# Patient Record
Sex: FEMALE | Race: WHITE | Employment: OTHER | URBAN - METROPOLITAN AREA
[De-identification: names, ages, dates, MRNs, and addresses within clinical notes are randomized per-mention and may not be internally consistent; named-entity substitution may affect disease eponyms.]

---

## 2017-03-21 ENCOUNTER — OFFICE VISIT (OUTPATIENT)
Dept: PULMONOLOGY | Age: 72
End: 2017-03-21

## 2017-03-21 VITALS
BODY MASS INDEX: 45.54 KG/M2 | HEART RATE: 106 BPM | DIASTOLIC BLOOD PRESSURE: 66 MMHG | OXYGEN SATURATION: 94 % | SYSTOLIC BLOOD PRESSURE: 113 MMHG | WEIGHT: 257 LBS | HEIGHT: 63 IN | TEMPERATURE: 98 F | RESPIRATION RATE: 18 BRPM

## 2017-03-21 DIAGNOSIS — J44.9 COPD, SEVERE (HCC): Primary | ICD-10-CM

## 2017-03-21 PROCEDURE — 99214 OFFICE O/P EST MOD 30 MIN: CPT | Performed by: INTERNAL MEDICINE

## 2017-03-21 RX ORDER — CHOLECALCIFEROL (VITAMIN D3) 25 MCG
CAPSULE ORAL
COMMUNITY
End: 2020-09-24 | Stop reason: CLARIF

## 2017-03-21 RX ORDER — ALBUTEROL SULFATE 90 UG/1
2 AEROSOL, METERED RESPIRATORY (INHALATION) EVERY 6 HOURS PRN
Qty: 1 INHALER | Refills: 5 | Status: SHIPPED | OUTPATIENT
Start: 2017-03-21 | End: 2017-08-01 | Stop reason: ALTCHOICE

## 2017-03-21 ASSESSMENT — SLEEP AND FATIGUE QUESTIONNAIRES
HOW LIKELY ARE YOU TO NOD OFF OR FALL ASLEEP WHILE SITTING AND READING: 0
HOW LIKELY ARE YOU TO NOD OFF OR FALL ASLEEP WHILE SITTING QUIETLY AFTER LUNCH WITHOUT ALCOHOL: 0
NECK CIRCUMFERENCE (INCHES): 16.25
HOW LIKELY ARE YOU TO NOD OFF OR FALL ASLEEP WHILE SITTING INACTIVE IN A PUBLIC PLACE: 0
HOW LIKELY ARE YOU TO NOD OFF OR FALL ASLEEP WHILE SITTING AND TALKING TO SOMEONE: 0
ESS TOTAL SCORE: 4
HOW LIKELY ARE YOU TO NOD OFF OR FALL ASLEEP WHILE LYING DOWN TO REST IN THE AFTERNOON WHEN CIRCUMSTANCES PERMIT: 3
HOW LIKELY ARE YOU TO NOD OFF OR FALL ASLEEP WHILE WATCHING TV: 1
HOW LIKELY ARE YOU TO NOD OFF OR FALL ASLEEP IN A CAR, WHILE STOPPED FOR A FEW MINUTES IN TRAFFIC: 0
HOW LIKELY ARE YOU TO NOD OFF OR FALL ASLEEP WHEN YOU ARE A PASSENGER IN A CAR FOR AN HOUR WITHOUT A BREAK: 0

## 2017-08-01 ENCOUNTER — OFFICE VISIT (OUTPATIENT)
Dept: PULMONOLOGY | Age: 72
End: 2017-08-01

## 2017-08-01 VITALS
DIASTOLIC BLOOD PRESSURE: 62 MMHG | OXYGEN SATURATION: 96 % | WEIGHT: 253.4 LBS | SYSTOLIC BLOOD PRESSURE: 118 MMHG | TEMPERATURE: 98 F | RESPIRATION RATE: 18 BRPM | HEIGHT: 63 IN | BODY MASS INDEX: 44.9 KG/M2 | HEART RATE: 90 BPM

## 2017-08-01 DIAGNOSIS — J44.9 COPD, SEVERE (HCC): Primary | ICD-10-CM

## 2017-08-01 DIAGNOSIS — E66.2 OBESITY HYPOVENTILATION SYNDROME (HCC): ICD-10-CM

## 2017-08-01 DIAGNOSIS — J96.11 HYPOXEMIC RESPIRATORY FAILURE, CHRONIC (HCC): ICD-10-CM

## 2017-08-01 PROCEDURE — 99214 OFFICE O/P EST MOD 30 MIN: CPT | Performed by: INTERNAL MEDICINE

## 2017-08-01 RX ORDER — FLUTICASONE PROPIONATE AND SALMETEROL 232; 14 UG/1; UG/1
1 POWDER, METERED RESPIRATORY (INHALATION) 2 TIMES DAILY
Qty: 1 EACH | Refills: 5 | Status: SHIPPED | OUTPATIENT
Start: 2017-08-01 | End: 2018-01-31

## 2017-09-19 RX ORDER — TIOTROPIUM BROMIDE 18 UG/1
CAPSULE ORAL; RESPIRATORY (INHALATION)
Qty: 30 CAPSULE | Refills: 5 | Status: SHIPPED | OUTPATIENT
Start: 2017-09-19 | End: 2018-01-31 | Stop reason: SDUPTHER

## 2018-01-31 ENCOUNTER — OFFICE VISIT (OUTPATIENT)
Dept: PULMONOLOGY | Age: 73
End: 2018-01-31

## 2018-01-31 VITALS
DIASTOLIC BLOOD PRESSURE: 68 MMHG | WEIGHT: 249 LBS | HEIGHT: 63 IN | RESPIRATION RATE: 18 BRPM | SYSTOLIC BLOOD PRESSURE: 130 MMHG | HEART RATE: 82 BPM | TEMPERATURE: 97.8 F | OXYGEN SATURATION: 96 % | BODY MASS INDEX: 44.12 KG/M2

## 2018-01-31 DIAGNOSIS — J96.11 CHRONIC HYPOXEMIC RESPIRATORY FAILURE (HCC): Primary | ICD-10-CM

## 2018-01-31 DIAGNOSIS — J44.9 CHRONIC OBSTRUCTIVE PULMONARY DISEASE, UNSPECIFIED COPD TYPE (HCC): ICD-10-CM

## 2018-01-31 PROCEDURE — 1036F TOBACCO NON-USER: CPT | Performed by: INTERNAL MEDICINE

## 2018-01-31 PROCEDURE — G8427 DOCREV CUR MEDS BY ELIG CLIN: HCPCS | Performed by: INTERNAL MEDICINE

## 2018-01-31 PROCEDURE — 3023F SPIROM DOC REV: CPT | Performed by: INTERNAL MEDICINE

## 2018-01-31 PROCEDURE — G8400 PT W/DXA NO RESULTS DOC: HCPCS | Performed by: INTERNAL MEDICINE

## 2018-01-31 PROCEDURE — G8926 SPIRO NO PERF OR DOC: HCPCS | Performed by: INTERNAL MEDICINE

## 2018-01-31 PROCEDURE — G8484 FLU IMMUNIZE NO ADMIN: HCPCS | Performed by: INTERNAL MEDICINE

## 2018-01-31 PROCEDURE — 1123F ACP DISCUSS/DSCN MKR DOCD: CPT | Performed by: INTERNAL MEDICINE

## 2018-01-31 PROCEDURE — 99214 OFFICE O/P EST MOD 30 MIN: CPT | Performed by: INTERNAL MEDICINE

## 2018-01-31 PROCEDURE — 3014F SCREEN MAMMO DOC REV: CPT | Performed by: INTERNAL MEDICINE

## 2018-01-31 PROCEDURE — 4040F PNEUMOC VAC/ADMIN/RCVD: CPT | Performed by: INTERNAL MEDICINE

## 2018-01-31 PROCEDURE — 3017F COLORECTAL CA SCREEN DOC REV: CPT | Performed by: INTERNAL MEDICINE

## 2018-01-31 PROCEDURE — G8417 CALC BMI ABV UP PARAM F/U: HCPCS | Performed by: INTERNAL MEDICINE

## 2018-01-31 PROCEDURE — 1090F PRES/ABSN URINE INCON ASSESS: CPT | Performed by: INTERNAL MEDICINE

## 2018-01-31 RX ORDER — ALBUTEROL SULFATE 2.5 MG/3ML
2.5 SOLUTION RESPIRATORY (INHALATION) EVERY 4 HOURS PRN
Qty: 360 ML | Refills: 3 | Status: SHIPPED | OUTPATIENT
Start: 2018-01-31 | End: 2018-10-30 | Stop reason: SDUPTHER

## 2018-01-31 ASSESSMENT — SLEEP AND FATIGUE QUESTIONNAIRES
HOW LIKELY ARE YOU TO NOD OFF OR FALL ASLEEP WHEN YOU ARE A PASSENGER IN A CAR FOR AN HOUR WITHOUT A BREAK: 0
NECK CIRCUMFERENCE (INCHES): 16
HOW LIKELY ARE YOU TO NOD OFF OR FALL ASLEEP WHILE SITTING AND READING: 0
HOW LIKELY ARE YOU TO NOD OFF OR FALL ASLEEP IN A CAR, WHILE STOPPED FOR A FEW MINUTES IN TRAFFIC: 0
HOW LIKELY ARE YOU TO NOD OFF OR FALL ASLEEP WHILE SITTING AND TALKING TO SOMEONE: 0
HOW LIKELY ARE YOU TO NOD OFF OR FALL ASLEEP WHILE SITTING INACTIVE IN A PUBLIC PLACE: 0
HOW LIKELY ARE YOU TO NOD OFF OR FALL ASLEEP WHILE LYING DOWN TO REST IN THE AFTERNOON WHEN CIRCUMSTANCES PERMIT: 3
HOW LIKELY ARE YOU TO NOD OFF OR FALL ASLEEP WHILE WATCHING TV: 1
ESS TOTAL SCORE: 4
HOW LIKELY ARE YOU TO NOD OFF OR FALL ASLEEP WHILE SITTING QUIETLY AFTER LUNCH WITHOUT ALCOHOL: 0

## 2018-07-31 ENCOUNTER — OFFICE VISIT (OUTPATIENT)
Dept: PULMONOLOGY | Age: 73
End: 2018-07-31

## 2018-07-31 VITALS
TEMPERATURE: 97.9 F | HEIGHT: 63 IN | BODY MASS INDEX: 44.16 KG/M2 | RESPIRATION RATE: 16 BRPM | SYSTOLIC BLOOD PRESSURE: 138 MMHG | HEART RATE: 97 BPM | DIASTOLIC BLOOD PRESSURE: 62 MMHG | WEIGHT: 249.2 LBS | OXYGEN SATURATION: 96 %

## 2018-07-31 DIAGNOSIS — E66.2 OBESITY HYPOVENTILATION SYNDROME (HCC): ICD-10-CM

## 2018-07-31 DIAGNOSIS — J44.9 CHRONIC OBSTRUCTIVE PULMONARY DISEASE, UNSPECIFIED COPD TYPE (HCC): Primary | ICD-10-CM

## 2018-07-31 DIAGNOSIS — J96.11 CHRONIC HYPOXEMIC RESPIRATORY FAILURE (HCC): ICD-10-CM

## 2018-07-31 PROCEDURE — 3023F SPIROM DOC REV: CPT | Performed by: INTERNAL MEDICINE

## 2018-07-31 PROCEDURE — G8400 PT W/DXA NO RESULTS DOC: HCPCS | Performed by: INTERNAL MEDICINE

## 2018-07-31 PROCEDURE — G8417 CALC BMI ABV UP PARAM F/U: HCPCS | Performed by: INTERNAL MEDICINE

## 2018-07-31 PROCEDURE — 4040F PNEUMOC VAC/ADMIN/RCVD: CPT | Performed by: INTERNAL MEDICINE

## 2018-07-31 PROCEDURE — G8926 SPIRO NO PERF OR DOC: HCPCS | Performed by: INTERNAL MEDICINE

## 2018-07-31 PROCEDURE — 1090F PRES/ABSN URINE INCON ASSESS: CPT | Performed by: INTERNAL MEDICINE

## 2018-07-31 PROCEDURE — G8427 DOCREV CUR MEDS BY ELIG CLIN: HCPCS | Performed by: INTERNAL MEDICINE

## 2018-07-31 PROCEDURE — 99214 OFFICE O/P EST MOD 30 MIN: CPT | Performed by: INTERNAL MEDICINE

## 2018-07-31 PROCEDURE — 3017F COLORECTAL CA SCREEN DOC REV: CPT | Performed by: INTERNAL MEDICINE

## 2018-07-31 PROCEDURE — 1123F ACP DISCUSS/DSCN MKR DOCD: CPT | Performed by: INTERNAL MEDICINE

## 2018-07-31 PROCEDURE — 1101F PT FALLS ASSESS-DOCD LE1/YR: CPT | Performed by: INTERNAL MEDICINE

## 2018-07-31 PROCEDURE — 1036F TOBACCO NON-USER: CPT | Performed by: INTERNAL MEDICINE

## 2018-07-31 RX ORDER — AZITHROMYCIN 250 MG/1
TABLET, FILM COATED ORAL
Qty: 1 PACKET | Refills: 0 | Status: SHIPPED | OUTPATIENT
Start: 2018-07-31 | End: 2018-08-16 | Stop reason: ALTCHOICE

## 2018-07-31 RX ORDER — PREDNISONE 10 MG/1
TABLET ORAL
Qty: 30 TABLET | Refills: 0 | Status: SHIPPED | OUTPATIENT
Start: 2018-07-31 | End: 2018-08-16 | Stop reason: ALTCHOICE

## 2018-07-31 NOTE — PROGRESS NOTES
2005     Plan:   1. Prednisone taper, Zpac and robitussin DM   Spiriva q day; Advair 500/50 (has failed symbicort - is not effective)   2. Continue Albuterol prn  3. Weight reduction is recommended. 4.  3 L oxygen continuous   5. Patient has declined low dose chest CT for lung cancer screening     6.   See me in 6 months

## 2018-08-14 ENCOUNTER — TELEPHONE (OUTPATIENT)
Dept: PULMONOLOGY | Age: 73
End: 2018-08-14

## 2018-08-14 RX ORDER — CEFDINIR 300 MG/1
300 CAPSULE ORAL 2 TIMES DAILY
Qty: 20 CAPSULE | Refills: 0 | Status: SHIPPED | OUTPATIENT
Start: 2018-08-14 | End: 2018-08-24

## 2018-08-14 RX ORDER — PREDNISONE 10 MG/1
TABLET ORAL
Qty: 25 TABLET | Refills: 0 | Status: SHIPPED | OUTPATIENT
Start: 2018-08-14 | End: 2018-08-24

## 2018-08-16 ENCOUNTER — HOSPITAL ENCOUNTER (OUTPATIENT)
Age: 73
Discharge: HOME | End: 2018-08-16
Payer: MEDICARE

## 2018-08-16 ENCOUNTER — OFFICE VISIT (OUTPATIENT)
Dept: PULMONOLOGY | Age: 73
End: 2018-08-16

## 2018-08-16 ENCOUNTER — HOSPITAL ENCOUNTER (OUTPATIENT)
Dept: GENERAL RADIOLOGY | Age: 73
Discharge: HOME | End: 2018-08-16
Payer: MEDICARE

## 2018-08-16 VITALS
HEART RATE: 94 BPM | WEIGHT: 246 LBS | HEIGHT: 64 IN | BODY MASS INDEX: 42 KG/M2 | RESPIRATION RATE: 20 BRPM | SYSTOLIC BLOOD PRESSURE: 132 MMHG | TEMPERATURE: 98 F | OXYGEN SATURATION: 97 % | DIASTOLIC BLOOD PRESSURE: 74 MMHG

## 2018-08-16 DIAGNOSIS — J18.9 COMMUNITY ACQUIRED PNEUMONIA, UNSPECIFIED LATERALITY: ICD-10-CM

## 2018-08-16 DIAGNOSIS — Z51.81 THERAPEUTIC DRUG MONITORING: ICD-10-CM

## 2018-08-16 DIAGNOSIS — J44.1 COPD EXACERBATION (HCC): ICD-10-CM

## 2018-08-16 DIAGNOSIS — J18.9 COMMUNITY ACQUIRED PNEUMONIA, UNSPECIFIED LATERALITY: Primary | ICD-10-CM

## 2018-08-16 LAB
ALBUMIN SERPL-MCNC: 3.9 G/DL (ref 3.4–5)
ANION GAP SERPL CALCULATED.3IONS-SCNC: 11 MMOL/L (ref 3–16)
BASOPHILS ABSOLUTE: 0.1 K/UL (ref 0–0.2)
BASOPHILS RELATIVE PERCENT: 0.7 %
BUN BLDV-MCNC: 31 MG/DL (ref 7–20)
CALCIUM SERPL-MCNC: 10.5 MG/DL (ref 8.3–10.6)
CHLORIDE BLD-SCNC: 102 MMOL/L (ref 99–110)
CO2: 30 MMOL/L (ref 21–32)
CREAT SERPL-MCNC: 1.4 MG/DL (ref 0.6–1.2)
EOSINOPHILS ABSOLUTE: 0 K/UL (ref 0–0.6)
EOSINOPHILS RELATIVE PERCENT: 0.2 %
GFR AFRICAN AMERICAN: 44
GFR NON-AFRICAN AMERICAN: 37
GLUCOSE BLD-MCNC: 101 MG/DL (ref 70–99)
HCT VFR BLD CALC: 36 % (ref 36–48)
HEMOGLOBIN: 11.7 G/DL (ref 12–16)
LYMPHOCYTES ABSOLUTE: 1.1 K/UL (ref 1–5.1)
LYMPHOCYTES RELATIVE PERCENT: 8.5 %
MCH RBC QN AUTO: 31.6 PG (ref 26–34)
MCHC RBC AUTO-ENTMCNC: 32.6 G/DL (ref 31–36)
MCV RBC AUTO: 96.8 FL (ref 80–100)
MONOCYTES ABSOLUTE: 0.5 K/UL (ref 0–1.3)
MONOCYTES RELATIVE PERCENT: 4.2 %
NEUTROPHILS ABSOLUTE: 11.1 K/UL (ref 1.7–7.7)
NEUTROPHILS RELATIVE PERCENT: 86.4 %
PDW BLD-RTO: 14.5 % (ref 12.4–15.4)
PHOSPHORUS: 2.8 MG/DL (ref 2.5–4.9)
PLATELET # BLD: 249 K/UL (ref 135–450)
PMV BLD AUTO: 9.2 FL (ref 5–10.5)
POTASSIUM SERPL-SCNC: 5.3 MMOL/L (ref 3.5–5.1)
RBC # BLD: 3.72 M/UL (ref 4–5.2)
SODIUM BLD-SCNC: 143 MMOL/L (ref 136–145)
WBC # BLD: 12.8 K/UL (ref 4–11)

## 2018-08-16 PROCEDURE — 1036F TOBACCO NON-USER: CPT | Performed by: INTERNAL MEDICINE

## 2018-08-16 PROCEDURE — G8417 CALC BMI ABV UP PARAM F/U: HCPCS | Performed by: INTERNAL MEDICINE

## 2018-08-16 PROCEDURE — 71046 X-RAY EXAM CHEST 2 VIEWS: CPT

## 2018-08-16 PROCEDURE — 4040F PNEUMOC VAC/ADMIN/RCVD: CPT | Performed by: INTERNAL MEDICINE

## 2018-08-16 PROCEDURE — 1090F PRES/ABSN URINE INCON ASSESS: CPT | Performed by: INTERNAL MEDICINE

## 2018-08-16 PROCEDURE — G8926 SPIRO NO PERF OR DOC: HCPCS | Performed by: INTERNAL MEDICINE

## 2018-08-16 PROCEDURE — G8427 DOCREV CUR MEDS BY ELIG CLIN: HCPCS | Performed by: INTERNAL MEDICINE

## 2018-08-16 PROCEDURE — 99214 OFFICE O/P EST MOD 30 MIN: CPT | Performed by: INTERNAL MEDICINE

## 2018-08-16 PROCEDURE — 3017F COLORECTAL CA SCREEN DOC REV: CPT | Performed by: INTERNAL MEDICINE

## 2018-08-16 PROCEDURE — 85025 COMPLETE CBC W/AUTO DIFF WBC: CPT

## 2018-08-16 PROCEDURE — 1101F PT FALLS ASSESS-DOCD LE1/YR: CPT | Performed by: INTERNAL MEDICINE

## 2018-08-16 PROCEDURE — 36415 COLL VENOUS BLD VENIPUNCTURE: CPT

## 2018-08-16 PROCEDURE — 3023F SPIROM DOC REV: CPT | Performed by: INTERNAL MEDICINE

## 2018-08-16 PROCEDURE — G8400 PT W/DXA NO RESULTS DOC: HCPCS | Performed by: INTERNAL MEDICINE

## 2018-08-16 PROCEDURE — 80069 RENAL FUNCTION PANEL: CPT

## 2018-08-16 PROCEDURE — 1123F ACP DISCUSS/DSCN MKR DOCD: CPT | Performed by: INTERNAL MEDICINE

## 2018-08-16 RX ORDER — PREDNISONE 10 MG/1
10 TABLET ORAL DAILY
Qty: 30 TABLET | Refills: 0 | Status: SHIPPED | OUTPATIENT
Start: 2018-08-16 | End: 2018-09-05

## 2018-08-16 RX ORDER — HYDROCHLOROTHIAZIDE 25 MG/1
25 TABLET ORAL DAILY
Qty: 30 TABLET | Refills: 3 | Status: SHIPPED | OUTPATIENT
Start: 2018-08-16 | End: 2019-02-12

## 2018-08-17 ENCOUNTER — TELEPHONE (OUTPATIENT)
Dept: PULMONOLOGY | Age: 73
End: 2018-08-17

## 2018-08-17 NOTE — PROGRESS NOTES
Tell pt I reviewed her blood work. Her potassium is slightly elevated but the blood pressure medication change I made will help with that. Repeat blood work next week as recommended.

## 2018-08-21 ENCOUNTER — HOSPITAL ENCOUNTER (OUTPATIENT)
Age: 73
Discharge: HOME | End: 2018-08-21
Payer: MEDICARE

## 2018-08-21 DIAGNOSIS — Z51.81 THERAPEUTIC DRUG MONITORING: ICD-10-CM

## 2018-08-21 DIAGNOSIS — J18.9 COMMUNITY ACQUIRED PNEUMONIA, UNSPECIFIED LATERALITY: ICD-10-CM

## 2018-08-21 LAB
ALBUMIN SERPL-MCNC: 3.7 G/DL (ref 3.4–5)
ANION GAP SERPL CALCULATED.3IONS-SCNC: 12 MMOL/L (ref 3–16)
BUN BLDV-MCNC: 33 MG/DL (ref 7–20)
CALCIUM SERPL-MCNC: 10.3 MG/DL (ref 8.3–10.6)
CHLORIDE BLD-SCNC: 98 MMOL/L (ref 99–110)
CO2: 33 MMOL/L (ref 21–32)
CREAT SERPL-MCNC: 1.5 MG/DL (ref 0.6–1.2)
GFR AFRICAN AMERICAN: 41
GFR NON-AFRICAN AMERICAN: 34
GLUCOSE BLD-MCNC: 100 MG/DL (ref 70–99)
PHOSPHORUS: 2.9 MG/DL (ref 2.5–4.9)
POTASSIUM SERPL-SCNC: 4.6 MMOL/L (ref 3.5–5.1)
SODIUM BLD-SCNC: 143 MMOL/L (ref 136–145)

## 2018-08-21 PROCEDURE — 36415 COLL VENOUS BLD VENIPUNCTURE: CPT

## 2018-08-21 PROCEDURE — 80069 RENAL FUNCTION PANEL: CPT

## 2018-09-05 ENCOUNTER — OFFICE VISIT (OUTPATIENT)
Dept: PULMONOLOGY | Age: 73
End: 2018-09-05

## 2018-09-05 VITALS
HEART RATE: 82 BPM | WEIGHT: 246 LBS | BODY MASS INDEX: 42 KG/M2 | SYSTOLIC BLOOD PRESSURE: 136 MMHG | RESPIRATION RATE: 18 BRPM | OXYGEN SATURATION: 95 % | DIASTOLIC BLOOD PRESSURE: 72 MMHG | HEIGHT: 64 IN | TEMPERATURE: 98 F

## 2018-09-05 DIAGNOSIS — J44.9 CHRONIC OBSTRUCTIVE PULMONARY DISEASE, UNSPECIFIED COPD TYPE (HCC): Primary | ICD-10-CM

## 2018-09-05 DIAGNOSIS — J96.11 CHRONIC HYPOXEMIC RESPIRATORY FAILURE (HCC): ICD-10-CM

## 2018-09-05 PROCEDURE — 1101F PT FALLS ASSESS-DOCD LE1/YR: CPT | Performed by: INTERNAL MEDICINE

## 2018-09-05 PROCEDURE — 1036F TOBACCO NON-USER: CPT | Performed by: INTERNAL MEDICINE

## 2018-09-05 PROCEDURE — 1123F ACP DISCUSS/DSCN MKR DOCD: CPT | Performed by: INTERNAL MEDICINE

## 2018-09-05 PROCEDURE — G8926 SPIRO NO PERF OR DOC: HCPCS | Performed by: INTERNAL MEDICINE

## 2018-09-05 PROCEDURE — G8400 PT W/DXA NO RESULTS DOC: HCPCS | Performed by: INTERNAL MEDICINE

## 2018-09-05 PROCEDURE — 99214 OFFICE O/P EST MOD 30 MIN: CPT | Performed by: INTERNAL MEDICINE

## 2018-09-05 PROCEDURE — G8417 CALC BMI ABV UP PARAM F/U: HCPCS | Performed by: INTERNAL MEDICINE

## 2018-09-05 PROCEDURE — 3017F COLORECTAL CA SCREEN DOC REV: CPT | Performed by: INTERNAL MEDICINE

## 2018-09-05 PROCEDURE — 4040F PNEUMOC VAC/ADMIN/RCVD: CPT | Performed by: INTERNAL MEDICINE

## 2018-09-05 PROCEDURE — G8428 CUR MEDS NOT DOCUMENT: HCPCS | Performed by: INTERNAL MEDICINE

## 2018-09-05 PROCEDURE — 3023F SPIROM DOC REV: CPT | Performed by: INTERNAL MEDICINE

## 2018-09-05 PROCEDURE — 1090F PRES/ABSN URINE INCON ASSESS: CPT | Performed by: INTERNAL MEDICINE

## 2018-09-05 RX ORDER — PREDNISONE 1 MG/1
TABLET ORAL
Qty: 60 TABLET | Refills: 2 | Status: SHIPPED | OUTPATIENT
Start: 2018-09-05 | End: 2018-12-07

## 2018-09-05 NOTE — LETTER
[facility] Pulmonary, Critical Care, and Sleep  [address], [address] [place] [address]  Phone: [phone]  Fax: [phone]    September 5, 2018     Patient: Ruthy Vuong   MR Number: [id number]   YOB: 1945   Date of Visit: 9/5/2018     Dear Dr. Beverley Fischer: Yadira Savage saw me today in follow up. She has had a very difficult couple of months, having been treated at least three times with prednisone for recurrent flares of her COPD. I recently treated her and kept her on 10 mg of prednisone until I could see her back. She looks tremendously better and I am going to plan to taper prednisone slowly to off. I did change her lisinopril to HCTZ temporarily due to great difficulty with her cough. I don't think the ACE is the sole culprit, but it may be lowering her cough threshold. My plan was to have her return to lisinopril once she is stable off prednisone. Assessment:   1. Severe COPD FEV1 1  L    2. Chronic Respiratory Failure with hypoxemia on continuous home oxygen (3 L pulse)   3. Morbid Obesity with hypoventilation syndrome - had been better S/P Gastric banding. Her PSG in 2014 did not show PARK  4.  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 Spiriva q day; Advair 500/50 (has failed symbicort - is not effective)    Prednisone very slow taper to 5 mg then see me and we'll try coming off over several more weeks  2. Continue Albuterol prn  3. Remain off lisinopril for now, persistent coughing. I don't think lisinopril is the cause, but it may lower cough threshold. 4.  3 L oxygen continuous     If you have questions, please do not hesitate to call me. I look forward to following Yadira Hussein along with you.     Sincerely,        Sierra Pate MD

## 2018-09-05 NOTE — PROGRESS NOTES
CC: COPD  HPI   Treated in April, June, and August for COPD exacerbations. Last flare was severe, I offered hospitalization but she has done well on slow prednisone taper. Still with increased cough. Prednisone dose now down to 10 mg. She has actually lost weight on the prednisone. Previous history:  Non restorative sleep with prolonged period of sleep: had PSG without PARK June 2014; had had CPAP 6 in the past but d/c'd (had Bariatric surgery). Weight not much different than on that study. Has PLMS with PLMI of 5 on that PSG. Review of Systems:        Objective:   Physical Exam  Blood pressure 136/72, pulse 82, temperature 98 °F (36.7 °C), temperature source Oral, resp. rate 18, height 5' 4\" (1.626 m), weight 246 lb (111.6 kg), SpO2 95 %. 'on 2L   previous weight 237, 241 on 5-17-13; 258 on 5-20-14; 260 on 2-6-15, 246 on 9-15-15; 239 on 11-17-15, 255 on 3-15-16, 243 on 9-19-16, 257 on 3/21/17, 249# on 1/31/18, 249# on 7/31/18, 246# on 9/5/18  Constitutional:  No acute distress. HENT:  Oropharynx is clear and moist.   Eyes: Pupils equal, round, and reactive to light. No scleral icterus. Neck: No tracheal deviation present. Cardiovascular: Normal heart sounds. + lower extremity edema. Pulmonary/Chest: No wheezes. No rhonchi. No rales. + decreased breath sounds. no accessory muscle usage or stridor. Musculoskeletal: No cyanosis. No clubbing. Skin: Skin is warm and dry. Psychiatric: Normal mood and affect. Neurologic: speech fluent, alert and oriented, strength symmetric        PFT Nov 2009 FEV1 1.07 L  54%  DLCO 7.16  PFT Feb 2013 FEV1 1.0 L  46% DLCO 10.44  PFT 4-21-15 FEV1 1.1 L 51% DLCO 9.72 45% %    PSG at [facility] with re-titration 2010 post surgery  PSG @ [facility] 6-11-14 AHI 0.6    CXR 8/16/18 no airspace disease     Assessment:   1. Severe COPD FEV1 1  L    2. Chronic Respiratory Failure with hypoxemia on continuous home oxygen (3 L pulse)   3.   Morbid Obesity with hypoventilation syndrome - had been better S/P Gastric banding. Her PSG in 2014 did not show PARK  4.  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 Spiriva q day; Advair 500/50 (has failed symbicort - is not effective)    Prednisone very slow taper to 5 mg then see me and we'll try coming off over several more weeks  2. Continue Albuterol prn  3. Remain off lisinopril for now, persistent coughing. I don't think lisinopril is the cause, but it may lower cough threshold. 4.  3 L oxygen continuous   5. Patient has previously declined low dose chest CT for lung cancer screening     6.   See me in 2 months to re-evaluate prednisone taper

## 2018-10-30 ENCOUNTER — OFFICE VISIT (OUTPATIENT)
Dept: PULMONOLOGY | Age: 73
End: 2018-10-30
Payer: MEDICARE

## 2018-10-30 VITALS
BODY MASS INDEX: 43.87 KG/M2 | SYSTOLIC BLOOD PRESSURE: 146 MMHG | RESPIRATION RATE: 22 BRPM | TEMPERATURE: 98.3 F | HEART RATE: 86 BPM | HEIGHT: 64 IN | OXYGEN SATURATION: 95 % | DIASTOLIC BLOOD PRESSURE: 74 MMHG | WEIGHT: 257 LBS

## 2018-10-30 DIAGNOSIS — J44.9 CHRONIC OBSTRUCTIVE PULMONARY DISEASE, UNSPECIFIED COPD TYPE (HCC): Primary | ICD-10-CM

## 2018-10-30 DIAGNOSIS — R05.3 CHRONIC COUGH: ICD-10-CM

## 2018-10-30 DIAGNOSIS — J96.11 CHRONIC HYPOXEMIC RESPIRATORY FAILURE (HCC): ICD-10-CM

## 2018-10-30 PROCEDURE — 3023F SPIROM DOC REV: CPT | Performed by: INTERNAL MEDICINE

## 2018-10-30 PROCEDURE — 99214 OFFICE O/P EST MOD 30 MIN: CPT | Performed by: INTERNAL MEDICINE

## 2018-10-30 PROCEDURE — G8400 PT W/DXA NO RESULTS DOC: HCPCS | Performed by: INTERNAL MEDICINE

## 2018-10-30 PROCEDURE — 4040F PNEUMOC VAC/ADMIN/RCVD: CPT | Performed by: INTERNAL MEDICINE

## 2018-10-30 PROCEDURE — G8417 CALC BMI ABV UP PARAM F/U: HCPCS | Performed by: INTERNAL MEDICINE

## 2018-10-30 PROCEDURE — G8427 DOCREV CUR MEDS BY ELIG CLIN: HCPCS | Performed by: INTERNAL MEDICINE

## 2018-10-30 PROCEDURE — 1036F TOBACCO NON-USER: CPT | Performed by: INTERNAL MEDICINE

## 2018-10-30 PROCEDURE — G8926 SPIRO NO PERF OR DOC: HCPCS | Performed by: INTERNAL MEDICINE

## 2018-10-30 PROCEDURE — 1101F PT FALLS ASSESS-DOCD LE1/YR: CPT | Performed by: INTERNAL MEDICINE

## 2018-10-30 PROCEDURE — G8482 FLU IMMUNIZE ORDER/ADMIN: HCPCS | Performed by: INTERNAL MEDICINE

## 2018-10-30 PROCEDURE — 3017F COLORECTAL CA SCREEN DOC REV: CPT | Performed by: INTERNAL MEDICINE

## 2018-10-30 PROCEDURE — 1123F ACP DISCUSS/DSCN MKR DOCD: CPT | Performed by: INTERNAL MEDICINE

## 2018-10-30 PROCEDURE — 1090F PRES/ABSN URINE INCON ASSESS: CPT | Performed by: INTERNAL MEDICINE

## 2018-10-30 RX ORDER — ALBUTEROL SULFATE 2.5 MG/3ML
2.5 SOLUTION RESPIRATORY (INHALATION) EVERY 4 HOURS PRN
Qty: 360 ML | Refills: 3 | Status: SHIPPED | OUTPATIENT
Start: 2018-10-30 | End: 2020-09-24 | Stop reason: SDUPTHER

## 2018-10-30 RX ORDER — LOSARTAN POTASSIUM 100 MG/1
100 TABLET ORAL
Status: ON HOLD | COMMUNITY
Start: 2018-09-06 | End: 2021-12-18 | Stop reason: HOSPADM

## 2018-10-30 NOTE — PROGRESS NOTES
CC: COPD  HPI   Several month h/o increase cough and green sputum production. Has cough enuresis. She feels miserable. Is not open to suggestions regarding weight loss and exercise. Previous history:  Non restorative sleep with prolonged period of sleep: had PSG without PARK June 2014; had had CPAP 6 in the past but d/c'd (had Bariatric surgery). Weight not much different than on that study. Has PLMS with PLMI of 5 on that PSG. Review of Systems:        Objective:   Physical Exam  Blood pressure (!) 146/74, pulse 86, temperature 98.3 °F (36.8 °C), temperature source Oral, resp. rate 22, height 5' 4\" (1.626 m), weight 257 lb (116.6 kg), SpO2 95 %. 'on 2L   previous weight 237, 241 on 5-17-13; 258 on 5-20-14; 260 on 2-6-15, 246 on 9-15-15; 239 on 11-17-15, 255 on 3-15-16, 243 on 9-19-16, 257 on 3/21/17, 249# on 1/31/18, 249# on 7/31/18, 246# on 9/5/18  Constitutional:  No acute distress. HENT:  Oropharynx is clear and moist.   Eyes: Pupils equal, round, and reactive to light. No scleral icterus. Neck: No tracheal deviation present. Cardiovascular: Normal heart sounds. + lower extremity edema. Pulmonary/Chest: No wheezes. No rhonchi. No rales. + decreased breath sounds. no accessory muscle usage or stridor. Musculoskeletal: No cyanosis. No clubbing. Skin: Skin is warm and dry. Psychiatric: Normal mood and affect. Neurologic: speech fluent, alert and oriented, strength symmetric        PFT Nov 2009 FEV1 1.07 L  54%  DLCO 7.16  PFT Feb 2013 FEV1 1.0 L  46% DLCO 10.44  PFT 4-21-15 FEV1 1.1 L 51% DLCO 9.72 45% %    PSG at [facility] with re-titration 2010 post surgery  PSG @ [facility] 6-11-14 AHI 0.6    CXR 8/16/18 no airspace disease     Assessment:   · Chronic cough   · Severe COPD FEV1 1  L    · Chronic Respiratory Failure with hypoxemia on continuous home oxygen (3 L pulse)   · Morbid Obesity with hypoventilation syndrome - had been better S/P Gastric banding.   Her PSG in 2014 did not show PARK  · 73

## 2018-10-31 ENCOUNTER — HOSPITAL ENCOUNTER (OUTPATIENT)
Dept: CT IMAGING | Age: 73
Discharge: HOME | End: 2018-10-31
Payer: MEDICARE

## 2018-10-31 DIAGNOSIS — J44.9 CHRONIC OBSTRUCTIVE PULMONARY DISEASE, UNSPECIFIED COPD TYPE (HCC): ICD-10-CM

## 2018-10-31 DIAGNOSIS — R05.3 CHRONIC COUGH: ICD-10-CM

## 2018-10-31 PROCEDURE — 71250 CT THORAX DX C-: CPT

## 2018-11-07 ENCOUNTER — TELEPHONE (OUTPATIENT)
Dept: PULMONOLOGY | Age: 73
End: 2018-11-07

## 2018-11-07 NOTE — TELEPHONE ENCOUNTER
Per overdue results, pt is due for sputum culture. I s/w patient. She said she has not been able to spit anything up. Once she is able, she will submit culture. Last OV 10/30/18  Assessment:   · Chronic cough   · Severe COPD FEV1 1  L    · Chronic Respiratory Failure with hypoxemia on continuous home oxygen (3 L pulse)   · Morbid Obesity with hypoventilation syndrome - had been better S/P Gastric banding. 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 Spiriva q day; Advair 500/50 (has failed symbicort - is not effective)               Prednisone currently at 10 mg alternating with 5 mg, feels like she does a little better on 10 mg than 5 mg. Will eventually need to try dropping dose to 5 mg   2. Continue Albuterol prn  3. Remain off lisinopril for now, persistent coughing. I don't think lisinopril is the cause, but it may lower cough threshold. 4.  3 L oxygen continuous   5. CT CHEST no IV dye for chronic cough  6. Sputum gram stain and culture to look for resistant organisms  7.   See me in 1-2 months to re-evaluate prednisone taper

## 2018-12-07 ENCOUNTER — OFFICE VISIT (OUTPATIENT)
Dept: PULMONOLOGY | Age: 73
End: 2018-12-07
Payer: MEDICARE

## 2018-12-07 VITALS
DIASTOLIC BLOOD PRESSURE: 100 MMHG | TEMPERATURE: 98.2 F | BODY MASS INDEX: 43.71 KG/M2 | WEIGHT: 256 LBS | OXYGEN SATURATION: 95 % | HEIGHT: 64 IN | RESPIRATION RATE: 18 BRPM | SYSTOLIC BLOOD PRESSURE: 152 MMHG | HEART RATE: 95 BPM

## 2018-12-07 DIAGNOSIS — J44.9 CHRONIC OBSTRUCTIVE PULMONARY DISEASE, UNSPECIFIED COPD TYPE (HCC): ICD-10-CM

## 2018-12-07 DIAGNOSIS — R05.3 CHRONIC COUGH: ICD-10-CM

## 2018-12-07 DIAGNOSIS — R91.1 PULMONARY NODULE: Primary | ICD-10-CM

## 2018-12-07 DIAGNOSIS — J96.11 CHRONIC HYPOXEMIC RESPIRATORY FAILURE (HCC): ICD-10-CM

## 2018-12-07 PROCEDURE — G8427 DOCREV CUR MEDS BY ELIG CLIN: HCPCS | Performed by: INTERNAL MEDICINE

## 2018-12-07 PROCEDURE — G8417 CALC BMI ABV UP PARAM F/U: HCPCS | Performed by: INTERNAL MEDICINE

## 2018-12-07 PROCEDURE — 4040F PNEUMOC VAC/ADMIN/RCVD: CPT | Performed by: INTERNAL MEDICINE

## 2018-12-07 PROCEDURE — 1123F ACP DISCUSS/DSCN MKR DOCD: CPT | Performed by: INTERNAL MEDICINE

## 2018-12-07 PROCEDURE — 3017F COLORECTAL CA SCREEN DOC REV: CPT | Performed by: INTERNAL MEDICINE

## 2018-12-07 PROCEDURE — 1090F PRES/ABSN URINE INCON ASSESS: CPT | Performed by: INTERNAL MEDICINE

## 2018-12-07 PROCEDURE — G8482 FLU IMMUNIZE ORDER/ADMIN: HCPCS | Performed by: INTERNAL MEDICINE

## 2018-12-07 PROCEDURE — 99214 OFFICE O/P EST MOD 30 MIN: CPT | Performed by: INTERNAL MEDICINE

## 2018-12-07 PROCEDURE — G8400 PT W/DXA NO RESULTS DOC: HCPCS | Performed by: INTERNAL MEDICINE

## 2018-12-07 PROCEDURE — 3023F SPIROM DOC REV: CPT | Performed by: INTERNAL MEDICINE

## 2018-12-07 PROCEDURE — 1036F TOBACCO NON-USER: CPT | Performed by: INTERNAL MEDICINE

## 2018-12-07 PROCEDURE — 1101F PT FALLS ASSESS-DOCD LE1/YR: CPT | Performed by: INTERNAL MEDICINE

## 2018-12-07 PROCEDURE — G8926 SPIRO NO PERF OR DOC: HCPCS | Performed by: INTERNAL MEDICINE

## 2018-12-07 RX ORDER — PREDNISONE 1 MG/1
TABLET ORAL
Qty: 55 TABLET | Refills: 0 | Status: SHIPPED | OUTPATIENT
Start: 2018-12-07 | End: 2019-02-04

## 2018-12-07 NOTE — PROGRESS NOTES
18-24 months.  In a   high-risk patient, CT at 6-12 months, then CT at 18-24 months. 3. Coronary artery disease. Assessment:   · Chronic cough   · Severe COPD FEV1 1  L  - Has done relatively poorly  · Chronic Respiratory Failure with hypoxemia on continuous home oxygen (3 L pulse)   · Morbid Obesity with hypoventilation syndrome - had been better S/P Gastric banding. 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 Spiriva q day; Advair 500/50 (has failed symbicort - is not effective)    Prednisone drop to 5 mg daily then 3, 2, 1  2. Continue Albuterol prn  3. Remain off lisinopril for now, persistent coughing. I don't think lisinopril is the cause, but it may lower cough threshold. 4.  3 L oxygen continuous   5. CT CHEST no IV dye for PN in 6 months  7.   See me in 1-2 months to re-evaluate prednisone taper

## 2019-01-16 RX ORDER — TIOTROPIUM BROMIDE 18 UG/1
CAPSULE ORAL; RESPIRATORY (INHALATION)
Qty: 30 CAPSULE | Refills: 11 | OUTPATIENT
Start: 2019-01-16

## 2019-01-29 RX ORDER — TIOTROPIUM BROMIDE 18 UG/1
CAPSULE ORAL; RESPIRATORY (INHALATION)
Qty: 30 CAPSULE | Refills: 5 | Status: SHIPPED | OUTPATIENT
Start: 2019-01-29 | End: 2019-08-15 | Stop reason: SDUPTHER

## 2019-02-04 ENCOUNTER — APPOINTMENT (OUTPATIENT)
Dept: GENERAL RADIOLOGY | Age: 74
End: 2019-02-04
Payer: MEDICARE

## 2019-02-04 ENCOUNTER — HOSPITAL ENCOUNTER (EMERGENCY)
Age: 74
Discharge: HOME | End: 2019-02-04
Payer: MEDICARE

## 2019-02-04 VITALS
SYSTOLIC BLOOD PRESSURE: 115 MMHG | TEMPERATURE: 98.2 F | RESPIRATION RATE: 16 BRPM | DIASTOLIC BLOOD PRESSURE: 87 MMHG | OXYGEN SATURATION: 99 % | HEART RATE: 80 BPM

## 2019-02-04 DIAGNOSIS — S41.112A ARM LACERATION WITH COMPLICATION, LEFT, INITIAL ENCOUNTER: Primary | ICD-10-CM

## 2019-02-04 PROCEDURE — 90715 TDAP VACCINE 7 YRS/> IM: CPT | Performed by: PHYSICIAN ASSISTANT

## 2019-02-04 PROCEDURE — 99283 EMERGENCY DEPT VISIT LOW MDM: CPT

## 2019-02-04 PROCEDURE — 6360000002 HC RX W HCPCS: Performed by: PHYSICIAN ASSISTANT

## 2019-02-04 PROCEDURE — 90471 IMMUNIZATION ADMIN: CPT | Performed by: PHYSICIAN ASSISTANT

## 2019-02-04 PROCEDURE — 73060 X-RAY EXAM OF HUMERUS: CPT

## 2019-02-04 PROCEDURE — 4500000023 HC ED LEVEL 3 PROCEDURE

## 2019-02-04 PROCEDURE — 73562 X-RAY EXAM OF KNEE 3: CPT

## 2019-02-04 RX ORDER — NAPROXEN 500 MG/1
500 TABLET ORAL 2 TIMES DAILY
Qty: 20 TABLET | Refills: 0 | Status: SHIPPED | OUTPATIENT
Start: 2019-02-04 | End: 2019-09-26

## 2019-02-04 RX ORDER — DULOXETIN HYDROCHLORIDE 60 MG/1
60 CAPSULE, DELAYED RELEASE ORAL DAILY
COMMUNITY

## 2019-02-04 RX ADMIN — TETANUS TOXOID, REDUCED DIPHTHERIA TOXOID AND ACELLULAR PERTUSSIS VACCINE, ADSORBED 0.5 ML: 5; 2.5; 8; 8; 2.5 SUSPENSION INTRAMUSCULAR at 18:00

## 2019-02-04 ASSESSMENT — PAIN DESCRIPTION - ORIENTATION: ORIENTATION: LEFT

## 2019-02-04 ASSESSMENT — PAIN DESCRIPTION - PAIN TYPE: TYPE: ACUTE PAIN

## 2019-02-04 ASSESSMENT — ENCOUNTER SYMPTOMS: RESPIRATORY NEGATIVE: 1

## 2019-02-04 ASSESSMENT — PAIN DESCRIPTION - LOCATION: LOCATION: ARM

## 2019-02-12 ENCOUNTER — OFFICE VISIT (OUTPATIENT)
Dept: PULMONOLOGY | Age: 74
End: 2019-02-12
Payer: MEDICARE

## 2019-02-12 VITALS
HEIGHT: 61 IN | SYSTOLIC BLOOD PRESSURE: 134 MMHG | WEIGHT: 256 LBS | BODY MASS INDEX: 48.33 KG/M2 | DIASTOLIC BLOOD PRESSURE: 64 MMHG | RESPIRATION RATE: 20 BRPM | TEMPERATURE: 98.1 F | HEART RATE: 84 BPM | OXYGEN SATURATION: 94 %

## 2019-02-12 DIAGNOSIS — J44.9 CHRONIC OBSTRUCTIVE PULMONARY DISEASE, UNSPECIFIED COPD TYPE (HCC): Primary | ICD-10-CM

## 2019-02-12 PROCEDURE — 3023F SPIROM DOC REV: CPT | Performed by: INTERNAL MEDICINE

## 2019-02-12 PROCEDURE — G8926 SPIRO NO PERF OR DOC: HCPCS | Performed by: INTERNAL MEDICINE

## 2019-02-12 PROCEDURE — 1090F PRES/ABSN URINE INCON ASSESS: CPT | Performed by: INTERNAL MEDICINE

## 2019-02-12 PROCEDURE — 3017F COLORECTAL CA SCREEN DOC REV: CPT | Performed by: INTERNAL MEDICINE

## 2019-02-12 PROCEDURE — 4040F PNEUMOC VAC/ADMIN/RCVD: CPT | Performed by: INTERNAL MEDICINE

## 2019-02-12 PROCEDURE — G8427 DOCREV CUR MEDS BY ELIG CLIN: HCPCS | Performed by: INTERNAL MEDICINE

## 2019-02-12 PROCEDURE — 1036F TOBACCO NON-USER: CPT | Performed by: INTERNAL MEDICINE

## 2019-02-12 PROCEDURE — G8400 PT W/DXA NO RESULTS DOC: HCPCS | Performed by: INTERNAL MEDICINE

## 2019-02-12 PROCEDURE — G8417 CALC BMI ABV UP PARAM F/U: HCPCS | Performed by: INTERNAL MEDICINE

## 2019-02-12 PROCEDURE — 1123F ACP DISCUSS/DSCN MKR DOCD: CPT | Performed by: INTERNAL MEDICINE

## 2019-02-12 PROCEDURE — 1101F PT FALLS ASSESS-DOCD LE1/YR: CPT | Performed by: INTERNAL MEDICINE

## 2019-02-12 PROCEDURE — G8482 FLU IMMUNIZE ORDER/ADMIN: HCPCS | Performed by: INTERNAL MEDICINE

## 2019-02-12 PROCEDURE — 99213 OFFICE O/P EST LOW 20 MIN: CPT | Performed by: INTERNAL MEDICINE

## 2019-06-12 ENCOUNTER — HOSPITAL ENCOUNTER (OUTPATIENT)
Dept: CT IMAGING | Age: 74
Discharge: HOME | End: 2019-06-12
Payer: MEDICARE

## 2019-06-12 ENCOUNTER — OFFICE VISIT (OUTPATIENT)
Dept: PULMONOLOGY | Age: 74
End: 2019-06-12
Payer: MEDICARE

## 2019-06-12 VITALS
OXYGEN SATURATION: 95 % | HEIGHT: 61 IN | SYSTOLIC BLOOD PRESSURE: 126 MMHG | RESPIRATION RATE: 16 BRPM | BODY MASS INDEX: 47.2 KG/M2 | DIASTOLIC BLOOD PRESSURE: 68 MMHG | WEIGHT: 250 LBS | HEART RATE: 101 BPM | TEMPERATURE: 98.2 F

## 2019-06-12 DIAGNOSIS — J44.9 CHRONIC OBSTRUCTIVE PULMONARY DISEASE, UNSPECIFIED COPD TYPE (HCC): ICD-10-CM

## 2019-06-12 DIAGNOSIS — R91.1 PULMONARY NODULE: Primary | ICD-10-CM

## 2019-06-12 DIAGNOSIS — J96.11 CHRONIC HYPOXEMIC RESPIRATORY FAILURE (HCC): ICD-10-CM

## 2019-06-12 DIAGNOSIS — R91.1 PULMONARY NODULE: ICD-10-CM

## 2019-06-12 PROCEDURE — G8926 SPIRO NO PERF OR DOC: HCPCS | Performed by: INTERNAL MEDICINE

## 2019-06-12 PROCEDURE — 1123F ACP DISCUSS/DSCN MKR DOCD: CPT | Performed by: INTERNAL MEDICINE

## 2019-06-12 PROCEDURE — 1036F TOBACCO NON-USER: CPT | Performed by: INTERNAL MEDICINE

## 2019-06-12 PROCEDURE — 99214 OFFICE O/P EST MOD 30 MIN: CPT | Performed by: INTERNAL MEDICINE

## 2019-06-12 PROCEDURE — 3023F SPIROM DOC REV: CPT | Performed by: INTERNAL MEDICINE

## 2019-06-12 PROCEDURE — 1090F PRES/ABSN URINE INCON ASSESS: CPT | Performed by: INTERNAL MEDICINE

## 2019-06-12 PROCEDURE — G8400 PT W/DXA NO RESULTS DOC: HCPCS | Performed by: INTERNAL MEDICINE

## 2019-06-12 PROCEDURE — G8417 CALC BMI ABV UP PARAM F/U: HCPCS | Performed by: INTERNAL MEDICINE

## 2019-06-12 PROCEDURE — 4040F PNEUMOC VAC/ADMIN/RCVD: CPT | Performed by: INTERNAL MEDICINE

## 2019-06-12 PROCEDURE — 71250 CT THORAX DX C-: CPT

## 2019-06-12 PROCEDURE — G8427 DOCREV CUR MEDS BY ELIG CLIN: HCPCS | Performed by: INTERNAL MEDICINE

## 2019-06-12 NOTE — PROGRESS NOTES
CC: COPD  HPI   Had prednisone 3 weeks ago from her PCP but otherwise has successfully come off prednisone     Previous history:  Non restorative sleep with prolonged period of sleep: had PSG without PARK June 2014; had had CPAP 6 in the past but d/c'd (had Bariatric surgery). Weight not much different than on that study. Has PLMS with PLMI of 5 on that PSG. Review of Systems:        Objective:   Physical Exam  Blood pressure 126/68, pulse 101, temperature 98.2 °F (36.8 °C), temperature source Oral, resp. rate 16, height 5' 1\" (1.549 m), weight 250 lb (113.4 kg), SpO2 95 %. 'on 2L   previous weight 237, 241 on 5-17-13; 258 on 5-20-14; 260 on 2-6-15, 246 on 9-15-15; 239 on 11-17-15, 255 on 3-15-16, 243 on 9-19-16, 257 on 3/21/17, 249# on 1/31/18, 249# on 7/31/18, 246# on 9/5/18, 256 # on 12/7/18  Constitutional:  No acute distress. HENT:  Oropharynx is clear and moist.   Eyes: Pupils equal, round, and reactive to light. No scleral icterus. Neck: No tracheal deviation present. Cardiovascular: Normal heart sounds. + lower extremity edema. Pulmonary/Chest: No wheezes. No rhonchi. No rales. + decreased breath sounds. No increased work of breathing   Musculoskeletal: No cyanosis. No clubbing. Skin: Skin is warm and dry. Psychiatric: Normal mood and affect. Neurologic: speech fluent, alert and oriented, strength symmetric        PFT Nov 2009 FEV1 1.07 L  54%  DLCO 7.16  PFT Feb 2013 FEV1 1.0 L  46% DLCO 10.44  PFT 4-21-15 FEV1 1.1 L 51% DLCO 9.72 45% %    PSG at [facility] with re-titration 2010 post surgery  PSG @ [facility] 6-11-14 AHI 0.6    CT CHEST 10/31/18  Impression   1. Emphysema with no acute airspace disease. 2. New 6 mm left upper lobe pulmonary nodule.  Recommend follow-up: In a   low-risk patient, CT at 6-12 months, then consider CT at 18-24 months.  In a   high-risk patient, CT at 6-12 months, then CT at 18-24 months. 3. Coronary artery disease.      CT CHEST 6/12/19  FINDINGS:   Mediastinum: Thyroid gland appears normal.  Small mediastinal nodes are   noted.  Atherosclerotic change seen in aorta.  Coronary artery calcification   is seen.  Trace pericardial fluid is seen. Aury Dine is evidence of prior lap   band procedure.  There is nonspecific thickening at the GE junction.       Lungs/pleura: Moderate to severe apical predominant emphysema is seen.  No   pulmonary edema.  Bandlike opacity is seen medially in the right upper lobe,   likely scarring       Subtle ground-glass opacity posteriorly in the right upper lobe is unchanged. There is a new area of ground-glass opacity anteriorly in the right upper   lobe measuring 9 mm.       Smoothly marginated noncalcified pulmonary nodule left upper lobe is   unchanged measuring 6-7 mm.  Calcified granuloma seen in the left lower lobe. Bandlike opacity is seen anteriorly in the left lower lobe.     Upper Abdomen: Right adrenal gland is normal.  Left adrenal gland is normal.   Scattered colonic diverticula are seen.       Soft Tissues/Bones: Spurring is seen in the spine           Impression   No obvious change in smoothly marginated noncalcified pulmonary nodule left   upper lobe since most recent study.  This finding however is new compared to   2008 as previously discussed.       New subtle area of ground-glass opacity right upper lobe anteriorly, likely   postinflammatory-infectious       Assessment:   · Severe COPD FEV1 1  L  - Has done better recently  · Pulmonary Nodules - solid 7 mm stable Oct 2018 to June 2019, one ground glass area is stable, one new ground glass opacity, favor inflammatory change  · Chronic cough   · Chronic Respiratory Failure with hypoxemia on continuous home oxygen (3 L pulse)   · Morbid Obesity with hypoventilation syndrome - had been better S/P Gastric banding. 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 Spiriva q day;  Advair 500/50 - substitute Wixela 500 based upon cost (has failed symbicort - is not effective)   2. Continue Albuterol prn  3. Remain off lisinopril for now, persistent coughing. I don't think lisinopril is the cause, but it may lower cough threshold. 4.  3 L oxygen continuous   5.   CT CHEST no IV dye for PN in 6 months

## 2019-06-12 NOTE — PATIENT INSTRUCTIONS
Remember to bring all pulmonary medications to your next appointment with the office. Please keep all of your future appointments scheduled by [facility] Pulmonary office. Out of respect for other patients and providers, you may be asked to reschedule your appointment if you arrive later than your scheduled appointment time. Appointments cancelled less than 24hrs in advance will be considered a no show. Patients with three missed appointments within 1 year or four missed appointments within 2 years can be dismissed from the practice.

## 2019-07-16 ENCOUNTER — TELEPHONE (OUTPATIENT)
Dept: PULMONOLOGY | Age: 74
End: 2019-07-16

## 2019-08-02 NOTE — TELEPHONE ENCOUNTER
Called billing and could not find reason for claim not to be paid by insurance company, as patient was recently seen 2/19 and 12/18 and insurance paid both visits.  said that the claim will be sent back to be re-processed. Patient should not receive a bill. I called patient and explained what happened with the claim. I told her it will be re-processed and she should not receive a bill. If she does please call the office and speak to either myself or [facility].

## 2019-08-15 RX ORDER — TIOTROPIUM BROMIDE 18 UG/1
CAPSULE ORAL; RESPIRATORY (INHALATION)
Qty: 30 CAPSULE | Refills: 5 | Status: SHIPPED | OUTPATIENT
Start: 2019-08-15 | End: 2020-02-17

## 2019-09-26 ENCOUNTER — OFFICE VISIT (OUTPATIENT)
Dept: PULMONOLOGY | Age: 74
End: 2019-09-26
Payer: MEDICARE

## 2019-09-26 VITALS
OXYGEN SATURATION: 98 % | DIASTOLIC BLOOD PRESSURE: 70 MMHG | RESPIRATION RATE: 26 BRPM | BODY MASS INDEX: 47.61 KG/M2 | WEIGHT: 252 LBS | HEART RATE: 80 BPM | SYSTOLIC BLOOD PRESSURE: 140 MMHG

## 2019-09-26 DIAGNOSIS — J44.9 CHRONIC OBSTRUCTIVE PULMONARY DISEASE, UNSPECIFIED COPD TYPE (HCC): Primary | ICD-10-CM

## 2019-09-26 DIAGNOSIS — R60.0 LOWER EXTREMITY EDEMA: ICD-10-CM

## 2019-09-26 PROCEDURE — 4040F PNEUMOC VAC/ADMIN/RCVD: CPT | Performed by: INTERNAL MEDICINE

## 2019-09-26 PROCEDURE — G8417 CALC BMI ABV UP PARAM F/U: HCPCS | Performed by: INTERNAL MEDICINE

## 2019-09-26 PROCEDURE — 1123F ACP DISCUSS/DSCN MKR DOCD: CPT | Performed by: INTERNAL MEDICINE

## 2019-09-26 PROCEDURE — 99214 OFFICE O/P EST MOD 30 MIN: CPT | Performed by: INTERNAL MEDICINE

## 2019-09-26 PROCEDURE — G8427 DOCREV CUR MEDS BY ELIG CLIN: HCPCS | Performed by: INTERNAL MEDICINE

## 2019-09-26 PROCEDURE — 1036F TOBACCO NON-USER: CPT | Performed by: INTERNAL MEDICINE

## 2019-09-26 PROCEDURE — 3023F SPIROM DOC REV: CPT | Performed by: INTERNAL MEDICINE

## 2019-09-26 PROCEDURE — 1090F PRES/ABSN URINE INCON ASSESS: CPT | Performed by: INTERNAL MEDICINE

## 2019-09-26 PROCEDURE — G8400 PT W/DXA NO RESULTS DOC: HCPCS | Performed by: INTERNAL MEDICINE

## 2019-09-26 PROCEDURE — G8926 SPIRO NO PERF OR DOC: HCPCS | Performed by: INTERNAL MEDICINE

## 2019-09-26 RX ORDER — PREDNISONE 10 MG/1
TABLET ORAL
Qty: 15 TABLET | Refills: 0 | Status: SHIPPED | OUTPATIENT
Start: 2019-09-26 | End: 2019-12-17 | Stop reason: CLARIF

## 2019-09-26 RX ORDER — ARIPIPRAZOLE 5 MG/1
TABLET ORAL
Refills: 0 | COMMUNITY
Start: 2019-09-19 | End: 2019-09-26

## 2019-09-26 NOTE — PROGRESS NOTES
CC: COPD  HPI   shortness of breath and leg swelling - leg swelling better with increased diuretic from Dr. Ondina German but shortness of breath not better. Previous history:  Non restorative sleep with prolonged period of sleep: had PSG without PARK June 2014; had had CPAP 6 in the past but d/c'd (had Bariatric surgery). Weight not much different than on that study. Has PLMS with PLMI of 5 on that PSG. Review of Systems:        Objective:   Physical Exam  Blood pressure (!) 140/70, pulse 80, resp. rate 26, weight 252 lb (114.3 kg), SpO2 98 %. 'on 2L   241 # on 5-17-13; 258 on 5-20-14; 260 on 2-6-15, 246 on 9-15-15; 239 on 11-17-15, 255 on 3-15-16, 243 on 9-19-16, 257 on 3/21/17, 249# on 1/31/18, 249# on 7/31/18, 246# on 9/5/18, 256 # on 12/7/18; 252# on 9/25/19  Constitutional:  No acute distress. HENT:  Oropharynx is clear and moist.   Eyes: Pupils equal, round, and reactive to light. No scleral icterus. Neck: No tracheal deviation present. Cardiovascular: Normal heart sounds. + lower extremity edema. Pulmonary/Chest: No wheezes. No rhonchi. No rales. + decreased breath sounds. No increased work of breathing   Musculoskeletal: No cyanosis. No clubbing. Skin: Skin is warm and dry. Psychiatric: Normal mood and affect. Neurologic: speech fluent, alert and oriented, strength symmetric        PFT Nov 2009 FEV1 1.07 L  54%  DLCO 7.16  PFT Feb 2013 FEV1 1.0 L  46% DLCO 10.44  PFT 4-21-15 FEV1 1.1 L 51% DLCO 9.72 45% %    PSG at [facility] with re-titration 2010 post surgery  PSG @ [facility] 6-11-14 AHI 0.6    CT CHEST 10/31/18  Impression   1. Emphysema with no acute airspace disease. 2. New 6 mm left upper lobe pulmonary nodule.  Recommend follow-up: In a   low-risk patient, CT at 6-12 months, then consider CT at 18-24 months.  In a   high-risk patient, CT at 6-12 months, then CT at 18-24 months. 3. Coronary artery disease.      CT CHEST 6/12/19  FINDINGS:   Mediastinum: Thyroid gland appears normal.  Small mediastinal nodes are   noted.  Atherosclerotic change seen in aorta.  Coronary artery calcification   is seen.  Trace pericardial fluid is seen. Liberty Lie is evidence of prior lap   band procedure.  There is nonspecific thickening at the GE junction.       Lungs/pleura: Moderate to severe apical predominant emphysema is seen.  No   pulmonary edema.  Bandlike opacity is seen medially in the right upper lobe,   likely scarring       Subtle ground-glass opacity posteriorly in the right upper lobe is unchanged. There is a new area of ground-glass opacity anteriorly in the right upper   lobe measuring 9 mm.       Smoothly marginated noncalcified pulmonary nodule left upper lobe is   unchanged measuring 6-7 mm.  Calcified granuloma seen in the left lower lobe. Bandlike opacity is seen anteriorly in the left lower lobe.     Upper Abdomen: Right adrenal gland is normal.  Left adrenal gland is normal.   Scattered colonic diverticula are seen.       Soft Tissues/Bones: Spurring is seen in the spine           Impression   No obvious change in smoothly marginated noncalcified pulmonary nodule left   upper lobe since most recent study.  This finding however is new compared to   2008 as previously discussed.       New subtle area of ground-glass opacity right upper lobe anteriorly, likely   postinflammatory-infectious       Assessment:   · Severe COPD FEV1 1  L  -  Had been doing better - now worsened  · Lower extremity edema - shortness of breath not improved despite treatment  · Chronic Respiratory Failure with hypoxemia on continuous home oxygen (3 L pulse)   · Pulmonary Nodules - solid 7 mm stable Oct 2018 to June 2019, one ground glass area is stable, one new ground glass opacity, favor inflammatory change  · Chronic cough   · Morbid Obesity with hypoventilation syndrome - had been better S/P Gastric banding. 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   Spiriva q day; Advair 500/50 (has failed symbicort - is not effective); add short course of prednisone for possible COPD exacerbation  If not improving, may require admission for close monitoring with further diuresis  2. Continue Albuterol prn  3. Remain off lisinopril for now, persistent coughing. I don't think lisinopril is the cause, but it may lower cough threshold. 4.  3 L oxygen continuous   5. CT CHEST no IV dye for PN in 6 months (Dec 2019)  6.   Resume pulmonary rehab recommended today

## 2019-10-08 ENCOUNTER — HOSPITAL ENCOUNTER (OUTPATIENT)
Dept: NON INVASIVE DIAGNOSTICS | Age: 74
Discharge: HOME | End: 2019-10-08
Payer: MEDICARE

## 2019-10-08 LAB
LV EF: 58 %
LVEF MODALITY: NORMAL

## 2019-10-08 PROCEDURE — 93306 TTE W/DOPPLER COMPLETE: CPT

## 2019-12-17 ENCOUNTER — OFFICE VISIT (OUTPATIENT)
Dept: PULMONOLOGY | Age: 74
End: 2019-12-17
Payer: MEDICARE

## 2019-12-17 ENCOUNTER — HOSPITAL ENCOUNTER (OUTPATIENT)
Dept: CT IMAGING | Age: 74
Discharge: HOME | End: 2019-12-17
Payer: MEDICARE

## 2019-12-17 ENCOUNTER — TELEPHONE (OUTPATIENT)
Dept: PULMONOLOGY | Age: 74
End: 2019-12-17

## 2019-12-17 VITALS
OXYGEN SATURATION: 92 % | BODY MASS INDEX: 49.09 KG/M2 | WEIGHT: 260 LBS | HEIGHT: 61 IN | SYSTOLIC BLOOD PRESSURE: 136 MMHG | DIASTOLIC BLOOD PRESSURE: 66 MMHG | HEART RATE: 103 BPM | TEMPERATURE: 98 F | RESPIRATION RATE: 18 BRPM

## 2019-12-17 DIAGNOSIS — J44.9 CHRONIC OBSTRUCTIVE PULMONARY DISEASE, UNSPECIFIED COPD TYPE (HCC): ICD-10-CM

## 2019-12-17 DIAGNOSIS — R91.1 PULMONARY NODULE: ICD-10-CM

## 2019-12-17 DIAGNOSIS — R91.1 LEFT UPPER LOBE PULMONARY NODULE: Primary | ICD-10-CM

## 2019-12-17 PROCEDURE — 99214 OFFICE O/P EST MOD 30 MIN: CPT | Performed by: INTERNAL MEDICINE

## 2019-12-17 PROCEDURE — 4040F PNEUMOC VAC/ADMIN/RCVD: CPT | Performed by: INTERNAL MEDICINE

## 2019-12-17 PROCEDURE — 3023F SPIROM DOC REV: CPT | Performed by: INTERNAL MEDICINE

## 2019-12-17 PROCEDURE — G8926 SPIRO NO PERF OR DOC: HCPCS | Performed by: INTERNAL MEDICINE

## 2019-12-17 PROCEDURE — 1123F ACP DISCUSS/DSCN MKR DOCD: CPT | Performed by: INTERNAL MEDICINE

## 2019-12-17 PROCEDURE — 71250 CT THORAX DX C-: CPT

## 2019-12-17 PROCEDURE — G8400 PT W/DXA NO RESULTS DOC: HCPCS | Performed by: INTERNAL MEDICINE

## 2019-12-17 PROCEDURE — 1036F TOBACCO NON-USER: CPT | Performed by: INTERNAL MEDICINE

## 2019-12-17 PROCEDURE — 1090F PRES/ABSN URINE INCON ASSESS: CPT | Performed by: INTERNAL MEDICINE

## 2019-12-17 PROCEDURE — G8427 DOCREV CUR MEDS BY ELIG CLIN: HCPCS | Performed by: INTERNAL MEDICINE

## 2019-12-17 PROCEDURE — G8417 CALC BMI ABV UP PARAM F/U: HCPCS | Performed by: INTERNAL MEDICINE

## 2019-12-17 PROCEDURE — G8484 FLU IMMUNIZE NO ADMIN: HCPCS | Performed by: INTERNAL MEDICINE

## 2019-12-17 RX ORDER — ALBUTEROL SULFATE 2.5 MG/3ML
2.5 SOLUTION RESPIRATORY (INHALATION) EVERY 4 HOURS PRN
Qty: 120 VIAL | Refills: 6 | Status: SHIPPED | OUTPATIENT
Start: 2019-12-17 | End: 2022-03-07

## 2020-01-03 ENCOUNTER — HOSPITAL ENCOUNTER (OUTPATIENT)
Dept: PET IMAGING | Age: 75
Discharge: HOME | End: 2020-01-03
Payer: MEDICARE

## 2020-01-03 VITALS — WEIGHT: 260 LBS | BODY MASS INDEX: 46.07 KG/M2 | HEIGHT: 63 IN

## 2020-01-03 PROCEDURE — A9552 F18 FDG: HCPCS | Performed by: INTERNAL MEDICINE

## 2020-01-03 PROCEDURE — 78815 PET IMAGE W/CT SKULL-THIGH: CPT

## 2020-01-03 PROCEDURE — 3430000000 HC RX DIAGNOSTIC RADIOPHARMACEUTICAL: Performed by: INTERNAL MEDICINE

## 2020-01-03 RX ORDER — FLUDEOXYGLUCOSE F 18 200 MCI/ML
17.6 INJECTION, SOLUTION INTRAVENOUS
Status: COMPLETED | OUTPATIENT
Start: 2020-01-03 | End: 2020-01-03

## 2020-01-03 RX ADMIN — FLUDEOXYGLUCOSE F 18 17.6 MILLICURIE: 200 INJECTION, SOLUTION INTRAVENOUS at 09:30

## 2020-01-08 ENCOUNTER — OFFICE VISIT (OUTPATIENT)
Dept: PULMONOLOGY | Age: 75
End: 2020-01-08
Payer: MEDICARE

## 2020-01-08 VITALS
RESPIRATION RATE: 16 BRPM | DIASTOLIC BLOOD PRESSURE: 70 MMHG | WEIGHT: 256 LBS | SYSTOLIC BLOOD PRESSURE: 136 MMHG | TEMPERATURE: 98.3 F | OXYGEN SATURATION: 91 % | BODY MASS INDEX: 45.36 KG/M2 | HEIGHT: 63 IN | HEART RATE: 91 BPM

## 2020-01-08 PROCEDURE — 1123F ACP DISCUSS/DSCN MKR DOCD: CPT | Performed by: INTERNAL MEDICINE

## 2020-01-08 PROCEDURE — 1090F PRES/ABSN URINE INCON ASSESS: CPT | Performed by: INTERNAL MEDICINE

## 2020-01-08 PROCEDURE — G8484 FLU IMMUNIZE NO ADMIN: HCPCS | Performed by: INTERNAL MEDICINE

## 2020-01-08 PROCEDURE — G8400 PT W/DXA NO RESULTS DOC: HCPCS | Performed by: INTERNAL MEDICINE

## 2020-01-08 PROCEDURE — G8427 DOCREV CUR MEDS BY ELIG CLIN: HCPCS | Performed by: INTERNAL MEDICINE

## 2020-01-08 PROCEDURE — 1036F TOBACCO NON-USER: CPT | Performed by: INTERNAL MEDICINE

## 2020-01-08 PROCEDURE — 4040F PNEUMOC VAC/ADMIN/RCVD: CPT | Performed by: INTERNAL MEDICINE

## 2020-01-08 PROCEDURE — G8417 CALC BMI ABV UP PARAM F/U: HCPCS | Performed by: INTERNAL MEDICINE

## 2020-01-08 PROCEDURE — 99214 OFFICE O/P EST MOD 30 MIN: CPT | Performed by: INTERNAL MEDICINE

## 2020-01-08 RX ORDER — ARIPIPRAZOLE 5 MG/1
5 TABLET ORAL
COMMUNITY
Start: 2019-12-19

## 2020-01-08 NOTE — LETTER
2.  Referral to Gastroenterology for abnormal CT PET imaging of gastroesophageal junction. Patient has seen [facility] in the past and she is referred to that group.       Sincerely,        Idalmis Farias MD

## 2020-01-08 NOTE — PROGRESS NOTES
re-titration 2010 post surgery  PSG @ [facility] 6-11-14 AHI 0.6    CT CHEST 12/17/19  FINDINGS:   Mediastinum:  Thyroid is grossly unremarkable within the limits of CT. Coronary and aortic atherosclerotic calcifications are noted. Heart and   pericardium are unremarkable. No evidence of mediastinal, hilar or axillary   lymphadenopathy. The central airways are clear.       Lungs/pleura: There is a smoothly marginated left upper lobe pulmonary nodule   on image 26 measuring approximately 8 mm in size.  When compared to prior   10/31/2018 and 6/12/2019 CTs, this has slowly increased over time.  Moderate   centrilobular emphysema is noted throughout the lungs.  No evidence of new   pulmonary nodules.  Bibasilar subsegmental atelectasis is noted.  No focal   consolidation or pulmonary edema.  No evidence of pleural effusion or   pneumothorax.  Resolution of the previously seen right upper lobe patchy   subpleural ground-glass opacity seen on prior examination.  Subsegmental   atelectasis is noted in both lung bases.       Upper Abdomen: No focal adrenal nodule. Arva Blunt is a laparoscopic gastric band   present in grossly satisfactory position. Limited images of the upper abdomen   demonstrate no acute abnormality.       Soft Tissues/Bones:  No acute abnormality of the visualized osseous   structures.           Impression   Solid, noncalcified, smoothly-marginated, left upper lobe pulmonary nodule   measuring 8 mm in size.  When compared to prior exams, this is likely slowly   growing over time, and measured between 6-7 mm on 2018 exam.  Recommend   further evaluation with PET-CT and/or biopsy.       Moderate centrilobular emphysema with bibasilar subsegmental atelectasis and   scarring. CT PET 1/3/20  FINDINGS:   Thyroid gland appears normal.  Atherosclerotic change seen in aorta.    Coronary artery calcification is seen.  Small hiatal hernia seen. Arva Blunt is   nonspecific thickening at the GE junction.       No continuous home oxygen (3 L pulse)   · Abnormal CT PET imaging of esophagus   · Morbid Obesity with hypoventilation syndrome - had been better S/P Gastric banding. Her PSG in 2014 did not show PARK; unfortunately, she has had progressive weight gain. 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 Referral to radiation oncology for consideration for stereotactic radiation to left upper lobe 8 mm metabolic Pulmonary Nodule that is growing. Biopsy in this patient with severe emphysema and a relatively deep pulmonary nodule would be very high risk and the risk is felt to outweigh the benefit. Watchful waiting was given as an alternative approach, however my clinical suspicion for cancer is high and I think that treatment is appropriate at this point. 2.  Referral to Gastroenterology for abnormal CT PET imaging of gastroesophageal junction  3. Spiriva q day; Advair 500/50 (has failed symbicort - is not effective)   4. Continue Albuterol prn  5.   Remain off lisinopril   6.  3 L supplemental oxygen

## 2020-01-31 NOTE — TELEPHONE ENCOUNTER
Spoke to 's office states patient was seen on 1/29/20, to send OV and patient is scheduled for EGD 2/23/20.

## 2020-02-17 RX ORDER — TIOTROPIUM BROMIDE 18 UG/1
CAPSULE ORAL; RESPIRATORY (INHALATION)
Qty: 30 CAPSULE | Refills: 5 | Status: SHIPPED | OUTPATIENT
Start: 2020-02-17 | End: 2020-09-17

## 2020-02-21 RX ORDER — ALBUTEROL SULFATE 90 UG/1
2 AEROSOL, METERED RESPIRATORY (INHALATION) EVERY 6 HOURS PRN
Qty: 8.5 G | Refills: 5 | Status: SHIPPED | OUTPATIENT
Start: 2020-02-21 | Stop reason: SDUPTHER

## 2020-03-31 ENCOUNTER — TELEPHONE (OUTPATIENT)
Dept: PULMONOLOGY | Age: 75
End: 2020-03-31

## 2020-03-31 NOTE — TELEPHONE ENCOUNTER
abnormal CT PET imaging of gastroesophageal junction  3. Spiriva q day; Advair 500/50 (has failed symbicort - is not effective)   4. Continue Albuterol prn  5.   Remain off lisinopril   6.  3 L supplemental oxygen

## 2020-04-22 ENCOUNTER — HOSPITAL ENCOUNTER (OUTPATIENT)
Dept: CT IMAGING | Age: 75
Discharge: HOME | End: 2020-04-22
Payer: MEDICARE

## 2020-04-22 PROCEDURE — 71250 CT THORAX DX C-: CPT

## 2020-05-01 ENCOUNTER — APPOINTMENT (OUTPATIENT)
Dept: GENERAL RADIOLOGY | Age: 75
DRG: 200 | End: 2020-05-01
Payer: MEDICARE

## 2020-05-01 ENCOUNTER — HOSPITAL ENCOUNTER (INPATIENT)
Age: 75
LOS: 3 days | Discharge: HOME HEALTH | DRG: 200 | End: 2020-05-04
Attending: EMERGENCY MEDICINE | Admitting: INTERNAL MEDICINE
Payer: MEDICARE

## 2020-05-01 PROBLEM — J93.0 TENSION PNEUMOTHORAX: Status: ACTIVE | Noted: 2020-05-01

## 2020-05-01 LAB
A/G RATIO: 1.2 (ref 1.1–2.2)
ALBUMIN SERPL-MCNC: 3.7 G/DL (ref 3.4–5)
ALP BLD-CCNC: 138 U/L (ref 40–129)
ALT SERPL-CCNC: 10 U/L (ref 10–40)
ANION GAP SERPL CALCULATED.3IONS-SCNC: 10 MMOL/L (ref 3–16)
AST SERPL-CCNC: 23 U/L (ref 15–37)
BASOPHILS ABSOLUTE: 0.1 K/UL (ref 0–0.2)
BASOPHILS RELATIVE PERCENT: 1.4 %
BILIRUB SERPL-MCNC: <0.2 MG/DL (ref 0–1)
BUN BLDV-MCNC: 22 MG/DL (ref 7–20)
CALCIUM SERPL-MCNC: 9.8 MG/DL (ref 8.3–10.6)
CHLORIDE BLD-SCNC: 95 MMOL/L (ref 99–110)
CO2: 32 MMOL/L (ref 21–32)
CREAT SERPL-MCNC: 1.5 MG/DL (ref 0.6–1.2)
EKG ATRIAL RATE: 102 BPM
EKG DIAGNOSIS: NORMAL
EKG P AXIS: 96 DEGREES
EKG P-R INTERVAL: 184 MS
EKG Q-T INTERVAL: 330 MS
EKG QRS DURATION: 86 MS
EKG QTC CALCULATION (BAZETT): 430 MS
EKG R AXIS: -60 DEGREES
EKG T AXIS: 90 DEGREES
EKG VENTRICULAR RATE: 102 BPM
EOSINOPHILS ABSOLUTE: 0.3 K/UL (ref 0–0.6)
EOSINOPHILS RELATIVE PERCENT: 3.5 %
GFR AFRICAN AMERICAN: 41
GFR NON-AFRICAN AMERICAN: 34
GLOBULIN: 3.1 G/DL
GLUCOSE BLD-MCNC: 113 MG/DL (ref 70–99)
GLUCOSE BLD-MCNC: 130 MG/DL (ref 70–99)
GLUCOSE BLD-MCNC: 132 MG/DL (ref 70–99)
HCT VFR BLD CALC: 35.1 % (ref 36–48)
HEMOGLOBIN: 11.5 G/DL (ref 12–16)
LYMPHOCYTES ABSOLUTE: 1.6 K/UL (ref 1–5.1)
LYMPHOCYTES RELATIVE PERCENT: 20.5 %
MCH RBC QN AUTO: 33.2 PG (ref 26–34)
MCHC RBC AUTO-ENTMCNC: 32.8 G/DL (ref 31–36)
MCV RBC AUTO: 101.4 FL (ref 80–100)
MONOCYTES ABSOLUTE: 0.5 K/UL (ref 0–1.3)
MONOCYTES RELATIVE PERCENT: 6.1 %
NEUTROPHILS ABSOLUTE: 5.3 K/UL (ref 1.7–7.7)
NEUTROPHILS RELATIVE PERCENT: 68.5 %
PDW BLD-RTO: 15.5 % (ref 12.4–15.4)
PERFORMED ON: ABNORMAL
PERFORMED ON: ABNORMAL
PLATELET # BLD: 247 K/UL (ref 135–450)
PMV BLD AUTO: 8.4 FL (ref 5–10.5)
POTASSIUM REFLEX MAGNESIUM: 4.7 MMOL/L (ref 3.5–5.1)
PRO-BNP: 366 PG/ML (ref 0–449)
RBC # BLD: 3.47 M/UL (ref 4–5.2)
SODIUM BLD-SCNC: 137 MMOL/L (ref 136–145)
TOTAL PROTEIN: 6.8 G/DL (ref 6.4–8.2)
TROPONIN: <0.01 NG/ML
WBC # BLD: 7.7 K/UL (ref 4–11)

## 2020-05-01 PROCEDURE — 2580000003 HC RX 258: Performed by: PHYSICIAN ASSISTANT

## 2020-05-01 PROCEDURE — 71045 X-RAY EXAM CHEST 1 VIEW: CPT

## 2020-05-01 PROCEDURE — 80053 COMPREHEN METABOLIC PANEL: CPT

## 2020-05-01 PROCEDURE — 0W9930Z DRAINAGE OF RIGHT PLEURAL CAVITY WITH DRAINAGE DEVICE, PERCUTANEOUS APPROACH: ICD-10-PCS | Performed by: EMERGENCY MEDICINE

## 2020-05-01 PROCEDURE — 6370000000 HC RX 637 (ALT 250 FOR IP): Performed by: PHYSICIAN ASSISTANT

## 2020-05-01 PROCEDURE — 32551 INSERTION OF CHEST TUBE: CPT

## 2020-05-01 PROCEDURE — 85025 COMPLETE CBC W/AUTO DIFF WBC: CPT

## 2020-05-01 PROCEDURE — 93010 ELECTROCARDIOGRAM REPORT: CPT | Performed by: INTERNAL MEDICINE

## 2020-05-01 PROCEDURE — 84484 ASSAY OF TROPONIN QUANT: CPT

## 2020-05-01 PROCEDURE — 94761 N-INVAS EAR/PLS OXIMETRY MLT: CPT

## 2020-05-01 PROCEDURE — 2500000003 HC RX 250 WO HCPCS: Performed by: EMERGENCY MEDICINE

## 2020-05-01 PROCEDURE — 6370000000 HC RX 637 (ALT 250 FOR IP): Performed by: NURSE PRACTITIONER

## 2020-05-01 PROCEDURE — 99222 1ST HOSP IP/OBS MODERATE 55: CPT | Performed by: NURSE PRACTITIONER

## 2020-05-01 PROCEDURE — 6360000002 HC RX W HCPCS

## 2020-05-01 PROCEDURE — 83880 ASSAY OF NATRIURETIC PEPTIDE: CPT

## 2020-05-01 PROCEDURE — 96374 THER/PROPH/DIAG INJ IV PUSH: CPT

## 2020-05-01 PROCEDURE — 2060000000 HC ICU INTERMEDIATE R&B

## 2020-05-01 PROCEDURE — 94640 AIRWAY INHALATION TREATMENT: CPT

## 2020-05-01 PROCEDURE — 93005 ELECTROCARDIOGRAM TRACING: CPT | Performed by: EMERGENCY MEDICINE

## 2020-05-01 PROCEDURE — 2700000000 HC OXYGEN THERAPY PER DAY

## 2020-05-01 PROCEDURE — 99291 CRITICAL CARE FIRST HOUR: CPT

## 2020-05-01 PROCEDURE — 6360000002 HC RX W HCPCS: Performed by: NURSE PRACTITIONER

## 2020-05-01 RX ORDER — ACETAMINOPHEN 325 MG/1
650 TABLET ORAL EVERY 6 HOURS PRN
Status: DISCONTINUED | OUTPATIENT
Start: 2020-05-01 | End: 2020-05-04 | Stop reason: HOSPADM

## 2020-05-01 RX ORDER — SODIUM CHLORIDE 0.9 % (FLUSH) 0.9 %
10 SYRINGE (ML) INJECTION EVERY 12 HOURS SCHEDULED
Status: DISCONTINUED | OUTPATIENT
Start: 2020-05-01 | End: 2020-05-04 | Stop reason: HOSPADM

## 2020-05-01 RX ORDER — SODIUM CHLORIDE 0.9 % (FLUSH) 0.9 %
10 SYRINGE (ML) INJECTION PRN
Status: DISCONTINUED | OUTPATIENT
Start: 2020-05-01 | End: 2020-05-04 | Stop reason: HOSPADM

## 2020-05-01 RX ORDER — FENTANYL CITRATE 50 UG/ML
50 INJECTION, SOLUTION INTRAMUSCULAR; INTRAVENOUS ONCE
Status: COMPLETED | OUTPATIENT
Start: 2020-05-01 | End: 2020-05-01

## 2020-05-01 RX ORDER — FENTANYL CITRATE 50 UG/ML
INJECTION, SOLUTION INTRAMUSCULAR; INTRAVENOUS
Status: COMPLETED
Start: 2020-05-01 | End: 2020-05-01

## 2020-05-01 RX ORDER — ALBUTEROL SULFATE 90 UG/1
2 AEROSOL, METERED RESPIRATORY (INHALATION) EVERY 6 HOURS PRN
Status: DISCONTINUED | OUTPATIENT
Start: 2020-05-01 | End: 2020-05-02

## 2020-05-01 RX ORDER — MAGNESIUM SULFATE IN WATER 40 MG/ML
2 INJECTION, SOLUTION INTRAVENOUS PRN
Status: DISCONTINUED | OUTPATIENT
Start: 2020-05-01 | End: 2020-05-04 | Stop reason: HOSPADM

## 2020-05-01 RX ORDER — HYDROCODONE BITARTRATE AND ACETAMINOPHEN 7.5; 325 MG/1; MG/1
1 TABLET ORAL EVERY 6 HOURS PRN
Status: DISCONTINUED | OUTPATIENT
Start: 2020-05-01 | End: 2020-05-04 | Stop reason: HOSPADM

## 2020-05-01 RX ORDER — BUMETANIDE 1 MG/1
1 TABLET ORAL DAILY
Status: DISCONTINUED | OUTPATIENT
Start: 2020-05-02 | End: 2020-05-04 | Stop reason: HOSPADM

## 2020-05-01 RX ORDER — ONDANSETRON 2 MG/ML
4 INJECTION INTRAMUSCULAR; INTRAVENOUS EVERY 6 HOURS PRN
Status: DISCONTINUED | OUTPATIENT
Start: 2020-05-01 | End: 2020-05-04 | Stop reason: HOSPADM

## 2020-05-01 RX ORDER — POLYETHYLENE GLYCOL 3350 17 G/17G
17 POWDER, FOR SOLUTION ORAL DAILY PRN
Status: DISCONTINUED | OUTPATIENT
Start: 2020-05-01 | End: 2020-05-04 | Stop reason: HOSPADM

## 2020-05-01 RX ORDER — DEXTROSE MONOHYDRATE 50 MG/ML
100 INJECTION, SOLUTION INTRAVENOUS PRN
Status: DISCONTINUED | OUTPATIENT
Start: 2020-05-01 | End: 2020-05-04 | Stop reason: HOSPADM

## 2020-05-01 RX ORDER — KETAMINE HYDROCHLORIDE 100 MG/ML
30 INJECTION, SOLUTION INTRAMUSCULAR; INTRAVENOUS ONCE
Status: COMPLETED | OUTPATIENT
Start: 2020-05-01 | End: 2020-05-01

## 2020-05-01 RX ORDER — PANTOPRAZOLE SODIUM 40 MG/1
40 TABLET, DELAYED RELEASE ORAL DAILY
Status: ON HOLD | COMMUNITY
End: 2021-12-18 | Stop reason: HOSPADM

## 2020-05-01 RX ORDER — BUDESONIDE AND FORMOTEROL FUMARATE DIHYDRATE 160; 4.5 UG/1; UG/1
2 AEROSOL RESPIRATORY (INHALATION) 2 TIMES DAILY
Status: DISCONTINUED | OUTPATIENT
Start: 2020-05-01 | End: 2020-05-04 | Stop reason: HOSPADM

## 2020-05-01 RX ORDER — DULOXETIN HYDROCHLORIDE 60 MG/1
60 CAPSULE, DELAYED RELEASE ORAL DAILY
Status: DISCONTINUED | OUTPATIENT
Start: 2020-05-01 | End: 2020-05-04 | Stop reason: HOSPADM

## 2020-05-01 RX ORDER — POTASSIUM CHLORIDE 20 MEQ/1
40 TABLET, EXTENDED RELEASE ORAL PRN
Status: DISCONTINUED | OUTPATIENT
Start: 2020-05-01 | End: 2020-05-04 | Stop reason: HOSPADM

## 2020-05-01 RX ORDER — DEXTROSE MONOHYDRATE 25 G/50ML
12.5 INJECTION, SOLUTION INTRAVENOUS PRN
Status: DISCONTINUED | OUTPATIENT
Start: 2020-05-01 | End: 2020-05-04 | Stop reason: HOSPADM

## 2020-05-01 RX ORDER — NICOTINE POLACRILEX 4 MG
15 LOZENGE BUCCAL PRN
Status: DISCONTINUED | OUTPATIENT
Start: 2020-05-01 | End: 2020-05-04 | Stop reason: HOSPADM

## 2020-05-01 RX ORDER — POTASSIUM CHLORIDE 7.45 MG/ML
10 INJECTION INTRAVENOUS PRN
Status: DISCONTINUED | OUTPATIENT
Start: 2020-05-01 | End: 2020-05-04 | Stop reason: HOSPADM

## 2020-05-01 RX ORDER — PROMETHAZINE HYDROCHLORIDE 25 MG/1
12.5 TABLET ORAL EVERY 6 HOURS PRN
Status: DISCONTINUED | OUTPATIENT
Start: 2020-05-01 | End: 2020-05-04 | Stop reason: HOSPADM

## 2020-05-01 RX ORDER — CALCIUM CARBONATE 500(1250)
500 TABLET ORAL DAILY
Status: DISCONTINUED | OUTPATIENT
Start: 2020-05-01 | End: 2020-05-04 | Stop reason: HOSPADM

## 2020-05-01 RX ORDER — ARIPIPRAZOLE 10 MG/1
5 TABLET ORAL DAILY
Status: DISCONTINUED | OUTPATIENT
Start: 2020-05-01 | End: 2020-05-04 | Stop reason: HOSPADM

## 2020-05-01 RX ORDER — MORPHINE SULFATE 4 MG/ML
4 INJECTION, SOLUTION INTRAMUSCULAR; INTRAVENOUS
Status: DISCONTINUED | OUTPATIENT
Start: 2020-05-01 | End: 2020-05-04 | Stop reason: HOSPADM

## 2020-05-01 RX ORDER — GUAIFENESIN AND CODEINE PHOSPHATE 100; 10 MG/5ML; MG/5ML
5 SOLUTION ORAL 3 TIMES DAILY PRN
COMMUNITY
End: 2020-09-24 | Stop reason: CLARIF

## 2020-05-01 RX ORDER — OXYBUTYNIN CHLORIDE 5 MG/1
5 TABLET ORAL 3 TIMES DAILY
COMMUNITY

## 2020-05-01 RX ORDER — ACETAMINOPHEN 650 MG/1
650 SUPPOSITORY RECTAL EVERY 6 HOURS PRN
Status: DISCONTINUED | OUTPATIENT
Start: 2020-05-01 | End: 2020-05-04 | Stop reason: HOSPADM

## 2020-05-01 RX ADMIN — Medication 2 PUFF: at 20:03

## 2020-05-01 RX ADMIN — FENTANYL CITRATE 50 MCG: 50 INJECTION INTRAMUSCULAR; INTRAVENOUS at 14:12

## 2020-05-01 RX ADMIN — FENTANYL CITRATE 50 MCG: 50 INJECTION, SOLUTION INTRAMUSCULAR; INTRAVENOUS at 14:12

## 2020-05-01 RX ADMIN — MORPHINE SULFATE 4 MG: 4 INJECTION, SOLUTION INTRAMUSCULAR; INTRAVENOUS at 17:31

## 2020-05-01 RX ADMIN — Medication 500 MG: at 18:17

## 2020-05-01 RX ADMIN — DULOXETINE HYDROCHLORIDE 60 MG: 60 CAPSULE, DELAYED RELEASE ORAL at 18:17

## 2020-05-01 RX ADMIN — Medication 10 ML: at 20:10

## 2020-05-01 RX ADMIN — ARIPIPRAZOLE 5 MG: 10 TABLET ORAL at 18:17

## 2020-05-01 RX ADMIN — HYDROCODONE BITARTRATE AND ACETAMINOPHEN 1 TABLET: 7.5; 325 TABLET ORAL at 20:09

## 2020-05-01 RX ADMIN — KETAMINE HYDROCHLORIDE 30 MG: 100 INJECTION INTRAMUSCULAR; INTRAVENOUS at 13:48

## 2020-05-01 ASSESSMENT — ENCOUNTER SYMPTOMS
COUGH: 1
STRIDOR: 0
ABDOMINAL PAIN: 0
SORE THROAT: 0
CHEST TIGHTNESS: 0
VOMITING: 0
NAUSEA: 0
DIARRHEA: 0
RHINORRHEA: 0
SHORTNESS OF BREATH: 1
WHEEZING: 0
TROUBLE SWALLOWING: 0

## 2020-05-01 ASSESSMENT — PAIN DESCRIPTION - ORIENTATION
ORIENTATION: RIGHT
ORIENTATION: RIGHT

## 2020-05-01 ASSESSMENT — PAIN DESCRIPTION - DESCRIPTORS
DESCRIPTORS: ACHING;CONSTANT
DESCRIPTORS: ACHING;CONSTANT;SORE

## 2020-05-01 ASSESSMENT — PAIN SCALES - GENERAL
PAINLEVEL_OUTOF10: 9
PAINLEVEL_OUTOF10: 9
PAINLEVEL_OUTOF10: 0
PAINLEVEL_OUTOF10: 9

## 2020-05-01 ASSESSMENT — PAIN DESCRIPTION - PROGRESSION: CLINICAL_PROGRESSION: GRADUALLY WORSENING

## 2020-05-01 ASSESSMENT — PAIN DESCRIPTION - LOCATION
LOCATION: RIB CAGE
LOCATION: RIB CAGE

## 2020-05-01 ASSESSMENT — PAIN DESCRIPTION - ONSET: ONSET: ON-GOING

## 2020-05-01 ASSESSMENT — PAIN DESCRIPTION - PAIN TYPE
TYPE: SURGICAL PAIN
TYPE: SURGICAL PAIN

## 2020-05-01 ASSESSMENT — PAIN DESCRIPTION - FREQUENCY
FREQUENCY: CONTINUOUS
FREQUENCY: CONTINUOUS

## 2020-05-01 ASSESSMENT — PAIN - FUNCTIONAL ASSESSMENT: PAIN_FUNCTIONAL_ASSESSMENT: PREVENTS OR INTERFERES SOME ACTIVE ACTIVITIES AND ADLS

## 2020-05-01 NOTE — ED NOTES
Report given to Sylvia Cuevas RN. Pt transported in stable condition.       Karishma Perez RN  05/01/20 [id number]

## 2020-05-01 NOTE — ED NOTES
Bed: 03  Expected date:   Expected time:   Means of arrival:   Comments:  [address]  05/01/20 1206

## 2020-05-01 NOTE — ED PROVIDER NOTES
Patient's oxygen saturation remained in the low 90s on her 4 L nasal cannula though she does desaturate when she falls asleep. Repeat cxr shows complete expansion of the right lung status post chest tube placement. Small pneumothorax remains.  -Labs and imaging reviewed and results discussed with patient. Plan for admission for further workup and treatment for spontaneous penumothorax discussed with patient and family. Patient  in agreement with plan and have nofurther questions/concerns      REASSESSMENT      Well appearing, non toxic, alert, oriented speaking in full sentences and hemodynamically stable upon discharge      CRITICAL CARE TIME   Total Critical Care time was 40 minutes, excluding separately reportableprocedures. There was a high probability of clinicallysignificant/life threatening deterioration in the patient's condition which required my urgent intervention. CONSULTS:  IP CONSULT TO HOSPITALIST  IP CONSULT TO PULMONOLOGY    PROCEDURES:  Unless otherwise noted below, none     Chest Tube  Date/Time: 5/1/2020 3:52 PM  Performed by: Carmen Marshall MD  Authorized by: Carmen Marshall MD     Consent:     Consent obtained:  Verbal    Consent given by:  Patient    Risks discussed:  Bleeding, incomplete drainage, infection and damage to surrounding structures  Pre-procedure details:     Skin preparation:  Betadine  Sedation:     Sedation type: ketamine. Anesthesia (see MAR for exact dosages):      Anesthesia method:  Local infiltration    Local anesthetic:  Lidocaine 2% WITH epi  Procedure details:     Placement location:  R lateral    Scalpel size:  11    Tube size (Fr):  28    Dissection instrument:  Nanette clamp    Tension pneumothorax: yes      Tube connected to:  Suction    Drainage characteristics:  Air only    Suture material:  0 silk    Dressing:  4x4 sterile gauze and petrolatum-impregnated gauze  Post-procedure details:     Post-insertion x-ray findings: tube in good position      Patient

## 2020-05-01 NOTE — H&P
HERNIA REPAIR      HYSTERECTOMY      LAP BAND  10/5/10    ADJUSTABLE GASTRIC RESTRICTIVE DEVICE       Medications Prior to Admission:    Prior to Admission medications    Medication Sig Start Date End Date Taking? Authorizing Provider   albuterol sulfate HFA (PROAIR HFA) 108 (90 Base) MCG/ACT inhaler Inhale 2 puffs into the lungs every 6 hours as needed for Wheezing or Shortness of Breath 2/21/20 2/20/21  Roxana Cantrell MD   SPIRIVA HANDIHALER 18 MCG inhalation capsule INHALE 1 CAPSULE INTO THE LUNGS DAILY 2/17/20   Marly North MD   ARIPiprazole (ABILIFY) 5 MG tablet Take 5 mg by mouth 12/19/19   Historical Provider, MD   IRON PO Take 325 mg by mouth 9/19/19   Historical Provider, MD   albuterol (PROVENTIL) (2.5 MG/3ML) 0.083% nebulizer solution Take 3 mLs by nebulization every 4 hours as needed for Wheezing or Shortness of Breath DX COPD J44.9 12/17/19   Marly North MD   fluticasone-salmeterol (ADVAIR) 500-50 MCG/DOSE diskus inhaler INHALE 1 PUFF INTO THE LUNGS EVERY 12 HOURS 12/13/19   Marly North MD   DULoxetine (CYMBALTA) 60 MG extended release capsule Take 60 mg by mouth daily    Historical Provider, MD   losartan (COZAAR) 100 MG tablet Take 100 mg by mouth 9/6/18 1/8/20  Historical Provider, MD   albuterol (PROVENTIL) (2.5 MG/3ML) 0.083% nebulizer solution Take 3 mLs by nebulization every 4 hours as needed for Wheezing 10/30/18 10/30/19  Marly North MD   Cholecalciferol (VITAMIN D-3) 1000 UNITS CAPS Take by mouth    Historical Provider, MD   calcium carbonate (OSCAL) 500 MG TABS tablet Take 500 mg by mouth daily. Historical Provider, MD   OXYGEN Inhale 3 L/min into the lungs continuous. Historical Provider, MD   potassium chloride SA (K-DUR;KLOR-CON) 20 MEQ tablet Take 20 mEq by mouth daily. 9/15/10   Historical Provider, MD   meloxicam (MOBIC) 15 MG tablet Take 15 mg by mouth daily. Historical Provider, MD   bumetanide (BUMEX) 1 MG tablet Take 1 mg by mouth daily.     Historical Provider, MD

## 2020-05-02 ENCOUNTER — APPOINTMENT (OUTPATIENT)
Dept: GENERAL RADIOLOGY | Age: 75
DRG: 200 | End: 2020-05-02
Payer: MEDICARE

## 2020-05-02 LAB
ANION GAP SERPL CALCULATED.3IONS-SCNC: 9 MMOL/L (ref 3–16)
BASOPHILS ABSOLUTE: 0.1 K/UL (ref 0–0.2)
BASOPHILS RELATIVE PERCENT: 0.7 %
BUN BLDV-MCNC: 21 MG/DL (ref 7–20)
CALCIUM SERPL-MCNC: 9.7 MG/DL (ref 8.3–10.6)
CHLORIDE BLD-SCNC: 95 MMOL/L (ref 99–110)
CO2: 31 MMOL/L (ref 21–32)
CREAT SERPL-MCNC: 1.3 MG/DL (ref 0.6–1.2)
EOSINOPHILS ABSOLUTE: 0.2 K/UL (ref 0–0.6)
EOSINOPHILS RELATIVE PERCENT: 2.5 %
GFR AFRICAN AMERICAN: 48
GFR NON-AFRICAN AMERICAN: 40
GLUCOSE BLD-MCNC: 102 MG/DL (ref 70–99)
GLUCOSE BLD-MCNC: 115 MG/DL (ref 70–99)
GLUCOSE BLD-MCNC: 115 MG/DL (ref 70–99)
GLUCOSE BLD-MCNC: 120 MG/DL (ref 70–99)
GLUCOSE BLD-MCNC: 124 MG/DL (ref 70–99)
HCT VFR BLD CALC: 30.5 % (ref 36–48)
HEMOGLOBIN: 10.1 G/DL (ref 12–16)
LYMPHOCYTES ABSOLUTE: 1.7 K/UL (ref 1–5.1)
LYMPHOCYTES RELATIVE PERCENT: 24.1 %
MCH RBC QN AUTO: 33.6 PG (ref 26–34)
MCHC RBC AUTO-ENTMCNC: 33.1 G/DL (ref 31–36)
MCV RBC AUTO: 101.4 FL (ref 80–100)
MONOCYTES ABSOLUTE: 0.4 K/UL (ref 0–1.3)
MONOCYTES RELATIVE PERCENT: 6.3 %
NEUTROPHILS ABSOLUTE: 4.6 K/UL (ref 1.7–7.7)
NEUTROPHILS RELATIVE PERCENT: 66.4 %
PDW BLD-RTO: 16.3 % (ref 12.4–15.4)
PERFORMED ON: ABNORMAL
PLATELET # BLD: 200 K/UL (ref 135–450)
PMV BLD AUTO: 8 FL (ref 5–10.5)
POTASSIUM REFLEX MAGNESIUM: 4.1 MMOL/L (ref 3.5–5.1)
RBC # BLD: 3.01 M/UL (ref 4–5.2)
SODIUM BLD-SCNC: 135 MMOL/L (ref 136–145)
WBC # BLD: 6.9 K/UL (ref 4–11)

## 2020-05-02 PROCEDURE — 97535 SELF CARE MNGMENT TRAINING: CPT

## 2020-05-02 PROCEDURE — 6360000002 HC RX W HCPCS: Performed by: NURSE PRACTITIONER

## 2020-05-02 PROCEDURE — 2060000000 HC ICU INTERMEDIATE R&B

## 2020-05-02 PROCEDURE — 85025 COMPLETE CBC W/AUTO DIFF WBC: CPT

## 2020-05-02 PROCEDURE — 71045 X-RAY EXAM CHEST 1 VIEW: CPT

## 2020-05-02 PROCEDURE — 97116 GAIT TRAINING THERAPY: CPT

## 2020-05-02 PROCEDURE — 6370000000 HC RX 637 (ALT 250 FOR IP): Performed by: INTERNAL MEDICINE

## 2020-05-02 PROCEDURE — 97162 PT EVAL MOD COMPLEX 30 MIN: CPT

## 2020-05-02 PROCEDURE — 6370000000 HC RX 637 (ALT 250 FOR IP): Performed by: NURSE PRACTITIONER

## 2020-05-02 PROCEDURE — 97530 THERAPEUTIC ACTIVITIES: CPT

## 2020-05-02 PROCEDURE — 2580000003 HC RX 258: Performed by: PHYSICIAN ASSISTANT

## 2020-05-02 PROCEDURE — 6370000000 HC RX 637 (ALT 250 FOR IP): Performed by: PHYSICIAN ASSISTANT

## 2020-05-02 PROCEDURE — 94761 N-INVAS EAR/PLS OXIMETRY MLT: CPT

## 2020-05-02 PROCEDURE — 2700000000 HC OXYGEN THERAPY PER DAY

## 2020-05-02 PROCEDURE — 6360000002 HC RX W HCPCS: Performed by: INTERNAL MEDICINE

## 2020-05-02 PROCEDURE — 97166 OT EVAL MOD COMPLEX 45 MIN: CPT

## 2020-05-02 PROCEDURE — 6360000002 HC RX W HCPCS: Performed by: PHYSICIAN ASSISTANT

## 2020-05-02 PROCEDURE — 99222 1ST HOSP IP/OBS MODERATE 55: CPT | Performed by: INTERNAL MEDICINE

## 2020-05-02 PROCEDURE — 36415 COLL VENOUS BLD VENIPUNCTURE: CPT

## 2020-05-02 PROCEDURE — 94640 AIRWAY INHALATION TREATMENT: CPT

## 2020-05-02 PROCEDURE — 80048 BASIC METABOLIC PNL TOTAL CA: CPT

## 2020-05-02 RX ORDER — ALBUTEROL SULFATE 90 UG/1
2 AEROSOL, METERED RESPIRATORY (INHALATION) 2 TIMES DAILY
Status: DISCONTINUED | OUTPATIENT
Start: 2020-05-02 | End: 2020-05-04 | Stop reason: HOSPADM

## 2020-05-02 RX ORDER — ALBUTEROL SULFATE 90 UG/1
2 AEROSOL, METERED RESPIRATORY (INHALATION) EVERY 4 HOURS PRN
Status: DISCONTINUED | OUTPATIENT
Start: 2020-05-02 | End: 2020-05-04 | Stop reason: HOSPADM

## 2020-05-02 RX ORDER — HYDRALAZINE HYDROCHLORIDE 20 MG/ML
10 INJECTION INTRAMUSCULAR; INTRAVENOUS EVERY 6 HOURS PRN
Status: DISCONTINUED | OUTPATIENT
Start: 2020-05-02 | End: 2020-05-04 | Stop reason: HOSPADM

## 2020-05-02 RX ADMIN — HYDROCODONE BITARTRATE AND ACETAMINOPHEN 1 TABLET: 7.5; 325 TABLET ORAL at 15:06

## 2020-05-02 RX ADMIN — Medication 10 ML: at 09:37

## 2020-05-02 RX ADMIN — Medication 2 PUFF: at 19:01

## 2020-05-02 RX ADMIN — BUMETANIDE 1 MG: 1 TABLET ORAL at 11:26

## 2020-05-02 RX ADMIN — HYDROCODONE BITARTRATE AND ACETAMINOPHEN 1 TABLET: 7.5; 325 TABLET ORAL at 21:13

## 2020-05-02 RX ADMIN — MORPHINE SULFATE 4 MG: 4 INJECTION, SOLUTION INTRAMUSCULAR; INTRAVENOUS at 00:30

## 2020-05-02 RX ADMIN — TIOTROPIUM BROMIDE INHALATION SPRAY 2 PUFF: 3.12 SPRAY, METERED RESPIRATORY (INHALATION) at 07:28

## 2020-05-02 RX ADMIN — ARIPIPRAZOLE 5 MG: 10 TABLET ORAL at 09:37

## 2020-05-02 RX ADMIN — HYDRALAZINE HYDROCHLORIDE 10 MG: 20 INJECTION INTRAMUSCULAR; INTRAVENOUS at 01:47

## 2020-05-02 RX ADMIN — Medication 500 MG: at 09:37

## 2020-05-02 RX ADMIN — HYDROCODONE BITARTRATE AND ACETAMINOPHEN 1 TABLET: 7.5; 325 TABLET ORAL at 07:45

## 2020-05-02 RX ADMIN — ENOXAPARIN SODIUM 40 MG: 40 INJECTION SUBCUTANEOUS at 09:37

## 2020-05-02 RX ADMIN — DULOXETINE HYDROCHLORIDE 60 MG: 60 CAPSULE, DELAYED RELEASE ORAL at 09:37

## 2020-05-02 RX ADMIN — Medication 2 PUFF: at 07:33

## 2020-05-02 RX ADMIN — Medication 10 ML: at 21:13

## 2020-05-02 RX ADMIN — Medication 2 PUFF: at 07:28

## 2020-05-02 ASSESSMENT — PAIN DESCRIPTION - DESCRIPTORS
DESCRIPTORS: ACHING;CRAMPING
DESCRIPTORS: ACHING;CONSTANT
DESCRIPTORS: ACHING;CONSTANT

## 2020-05-02 ASSESSMENT — PAIN DESCRIPTION - LOCATION
LOCATION: CHEST;RIB CAGE
LOCATION: RIB CAGE
LOCATION: RIB CAGE

## 2020-05-02 ASSESSMENT — PAIN DESCRIPTION - PAIN TYPE
TYPE: SURGICAL PAIN
TYPE: OTHER (COMMENT)
TYPE: SURGICAL PAIN

## 2020-05-02 ASSESSMENT — PAIN DESCRIPTION - ONSET
ONSET: ON-GOING

## 2020-05-02 ASSESSMENT — PAIN SCALES - GENERAL
PAINLEVEL_OUTOF10: 8
PAINLEVEL_OUTOF10: 7
PAINLEVEL_OUTOF10: 8
PAINLEVEL_OUTOF10: 8

## 2020-05-02 ASSESSMENT — PAIN DESCRIPTION - FREQUENCY
FREQUENCY: CONTINUOUS

## 2020-05-02 ASSESSMENT — PAIN DESCRIPTION - ORIENTATION
ORIENTATION: RIGHT

## 2020-05-02 ASSESSMENT — PAIN - FUNCTIONAL ASSESSMENT
PAIN_FUNCTIONAL_ASSESSMENT: PREVENTS OR INTERFERES SOME ACTIVE ACTIVITIES AND ADLS
PAIN_FUNCTIONAL_ASSESSMENT: PREVENTS OR INTERFERES SOME ACTIVE ACTIVITIES AND ADLS

## 2020-05-02 ASSESSMENT — PAIN DESCRIPTION - PROGRESSION
CLINICAL_PROGRESSION: GRADUALLY WORSENING
CLINICAL_PROGRESSION: GRADUALLY WORSENING

## 2020-05-02 NOTE — PROGRESS NOTES
recovered  BP: NT    Dressing:      UE:   Not Tested  LE:    Max A    Bathing:    UE:  Not Tested  LE:  Not Tested    Eating:   Independent    Toileting: Max A    Positioning Needs:   Up in chair, call light and needs in reach. Exercise / Activities Initiated:   N/A    Patient/Family Education:   Role of OT  Recommendations for DC  Energy conservation techniques    Assessment of Deficits: Pt seen for Occupational therapy evaluation in acute care setting. Pt demonstrated decreased Activity Tolerance, ADL's, Balance, Bathing, ROM, Strength and Transfers. dressing Pt functioning below baseline and will likely benefit from skilled occupational therapy services to maximize safety and independence. Goal(s) : To be met in 3 Visits:  1). Bed to toilet/BSC: SBA    To be met in 5 Visits:  1). Supine to Sit: SBA  2). Upper Body Bathing:  SBA  3). Lower Body Bathing: Mod A  4). Upper Body Dressing: SBA  5). Lower Body Dressing: Mod A  6). Pt to mitch UE exs x 15 reps    Rehabilitation Potential:  Good for goals listed above. Strengths for achieving goals include: Pt motivated, Family Support and Pt cooperative  Barriers to achieving goals include:  Weakness     Plan: To be seen 3-5 x/wk while in acute care setting for therapeutic exercises, bed mobility, transfers, dressing, bathing, family/patient education with adaptive equipment, breathing technique instruction.      ZOFIA Cardenas/L [id number]            If patient discharges from this facility prior to next visit, this note will serve as the Discharge Summary

## 2020-05-02 NOTE — CONSULTS
accessory muscle use. No crackles. No wheezes. No rhonchi. No dullness on percussion. Right standard chest tube in place. There is no air leak  CV: Regular rate. Regular rhythm. No murmur or rub. No edema. Peripheral pulses are 2+. Capillary refill is less than 3 seconds. GI: Non-tender. Non-distended. No hernia. Skin: Warm and dry. No nodule on exposed extremities. Lymph: No cervical LAD. No supraclavicular LAD. M/S: No cyanosis. No joint deformity. No clubbing. Neuro: Awake. Alert. Moves all four extremities. Psych: Oriented x 3. No anxiety. LABS:  CBC:   Recent Labs     05/01/20  1217 05/02/20  0450   WBC 7.7 6.9   HGB 11.5* 10.1*   HCT 35.1* 30.5*   .4* 101.4*    200     BMP:   Recent Labs     05/01/20  1217 05/02/20  0450    135*   K 4.7 4.1   CL 95* 95*   CO2 32 31   BUN 22* 21*   CREATININE 1.5* 1.3*     LIVER PROFILE:   Recent Labs     05/01/20  1217   AST 23   ALT 10   BILITOT <0.2   ALKPHOS 138*     PT/INR: No results for input(s): PROTIME, INR in the last 72 hours. APTT: No results for input(s): APTT in the last 72 hours. UA:No results for input(s): NITRITE, COLORU, PHUR, LABCAST, WBCUA, RBCUA, MUCUS, TRICHOMONAS, YEAST, BACTERIA, CLARITYU, SPECGRAV, LEUKOCYTESUR, UROBILINOGEN, BILIRUBINUR, BLOODU, GLUCOSEU, AMORPHOUS in the last 72 hours. Invalid input(s): KETONESU  No results for input(s): PHART, IHG6OUV, PO2ART in the last 72 hours. Chest imaging was reviewed by me and showed   5/1/2020 CXR large right pneumothorax with evidence of tension, subsequent CXR showed significant reexpansion although incomplete    ASSESSMENT:  · Secondary spontaneous right pneumothorax S/P chest tube 5/1/2020  · Acute on chronic hypoxemic respiratory failure  · COPD  · CARIDAD NSCLC s/p SBRT  · PARK, refused CPAP  · Chronic diastolic CHF  · Morbid obesity    PLAN:  · Chest tube clamped by me at the bedside. Repeat CXR noon today.   If no recurrent pneumothorax, will leave clamped

## 2020-05-02 NOTE — PROGRESS NOTES
Patient with c/o pain 8/10 at surgical site. PRN morphine given per order. BP elevated 197/71. Notified MD. Nothing medication in place for BP. Will continue to monitor.

## 2020-05-02 NOTE — PROGRESS NOTES
extremities. M/S: No cyanosis. No joint deformity. No clubbing. Neuro: Awake. Grossly nonfocal    Psych: Oriented x 3. No anxiety or agitation. Labs:   Recent Labs     05/01/20  1217 05/02/20  0450   WBC 7.7 6.9   HGB 11.5* 10.1*   HCT 35.1* 30.5*    200     Recent Labs     05/01/20  1217 05/02/20  0450    135*   K 4.7 4.1   CL 95* 95*   CO2 32 31   BUN 22* 21*   CREATININE 1.5* 1.3*   CALCIUM 9.8 9.7     Recent Labs     05/01/20  1217   AST 23   ALT 10   BILITOT <0.2   ALKPHOS 138*     No results for input(s): INR in the last 72 hours. Recent Labs     05/01/20  1217   TROPONINI <0.01       Urinalysis:      Lab Results   Component Value Date    NITRU NEGATIVE 02/25/2013    BLOODU NEGATIVE 02/25/2013    SPECGRAV 1.015 02/25/2013    GLUCOSEU NEGATIVE 02/25/2013       Radiology:  XR CHEST PORTABLE   Final Result   1. No significant change. XR CHEST PORTABLE   Final Result   1. Resolved right apical pneumothorax. 2. Mild pulmonary vascular congestion. XR CHEST PORTABLE   Final Result   Near complete re-expansion of the right lung status post chest tube   placement. Small pneumothorax remains. XR CHEST PORTABLE   Final Result   Large right pneumothorax with mild mediastinal deviation suggesting tension   pneumothorax. Findings were discussed with Dr. George Joshi of the [facility] emergency   department at 1:28 pm on 5/1/2020. Assessment/Plan:    Active Hospital Problems    Diagnosis    Spontaneous tension pneumothorax [J93.0]    Pulmonary nodule [R91.1]    Chronic respiratory failure with hypoxia, on home O2 therapy (HCC) [J96.11, Z99.81]         # Right Tension Pneumothorax  - presented with c/o shoulder, back pain, dyspnea  - found to have large right sided pneumothorax  - chest tube placed in ED  - admitted to PCU on telemetry  - pulmonology consulted  - supplemental O2  - Norco, Morphine as needed for pain.    - repeat CXR cleared - chest tube clamped - repeat CXR in 4 hrs     # Chronic hypoxic respiratory failure  - stable on home O2 settings: 4 L O2.   - (did require up to 7 L before chest tube placed)     # COPD  - stable. No AE.  - continue Symbicort, Spiriva, Albuterol prn.      # Hypertension  - BP elevated most likely with pain. Improved now. - not on any home medications.      # DM type 2  - monitor glucose. SSI coverage.      # Pulmonary nodule  - concerning for non-small cell lung cancer  - positive on CT PET.   - f/w Dr. Moy Fang, Dr. Adrienne Molina  - she is undergoing radiation.      # Sleep apnea   - not on CPAP.      # Chronic diastolic CHF  - on Bumex.      # CKD stage 3  - stable. Monitor labs.      # GERD  - on PPI (recently started on PPI by Dr. Sri Borjas).      # Morbid Obesity  - Body mass index is 44.99 kg/m².   - Recommended weight loss       DVT Prophylaxis: Lovenox  Diet: DIET CARB CONTROL; Carb Control: 4 carb choices (60 gms)/meal  Code Status: Full Code    PT/OT Eval Status: ordered    Dispo - poss d/c in am if pneumotx resolved    Keila Soni MD

## 2020-05-02 NOTE — PROGRESS NOTES
Inpatient Physical Therapy Evaluation and Treatment    Unit: PCU  Date:  5/2/2020  Patient Name:    Swati Auguste  Admitting diagnosis:  Tension pneumothorax [J93.0]  Admit Date:  5/1/2020  Precautions/Restrictions/WB Status/ Lines/ Wounds/ Oxygen: fall risk, bed/chair alarm, supplemental O2 (4L), telemetry and purewick catheter    Treatment Time:  08:30-09:10  Treatment Number:  1   Timed Code Treatment Minutes: 30 minutes  Total Treatment Minutes:  40  minutes    Patient Goals for Therapy: \" go home \"          Discharge Recommendations: Home with PRN assist and home therapy  DME needs for discharge: Needs Met       Therapy recommendation for EMS Transport: can transport by wheelchair    Therapy recommendations for staff:   Assist of 1 with use of HHA for all transfers and ambulation to/from BSC/chair    History of Present Illness: 66 y.o. female with sleep apnea (on CPAP), chronic hypoxic respiratory failure, hypertension, glaucoma, DM type 2, depression, severe COPD, and arthritis who presents to [facility] with c/o back pain. Onset was this morning. She reports it is located to right lower scapula area, mid back, and right lower rib area. The pain was constant, sharp, and severe. Worsened with breathing. Patient found to have large right sided tension pneumothorax. Chest tube placed. Patient admitted to PCU on telemetry. Pulmonology consulted. Home Health S4 Level Recommendation:  Level 1 Standard  AM-PAC Mobility Score            Preadmission Environment    Pt.  Lives with family ( and son)     is home 24/7 and in good health, son works nights  Home environment:  one story home with basement    No need to go into basement  Steps to enter first floor: 3 steps to enter and one hand rail    Pt has to do 4+1+4 with no rail if raining  Steps to basement: Full flight of 12-13 and bilateral hand rails  Bathroom: Tub/Shower unit, Grab bars and Shower Chair , hand held shower; comfort Gait: Ambulate 50 ft.  with  Supervision  and use of No AD  5). Tolerate B LE exercises 3 sets of 10-15 reps  6). Ascend/descend 3 steps with CGA with use of one hand rail and No AD. Rehabilitation Potential: Good  Strengths for achieving goals include:   Pt motivated, PLOF, Family Support and Pt cooperative  Barriers to achieving goals include:    Complexity of condition    Plan    To be seen 2-3 x / week  while in acute care setting for therapeutic exercises, bed mobility, transfers, progressive gait training, balance training, and family/patient education. Signature: Adri Delgado PT, DPT #[id number]    If patient discharges from this facility prior to next visit, this note will serve as the Discharge Summary.

## 2020-05-02 NOTE — FLOWSHEET NOTE
05/01/20 1959   Vitals   Temp 97.7 °F (36.5 °C)   Temp Source Oral   Pulse 85   Heart Rate Source Monitor   Resp 16   BP (!) 160/75   BP Location Right lower arm   BP Upper/Lower Lower   BP Method Automatic   Patient Position Semi fowlers   Level of Consciousness 0   MEWS Score 1   Patient Currently in Pain Yes   Oxygen Therapy   SpO2 93 %   O2 Device Nasal cannula   O2 Flow Rate (L/min) 4 L/min   Shift assessment completed. Patient in bed awake,alert and oriented x4. C/o of pain 9/10 r/t to chest tube. Dressing clean,dry,inact. Vitals obtained. Diminished breath sounds bilateral. 4L oxygen. Evening medications given per order. PRN Norco given per order. Patient satisfied at this time. Will continue to monitor,call light within reach.

## 2020-05-02 NOTE — PROGRESS NOTES
COLONOSCOPY  11/18/13    Diverticulosis    HERNIA REPAIR      HYSTERECTOMY      LAP BAND  10/5/10    ADJUSTABLE GASTRIC RESTRICTIVE DEVICE       Level of Consciousness: Alert, Oriented, and Cooperative = 0    Level of Activity: Walking unassisted = 0    Respiratory Pattern: Regular Pattern; RR 8-20 = 0    Breath Sounds: Diminshed bilaterally and/or crackles = 2    Sputum   ,  , Sputum How Obtained: Spontaneous cough  Cough: Strong, spontaneous, non-productive = 0    Vital Signs   BP (!) 160/75   Pulse 85   Temp 97.7 °F (36.5 °C) (Oral)   Resp 18   Ht 5' 3\" (1.6 m)   Wt 253 lb 9.6 oz (115 kg)   SpO2 96%   BMI 44.92 kg/m²   SPO2 (COPD values may differ): 86-87% on room air or greater than 92% on FiO2 35- 50% = 3    Peak Flow (asthma only): not applicable = 0    RSI: 7-8 = BID and Q4HPRN (every four hours as needed) for dyspnea        Plan       Goals: medication delivery    Patient/caregiver was educated on the proper method of use for Respiratory Care Devices:  Yes      Level of patient/caregiver understanding able to:   ? Verbalize understanding   ? Demonstrate understanding       ? Teach back        ? Needs reinforcement       ? No available caregiver               ? Other:     Response to education:  Good     Is patient being placed on Home Treatment Regimen? No     Does the patient have everything they need prior to discharge? NA     Comments: Chart reviewed and patient assessed. Plan of Care: Albuterol 2PUFFS BID & Q4PRN,  Symbicort 2puffs BID,  Spiriva Daily. Electronically signed by Amanda Nicolas RCP on 5/2/2020 at 12:01 AM    Respiratory Protocol Guidelines     1. Assessment and treatment by Respiratory Therapy will be initiated for medication and therapeutic interventions upon initiation of aerosolized medication. 2. Physician will be contacted for respiratory rate (RR) greater than 35 breaths per minute.  Therapy will be held for heart rate (HR) greater than 140 beats per minute, pending direction from physician. 3. Bronchodilators will be administered via Metered Dose Inhaler (MDI) with spacer when the following criteria are met:  a. Alert and cooperative     b. HR < 140 bpm  c. RR < 30 bpm                d. Can demonstrate a 2-3 second inspiratory hold  4. Bronchodilators will be administered via Hand Held Nebulizer [facility]) to patients when ANY of the following criteria are met  a. Incognizant or uncooperative          b. Patients treated with HHN at Home        c. Unable to demonstrate proper use of MDI with spacer     d. RR > 30 bpm   5. Bronchodilators will be delivered via Metered Dose Inhaler (MDI), HHN, Aerogen to intubated patients on mechanical ventilation. 6. Inhalation medication orders will be delivered and/or substituted as outlined below. Aerosolized Medications Ordering and Administration Guidelines:    1. All Medications will be ordered by a physician, and their frequency and/or modality will be adjusted as defined by the patients Respiratory Severity Index (RSI) score. 2. If the patient does not have documented COPD, consider discontinuing anticholinergics when RSI is less than 9.  3. If the bronchospasm worsens (increased RSI), then the bronchodilator frequency can be increased to a maximum of every 4 hours. If greater than every 4 hours is required, the physician will be contacted. 4. If the bronchospasm improves, the frequency of the bronchodilator can be decreased, based on the patient's RSI, but not less than home treatment regimen frequency. 5. Bronchodilator(s) will be discontinued if patient has a RSI less than 9 and has received no scheduled or as needed treatment for 72  Hrs. Patients Ordered on a Mucolytic Agent:    1. Must always be administered with a bronchodilator. 2. Discontinue if patient experiences worsened bronchospasm, or secretions have lessened to the point that the patient is able to clear them with a cough.     Anti-inflammatory and

## 2020-05-03 ENCOUNTER — APPOINTMENT (OUTPATIENT)
Dept: GENERAL RADIOLOGY | Age: 75
DRG: 200 | End: 2020-05-03
Payer: MEDICARE

## 2020-05-03 LAB
GLUCOSE BLD-MCNC: 115 MG/DL (ref 70–99)
GLUCOSE BLD-MCNC: 115 MG/DL (ref 70–99)
GLUCOSE BLD-MCNC: 117 MG/DL (ref 70–99)
GLUCOSE BLD-MCNC: 93 MG/DL (ref 70–99)
PERFORMED ON: ABNORMAL
PERFORMED ON: NORMAL

## 2020-05-03 PROCEDURE — 6370000000 HC RX 637 (ALT 250 FOR IP): Performed by: NURSE PRACTITIONER

## 2020-05-03 PROCEDURE — 71045 X-RAY EXAM CHEST 1 VIEW: CPT

## 2020-05-03 PROCEDURE — 2580000003 HC RX 258: Performed by: PHYSICIAN ASSISTANT

## 2020-05-03 PROCEDURE — 99232 SBSQ HOSP IP/OBS MODERATE 35: CPT | Performed by: INTERNAL MEDICINE

## 2020-05-03 PROCEDURE — 6370000000 HC RX 637 (ALT 250 FOR IP): Performed by: PHYSICIAN ASSISTANT

## 2020-05-03 PROCEDURE — 6360000002 HC RX W HCPCS: Performed by: PHYSICIAN ASSISTANT

## 2020-05-03 PROCEDURE — 94761 N-INVAS EAR/PLS OXIMETRY MLT: CPT

## 2020-05-03 PROCEDURE — 94640 AIRWAY INHALATION TREATMENT: CPT

## 2020-05-03 PROCEDURE — 2060000000 HC ICU INTERMEDIATE R&B

## 2020-05-03 PROCEDURE — 2700000000 HC OXYGEN THERAPY PER DAY

## 2020-05-03 RX ADMIN — HYDROCODONE BITARTRATE AND ACETAMINOPHEN 1 TABLET: 7.5; 325 TABLET ORAL at 12:50

## 2020-05-03 RX ADMIN — Medication 10 ML: at 20:42

## 2020-05-03 RX ADMIN — HYDROCODONE BITARTRATE AND ACETAMINOPHEN 1 TABLET: 7.5; 325 TABLET ORAL at 20:42

## 2020-05-03 RX ADMIN — Medication 2 PUFF: at 19:12

## 2020-05-03 RX ADMIN — ENOXAPARIN SODIUM 40 MG: 40 INJECTION SUBCUTANEOUS at 08:48

## 2020-05-03 RX ADMIN — Medication 10 ML: at 08:48

## 2020-05-03 RX ADMIN — BUMETANIDE 1 MG: 1 TABLET ORAL at 08:48

## 2020-05-03 RX ADMIN — DULOXETINE HYDROCHLORIDE 60 MG: 60 CAPSULE, DELAYED RELEASE ORAL at 08:48

## 2020-05-03 RX ADMIN — ARIPIPRAZOLE 5 MG: 10 TABLET ORAL at 08:48

## 2020-05-03 RX ADMIN — Medication 500 MG: at 08:48

## 2020-05-03 RX ADMIN — HYDROCODONE BITARTRATE AND ACETAMINOPHEN 1 TABLET: 7.5; 325 TABLET ORAL at 03:30

## 2020-05-03 RX ADMIN — Medication 2 PUFF: at 07:12

## 2020-05-03 RX ADMIN — TIOTROPIUM BROMIDE INHALATION SPRAY 2 PUFF: 3.12 SPRAY, METERED RESPIRATORY (INHALATION) at 07:12

## 2020-05-03 ASSESSMENT — PAIN SCALES - GENERAL
PAINLEVEL_OUTOF10: 6
PAINLEVEL_OUTOF10: 7
PAINLEVEL_OUTOF10: 6

## 2020-05-03 ASSESSMENT — PAIN DESCRIPTION - ORIENTATION: ORIENTATION: RIGHT

## 2020-05-03 ASSESSMENT — PAIN DESCRIPTION - LOCATION: LOCATION: CHEST;RIB CAGE

## 2020-05-03 ASSESSMENT — PAIN DESCRIPTION - DESCRIPTORS: DESCRIPTORS: DISCOMFORT;DULL

## 2020-05-03 ASSESSMENT — PAIN DESCRIPTION - ONSET: ONSET: ON-GOING

## 2020-05-03 ASSESSMENT — PAIN DESCRIPTION - FREQUENCY: FREQUENCY: INTERMITTENT

## 2020-05-03 ASSESSMENT — PAIN DESCRIPTION - PAIN TYPE: TYPE: ACUTE PAIN

## 2020-05-04 VITALS
RESPIRATION RATE: 16 BRPM | TEMPERATURE: 97.9 F | BODY MASS INDEX: 44.4 KG/M2 | WEIGHT: 250.6 LBS | HEART RATE: 89 BPM | OXYGEN SATURATION: 94 % | HEIGHT: 63 IN | DIASTOLIC BLOOD PRESSURE: 49 MMHG | SYSTOLIC BLOOD PRESSURE: 128 MMHG

## 2020-05-04 LAB
GLUCOSE BLD-MCNC: 108 MG/DL (ref 70–99)
GLUCOSE BLD-MCNC: 95 MG/DL (ref 70–99)
PERFORMED ON: ABNORMAL
PERFORMED ON: NORMAL

## 2020-05-04 PROCEDURE — 99232 SBSQ HOSP IP/OBS MODERATE 35: CPT | Performed by: INTERNAL MEDICINE

## 2020-05-04 PROCEDURE — 6360000002 HC RX W HCPCS: Performed by: PHYSICIAN ASSISTANT

## 2020-05-04 PROCEDURE — 6370000000 HC RX 637 (ALT 250 FOR IP): Performed by: NURSE PRACTITIONER

## 2020-05-04 PROCEDURE — 99238 HOSP IP/OBS DSCHRG MGMT 30/<: CPT | Performed by: INTERNAL MEDICINE

## 2020-05-04 PROCEDURE — 2580000003 HC RX 258: Performed by: PHYSICIAN ASSISTANT

## 2020-05-04 PROCEDURE — 6370000000 HC RX 637 (ALT 250 FOR IP): Performed by: PHYSICIAN ASSISTANT

## 2020-05-04 PROCEDURE — 94640 AIRWAY INHALATION TREATMENT: CPT

## 2020-05-04 PROCEDURE — 94761 N-INVAS EAR/PLS OXIMETRY MLT: CPT

## 2020-05-04 PROCEDURE — 2700000000 HC OXYGEN THERAPY PER DAY

## 2020-05-04 RX ORDER — SENNA AND DOCUSATE SODIUM 50; 8.6 MG/1; MG/1
2 TABLET, FILM COATED ORAL 2 TIMES DAILY PRN
Qty: 30 TABLET | Refills: 0 | Status: SHIPPED | OUTPATIENT
Start: 2020-05-04 | End: 2020-09-24 | Stop reason: CLARIF

## 2020-05-04 RX ORDER — SENNA AND DOCUSATE SODIUM 50; 8.6 MG/1; MG/1
2 TABLET, FILM COATED ORAL 2 TIMES DAILY PRN
Status: DISCONTINUED | OUTPATIENT
Start: 2020-05-04 | End: 2020-05-04 | Stop reason: HOSPADM

## 2020-05-04 RX ADMIN — DULOXETINE HYDROCHLORIDE 60 MG: 60 CAPSULE, DELAYED RELEASE ORAL at 09:08

## 2020-05-04 RX ADMIN — ARIPIPRAZOLE 5 MG: 10 TABLET ORAL at 09:08

## 2020-05-04 RX ADMIN — Medication 2 PUFF: at 06:46

## 2020-05-04 RX ADMIN — HYDROCODONE BITARTRATE AND ACETAMINOPHEN 1 TABLET: 7.5; 325 TABLET ORAL at 03:09

## 2020-05-04 RX ADMIN — TIOTROPIUM BROMIDE INHALATION SPRAY 2 PUFF: 3.12 SPRAY, METERED RESPIRATORY (INHALATION) at 06:46

## 2020-05-04 RX ADMIN — ENOXAPARIN SODIUM 40 MG: 40 INJECTION SUBCUTANEOUS at 09:07

## 2020-05-04 RX ADMIN — Medication 500 MG: at 09:08

## 2020-05-04 RX ADMIN — BUMETANIDE 1 MG: 1 TABLET ORAL at 12:30

## 2020-05-04 RX ADMIN — HYDROCODONE BITARTRATE AND ACETAMINOPHEN 1 TABLET: 7.5; 325 TABLET ORAL at 12:30

## 2020-05-04 RX ADMIN — Medication 10 ML: at 09:07

## 2020-05-04 ASSESSMENT — PAIN DESCRIPTION - ORIENTATION: ORIENTATION: LEFT;RIGHT

## 2020-05-04 ASSESSMENT — PAIN DESCRIPTION - PAIN TYPE: TYPE: ACUTE PAIN

## 2020-05-04 ASSESSMENT — PAIN DESCRIPTION - LOCATION: LOCATION: RIB CAGE

## 2020-05-04 ASSESSMENT — PAIN DESCRIPTION - DESCRIPTORS: DESCRIPTORS: ACHING;DISCOMFORT;SORE

## 2020-05-04 ASSESSMENT — PAIN - FUNCTIONAL ASSESSMENT: PAIN_FUNCTIONAL_ASSESSMENT: PREVENTS OR INTERFERES SOME ACTIVE ACTIVITIES AND ADLS

## 2020-05-04 ASSESSMENT — PAIN DESCRIPTION - FREQUENCY: FREQUENCY: INTERMITTENT

## 2020-05-04 ASSESSMENT — PAIN DESCRIPTION - PROGRESSION: CLINICAL_PROGRESSION: GRADUALLY WORSENING

## 2020-05-04 ASSESSMENT — PAIN DESCRIPTION - ONSET: ONSET: GRADUAL

## 2020-05-04 ASSESSMENT — PAIN SCALES - GENERAL
PAINLEVEL_OUTOF10: 7
PAINLEVEL_OUTOF10: 7

## 2020-05-04 NOTE — PROGRESS NOTES
Shift assessment completed, see flowsheets. Patient A/Ox4. Dressing to chest tube removal site C/D/I. AM medications given per orders. Patient denies pain or further needs at this time. Patient currently awake in bed, call light and personal belongings within reach. Patient educated on use of call light and to call out with needs, verbalized understanding.

## 2020-05-04 NOTE — CARE COORDINATION
[facility]    DC order noted, all docs needed have been faxed to [facility] for home care services.     Home care to see patient within 24-48 hrs    Chiara Rose RN, BSN CTN  [facility] [phone]
home with [facility] AT [facility]. Does not have a preference of home care providers. \"want someone in network with insurance\". Referral faxed to [facility]. +CM following    The Patient  was provided with a choice of provider and agrees   with the discharge plan. [x] Yes [] No    Freedom of choice list was provided with basic dialogue that supports the patient's individualized plan of care/goals, treatment preferences and shares the quality data associated with the providers. [x] Yes [] No    Explained Case Management role/services.

## 2020-05-04 NOTE — PLAN OF CARE
Problem: Falls - Risk of:  Goal: Will remain free from falls  Description: Will remain free from falls  5/4/2020 0930 by Desiree Stratton RN  Outcome: Ongoing    Patient has not suffered from falls this shift     Problem: Pain:  Goal: Pain level will decrease  Description: Pain level will decrease  5/4/2020 0930 by Cesia Stratton RN  Outcome: Ongoing    PRN pain medication provided as requested     Problem: Pain:  Goal: Control of acute pain  Description: Control of acute pain  5/4/2020 0930 by Desiree Stratton RN  Outcome: Ongoing     Problem: Safety:  Goal: Free from accidental physical injury  Description: Free from accidental physical injury  5/4/2020 0930 by Desiree Stratton RN  Outcome: Ongoing    Patient has not suffered from injury this shift     Problem: Daily Care:  Goal: Daily care needs are met  Description: Daily care needs are met  5/4/2020 0930 by Royer Hull RN  Outcome: Ongoing    Patient provided with daily care items and assisted with getting cleaned up
met  5/2/2020 2251 by Darnell Rankin RN  Outcome: Ongoing  5/2/2020 1000 by Fatemeh Porras RN  Outcome: Ongoing

## 2020-05-04 NOTE — DISCHARGE INSTR - COC
hours) at 5/4/2020 1132  Last data filed at 5/4/2020 0848  Gross per 24 hour   Intake 1080 ml   Output 875 ml   Net 205 ml     I/O last 3 completed shifts: In: 1080 [P.O.:1080]  Out: 960 [Urine:875; Chest Tube:85]    Safety Concerns: At Risk for Falls    Impairments/Disabilities:      None    Nutrition Therapy:  Current Nutrition Therapy:   - Oral Diet:  Carb Control 4 carbs/meal (1800kcals/day)    Routes of Feeding: Oral  Liquids: Thin Liquids  Daily Fluid Restriction: no  Last Modified Barium Swallow with Video (Video Swallowing Test): not done    Treatments at the Time of Hospital Discharge:   Respiratory Treatments:   Oxygen Therapy:  is on oxygen at 4 L/min per nasal cannula. Ventilator:    - No ventilator support    Rehab Therapies: Nurse, Physical Therapy and Occupational Therapy  Weight Bearing Status/Restrictions: No weight bearing restirctions  Other Medical Equipment (for information only, NOT a DME order):     Other Treatments: HOME HEALTH CARE: [address] agency to establish plan of care for patient over 60 day period   Nursing  Initial home SN evaluation visit to occur within 24-48 hours for:  1)  medication management  2)  VS and clinical assessment  3)  S&S chronic disease exacerbation education + when to contact MD/NP  4)  care coordination  Medication Reconciliation during 1st SN visit  PT/OT/Speech   Evaluations in home within 24-48 hours of discharge to include DME and home safety   Frontload therapy 5 days, then 3x a week   OT to evaluate if patient has [address] needs for personal care    evaluation within 24-48 hours to evaluate resources & insurance for potential [place], [place], LTC, and Medicaid options   Palliative Care referral within 5 days of hospital discharge   PCP Visit scheduled within 3 - 7 days of hospital discharge [address] [facility] [address] (If patient is agreeable and meets guidelines)      Patient's personal belongings (please select

## 2020-05-04 NOTE — DISCHARGE SUMMARY
Name:  Jas Husain  Room:  /5092-07  MRN:    [id number]    Discharge Summary      This discharge summary is in conjunction with a complete physical exam done on the day of discharge. Attending Physician: Dr. Olga Chisholm  Discharging Physician: Dr. Dee Humphrey: 5/1/2020  Discharge:   5/4/2020    HPI:  The patient is a 66 y.o. female with sleep apnea (on CPAP), chronic hypoxic respiratory failure, hypertension, glaucoma, DM type 2, depression, severe COPD, and arthritis who presents to [facility] with c/o back pain. Onset was this morning. She reports it is located to right lower scapula area, mid back, and right lower rib area. The pain was constant, sharp, and severe. Worsened with breathing. She states she couldn't inhale, but she could exhale. She felt short of breath. She f/w Dr. Kristi Carlin. There was a concerning pulmonary nodule on CT PET. It was recommended she go see radiation oncology and to see GI. She has f/u with them. She was started on a PPI by Dr. Mily Doll and has undergone some radiation. She was initially hypoxic, requiring 7 L O2. Now stable on home O2 settings (4 L O2). BP elevated. Patient found to have large right sided tension pneumothorax. Chest tube placed. Patient admitted to PCU on telemetry. Pulmonology consulted.         Diagnoses this Admission and Hospital Course   # Right Tension Pneumothorax  - presented with c/o shoulder, back pain, dyspnea  - found to have large right sided pneumothorax  - chest tube placed in ED  - admitted to PCU on telemetry  - pulmonology consulted  - supplemental O2  - Norco, Morphine as needed for pain.   - repeat CXR cleared - chest tube clamped 5/2, removed 5/3  - can D/c home today. Given Rx for pain medication.      # Chronic hypoxic respiratory failure  - stable on home O2 settings: 4 L O2.   - (did require up to 7 L before chest tube placed)     # COPD  - stable. No AE.  - continued Symbicort, Spiriva, Albuterol prn. CULTURES  N/A    RADIOLOGY  XR CHEST PORTABLE   Final Result   1. Trace right apical pneumothorax status post right chest tube. XR CHEST PORTABLE   Final Result   1. No significant change. XR CHEST PORTABLE   Final Result   1. Resolved right apical pneumothorax. 2. Mild pulmonary vascular congestion. XR CHEST PORTABLE   Final Result   Near complete re-expansion of the right lung status post chest tube   placement. Small pneumothorax remains. XR CHEST PORTABLE   Final Result   Large right pneumothorax with mild mediastinal deviation suggesting tension   pneumothorax. Findings were discussed with Dr. Semaj Blanco of the [facility] emergency   department at 1:28 pm on 5/1/2020.                Discharge Medications     Medication List      CONTINUE taking these medications    * albuterol (2.5 MG/3ML) 0.083% nebulizer solution  Commonly known as:  PROVENTIL  Take 3 mLs by nebulization every 4 hours as needed for Wheezing     * albuterol (2.5 MG/3ML) 0.083% nebulizer solution  Commonly known as:  PROVENTIL  Take 3 mLs by nebulization every 4 hours as needed for Wheezing or Shortness of Breath DX COPD J44.9     * albuterol sulfate  (90 Base) MCG/ACT inhaler  Commonly known as:  ProAir HFA  Inhale 2 puffs into the lungs every 6 hours as needed for Wheezing or Shortness of Breath     ARIPiprazole 5 MG tablet  Commonly known as:  ABILIFY     bumetanide 1 MG tablet  Commonly known as:  BUMEX     calcium carbonate 500 MG Tabs tablet  Commonly known as:  OSCAL     DULoxetine 60 MG extended release capsule  Commonly known as:  CYMBALTA     fluticasone-salmeterol 500-50 MCG/DOSE diskus inhaler  Commonly known as:  ADVAIR  INHALE 1 PUFF INTO THE LUNGS EVERY 12 HOURS     Guaiatussin -10 MG/5ML syrup  Generic drug:  guaiFENesin-codeine     IRON PO     losartan 100 MG tablet  Commonly known as:  COZAAR     meloxicam 15 MG tablet  Commonly known as:  MOBIC     oxybutynin 5

## 2020-05-05 ENCOUNTER — CARE COORDINATION (OUTPATIENT)
Dept: CASE MANAGEMENT | Age: 75
End: 2020-05-05

## 2020-05-05 NOTE — CARE COORDINATION
Patient contacted regarding recent discharge and COVID-19 risk     Care Transition Nurse/ Ambulatory Care Manager contacted the patient by telephone to perform post discharge assessment. Verified name and  with patient as identifiers. Patient has following risk factors of: heart failure, COPD, asthma, acute respiratory failure, immunocompromised, diabetes, chronic kidney disease and obesity. CTN/ACM reviewed discharge instructions, medical action plan and red flags related to discharge diagnosis. Reviewed and educated them on any new and changed medications related to discharge diagnosis. Education provided regarding infection prevention, and signs and symptoms of COVID-19 and when to seek medical attention with patient who verbalized understanding. Discussed exposure protocols and quarantine from [address] you at higher risk for severe illness  and given an opportunity for questions and concerns. The patient agrees to contact the COVID-19 hotline [phone] or PCP office for questions related to their healthcare. CTN/ACM provided contact information for future reference. From CDC: Are you at higher risk for severe illness?  Wash your hands often.  Avoid close contact (6 feet, which is about two arm lengths) with people who are sick.  Put distance between yourself and other people if COVID-19 is spreading in your community.  Clean and disinfect frequently touched surfaces.  Avoid all cruise travel and non-essential air travel.  Call your healthcare professional if you have concerns about COVID-19 and your underlying condition or if you are sick. For more information on steps you can take to protect yourself, see CDC's How to Protect Yourself    Wearing oxygen at 4lpm. Had issues with it working last night but  was able to fix it. Knows how to reach DME provider but feels it was fixed. Instructed her to call them to trouble shoot to prevent further issues.  she

## 2020-05-06 ENCOUNTER — CARE COORDINATION (OUTPATIENT)
Dept: CASE MANAGEMENT | Age: 75
End: 2020-05-06

## 2020-05-06 NOTE — CARE COORDINATION
COVID-19 Monitoring Follow Up Outreach:     Wearing oxygen 4lpm with SaO2 in the 90s. She used neb tx and DB&C as instructed yesterday and feels breathing has improved. Encouraged her to continue with neb tx and DB&C exercises. Instructed her to call Dr Adrienne Sanders office if unable to maintain >92% SaO2. She voices understanding. She c/o being weak and sore. Expects PT today around 4pm. She knows how to reach [facility] for prn needs/concerns and was empowered to do so to avoid rehospitalization. She thanked writer for follow up and denies needs at this time. Education provided regarding infection prevention, and signs and symptoms of COVID-19 and when to seek medical attention with patient who verbalized understanding. Discussed exposure protocols and quarantine from [address] you at higher risk for severe illness 2019 and given an opportunity for questions and concerns. Patient agreed to contact the PCP office for questions related to their healthcare. Writer provided contact information for future reference. From CDC: Are you at higher risk for severe illness?            Wash your hands often.            Avoid close contact (6 feet - which is about two arm lengths) with people who are sick.            Put distance between yourself and other people because COVID-19 is spreading in your community.            Clean and disinfect frequently touched surfaces (door knobs, faucets, appliance handles/buttons, steering wheel, door handles, etc)             Avoid all cruise travel and non-essential air travel.            Call your healthcare professional if you have concerns about COVID-19 and your underlying condition or if you are sick.       Victoria Helm RN  Care Transition Nurse  [phone] mobile    Future Appointments   Date Time Provider Su Arrington   7/7/2020  4:15 PM Antonio Goodrich MD [facility]

## 2020-05-07 ENCOUNTER — TELEPHONE (OUTPATIENT)
Dept: PULMONOLOGY | Age: 75
End: 2020-05-07

## 2020-05-11 ENCOUNTER — CARE COORDINATION (OUTPATIENT)
Dept: CASE MANAGEMENT | Age: 75
End: 2020-05-11

## 2020-05-11 NOTE — CARE COORDINATION
Patient resolved from the Care Transitions episode on 5/11/2020    Patient/family has been provided the following resources and education related to COVID-19:                         Signs, symptoms and red flags related to COVID-19            CDC exposure and quarantine guidelines            [facility] exposure contact - [phone]                Patient currently reports that the following symptoms have improved:  no new/worsening symptoms     Deidra Caceres reports slowly improving. Has PCP appt next week. Knows how to reach [facility] for prn needs. She will be there tomorrow. Wearing oxygen at 4lpm and SaO2 93-94% at rest and high 80s with exertion but rebounds quickly with DB&C and rest. She denies fever, chills, n/v/d. She feels slight improvement and states mostly she is \"nervous about something like this happening again\". She is aware of measures to take to reduce her exposure to COVID including but not limited to handwashing, surface washing, wearing a mask and avoid touching her face. She knows she should contact Dr Oneil Cheadle office immediately with any concerns or changes in her respiratory status. She denies needs at this time. Thanked writer for follow up call. No further outreach scheduled with this CTN/ACM. Episode of Care resolved. Patient has this CTN/ACM contact information if future needs arise.     Kristi Aguilar, LISSETH  Care Transition Nurse  [phone] mobile    Future Appointments   Date Time Provider Su Arrington   7/7/2020  4:15 PM Kael Christianson [facility]

## 2020-05-15 ENCOUNTER — APPOINTMENT (OUTPATIENT)
Dept: GENERAL RADIOLOGY | Age: 75
End: 2020-05-15
Payer: MEDICARE

## 2020-05-15 ENCOUNTER — TELEPHONE (OUTPATIENT)
Dept: PULMONOLOGY | Age: 75
End: 2020-05-15

## 2020-05-15 ENCOUNTER — HOSPITAL ENCOUNTER (EMERGENCY)
Age: 75
Discharge: HOME | End: 2020-05-15
Attending: EMERGENCY MEDICINE
Payer: MEDICARE

## 2020-05-15 VITALS
HEART RATE: 75 BPM | BODY MASS INDEX: 43.36 KG/M2 | HEIGHT: 64 IN | TEMPERATURE: 98.4 F | DIASTOLIC BLOOD PRESSURE: 57 MMHG | OXYGEN SATURATION: 98 % | WEIGHT: 254 LBS | RESPIRATION RATE: 16 BRPM | SYSTOLIC BLOOD PRESSURE: 152 MMHG

## 2020-05-15 LAB
A/G RATIO: 1.1 (ref 1.1–2.2)
ALBUMIN SERPL-MCNC: 3.2 G/DL (ref 3.4–5)
ALP BLD-CCNC: 121 U/L (ref 40–129)
ALT SERPL-CCNC: 11 U/L (ref 10–40)
ANION GAP SERPL CALCULATED.3IONS-SCNC: 8 MMOL/L (ref 3–16)
AST SERPL-CCNC: 18 U/L (ref 15–37)
BASOPHILS ABSOLUTE: 0 K/UL (ref 0–0.2)
BASOPHILS RELATIVE PERCENT: 0.6 %
BILIRUB SERPL-MCNC: <0.2 MG/DL (ref 0–1)
BUN BLDV-MCNC: 15 MG/DL (ref 7–20)
CALCIUM SERPL-MCNC: 9.2 MG/DL (ref 8.3–10.6)
CHLORIDE BLD-SCNC: 103 MMOL/L (ref 99–110)
CO2: 27 MMOL/L (ref 21–32)
CREAT SERPL-MCNC: 1.2 MG/DL (ref 0.6–1.2)
EOSINOPHILS ABSOLUTE: 0.8 K/UL (ref 0–0.6)
EOSINOPHILS RELATIVE PERCENT: 10.8 %
GFR AFRICAN AMERICAN: 53
GFR NON-AFRICAN AMERICAN: 44
GLOBULIN: 3 G/DL
GLUCOSE BLD-MCNC: 103 MG/DL (ref 70–99)
HCT VFR BLD CALC: 30.8 % (ref 36–48)
HEMOGLOBIN: 9.9 G/DL (ref 12–16)
LYMPHOCYTES ABSOLUTE: 1.7 K/UL (ref 1–5.1)
LYMPHOCYTES RELATIVE PERCENT: 23 %
MCH RBC QN AUTO: 32.9 PG (ref 26–34)
MCHC RBC AUTO-ENTMCNC: 32.3 G/DL (ref 31–36)
MCV RBC AUTO: 101.9 FL (ref 80–100)
MONOCYTES ABSOLUTE: 0.4 K/UL (ref 0–1.3)
MONOCYTES RELATIVE PERCENT: 5.8 %
NEUTROPHILS ABSOLUTE: 4.5 K/UL (ref 1.7–7.7)
NEUTROPHILS RELATIVE PERCENT: 59.8 %
PDW BLD-RTO: 16.1 % (ref 12.4–15.4)
PLATELET # BLD: 277 K/UL (ref 135–450)
PMV BLD AUTO: 8.1 FL (ref 5–10.5)
POTASSIUM REFLEX MAGNESIUM: 4.3 MMOL/L (ref 3.5–5.1)
PRO-BNP: 660 PG/ML (ref 0–449)
RBC # BLD: 3.02 M/UL (ref 4–5.2)
SODIUM BLD-SCNC: 138 MMOL/L (ref 136–145)
TOTAL PROTEIN: 6.2 G/DL (ref 6.4–8.2)
TROPONIN: <0.01 NG/ML
WBC # BLD: 7.6 K/UL (ref 4–11)

## 2020-05-15 PROCEDURE — 83880 ASSAY OF NATRIURETIC PEPTIDE: CPT

## 2020-05-15 PROCEDURE — 71045 X-RAY EXAM CHEST 1 VIEW: CPT

## 2020-05-15 PROCEDURE — 84484 ASSAY OF TROPONIN QUANT: CPT

## 2020-05-15 PROCEDURE — 96374 THER/PROPH/DIAG INJ IV PUSH: CPT

## 2020-05-15 PROCEDURE — 99285 EMERGENCY DEPT VISIT HI MDM: CPT

## 2020-05-15 PROCEDURE — 6360000002 HC RX W HCPCS: Performed by: EMERGENCY MEDICINE

## 2020-05-15 PROCEDURE — 80053 COMPREHEN METABOLIC PANEL: CPT

## 2020-05-15 PROCEDURE — 93005 ELECTROCARDIOGRAM TRACING: CPT | Performed by: EMERGENCY MEDICINE

## 2020-05-15 PROCEDURE — 85025 COMPLETE CBC W/AUTO DIFF WBC: CPT

## 2020-05-15 RX ORDER — FUROSEMIDE 10 MG/ML
40 INJECTION INTRAMUSCULAR; INTRAVENOUS ONCE
Status: COMPLETED | OUTPATIENT
Start: 2020-05-15 | End: 2020-05-15

## 2020-05-15 RX ADMIN — FUROSEMIDE 40 MG: 10 INJECTION, SOLUTION INTRAMUSCULAR; INTRAVENOUS at 17:41

## 2020-05-15 NOTE — TELEPHONE ENCOUNTER
· Morbid Obesity with hypoventilation syndrome - had been better S/P Gastric banding. Her PSG in 2014 did not show PARK; unfortunately, she has had progressive weight gain. 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 Referral to radiation oncology for consideration for stereotactic radiation to left upper lobe 8 mm metabolic Pulmonary Nodule that is growing. Biopsy in this patient with severe emphysema and a relatively deep pulmonary nodule would be very high risk and the risk is felt to outweigh the benefit. Watchful waiting was given as an alternative approach, however my clinical suspicion for cancer is high and I think that treatment is appropriate at this point. 2.  Referral to Gastroenterology for abnormal CT PET imaging of gastroesophageal junction  3. Spiriva q day; Advair 500/50 (has failed symbicort - is not effective)   4. Continue Albuterol prn  5.   Remain off lisinopril   6.  3 L supplemental oxygen

## 2020-05-16 ENCOUNTER — CARE COORDINATION (OUTPATIENT)
Dept: CARE COORDINATION | Age: 75
End: 2020-05-16

## 2020-05-16 LAB
EKG ATRIAL RATE: 86 BPM
EKG DIAGNOSIS: NORMAL
EKG P AXIS: 83 DEGREES
EKG P-R INTERVAL: 180 MS
EKG Q-T INTERVAL: 340 MS
EKG QRS DURATION: 92 MS
EKG QTC CALCULATION (BAZETT): 406 MS
EKG R AXIS: 14 DEGREES
EKG T AXIS: 78 DEGREES
EKG VENTRICULAR RATE: 86 BPM

## 2020-05-16 PROCEDURE — 93010 ELECTROCARDIOGRAM REPORT: CPT | Performed by: INTERNAL MEDICINE

## 2020-05-16 NOTE — CARE COORDINATION
Patient contacted regarding Seble Mcfarlane. Care Transition Nurse/ Ambulatory Care Manager contacted the patient by telephone to perform post discharge assessment. Verified name and  with patient as identifiers. Provided introduction to self, and explanation of the CTN/ACM role, and reason for call due to risk factors for infection and/or exposure to COVID-19. Symptoms reviewed with patient who verbalized the following symptoms: shortness of breath, no new symptoms and no worsening symptoms. Due to no new or worsening symptoms encounter was not routed to provider for escalation. Patient has following risk factors of: heart failure, COPD, asthma and obesity. CTN/ACM reviewed discharge instructions, medical action plan and red flags such as increased shortness of breath, increasing fever and signs of decompensation with patient who verbalized understanding. Discussed exposure protocols and quarantine with CDC Guidelines What to do if you are sick with coronavirus disease .  Patient was given an opportunity for questions and concerns. The patient agrees to contact the [facility] exposure line [phone], [facility] department [place] [facility]: ([phone]) and PCP office for questions related to their healthcare. CTN/ACM provided contact information for future needs. Reviewed and educated patient on any new and changed medications related to discharge diagnosis     Patient/family/caregiver given information for [employer] [facility] and agrees to enroll no      Plan for follow-up call in 3-5 days based on severity of symptoms and risk factors. Pt has spoken with CTN and was able to verbalize s/s to watch for and has the [facility] line. Pt has appt Monday with PCP. Will relay information to active CTN. Still active with home care, and  present in home with pt.

## 2020-05-20 ENCOUNTER — CARE COORDINATION (OUTPATIENT)
Dept: CASE MANAGEMENT | Age: 75
End: 2020-05-20

## 2020-05-20 NOTE — CARE COORDINATION
She plans to see if Dr Evelia Collins will see her sooner than as scheduled below. [facility]. nurse was there today. States she recommended OTC Allegra for allergy symptoms of cough and nasal congestion. She plans to get this med and start it. She was instructed if no improvement to report it to PCP for recommendation. She voices understanding. Denies needs and knows how to reach [facility].. Plan for follow-up call in 5-7 days based on severity of symptoms and risk factors.     Ivonne Wisdom, LISSETH  Care Transition Nurse  [phone] mobile    Future Appointments   Date Time Provider Su Arrington   7/7/2020  4:15 PM Maria Del Rosario Melgoza MD [facility]

## 2020-05-27 ENCOUNTER — CARE COORDINATION (OUTPATIENT)
Dept: CASE MANAGEMENT | Age: 75
End: 2020-05-27

## 2020-05-27 NOTE — CARE COORDINATION
Patient contacted regarding COVID-19 risk and screening. Patient has following risk factors for COVID-19: heart failure and COPD. Unable to reach patient by phone. Message left stating purpose of call with contact information requesting return call.       Robert Ritter RN  Care Transition Nurse  [phone] mobile    Future Appointments   Date Time Provider Su Arrington   7/7/2020  4:15 PM Adrian Hoover MD [facility]

## 2020-05-28 ENCOUNTER — CARE COORDINATION (OUTPATIENT)
Dept: CASE MANAGEMENT | Age: 75
End: 2020-05-28

## 2020-06-12 ENCOUNTER — TELEPHONE (OUTPATIENT)
Dept: PULMONOLOGY | Age: 75
End: 2020-06-12

## 2020-06-12 NOTE — TELEPHONE ENCOUNTER
Noted
gastroesophageal junction  3. Spiriva q day; Advair 500/50 (has failed symbicort - is not effective)   4. Continue Albuterol prn  5.   Remain off lisinopril   6.  3 L supplemental oxygen

## 2020-07-01 ENCOUNTER — TELEPHONE (OUTPATIENT)
Dept: PULMONOLOGY | Age: 75
End: 2020-07-01

## 2020-07-01 NOTE — TELEPHONE ENCOUNTER
Within this Telehealth Consent, the terms you and yours refer to the person using the Telehealth Service (Service), or in the case of a use of the Service by or on behalf of a minor, you and yours refer to and include (i) the parent or legal guardian who provides consent to the use of the Service by such minor or uses the Service on behalf of such minor, and (ii) the minor for whom consent is being provided or on whose behalf the Service is being utilized. When using Service, you will be consulting with your health care providers via the use of Telehealth.   Telehealth involves the delivery of healthcare services using electronic communications, information technology or other means between a healthcare provider and a patient who are not in the same physical location. Telehealth may be used for diagnosis, treatment, follow-up and/or patient education, and may include, but is not limited to, one or more of the following:    Electronic transmission of medical records, photo images, personal health information or other data between a patient and a healthcare provider    Interactions between a patient and healthcare provider via audio, video and/or data communications    Use of output data from medical devices, sound and video files    Anticipated Benefits   The use of Telehealth by your Provider(s) through the Service may have the following possible benefits:    Making it easier and more efficient for you to access medical care and treatment for the conditions treated by such Provider(s) utilizing the Service    Allowing you to obtain medical care and treatment by Provider(s) at times that are convenient for you    Enabling you to interact with Provider(s) without the necessity of an in-office appointment     Possible Risks   While the use of Telehealth can provide potential benefits for you, there are also potential risks associated with the use of Telehealth.  These risks include, but may not be limited to the following:    Your Provider(s) may not able to provide medical treatment for your particular condition and you may be required to seek alternative healthcare or emergency care services.  The electronic systems or other security protocols or safeguards used in the Service could fail, causing a breach of privacy of your medical or other information.  Given regulatory requirements in certain jurisdictions, your Provider(s) diagnosis and/or treatment options, especially pertaining to certain prescriptions, may be limited. Acceptance   1. You understand that Services will be provided via Telehealth. This process involves the use of HIPAA compliant and secure, real-time audio-visual interfacing with a qualified and appropriately trained provider located at [facility]. 2. You understand that, under no circumstances, will this session be recorded. 3. You understand that the Provider(s) at [facility] and other clinical participants will be party to the information obtained during the Telehealth session in accordance with best medical practices. 4. You understand that the information obtained during the Telehealth session will be used to help determine the most appropriate treatment options. 5. You understand that You have the right to revoke this consent at any point in time. 6. You understand that Telehealth is voluntary, and that continued treatment is not dependent upon consent. 7. You understand that, in the event of non-consent to Telehealth services and/or technical difficulties, you will obtain services as typically provided in the absence of Telehealth technology. 8. You understand that this consent will be kept in Your medical record. 9. No potential benefits from the use of Telehealth or specific results can be guaranteed. Your condition may not be cured or improved and, in some cases, may get worse.    10. There are limitations in the provision of medical care and treatment via Telehealth and the Service and you may not be able to receive diagnosis and/or treatment through the Service for every condition for which you seek diagnosis and/or treatment. 11. There are potential risks to the use of Telehealth, including but not limited to the risks described in this Telehealth Consent. 12. Your Provider(s) have discussed the use of Telehealth and the Service with you, including the benefits and risks of such and you have provided oral consent to your Provider(s) for the use of Telehealth and the Service. 15. You understand that it is your duty to provide your Provider(s) truthful, accurate and complete information, including all relevant information regarding care that you may have received or may be receiving from other healthcare providers outside of the Service. 14. You understand that each of your Provider(s) may determine in his or sole discretion that your condition is not suitable for diagnosis and/or treatment using the Service, and that you may need to seek medical care and treatment a specialist or other healthcare provider, outside of the Service. 15. You understand that you are fully responsible for payment for all services provided by Provider(s) or through use of the Service and that you may not be able to use third-party insurance. 16. You represent that (a) you have read this Telehealth Consent carefully, (b) you understand the risks and benefits of the Service and the use of Telehealth in the medical care and treatment provided to you by Provider(s) using the Service, and (c) you have the legal capacity and authority to provide this consent for yourself and/or the minor for which you are consenting under applicable federal and state laws, including laws relating to the age of [de-identified] and/or parental/guardian consent.    17. You give your informed consent to the use of Telehealth by Provider(s) using the Service under the terms described in the Terms of Service and this Telehealth Consent. The patient was read the following statement and has consented to the visit as of 7/1/20. The patient has been scheduled for their first telehealth visit on 7/1/20 with Dr Elkin Baker.

## 2020-07-07 ENCOUNTER — VIRTUAL VISIT (OUTPATIENT)
Dept: PULMONOLOGY | Age: 75
End: 2020-07-07
Payer: MEDICARE

## 2020-07-07 PROCEDURE — 99213 OFFICE O/P EST LOW 20 MIN: CPT | Performed by: INTERNAL MEDICINE

## 2020-07-07 RX ORDER — BENZONATATE 100 MG/1
100 CAPSULE ORAL 3 TIMES DAILY PRN
Qty: 90 CAPSULE | Refills: 5 | Status: SHIPPED | OUTPATIENT
Start: 2020-07-07 | End: 2020-09-24 | Stop reason: CLARIF

## 2020-07-07 NOTE — PROGRESS NOTES
CC: COPD  HPI     Rae Riedel is a 66 y.o. female evaluated via telephone on 7/7/2020. Consent: She and/or health care decision maker is aware that that she may receive a bill for this telephone service, depending on her insurance coverage, and has provided verbal consent to proceed: YES  I communicated with the patient and/or health care decision maker about: see interval history below. Details of this discussion including any medical advice provided: see plan below  I affirm this is a Patient Initiated Episode with a Patient who has not had a related appointment within my department in the past 7 days or scheduled within the next 24 hours. Patient identification was verified at the start of the visit: YES  Total Time: minutes: 11-20 minutes     Treated in May for CHF    Cough is most troubling sx. She does desaturate to 80% with ambulation on usual 4 L, otherwise she remains 94% to 97% at rest.    CARIDAD nodule with SBRT in Feb 2020  Other Pulmonary Nodules f/b Dr. Haresh Seay with serial CT imaging    Saw GI, Dr. Nya Lane and was given medication. He did an EGD. Review of Systems:        Objective:   Physical Exam  There were no vitals taken for this visit. 'on 2L   241 # on 5-17-13; 258 on 5-20-14; 260 on 2-6-15, 246 on 9-15-15; 239 on 11-17-15, 255 on 3-15-16, 243 on 9-19-16, 257 on 3/21/17, 249# on 1/31/18, 249# on 7/31/18, 246# on 9/5/18, 256 # on 12/7/18; 252# on 9/25/19; 260#12/17/2019  PHONE       PFT Nov 2009 FEV1 1.07 L  54%  DLCO 7.16  PFT Feb 2013 FEV1 1.0 L  46% DLCO 10.44  PFT 4-21-15 FEV1 1.1 L 51% DLCO 9.72 45% %    PSG at [facility] with re-titration 2010 post surgery  PSG @ [facility] 6-11-14 AHI 0.6    CT CHEST 12/17/19  FINDINGS:   Mediastinum:  Thyroid is grossly unremarkable within the limits of CT. Coronary and aortic atherosclerotic calcifications are noted. Heart and   pericardium are unremarkable. No evidence of mediastinal, hilar or axillary   lymphadenopathy.  The central airways are clear.       Lungs/pleura: There is a smoothly marginated left upper lobe pulmonary nodule   on image 26 measuring approximately 8 mm in size.  When compared to prior   10/31/2018 and 6/12/2019 CTs, this has slowly increased over time.  Moderate   centrilobular emphysema is noted throughout the lungs.  No evidence of new   pulmonary nodules.  Bibasilar subsegmental atelectasis is noted.  No focal   consolidation or pulmonary edema.  No evidence of pleural effusion or   pneumothorax.  Resolution of the previously seen right upper lobe patchy   subpleural ground-glass opacity seen on prior examination.  Subsegmental   atelectasis is noted in both lung bases.       Upper Abdomen: No focal adrenal nodule. Can Sos is a laparoscopic gastric band   present in grossly satisfactory position. Limited images of the upper abdomen   demonstrate no acute abnormality.       Soft Tissues/Bones:  No acute abnormality of the visualized osseous   structures.           Impression   Solid, noncalcified, smoothly-marginated, left upper lobe pulmonary nodule   measuring 8 mm in size.  When compared to prior exams, this is likely slowly   growing over time, and measured between 6-7 mm on 2018 exam.  Recommend   further evaluation with PET-CT and/or biopsy.       Moderate centrilobular emphysema with bibasilar subsegmental atelectasis and   scarring. CT PET 1/3/20  FINDINGS:   Thyroid gland appears normal.  Atherosclerotic change seen in aorta. Coronary artery calcification is seen.  Small hiatal hernia seen. Can Sos is   nonspecific thickening at the GE junction.       No hypermetabolic mediastinal adenopathy is seen       There is redemonstration of the previously described nodule in the left upper   lobe.  This nodule is new compared to 2008       Maximum SUV of the nodule measures 2.36       Postsurgical change from lap band procedure is seen. Davene Mood is focal   hypermetabolism seen at the GE junction, maximum SUV measuring 5.47.  No   hypermetabolic adrenal mass.  Mild stool load seen in colon.  There is   evidence of prior surgery anterior abdominal wall.       Mild stool load seen in colon.  No bowel obstruction.  Scattered colonic   diverticula are seen.       Atherosclerotic change seen in aorta and iliacs       Spurring is seen in the spine.  Spurring is seen in the shoulder joints           Impression   Previously described noncalcified pulmonary nodule left upper lobe is   borderline hypermetabolic.  This could be inflammatory or an early finding of   lung carcinoma.  Recommend continued clinical-imaging follow-up.       Focal hypermetabolism at the GE junction. .  Recommend direct visualization to   rule out mass. Brena Sovereign is evidence of prior lap band procedure           Assessment:   · CARIDAD Pulmonary Nodule: clinical dx NSCL Cancer s/p SBRT Feb 2020 with Dr. Tori Guan  · Severe COPD FEV1 1  L - very poor exercise capacity   · Chronic Respiratory Failure with hypoxemia on continuous home oxygen (4 L pulse)   · Morbid Obesity with hypoventilation syndrome - had been better S/P Gastric banding. Her PSG in 2014 did not show PARK; unfortunately, she has had progressive weight gain. 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 Tessalon rx today, continue mucinex DM for cough  2. Spiriva q day; Advair 500/50 (has failed symbicort - is not effective)   3. Continue Albuterol prn  4. Remain off lisinopril   5.  4 L supplemental oxygen, 5 L with exertion     6.   F/U 4-5 months

## 2020-09-01 ENCOUNTER — HOSPITAL ENCOUNTER (OUTPATIENT)
Dept: CT IMAGING | Age: 75
Discharge: HOME | End: 2020-09-01
Payer: MEDICARE

## 2020-09-01 PROCEDURE — 71250 CT THORAX DX C-: CPT

## 2020-09-17 RX ORDER — TIOTROPIUM BROMIDE 18 UG/1
CAPSULE ORAL; RESPIRATORY (INHALATION)
Qty: 30 CAPSULE | Refills: 5 | Status: SHIPPED | OUTPATIENT
Start: 2020-09-17 | End: 2021-04-27

## 2020-09-24 ENCOUNTER — OFFICE VISIT (OUTPATIENT)
Dept: PULMONOLOGY | Age: 75
End: 2020-09-24
Payer: MEDICARE

## 2020-09-24 VITALS
DIASTOLIC BLOOD PRESSURE: 82 MMHG | HEIGHT: 64 IN | WEIGHT: 263 LBS | OXYGEN SATURATION: 97 % | SYSTOLIC BLOOD PRESSURE: 140 MMHG | BODY MASS INDEX: 44.9 KG/M2 | RESPIRATION RATE: 18 BRPM | HEART RATE: 93 BPM

## 2020-09-24 PROCEDURE — G8400 PT W/DXA NO RESULTS DOC: HCPCS | Performed by: INTERNAL MEDICINE

## 2020-09-24 PROCEDURE — 4040F PNEUMOC VAC/ADMIN/RCVD: CPT | Performed by: INTERNAL MEDICINE

## 2020-09-24 PROCEDURE — 99214 OFFICE O/P EST MOD 30 MIN: CPT | Performed by: INTERNAL MEDICINE

## 2020-09-24 PROCEDURE — 3023F SPIROM DOC REV: CPT | Performed by: INTERNAL MEDICINE

## 2020-09-24 PROCEDURE — G8926 SPIRO NO PERF OR DOC: HCPCS | Performed by: INTERNAL MEDICINE

## 2020-09-24 PROCEDURE — 1036F TOBACCO NON-USER: CPT | Performed by: INTERNAL MEDICINE

## 2020-09-24 PROCEDURE — G8417 CALC BMI ABV UP PARAM F/U: HCPCS | Performed by: INTERNAL MEDICINE

## 2020-09-24 PROCEDURE — G8427 DOCREV CUR MEDS BY ELIG CLIN: HCPCS | Performed by: INTERNAL MEDICINE

## 2020-09-24 PROCEDURE — 1090F PRES/ABSN URINE INCON ASSESS: CPT | Performed by: INTERNAL MEDICINE

## 2020-09-24 PROCEDURE — 1123F ACP DISCUSS/DSCN MKR DOCD: CPT | Performed by: INTERNAL MEDICINE

## 2020-09-24 RX ORDER — ALBUTEROL SULFATE 2.5 MG/3ML
2.5 SOLUTION RESPIRATORY (INHALATION) EVERY 4 HOURS PRN
Qty: 360 ML | Refills: 3 | Status: SHIPPED | OUTPATIENT
Start: 2020-09-24 | End: 2022-06-14

## 2020-09-24 NOTE — PROGRESS NOTES
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 Tessalon & mucinex DM PRN for cough  2. Spiriva q day; Advair 500/50 (has failed symbicort - is not effective)   3. Continue Albuterol prn, nebs sent in today   4. Remain off lisinopril   5.  4 L supplemental oxygen, 5 L with exertion     6.   F/U 3 months

## 2020-11-23 ENCOUNTER — HOSPITAL ENCOUNTER (OUTPATIENT)
Dept: CT IMAGING | Age: 75
Discharge: HOME | End: 2020-11-23
Payer: MEDICARE

## 2020-11-23 PROCEDURE — 71250 CT THORAX DX C-: CPT

## 2020-12-15 ENCOUNTER — OFFICE VISIT (OUTPATIENT)
Dept: PULMONOLOGY | Age: 75
End: 2020-12-15
Payer: MEDICARE

## 2020-12-15 VITALS
RESPIRATION RATE: 18 BRPM | HEART RATE: 107 BPM | BODY MASS INDEX: 43.54 KG/M2 | DIASTOLIC BLOOD PRESSURE: 60 MMHG | SYSTOLIC BLOOD PRESSURE: 142 MMHG | TEMPERATURE: 97.2 F | HEIGHT: 64 IN | WEIGHT: 255 LBS | OXYGEN SATURATION: 91 %

## 2020-12-15 PROCEDURE — 99214 OFFICE O/P EST MOD 30 MIN: CPT | Performed by: INTERNAL MEDICINE

## 2020-12-15 PROCEDURE — 1123F ACP DISCUSS/DSCN MKR DOCD: CPT | Performed by: INTERNAL MEDICINE

## 2020-12-15 PROCEDURE — 3023F SPIROM DOC REV: CPT | Performed by: INTERNAL MEDICINE

## 2020-12-15 PROCEDURE — G8484 FLU IMMUNIZE NO ADMIN: HCPCS | Performed by: INTERNAL MEDICINE

## 2020-12-15 PROCEDURE — G8427 DOCREV CUR MEDS BY ELIG CLIN: HCPCS | Performed by: INTERNAL MEDICINE

## 2020-12-15 PROCEDURE — G8926 SPIRO NO PERF OR DOC: HCPCS | Performed by: INTERNAL MEDICINE

## 2020-12-15 PROCEDURE — 1036F TOBACCO NON-USER: CPT | Performed by: INTERNAL MEDICINE

## 2020-12-15 PROCEDURE — 1090F PRES/ABSN URINE INCON ASSESS: CPT | Performed by: INTERNAL MEDICINE

## 2020-12-15 PROCEDURE — G8400 PT W/DXA NO RESULTS DOC: HCPCS | Performed by: INTERNAL MEDICINE

## 2020-12-15 PROCEDURE — G8417 CALC BMI ABV UP PARAM F/U: HCPCS | Performed by: INTERNAL MEDICINE

## 2020-12-15 PROCEDURE — 4040F PNEUMOC VAC/ADMIN/RCVD: CPT | Performed by: INTERNAL MEDICINE

## 2020-12-15 NOTE — PROGRESS NOTES
CC: COPD  HPI   COPD: on 4 to 4.5 L continuous. Good days and bad days, but feels better overall and looks much much better    CARIDAD nodule with SBRT in Feb 2020, being followed by Dr. Edilma Hernandez     Review of Systems:        Objective:   Physical Exam  Blood pressure (!) 142/60, pulse 107, temperature 97.2 °F (36.2 °C), temperature source Temporal, resp. rate 18, height 5' 4\" (1.626 m), weight 255 lb (115.7 kg), SpO2 91 %. 'on NC  241 # on 5-17-13; 258 on 5-20-14; 260 on 2-6-15, 246 on 9-15-15; 239 on 11-17-15, 255 on 3-15-16, 243 on 9-19-16, 257 on 3/21/17, 249# on 1/31/18, 249# on 7/31/18, 246# on 9/5/18, 256 # on 12/7/18; 252# on 9/25/19; 260#12/17/2019; 263# on 9/24/20 255# on 12/15/20  Constitutional:  No acute distress. Looks so much better! HENT:  Oropharynx is clear and moist.   Neck: No tracheal deviation present. Cardiovascular: Normal heart sounds. No lower extremity edema. Pulmonary/Chest: No wheezes. No rhonchi. No rales. + decreased breath sounds. No accessory muscle usage or stridor. Musculoskeletal: No cyanosis. No clubbing. Skin: Skin is warm and dry. Psychiatric: Normal mood and affect. Neurologic: speech fluent, alert and oriented, strength symmetric          PFT Nov 2009 FEV1 1.07 L  54%  DLCO 7.16  PFT Feb 2013 FEV1 1.0 L  46% DLCO 10.44  PFT 4-21-15 FEV1 1.1 L 51% DLCO 9.72 45% %    PSG at [facility] with re-titration 2010 post surgery  PSG @ [facility] 6-11-14 AHI 0.6    CT CHEST 11/23/20  Impression   No change in dominant nodule left upper lobe with surrounding ground-glass   and curvilinear opacity. .  This nodule is similar compared to most recent   study but is new compared to more remote exams in 2008       Linear opacities are seen on the right, likely scarring or atelectasis.    There is underlying emphysema       Assessment:   · CARIDAD Pulmonary Nodule: clinical dx NSCL Cancer s/p SBRT Feb 2020 with Dr. 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 Tessalon & mucinex DM PRN for cough  2. Spiriva q day; Advair 500/50 (has failed symbicort - is not effective)   3. Continue Albuterol prn, nebs sent in today   4. Remain off lisinopril   5.  4 L supplemental oxygen, 5 L with exertion     6.   F/U April

## 2021-01-21 ENCOUNTER — IMMUNIZATION (OUTPATIENT)
Dept: PRIMARY CARE CLINIC | Age: 76
End: 2021-01-21
Payer: MEDICARE

## 2021-01-21 PROCEDURE — 0011A COVID-19, MODERNA VACCINE 100MCG/0.5ML DOSE: CPT | Performed by: FAMILY MEDICINE

## 2021-01-21 PROCEDURE — 91301 COVID-19, MODERNA VACCINE 100MCG/0.5ML DOSE: CPT | Performed by: FAMILY MEDICINE

## 2021-02-18 ENCOUNTER — IMMUNIZATION (OUTPATIENT)
Dept: PRIMARY CARE CLINIC | Age: 76
End: 2021-02-18
Payer: MEDICARE

## 2021-02-18 PROCEDURE — 91301 COVID-19, MODERNA VACCINE 100MCG/0.5ML DOSE: CPT | Performed by: FAMILY MEDICINE

## 2021-02-18 PROCEDURE — 0012A COVID-19, MODERNA VACCINE 100MCG/0.5ML DOSE: CPT | Performed by: FAMILY MEDICINE

## 2021-02-22 RX ORDER — ALBUTEROL SULFATE 90 UG/1
2 AEROSOL, METERED RESPIRATORY (INHALATION) EVERY 6 HOURS PRN
Qty: 8.5 G | Refills: 5 | Status: SHIPPED | OUTPATIENT
Start: 2021-02-22 | End: 2022-10-26

## 2021-04-05 ENCOUNTER — HOSPITAL ENCOUNTER (EMERGENCY)
Age: 76
Discharge: HOME | End: 2021-04-06
Attending: EMERGENCY MEDICINE
Payer: MEDICARE

## 2021-04-05 DIAGNOSIS — M47.816 LUMBAR SPONDYLOSIS: Primary | ICD-10-CM

## 2021-04-05 DIAGNOSIS — N39.0 ACUTE UTI: ICD-10-CM

## 2021-04-05 DIAGNOSIS — M54.32 SCIATICA OF LEFT SIDE: ICD-10-CM

## 2021-04-05 DIAGNOSIS — D64.9 ACUTE ON CHRONIC ANEMIA: ICD-10-CM

## 2021-04-05 DIAGNOSIS — N17.9 AKI (ACUTE KIDNEY INJURY) (HCC): ICD-10-CM

## 2021-04-05 DIAGNOSIS — E87.1 HYPONATREMIA: ICD-10-CM

## 2021-04-05 PROCEDURE — 99284 EMERGENCY DEPT VISIT MOD MDM: CPT

## 2021-04-06 ENCOUNTER — APPOINTMENT (OUTPATIENT)
Dept: CT IMAGING | Age: 76
End: 2021-04-06
Payer: MEDICARE

## 2021-04-06 ENCOUNTER — APPOINTMENT (OUTPATIENT)
Dept: GENERAL RADIOLOGY | Age: 76
End: 2021-04-06
Payer: MEDICARE

## 2021-04-06 VITALS
HEIGHT: 64 IN | SYSTOLIC BLOOD PRESSURE: 122 MMHG | RESPIRATION RATE: 16 BRPM | WEIGHT: 244 LBS | HEART RATE: 65 BPM | BODY MASS INDEX: 41.66 KG/M2 | DIASTOLIC BLOOD PRESSURE: 59 MMHG | TEMPERATURE: 97.9 F | OXYGEN SATURATION: 97 %

## 2021-04-06 LAB
ANION GAP SERPL CALCULATED.3IONS-SCNC: 4 MMOL/L (ref 3–16)
BACTERIA: ABNORMAL /HPF
BASOPHILS ABSOLUTE: 0 K/UL (ref 0–0.2)
BASOPHILS RELATIVE PERCENT: 0.8 %
BILIRUBIN URINE: NEGATIVE
BLOOD, URINE: NEGATIVE
BUN BLDV-MCNC: 28 MG/DL (ref 7–20)
CALCIUM SERPL-MCNC: 9.4 MG/DL (ref 8.3–10.6)
CHLORIDE BLD-SCNC: 92 MMOL/L (ref 99–110)
CLARITY: CLEAR
CO2: 32 MMOL/L (ref 21–32)
COLOR: YELLOW
CREAT SERPL-MCNC: 1.7 MG/DL (ref 0.6–1.2)
EKG ATRIAL RATE: 63 BPM
EKG DIAGNOSIS: NORMAL
EKG P AXIS: 74 DEGREES
EKG P-R INTERVAL: 196 MS
EKG Q-T INTERVAL: 382 MS
EKG QRS DURATION: 92 MS
EKG QTC CALCULATION (BAZETT): 390 MS
EKG R AXIS: 11 DEGREES
EKG T AXIS: 56 DEGREES
EKG VENTRICULAR RATE: 63 BPM
EOSINOPHILS ABSOLUTE: 0.4 K/UL (ref 0–0.6)
EOSINOPHILS RELATIVE PERCENT: 6.4 %
EPITHELIAL CELLS, UA: ABNORMAL /HPF (ref 0–5)
GFR AFRICAN AMERICAN: 35
GFR NON-AFRICAN AMERICAN: 29
GLUCOSE BLD-MCNC: 107 MG/DL (ref 70–99)
GLUCOSE URINE: NEGATIVE MG/DL
HCT VFR BLD CALC: 26.8 % (ref 36–48)
HEMOGLOBIN: 8.9 G/DL (ref 12–16)
KETONES, URINE: NEGATIVE MG/DL
LEUKOCYTE ESTERASE, URINE: ABNORMAL
LYMPHOCYTES ABSOLUTE: 1.9 K/UL (ref 1–5.1)
LYMPHOCYTES RELATIVE PERCENT: 32.8 %
MCH RBC QN AUTO: 31.5 PG (ref 26–34)
MCHC RBC AUTO-ENTMCNC: 33.1 G/DL (ref 31–36)
MCV RBC AUTO: 94.9 FL (ref 80–100)
MICROSCOPIC EXAMINATION: YES
MONOCYTES ABSOLUTE: 0.3 K/UL (ref 0–1.3)
MONOCYTES RELATIVE PERCENT: 5.8 %
NEUTROPHILS ABSOLUTE: 3.2 K/UL (ref 1.7–7.7)
NEUTROPHILS RELATIVE PERCENT: 54.2 %
NITRITE, URINE: POSITIVE
PDW BLD-RTO: 16.3 % (ref 12.4–15.4)
PH UA: 5.5 (ref 5–8)
PLATELET # BLD: 207 K/UL (ref 135–450)
PMV BLD AUTO: 8.1 FL (ref 5–10.5)
POTASSIUM REFLEX MAGNESIUM: 4.1 MMOL/L (ref 3.5–5.1)
PRO-BNP: 865 PG/ML (ref 0–449)
PROTEIN UA: NEGATIVE MG/DL
RBC # BLD: 2.82 M/UL (ref 4–5.2)
RBC UA: ABNORMAL /HPF (ref 0–4)
SODIUM BLD-SCNC: 128 MMOL/L (ref 136–145)
SPECIFIC GRAVITY UA: 1.01 (ref 1–1.03)
TROPONIN: <0.01 NG/ML
URINE REFLEX TO CULTURE: ABNORMAL
URINE TYPE: ABNORMAL
UROBILINOGEN, URINE: 0.2 E.U./DL
WBC # BLD: 5.8 K/UL (ref 4–11)
WBC UA: ABNORMAL /HPF (ref 0–5)

## 2021-04-06 PROCEDURE — 72131 CT LUMBAR SPINE W/O DYE: CPT

## 2021-04-06 PROCEDURE — 6370000000 HC RX 637 (ALT 250 FOR IP): Performed by: EMERGENCY MEDICINE

## 2021-04-06 PROCEDURE — 84484 ASSAY OF TROPONIN QUANT: CPT

## 2021-04-06 PROCEDURE — 93005 ELECTROCARDIOGRAM TRACING: CPT | Performed by: EMERGENCY MEDICINE

## 2021-04-06 PROCEDURE — 71046 X-RAY EXAM CHEST 2 VIEWS: CPT

## 2021-04-06 PROCEDURE — 83880 ASSAY OF NATRIURETIC PEPTIDE: CPT

## 2021-04-06 PROCEDURE — 93010 ELECTROCARDIOGRAM REPORT: CPT | Performed by: INTERNAL MEDICINE

## 2021-04-06 PROCEDURE — 81001 URINALYSIS AUTO W/SCOPE: CPT

## 2021-04-06 PROCEDURE — 85025 COMPLETE CBC W/AUTO DIFF WBC: CPT

## 2021-04-06 PROCEDURE — 80048 BASIC METABOLIC PNL TOTAL CA: CPT

## 2021-04-06 RX ORDER — ACETAMINOPHEN 500 MG
1000 TABLET ORAL ONCE
Status: COMPLETED | OUTPATIENT
Start: 2021-04-06 | End: 2021-04-06

## 2021-04-06 RX ORDER — METHOCARBAMOL 750 MG/1
750 TABLET, FILM COATED ORAL EVERY 8 HOURS PRN
Qty: 30 TABLET | Refills: 0 | Status: SHIPPED | OUTPATIENT
Start: 2021-04-06 | End: 2021-04-16

## 2021-04-06 RX ORDER — PREDNISONE 20 MG/1
20 TABLET ORAL 2 TIMES DAILY
Qty: 8 TABLET | Refills: 0 | Status: SHIPPED | OUTPATIENT
Start: 2021-04-06 | End: 2021-04-10

## 2021-04-06 RX ORDER — CEPHALEXIN 500 MG/1
500 CAPSULE ORAL 3 TIMES DAILY
Qty: 15 CAPSULE | Refills: 0 | Status: SHIPPED | OUTPATIENT
Start: 2021-04-06 | End: 2021-04-11

## 2021-04-06 RX ADMIN — ACETAMINOPHEN 1000 MG: 500 TABLET ORAL at 00:39

## 2021-04-06 ASSESSMENT — PAIN SCALES - GENERAL: PAINLEVEL_OUTOF10: 1

## 2021-04-06 NOTE — ED PROVIDER NOTES
CHIEF COMPLAINT  Back Pain (pt reached up and felt a pop in back last thursday. Saw PCP given pain meds, and muscle relaxer. states meds aren't working and legs keep giving out. ) and Leg Pain      HISTORY OF PRESENT ILLNESS  Eva Ann is a 66 y.o. female with a history of COPD on home oxygen, lung cancer, diabetes, hypertension, CHF, obesity who presents to the ED complaining of lower back pain. Patient reports at least 4 days of lower back pain worse in the left lumbar paraspinal region and radiating to her hip and lower extremity. States at times it feels as if her legs are going to give out from under her. Denies any focal weakness, numbness, bladder or bowel dysfunction. Was seen by her PCP and started on Zanaflex however according to her  it makes her extremely confused so she stopped taking it. Review of systems positive for dyspnea which she states is baseline. Denies fever, chills, chest pain, abdominal pain. No other complaints, modifying factors or associated symptoms. I have reviewed the following from the nursing documentation.     Past Medical History:   Diagnosis Date    Arthritis     Asthma     Bronchitis chronic     Chronic respiratory failure (HCC)     Depression     Diabetes mellitus ([address]     BORDERLINE NO MEDS    Emphysema of lung ([address]     spontaneous pneumo 5/07/2020    Glaucoma     Hypertension     Morbid obesity ([address]     Oxygen dependent     Uses O2 NC at 3L/min continuously    Rash     due to nasal canula    Shingles     recently no problems now    Sleep apnea     C pap     Past Surgical History:   Procedure Laterality Date    CATARACT REMOVAL WITH IMPLANT  3/9/2011    CATARACT REMOVAL WITH IMPLANT  3/23/2011    CHOLECYSTECTOMY      COLON SURGERY      11\" removed    COLONOSCOPY  11/18/13    Diverticulosis    HERNIA REPAIR      HYSTERECTOMY      LAP BAND  10/5/10    ADJUSTABLE GASTRIC RESTRICTIVE DEVICE     Family History   Problem Relation Age of Onset    Diabetes Mother    [facility] Arthritis Mother    [facility] Vision Loss Mother     High Blood Pressure Sister     Stroke Sister     Diabetes Sister     Depression Sister     Arthritis Sister     Diabetes Daughter     Substance Abuse Daughter     Depression Daughter     Asthma Daughter     Emphysema Father     Cancer Neg Hx     Heart Failure Neg Hx     Hypertension Neg Hx      Social History     Socioeconomic History    Marital status:      Spouse name: Not on file    Number of children: Not on file    Years of education: Not on file    Highest education level: Not on file   Occupational History    Not on file   Social Needs    Financial resource strain: Not on file    Food insecurity     Worry: Not on file     Inability: Not on file   [employer] needs     Medical: Not on file     Non-medical: Not on file   Tobacco Use    Smoking status: Former Smoker     Packs/day: 1.50     Years: 10.00     Pack years: 15.00     Types: Cigarettes     Quit date: 2005     Years since quittin.1    Smokeless tobacco: Never Used   Substance and Sexual Activity    Alcohol use: No     Alcohol/week: 0.0 standard drinks    Drug use: No    Sexual activity: Not Currently   Lifestyle    Physical activity     Days per week: Not on file     Minutes per session: Not on file    Stress: Not on file   Relationships    Social connections     Talks on phone: Not on file     Gets together: Not on file     Attends Latter-day service: Not on file     Active member of club or organization: Not on file     Attends meetings of clubs or organizations: Not on file     Relationship status: Not on file    Intimate partner violence     Fear of current or ex partner: Not on file     Emotionally abused: Not on file     Physically abused: Not on file     Forced sexual activity: Not on file   Other Topics Concern    Not on file   Social History Narrative    Not on file     No current facility-administered medications for this encounter. Current Outpatient Medications   Medication Sig Dispense Refill    predniSONE (DELTASONE) 20 MG tablet Take 1 tablet by mouth 2 times daily for 4 days 8 tablet 0    methocarbamol (ROBAXIN-750) 750 MG tablet Take 1 tablet by mouth every 8 hours as needed (Back pain) 30 tablet 0    cephALEXin (KEFLEX) 500 MG capsule Take 1 capsule by mouth 3 times daily for 5 days 15 capsule 0    albuterol sulfate  (90 Base) MCG/ACT inhaler INHALE 2 PUFFS INTO THE LUNGS EVERY 6 HOURS AS NEEDED FOR WHEEZING OR SHORTNESS OF BREATH 8.5 g 5    fluticasone-salmeterol (ADVAIR) 500-50 MCG/DOSE diskus inhaler INHALE 1 PUFF INTO THE LUNGS EVERY 12 HOURS 1 Inhaler 5    albuterol (PROVENTIL) (2.5 MG/3ML) 0.083% nebulizer solution Take 3 mLs by nebulization every 4 hours as needed for Wheezing 360 mL 3    SPIRIVA HANDIHALER 18 MCG inhalation capsule INHALE 1 CAPSULE INTO THE LUNGS DAILY 30 capsule 5    pantoprazole (PROTONIX) 40 MG tablet Take 40 mg by mouth daily      oxybutynin (DITROPAN) 5 MG tablet Take 5 mg by mouth 3 times daily      ARIPiprazole (ABILIFY) 5 MG tablet Take 5 mg by mouth      IRON PO Take 325 mg by mouth      albuterol (PROVENTIL) (2.5 MG/3ML) 0.083% nebulizer solution Take 3 mLs by nebulization every 4 hours as needed for Wheezing or Shortness of Breath DX COPD J44.9 120 vial 6    DULoxetine (CYMBALTA) 60 MG extended release capsule Take 60 mg by mouth daily      losartan (COZAAR) 100 MG tablet Take 100 mg by mouth      OXYGEN Inhale 3 L/min into the lungs continuous.  meloxicam (MOBIC) 15 MG tablet Take 15 mg by mouth daily.  bumetanide (BUMEX) 1 MG tablet Take 1 mg by mouth daily. No Known Allergies    REVIEW OF SYSTEMS  10 systems reviewed, pertinent positives per HPI otherwise noted to be negative.     PHYSICAL EXAM  BP (!) 122/59   Pulse 65   Temp 97.9 °F (36.6 °C) (Oral)   Resp 16   Ht 5' 4\" (1.626 m)   Wt 244 lb (110.7 kg)   SpO2 97%   BMI 41.88 kg/m²   GENERAL APPEARANCE: Awake and alert. Cooperative. No acute distress. Obese. HEAD: Normocephalic. Atraumatic. EYES: PERRL. EOM's grossly intact. ENT: Mucous membranes are moist.   NECK: Supple, trachea midline. HEART: RRR. Normal S1S2, no rubs, gallops, or murmurs noted  LUNGS: Respirations unlabored. Moderate air movement, suspect baseline. No significant wheezes or rales, no coughing observed. Speaking comfortably in full sentences. ABDOMEN: Soft. Non-distended. Non-tender. No guarding or rebound. Normal bowel sounds. EXTREMITIES: No peripheral edema. MAEE. No acute deformities. Spine: Mid to lower lumbar tenderness worse in the left paraspinal region. SKIN: Warm and dry. No acute rashes. NEUROLOGICAL: Alert and oriented X 3. CN II-XII intact. No gross facial drooping. Strength 5/5, sensation intact. Normal coordination. No pronator drift. No truncal instability. No saddle anesthesia. PSYCHIATRIC: Normal mood and affect. LABS  I have reviewed all labs for this visit.    Results for orders placed or performed during the hospital encounter of 04/05/21   CBC Auto Differential   Result Value Ref Range    WBC 5.8 4.0 - 11.0 K/uL    RBC 2.82 (L) 4.00 - 5.20 M/uL    Hemoglobin 8.9 (L) 12.0 - 16.0 g/dL    Hematocrit 26.8 (L) 36.0 - 48.0 %    MCV 94.9 80.0 - 100.0 fL    MCH 31.5 26.0 - 34.0 pg    MCHC 33.1 31.0 - 36.0 g/dL    RDW 16.3 (H) 12.4 - 15.4 %    Platelets 837 760 - 605 K/uL    MPV 8.1 5.0 - 10.5 fL    Neutrophils % 54.2 %    Lymphocytes % 32.8 %    Monocytes % 5.8 %    Eosinophils % 6.4 %    Basophils % 0.8 %    Neutrophils Absolute 3.2 1.7 - 7.7 K/uL    Lymphocytes Absolute 1.9 1.0 - 5.1 K/uL    Monocytes Absolute 0.3 0.0 - 1.3 K/uL    Eosinophils Absolute 0.4 0.0 - 0.6 K/uL    Basophils Absolute 0.0 0.0 - 0.2 K/uL   Basic Metabolic Panel w/ Reflex to MG   Result Value Ref Range    Sodium 128 (L) 136 - 145 mmol/L    Potassium reflex Magnesium 4.1 3.5 - 5.1 mmol/L    Chloride 92 (L) 99 - 110 mmol/L    CO2 32 21 - 32 mmol/L    Anion Gap 4 3 - 16    Glucose 107 (H) 70 - 99 mg/dL    BUN 28 (H) 7 - 20 mg/dL    CREATININE 1.7 (H) 0.6 - 1.2 mg/dL    GFR Non-African American 29 (A) >60    GFR  35 (A) >60    Calcium 9.4 8.3 - 10.6 mg/dL   Brain Natriuretic Peptide   Result Value Ref Range    Pro- (H) 0 - 449 pg/mL   Troponin   Result Value Ref Range    Troponin <0.01 <0.01 ng/mL   Urinalysis Reflex to Culture    Specimen: Urine, clean catch   Result Value Ref Range    Color, UA Yellow Straw/Yellow    Clarity, UA Clear Clear    Glucose, Ur Negative Negative mg/dL    Bilirubin Urine Negative Negative    Ketones, Urine Negative Negative mg/dL    Specific Gravity, UA 1.010 1.005 - 1.030    Blood, Urine Negative Negative    pH, UA 5.5 5.0 - 8.0    Protein, UA Negative Negative mg/dL    Urobilinogen, Urine 0.2 <2.0 E.U./dL    Nitrite, Urine POSITIVE (A) Negative    Leukocyte Esterase, Urine TRACE (A) Negative    Microscopic Examination YES     Urine Type NotGiven     Urine Reflex to Culture Not Indicated    Microscopic Urinalysis   Result Value Ref Range    WBC, UA 6-9 (A) 0 - 5 /HPF    RBC, UA 0-2 0 - 4 /HPF    Epithelial Cells, UA 2-5 0 - 5 /HPF    Bacteria, UA 3+ (A) None Seen /HPF       EKG  Normal sinus rhythm, rate 63, normal axis, QTC within normal limits, no acute ST changes or T wave inversions compared with prior dated May 15, 2020    RADIOLOGY  X-RAYS:  I have reviewed radiologic plain film image(s). ALL OTHER NON-PLAIN FILM IMAGES SUCH AS CT, ULTRASOUND AND MRI HAVE BEEN READ BY THE RADIOLOGIST. XR CHEST (2 VW)   Final Result   Stable cardiomegaly with minimal central pulmonary congestion or residual   pulmonary artery hypertension. Chronic obstructive lung changes with mild bibasilar discoid atelectasis or   scarring         CT Lumbar Spine WO Contrast   Final Result   1. No fracture. 2. Moderate to severe spondylosis, most pronounced at L3-L4 and L4-L5. Rechecks: Physical assessment performed. Appears comfortable and nontoxic on reevaluation, eager to be discharged home. ED COURSE/MDM  Patient seen and evaluated. Old records reviewed. Labs and imaging reviewed and results discussed with patient. Patient with lower back pain however not tolerating the Zanaflex prescribed by her PCP due to side effect of increased confusion. Work-up demonstrates significant degenerative changes of the lumbar spine including bulging disks, spinal stenosis, spondylosis. Patient also has an element of sciatica. The remainder of her work-up was noteworthy for a mild UTI, acute on chronic anemia, mild hyponatremia, and mild CARLOS compared with prior labs from last year. Plan for close outpatient follow-up with her PCP and patient has also been referred to a spine specialist for further work-up and management of her back pain. Patient was given scripts for the following medications. I counseled patient how to take these medications. New Prescriptions    CEPHALEXIN (KEFLEX) 500 MG CAPSULE    Take 1 capsule by mouth 3 times daily for 5 days    METHOCARBAMOL (ROBAXIN-750) 750 MG TABLET    Take 1 tablet by mouth every 8 hours as needed (Back pain)    PREDNISONE (DELTASONE) 20 MG TABLET    Take 1 tablet by mouth 2 times daily for 4 days       CLINICAL IMPRESSION  1. Lumbar spondylosis    2. Sciatica of left side    3. Acute UTI    4. Hyponatremia    5. CARLOS (acute kidney injury) ([address]    6. Acute on chronic anemia        Blood pressure (!) 122/59, pulse 65, temperature 97.9 °F (36.6 °C), temperature source Oral, resp. rate 16, height 5' 4\" (1.626 m), weight 244 lb (110.7 kg), SpO2 97 %. DISPOSITION  Sweta Cavazos was discharged to home in stable condition.         Tony Ward MD  04/06/21 [id number]

## 2021-04-06 NOTE — ED NOTES
One successful attempt of rainbow lab draw with 23g butterfly needle. Patient is resting comfortably at this time.      Ana Dale  04/06/21 0124

## 2021-04-20 ENCOUNTER — OFFICE VISIT (OUTPATIENT)
Dept: PULMONOLOGY | Age: 76
End: 2021-04-20
Payer: MEDICARE

## 2021-04-20 VITALS
DIASTOLIC BLOOD PRESSURE: 94 MMHG | BODY MASS INDEX: 41.88 KG/M2 | SYSTOLIC BLOOD PRESSURE: 170 MMHG | RESPIRATION RATE: 18 BRPM | HEART RATE: 96 BPM | OXYGEN SATURATION: 97 % | HEIGHT: 64 IN

## 2021-04-20 DIAGNOSIS — J44.9 CHRONIC OBSTRUCTIVE PULMONARY DISEASE, UNSPECIFIED COPD TYPE (HCC): Primary | ICD-10-CM

## 2021-04-20 PROCEDURE — 3023F SPIROM DOC REV: CPT | Performed by: INTERNAL MEDICINE

## 2021-04-20 PROCEDURE — G8417 CALC BMI ABV UP PARAM F/U: HCPCS | Performed by: INTERNAL MEDICINE

## 2021-04-20 PROCEDURE — 99214 OFFICE O/P EST MOD 30 MIN: CPT | Performed by: INTERNAL MEDICINE

## 2021-04-20 PROCEDURE — 1036F TOBACCO NON-USER: CPT | Performed by: INTERNAL MEDICINE

## 2021-04-20 PROCEDURE — G8427 DOCREV CUR MEDS BY ELIG CLIN: HCPCS | Performed by: INTERNAL MEDICINE

## 2021-04-20 PROCEDURE — 1123F ACP DISCUSS/DSCN MKR DOCD: CPT | Performed by: INTERNAL MEDICINE

## 2021-04-20 PROCEDURE — G8400 PT W/DXA NO RESULTS DOC: HCPCS | Performed by: INTERNAL MEDICINE

## 2021-04-20 PROCEDURE — 1090F PRES/ABSN URINE INCON ASSESS: CPT | Performed by: INTERNAL MEDICINE

## 2021-04-20 PROCEDURE — 4040F PNEUMOC VAC/ADMIN/RCVD: CPT | Performed by: INTERNAL MEDICINE

## 2021-04-20 PROCEDURE — G8926 SPIRO NO PERF OR DOC: HCPCS | Performed by: INTERNAL MEDICINE

## 2021-04-20 RX ORDER — FOLIC ACID 1 MG/1
TABLET ORAL
COMMUNITY
Start: 2021-03-19

## 2021-04-20 RX ORDER — TIZANIDINE 4 MG/1
TABLET ORAL EVERY 8 HOURS PRN
COMMUNITY
Start: 2021-04-02

## 2021-04-20 RX ORDER — TRAMADOL HYDROCHLORIDE 50 MG/1
TABLET ORAL
Status: ON HOLD | COMMUNITY
Start: 2021-04-02 | End: 2021-12-06 | Stop reason: HOSPADM

## 2021-04-20 NOTE — PROGRESS NOTES
CC: COPD  HPI   COPD: on 4 to 4.5 L continuous. Feeling pretty good. In ED for back/leg pain. Was set up with PT   Has lost 15 pounds with GOLO diet     CARIDAD nodule with SBRT in Feb 2020, being followed by Dr. Paul Huffman, his office told her they would call to arrange f/u imaging    Review of Systems:        Objective:   Physical Exam  Blood pressure (!) 170/94, pulse 96, resp. rate 18, height 5' 4\" (1.626 m), SpO2 97 %. 'on NC  241 # on 5-17-13; 258 on 5-20-14; 260 on 2-6-15, 246 on 9-15-15; 239 on 11-17-15, 255 on 3-15-16, 243 on 9-19-16, 257 on 3/21/17, 249# on 1/31/18, 249# on 7/31/18, 246# on 9/5/18, 256 # on 12/7/18; 252# on 9/25/19; 260#12/17/2019; 263# on 9/24/20 255# on 12/15/20  Constitutional:  No acute distress. Looks so much better! HENT:  Oropharynx is clear and moist.   Neck: No tracheal deviation present. Cardiovascular: Normal heart sounds. No lower extremity edema. Pulmonary/Chest: No wheezes. No rhonchi. No rales. + decreased breath sounds. No accessory muscle usage or stridor. Musculoskeletal: No cyanosis. No clubbing. Skin: Skin is warm and dry. Psychiatric: Normal mood and affect. Neurologic: speech fluent, alert and oriented, strength symmetric          PFT Nov 2009 FEV1 1.07 L  54%  DLCO 7.16  PFT Feb 2013 FEV1 1.0 L  46% DLCO 10.44  PFT 4-21-15 FEV1 1.1 L 51% DLCO 9.72 45% %    PSG at [facility] with re-titration 2010 post surgery  PSG @ [facility] 6-11-14 AHI 0.6    CT CHEST 11/23/20  Impression   No change in dominant nodule left upper lobe with surrounding ground-glass   and curvilinear opacity. .  This nodule is similar compared to most recent   study but is new compared to more remote exams in 2008       Linear opacities are seen on the right, likely scarring or atelectasis.    There is underlying emphysema       Assessment:   · CARIDAD Pulmonary Nodule: [facility] SB[facility] Feb 2020 with Dr. 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

## 2021-04-27 RX ORDER — TIOTROPIUM BROMIDE 18 UG/1
CAPSULE ORAL; RESPIRATORY (INHALATION)
Qty: 30 CAPSULE | Refills: 5 | Status: SHIPPED | OUTPATIENT
Start: 2021-04-27 | End: 2021-11-03

## 2021-06-07 ENCOUNTER — HOSPITAL ENCOUNTER (OUTPATIENT)
Dept: CT IMAGING | Age: 76
Discharge: HOME | End: 2021-06-07
Payer: MEDICARE

## 2021-06-07 DIAGNOSIS — C34.12 MALIGNANT NEOPLASM OF UPPER LOBE BRONCHUS, LEFT (HCC): ICD-10-CM

## 2021-06-07 PROCEDURE — 71250 CT THORAX DX C-: CPT

## 2021-07-12 NOTE — LETTER
[facility] Pulmonary, Critical Care, and Sleep  [address] , [address] [place] [address]  Phone: [phone]  Fax: [phone]    June 12, 2019     Patient: Power Bah   MR Number: [id number]   YOB: 1945   Date of Visit: 6/12/2019     Dear Dr. Cerna Scales: Norma Chica followed up with me today after obtaining a repeat CT scan of the chest for a pulmonary nodule which was newly identified on an October 2018 CT scan. That nodule is unchanged on today's imaging. She also has a groundglass nodular opacity which is unchanged. There is one new area of groundglass opacity, which I favor to be inflammatory. I set her up for follow-up CT imaging in 6 months. If you have questions, please do not hesitate to call me. I look forward to following Norma Coto along with you.     Sincerely,        Bertin Veloz MD
No

## 2021-08-25 ENCOUNTER — OFFICE VISIT (OUTPATIENT)
Dept: PULMONOLOGY | Age: 76
End: 2021-08-25
Payer: MEDICARE

## 2021-08-25 VITALS
HEIGHT: 63 IN | WEIGHT: 241 LBS | OXYGEN SATURATION: 90 % | HEART RATE: 101 BPM | SYSTOLIC BLOOD PRESSURE: 140 MMHG | BODY MASS INDEX: 42.7 KG/M2 | RESPIRATION RATE: 22 BRPM | DIASTOLIC BLOOD PRESSURE: 72 MMHG | TEMPERATURE: 97.4 F

## 2021-08-25 DIAGNOSIS — J96.11 CHRONIC HYPOXEMIC RESPIRATORY FAILURE (HCC): ICD-10-CM

## 2021-08-25 DIAGNOSIS — J44.9 CHRONIC OBSTRUCTIVE PULMONARY DISEASE, UNSPECIFIED COPD TYPE (HCC): Primary | ICD-10-CM

## 2021-08-25 PROCEDURE — 1036F TOBACCO NON-USER: CPT | Performed by: INTERNAL MEDICINE

## 2021-08-25 PROCEDURE — 1090F PRES/ABSN URINE INCON ASSESS: CPT | Performed by: INTERNAL MEDICINE

## 2021-08-25 PROCEDURE — 1123F ACP DISCUSS/DSCN MKR DOCD: CPT | Performed by: INTERNAL MEDICINE

## 2021-08-25 PROCEDURE — G8427 DOCREV CUR MEDS BY ELIG CLIN: HCPCS | Performed by: INTERNAL MEDICINE

## 2021-08-25 PROCEDURE — G8400 PT W/DXA NO RESULTS DOC: HCPCS | Performed by: INTERNAL MEDICINE

## 2021-08-25 PROCEDURE — G8417 CALC BMI ABV UP PARAM F/U: HCPCS | Performed by: INTERNAL MEDICINE

## 2021-08-25 PROCEDURE — 99214 OFFICE O/P EST MOD 30 MIN: CPT | Performed by: INTERNAL MEDICINE

## 2021-08-25 PROCEDURE — 4040F PNEUMOC VAC/ADMIN/RCVD: CPT | Performed by: INTERNAL MEDICINE

## 2021-08-25 PROCEDURE — 3023F SPIROM DOC REV: CPT | Performed by: INTERNAL MEDICINE

## 2021-08-25 PROCEDURE — G8926 SPIRO NO PERF OR DOC: HCPCS | Performed by: INTERNAL MEDICINE

## 2021-08-25 ASSESSMENT — SLEEP AND FATIGUE QUESTIONNAIRES
ESS TOTAL SCORE: 2
HOW LIKELY ARE YOU TO NOD OFF OR FALL ASLEEP WHILE LYING DOWN TO REST IN THE AFTERNOON WHEN CIRCUMSTANCES PERMIT: 0
HOW LIKELY ARE YOU TO NOD OFF OR FALL ASLEEP WHILE WATCHING TV: 0
HOW LIKELY ARE YOU TO NOD OFF OR FALL ASLEEP WHILE SITTING QUIETLY AFTER LUNCH WITHOUT ALCOHOL: 0
NECK CIRCUMFERENCE (INCHES): 16.5
HOW LIKELY ARE YOU TO NOD OFF OR FALL ASLEEP WHILE SITTING AND READING: 0
HOW LIKELY ARE YOU TO NOD OFF OR FALL ASLEEP WHEN YOU ARE A PASSENGER IN A CAR FOR AN HOUR WITHOUT A BREAK: 2
HOW LIKELY ARE YOU TO NOD OFF OR FALL ASLEEP WHILE SITTING INACTIVE IN A PUBLIC PLACE: 0
HOW LIKELY ARE YOU TO NOD OFF OR FALL ASLEEP WHILE SITTING AND TALKING TO SOMEONE: 0
HOW LIKELY ARE YOU TO NOD OFF OR FALL ASLEEP IN A CAR, WHILE STOPPED FOR A FEW MINUTES IN TRAFFIC: 0

## 2021-08-25 NOTE — PROGRESS NOTES
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 Raul Baumann, s/p SBRT  · F/U with Dr. Lore Gonzalez re: tremor   · See me in 6 months

## 2021-09-13 ENCOUNTER — HOSPITAL ENCOUNTER (OUTPATIENT)
Dept: CT IMAGING | Age: 76
Discharge: HOME | End: 2021-09-13
Payer: MEDICARE

## 2021-09-13 DIAGNOSIS — C34.12 PRIMARY MALIGNANT NEOPLASM OF BRONCHUS OF LEFT UPPER LOBE (HCC): ICD-10-CM

## 2021-09-13 PROCEDURE — 71250 CT THORAX DX C-: CPT

## 2021-09-14 ENCOUNTER — TELEPHONE (OUTPATIENT)
Dept: PULMONOLOGY | Age: 76
End: 2021-09-14

## 2021-09-14 DIAGNOSIS — J44.9 CHRONIC OBSTRUCTIVE PULMONARY DISEASE, UNSPECIFIED COPD TYPE (HCC): Primary | ICD-10-CM

## 2021-09-14 NOTE — TELEPHONE ENCOUNTER
Patient called stating [facility] is requesting patient to complete 6MWT for oxygen requalification. Please advise. Will need to complete before 9/25/21 will need orders faxed after walk test.       Assessment: 8/25/21   · CARIDAD Pulmonary Nodule: clinical dx [facility] s/p SBRT Feb 2020 with Dr. 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 Miguel Angel West, s/p SBRT  · F/U with Dr. Amiel Severance re: tremor   · See me in 6 months

## 2021-09-14 NOTE — TELEPHONE ENCOUNTER
Order Pended. Patient scheduled 9/20/21. Watch for results. Will need to fax results, OV and order to [facility].

## 2021-09-20 ENCOUNTER — HOSPITAL ENCOUNTER (OUTPATIENT)
Dept: PULMONOLOGY | Age: 76
Discharge: HOME | End: 2021-09-20
Payer: MEDICARE

## 2021-09-20 DIAGNOSIS — J44.9 CHRONIC OBSTRUCTIVE PULMONARY DISEASE, UNSPECIFIED COPD TYPE (HCC): ICD-10-CM

## 2021-09-20 PROCEDURE — 94618 PULMONARY STRESS TESTING: CPT

## 2021-09-20 NOTE — TELEPHONE ENCOUNTER
Watch for 6MWT results scheduled 9/20/21. Will need to fax OV note, testing, and order to [facility].

## 2021-09-21 NOTE — PROCEDURES
[address]                 [address] [address]                               PULMONARY FUNCTION    PATIENT NAME: Delta Stark                     :        1945  MED REC NO:   [id number]                          ROOM:  ACCOUNT NO:   [de-identified]                           ADMIT DATE: 2021  PROVIDER:     Norma Huntley MD    DATE OF PROCEDURE:  2021    INDICATION:  COPD. FINDINGS:  Six-minute walk test per [facility] protocol. Baseline oxygen  saturation was 82% on room air. The patient required 2 liters of  supplemental oxygen to maintain a saturation of 92% at rest.  With  ambulation, the patient desaturated to 87% and required 4 liters of  supplemental oxygen to maintain saturation greater than 89%. The  patient ambulated 280 feet. IMPRESSION:  Successful completion of six-minute walk test demonstrating  the need for 2 liters of supplemental oxygen at rest and 4 liters with  exertion.         Keren Feliciano MD    D: 2021 16:55:18       T: 2021 17:27:05     [id number]  Job#: [id number]     Doc#: [id number]    CC:

## 2021-11-03 RX ORDER — TIOTROPIUM BROMIDE 18 UG/1
CAPSULE ORAL; RESPIRATORY (INHALATION)
Qty: 30 CAPSULE | Refills: 5 | Status: SHIPPED | OUTPATIENT
Start: 2021-11-03 | End: 2022-06-20

## 2021-11-26 ENCOUNTER — APPOINTMENT (OUTPATIENT)
Dept: CT IMAGING | Age: 76
DRG: 175 | End: 2021-11-26
Payer: MEDICARE

## 2021-11-26 ENCOUNTER — HOSPITAL ENCOUNTER (INPATIENT)
Age: 76
LOS: 11 days | Discharge: NURSING, REHAB OR LONG-TERM CARE | DRG: 175 | End: 2021-12-07
Attending: EMERGENCY MEDICINE | Admitting: INTERNAL MEDICINE
Payer: MEDICARE

## 2021-11-26 ENCOUNTER — APPOINTMENT (OUTPATIENT)
Dept: NUCLEAR MEDICINE | Age: 76
DRG: 175 | End: 2021-11-26
Payer: MEDICARE

## 2021-11-26 ENCOUNTER — APPOINTMENT (OUTPATIENT)
Dept: GENERAL RADIOLOGY | Age: 76
DRG: 175 | End: 2021-11-26
Payer: MEDICARE

## 2021-11-26 DIAGNOSIS — J15.1 PNEUMONIA OF LEFT UPPER LOBE DUE TO PSEUDOMONAS SPECIES (HCC): ICD-10-CM

## 2021-11-26 DIAGNOSIS — M19.90 ARTHRITIS: ICD-10-CM

## 2021-11-26 DIAGNOSIS — J96.21 ACUTE ON CHRONIC RESPIRATORY FAILURE WITH HYPOXIA (HCC): Primary | ICD-10-CM

## 2021-11-26 DIAGNOSIS — R91.8 PULMONARY INFILTRATE: ICD-10-CM

## 2021-11-26 PROBLEM — J44.1 COPD EXACERBATION (HCC): Status: ACTIVE | Noted: 2021-11-26

## 2021-11-26 LAB
A/G RATIO: 1.2 (ref 1.1–2.2)
ALBUMIN SERPL-MCNC: 3.9 G/DL (ref 3.4–5)
ALP BLD-CCNC: 141 U/L (ref 40–129)
ALT SERPL-CCNC: 9 U/L (ref 10–40)
ANION GAP SERPL CALCULATED.3IONS-SCNC: 8 MMOL/L (ref 3–16)
AST SERPL-CCNC: 21 U/L (ref 15–37)
BASOPHILS ABSOLUTE: 0 K/UL (ref 0–0.2)
BASOPHILS RELATIVE PERCENT: 0.9 %
BILIRUB SERPL-MCNC: 0.4 MG/DL (ref 0–1)
BUN BLDV-MCNC: 19 MG/DL (ref 7–20)
CALCIUM SERPL-MCNC: 10.1 MG/DL (ref 8.3–10.6)
CHLORIDE BLD-SCNC: 101 MMOL/L (ref 99–110)
CO2: 30 MMOL/L (ref 21–32)
CREAT SERPL-MCNC: 1.6 MG/DL (ref 0.6–1.2)
D DIMER: 300 NG/ML DDU (ref 0–229)
EKG ATRIAL RATE: 100 BPM
EKG DIAGNOSIS: NORMAL
EKG P AXIS: 80 DEGREES
EKG P-R INTERVAL: 180 MS
EKG Q-T INTERVAL: 336 MS
EKG QRS DURATION: 92 MS
EKG QTC CALCULATION (BAZETT): 433 MS
EKG R AXIS: 20 DEGREES
EKG T AXIS: 63 DEGREES
EKG VENTRICULAR RATE: 100 BPM
EOSINOPHILS ABSOLUTE: 0.1 K/UL (ref 0–0.6)
EOSINOPHILS RELATIVE PERCENT: 2.1 %
GFR AFRICAN AMERICAN: 38
GFR NON-AFRICAN AMERICAN: 31
GLUCOSE BLD-MCNC: 122 MG/DL (ref 70–99)
HCT VFR BLD CALC: 32.3 % (ref 36–48)
HEMOGLOBIN: 10.6 G/DL (ref 12–16)
LYMPHOCYTES ABSOLUTE: 1.3 K/UL (ref 1–5.1)
LYMPHOCYTES RELATIVE PERCENT: 22.5 %
MCH RBC QN AUTO: 30.6 PG (ref 26–34)
MCHC RBC AUTO-ENTMCNC: 32.7 G/DL (ref 31–36)
MCV RBC AUTO: 93.5 FL (ref 80–100)
MONOCYTES ABSOLUTE: 0.4 K/UL (ref 0–1.3)
MONOCYTES RELATIVE PERCENT: 7.9 %
NEUTROPHILS ABSOLUTE: 3.7 K/UL (ref 1.7–7.7)
NEUTROPHILS RELATIVE PERCENT: 66.6 %
PDW BLD-RTO: 16.2 % (ref 12.4–15.4)
PLATELET # BLD: 243 K/UL (ref 135–450)
PMV BLD AUTO: 7.8 FL (ref 5–10.5)
POTASSIUM REFLEX MAGNESIUM: 4.5 MMOL/L (ref 3.5–5.1)
PRO-BNP: 494 PG/ML (ref 0–449)
RBC # BLD: 3.45 M/UL (ref 4–5.2)
SARS-COV-2, NAAT: NOT DETECTED
SODIUM BLD-SCNC: 139 MMOL/L (ref 136–145)
TOTAL PROTEIN: 7.2 G/DL (ref 6.4–8.2)
TROPONIN: <0.01 NG/ML
WBC # BLD: 5.6 K/UL (ref 4–11)

## 2021-11-26 PROCEDURE — 99284 EMERGENCY DEPT VISIT MOD MDM: CPT

## 2021-11-26 PROCEDURE — 71250 CT THORAX DX C-: CPT

## 2021-11-26 PROCEDURE — 96374 THER/PROPH/DIAG INJ IV PUSH: CPT

## 2021-11-26 PROCEDURE — 93005 ELECTROCARDIOGRAM TRACING: CPT | Performed by: PHYSICIAN ASSISTANT

## 2021-11-26 PROCEDURE — 2580000003 HC RX 258: Performed by: NURSE PRACTITIONER

## 2021-11-26 PROCEDURE — 94640 AIRWAY INHALATION TREATMENT: CPT

## 2021-11-26 PROCEDURE — 94761 N-INVAS EAR/PLS OXIMETRY MLT: CPT

## 2021-11-26 PROCEDURE — 6370000000 HC RX 637 (ALT 250 FOR IP): Performed by: NURSE PRACTITIONER

## 2021-11-26 PROCEDURE — 85025 COMPLETE CBC W/AUTO DIFF WBC: CPT

## 2021-11-26 PROCEDURE — 6370000000 HC RX 637 (ALT 250 FOR IP): Performed by: PHYSICIAN ASSISTANT

## 2021-11-26 PROCEDURE — 84484 ASSAY OF TROPONIN QUANT: CPT

## 2021-11-26 PROCEDURE — 87635 SARS-COV-2 COVID-19 AMP PRB: CPT

## 2021-11-26 PROCEDURE — 6370000000 HC RX 637 (ALT 250 FOR IP): Performed by: INTERNAL MEDICINE

## 2021-11-26 PROCEDURE — 6360000002 HC RX W HCPCS: Performed by: PHYSICIAN ASSISTANT

## 2021-11-26 PROCEDURE — 2700000000 HC OXYGEN THERAPY PER DAY

## 2021-11-26 PROCEDURE — 80053 COMPREHEN METABOLIC PANEL: CPT

## 2021-11-26 PROCEDURE — 71045 X-RAY EXAM CHEST 1 VIEW: CPT

## 2021-11-26 PROCEDURE — 6360000002 HC RX W HCPCS: Performed by: NURSE PRACTITIONER

## 2021-11-26 PROCEDURE — 3430000000 HC RX DIAGNOSTIC RADIOPHARMACEUTICAL: Performed by: PHYSICIAN ASSISTANT

## 2021-11-26 PROCEDURE — 78580 LUNG PERFUSION IMAGING: CPT

## 2021-11-26 PROCEDURE — 85379 FIBRIN DEGRADATION QUANT: CPT

## 2021-11-26 PROCEDURE — A9540 TC99M MAA: HCPCS | Performed by: PHYSICIAN ASSISTANT

## 2021-11-26 PROCEDURE — 93010 ELECTROCARDIOGRAM REPORT: CPT | Performed by: INTERNAL MEDICINE

## 2021-11-26 PROCEDURE — 83880 ASSAY OF NATRIURETIC PEPTIDE: CPT

## 2021-11-26 PROCEDURE — 1200000000 HC SEMI PRIVATE

## 2021-11-26 RX ORDER — ACETAMINOPHEN 650 MG/1
650 SUPPOSITORY RECTAL EVERY 6 HOURS PRN
Status: DISCONTINUED | OUTPATIENT
Start: 2021-11-26 | End: 2021-12-07 | Stop reason: HOSPADM

## 2021-11-26 RX ORDER — SODIUM CHLORIDE 0.9 % (FLUSH) 0.9 %
5-40 SYRINGE (ML) INJECTION EVERY 12 HOURS SCHEDULED
Status: DISCONTINUED | OUTPATIENT
Start: 2021-11-26 | End: 2021-12-07 | Stop reason: HOSPADM

## 2021-11-26 RX ORDER — SODIUM CHLORIDE 9 MG/ML
25 INJECTION, SOLUTION INTRAVENOUS PRN
Status: DISCONTINUED | OUTPATIENT
Start: 2021-11-26 | End: 2021-11-28

## 2021-11-26 RX ORDER — IPRATROPIUM BROMIDE AND ALBUTEROL SULFATE 2.5; .5 MG/3ML; MG/3ML
3 SOLUTION RESPIRATORY (INHALATION) ONCE
Status: COMPLETED | OUTPATIENT
Start: 2021-11-26 | End: 2021-11-26

## 2021-11-26 RX ORDER — ALBUTEROL SULFATE 2.5 MG/3ML
2.5 SOLUTION RESPIRATORY (INHALATION) EVERY 4 HOURS PRN
Status: DISCONTINUED | OUTPATIENT
Start: 2021-11-26 | End: 2021-11-26

## 2021-11-26 RX ORDER — ARIPIPRAZOLE 10 MG/1
5 TABLET ORAL DAILY
Status: DISCONTINUED | OUTPATIENT
Start: 2021-11-26 | End: 2021-12-07 | Stop reason: HOSPADM

## 2021-11-26 RX ORDER — OXYBUTYNIN CHLORIDE 5 MG/1
5 TABLET ORAL 3 TIMES DAILY
Status: DISCONTINUED | OUTPATIENT
Start: 2021-11-26 | End: 2021-12-07 | Stop reason: HOSPADM

## 2021-11-26 RX ORDER — BUDESONIDE AND FORMOTEROL FUMARATE DIHYDRATE 160; 4.5 UG/1; UG/1
2 AEROSOL RESPIRATORY (INHALATION) 2 TIMES DAILY
Status: DISCONTINUED | OUTPATIENT
Start: 2021-11-26 | End: 2021-12-07 | Stop reason: HOSPADM

## 2021-11-26 RX ORDER — ACETAMINOPHEN 325 MG/1
650 TABLET ORAL EVERY 6 HOURS PRN
Status: DISCONTINUED | OUTPATIENT
Start: 2021-11-26 | End: 2021-12-07 | Stop reason: HOSPADM

## 2021-11-26 RX ORDER — ONDANSETRON 4 MG/1
4 TABLET, ORALLY DISINTEGRATING ORAL EVERY 8 HOURS PRN
Status: DISCONTINUED | OUTPATIENT
Start: 2021-11-26 | End: 2021-12-07 | Stop reason: HOSPADM

## 2021-11-26 RX ORDER — POLYETHYLENE GLYCOL 3350 17 G/17G
17 POWDER, FOR SOLUTION ORAL DAILY PRN
Status: DISCONTINUED | OUTPATIENT
Start: 2021-11-26 | End: 2021-12-07 | Stop reason: HOSPADM

## 2021-11-26 RX ORDER — DULOXETIN HYDROCHLORIDE 60 MG/1
60 CAPSULE, DELAYED RELEASE ORAL DAILY
Status: DISCONTINUED | OUTPATIENT
Start: 2021-11-26 | End: 2021-12-07 | Stop reason: HOSPADM

## 2021-11-26 RX ORDER — ONDANSETRON 2 MG/ML
4 INJECTION INTRAMUSCULAR; INTRAVENOUS EVERY 6 HOURS PRN
Status: DISCONTINUED | OUTPATIENT
Start: 2021-11-26 | End: 2021-12-07 | Stop reason: HOSPADM

## 2021-11-26 RX ORDER — METHYLPREDNISOLONE SODIUM SUCCINATE 125 MG/2ML
125 INJECTION, POWDER, LYOPHILIZED, FOR SOLUTION INTRAMUSCULAR; INTRAVENOUS ONCE
Status: COMPLETED | OUTPATIENT
Start: 2021-11-26 | End: 2021-11-26

## 2021-11-26 RX ORDER — ALBUTEROL SULFATE 2.5 MG/3ML
2.5 SOLUTION RESPIRATORY (INHALATION)
Status: DISCONTINUED | OUTPATIENT
Start: 2021-11-27 | End: 2021-11-30

## 2021-11-26 RX ORDER — DOXYCYCLINE HYCLATE 100 MG
100 TABLET ORAL EVERY 12 HOURS SCHEDULED
Status: DISCONTINUED | OUTPATIENT
Start: 2021-11-26 | End: 2021-11-30

## 2021-11-26 RX ORDER — FOLIC ACID 1 MG/1
1 TABLET ORAL DAILY
Status: DISCONTINUED | OUTPATIENT
Start: 2021-11-26 | End: 2021-12-07 | Stop reason: HOSPADM

## 2021-11-26 RX ORDER — PREDNISONE 20 MG/1
40 TABLET ORAL DAILY
Status: DISPENSED | OUTPATIENT
Start: 2021-11-26 | End: 2021-12-01

## 2021-11-26 RX ORDER — SODIUM CHLORIDE 0.9 % (FLUSH) 0.9 %
5-40 SYRINGE (ML) INJECTION PRN
Status: DISCONTINUED | OUTPATIENT
Start: 2021-11-26 | End: 2021-12-07 | Stop reason: HOSPADM

## 2021-11-26 RX ORDER — PANTOPRAZOLE SODIUM 40 MG/1
40 TABLET, DELAYED RELEASE ORAL DAILY
Status: DISCONTINUED | OUTPATIENT
Start: 2021-11-26 | End: 2021-12-07 | Stop reason: HOSPADM

## 2021-11-26 RX ADMIN — ARIPIPRAZOLE 5 MG: 10 TABLET ORAL at 22:21

## 2021-11-26 RX ADMIN — DOXYCYCLINE HYCLATE 100 MG: 100 TABLET, COATED ORAL at 22:21

## 2021-11-26 RX ADMIN — OXYBUTYNIN CHLORIDE 5 MG: 5 TABLET ORAL at 22:23

## 2021-11-26 RX ADMIN — DULOXETINE HYDROCHLORIDE 60 MG: 60 CAPSULE, DELAYED RELEASE ORAL at 22:20

## 2021-11-26 RX ADMIN — APIXABAN 10 MG: 5 TABLET, FILM COATED ORAL at 22:22

## 2021-11-26 RX ADMIN — PREDNISONE 40 MG: 20 TABLET ORAL at 22:23

## 2021-11-26 RX ADMIN — FOLIC ACID 1 MG: 1 TABLET ORAL at 22:20

## 2021-11-26 RX ADMIN — PANTOPRAZOLE SODIUM 40 MG: 40 TABLET, DELAYED RELEASE ORAL at 22:23

## 2021-11-26 RX ADMIN — ACETAMINOPHEN 650 MG: 325 TABLET, FILM COATED ORAL at 22:36

## 2021-11-26 RX ADMIN — IPRATROPIUM BROMIDE AND ALBUTEROL SULFATE 3 AMPULE: .5; 3 SOLUTION RESPIRATORY (INHALATION) at 14:20

## 2021-11-26 RX ADMIN — Medication 6.8 MILLICURIE: at 19:15

## 2021-11-26 RX ADMIN — ALBUTEROL SULFATE 2.5 MG: 2.5 SOLUTION RESPIRATORY (INHALATION) at 23:17

## 2021-11-26 RX ADMIN — Medication 2 PUFF: at 23:14

## 2021-11-26 RX ADMIN — METHYLPREDNISOLONE SODIUM SUCCINATE 125 MG: 125 INJECTION, POWDER, FOR SOLUTION INTRAMUSCULAR; INTRAVENOUS at 14:09

## 2021-11-26 RX ADMIN — SODIUM CHLORIDE, PRESERVATIVE FREE 10 ML: 5 INJECTION INTRAVENOUS at 22:30

## 2021-11-26 ASSESSMENT — PAIN DESCRIPTION - PAIN TYPE: TYPE: CHRONIC PAIN

## 2021-11-26 ASSESSMENT — PAIN DESCRIPTION - LOCATION: LOCATION: BACK

## 2021-11-26 ASSESSMENT — PAIN DESCRIPTION - PROGRESSION
CLINICAL_PROGRESSION: NOT CHANGED
CLINICAL_PROGRESSION: NOT CHANGED

## 2021-11-26 ASSESSMENT — PAIN DESCRIPTION - FREQUENCY: FREQUENCY: CONTINUOUS

## 2021-11-26 ASSESSMENT — PAIN SCALES - GENERAL
PAINLEVEL_OUTOF10: 3
PAINLEVEL_OUTOF10: 7
PAINLEVEL_OUTOF10: 2

## 2021-11-26 ASSESSMENT — PAIN DESCRIPTION - ONSET: ONSET: GRADUAL

## 2021-11-26 ASSESSMENT — PAIN DESCRIPTION - ORIENTATION: ORIENTATION: LOWER

## 2021-11-26 NOTE — ED PROVIDER NOTES
I did not personally evaluate this patient but I was asked to review the EKG. EKG  The Ekg interpreted by myself in the emergency department in the absence of a cardiologist.  normal sinus rhythm with a rate of 100  Axis is   Normal  QTc is  within an acceptable range  Intervals and Durations are unremarkable. No specific ST-T wave changes appreciated. No evidence of acute ischemia.    No significant change from prior EKG dated 4/6/21     Robert Gómez MD  11/26/21 1455

## 2021-11-26 NOTE — ED NOTES
Nuc med on way up to transport patient to complete scan. Patient and family member at bedside updated - no food or drink until scan completed and read. Both agreeable to plan of care at this time.      Amarilis Jorge RN  11/26/21 1274

## 2021-11-26 NOTE — ED PROVIDER NOTES
I independently performed a history and physical on Smiley Dos Santos. All diagnostic, treatment, and disposition decisions were made by myself in conjunction with the advanced practice provider. I have participated in the medical decision making and directed the treatment plan and disposition of the patient. For further details of Geri Do emergency department encounter, please see the advanced practice provider's documentation. CHIEF COMPLAINT  Chief Complaint   Patient presents with    Shortness of Breath     pt to ED with c/o SOB for a few days. pt reports hx of COPD and wears 4 L O2 at home. Briefly, Smiley Dos Santos is a 64 y.o. female  who presents to the ED complaining of shortness of breath. FOCUSED PHYSICAL EXAMINATION  BP (!) 137/50   Pulse 105   Temp 98.7 °F (37.1 °C) (Oral)   Resp 19   Ht 5' 3\" (1.6 m)   Wt 230 lb (104.3 kg)   SpO2 97%   BMI 40.74 kg/m²      Focused physical examination:  General appearance:  Cooperative. No acute distress. Skin:  Warm. Dry. Eye:  Extraocular movements intact. Ears, nose, mouth and throat:  Oral mucosa moist,  Neck:  Trachea midline. Heart: Slightly tachycardic but regular  Perfusion:  intact  Respiratory:  Lungs clear to auscultation bilaterally. Respirations nonlabored. Abdominal:   Non distended. Nontender  Neurological:  Alert and oriented x 3. Moves all extremities spontaneously  Musculoskeletal:   Normal ROM, no deformities          Psychiatric:  Normal mood      EKG: Normal sinus rhythm rate of 100 bpm with poor R wave progression. No ST elevation    MDM: Patient presents emergency department today complaining of shortness of breath. History of underlying COPD on baseline oxygen but here with ambulation oxygen saturations fell to the low 80s even on her baseline nasal cannula. Labs notable for chronic renal insufficiency. Remainder labs otherwise unremarkable.   Chest x-ray questioned infiltrates with CT scan of the chest shows only chronic findings. Given her worsening hypoxia slight tachycardia do feel that she would would require VQ scan to rule out PE. This is been ordered and will be performed as an inpatient. Otherwise, she has been treated for underlying COPD and plan to admit to the hospital.  No hemodynamic instability at present    Sandra Leonard 124 time was 30 minutes, excluding separately reportable procedures. There was a high probability of clinically significant/life threatening deterioration in the patient's condition which required my urgent intervention. This time was spent reviewing the patient chart, interpreting diagnostic/laboratory data, administration of supplemental oxygen for acute on chronic hypoxic respiratory failure      During the patient's ED course, the patient was given:  Medications   methylPREDNISolone sodium (SOLU-MEDROL) injection 125 mg (125 mg IntraVENous Given 11/26/21 1409)   ipratropium-albuterol (DUONEB) nebulizer solution 3 ampule (3 ampules Inhalation Given 11/26/21 1420)        CLINICAL IMPRESSION  1. Acute on chronic respiratory failure with hypoxia ([address]        DISPOSITION  Admission      This chart was created using Dragon dictation software. Efforts were made by me to ensure accuracy, however some errors may be present due to limitations of this technology.             Trish Castro MD  11/26/21 1488

## 2021-11-26 NOTE — ED NOTES
Ambulated through cruz with home O2 4L. O2 sat's with ambulation 81-83%. Upon return to room, pt diaphoretic with audible wheezes noted.  's, [address], RN  11/26/21 6985

## 2021-11-26 NOTE — ED NOTES
Report received from [facility], [address] [place] [address]  11/26/21 7587

## 2021-11-26 NOTE — ED PROVIDER NOTES
[facility] Emergency Department    CHIEF COMPLAINT  Shortness of Breath (pt to ED with c/o SOB for a few days. pt reports hx of COPD and wears 4 L O2 at home. )      [address]  I have seen and evaluated this patient with my supervising physician, Dr. Willy Galeana. HISTORY OF PRESENT ILLNESS  Leslie Nelson is a 64 y.o. female who presents to the ED complaining of 1 day history of increased shortness of breath. Patient brought in by  today for evaluation. History of COPD. On home oxygen. States that she became more short of breath last night. Mildly productive cough without hemoptysis. She denies pain in the chest.  She has had no headaches, lightheadedness, dizziness or confusion. She denies neck, arm, jaw or back pain. Has had no abdominal discomfort. No nausea or vomiting. No diarrhea or constipation. No leg pain or swelling. No recent travel, trauma or surgery. No pain with deep breaths. No other complaints, modifying factors or associated symptoms. Nursing notes reviewed.    Past Medical History:   Diagnosis Date    Arthritis     Asthma     Bronchitis chronic     Chronic respiratory failure (HCC)     Depression     Diabetes mellitus ([address]     BORDERLINE NO MEDS    Emphysema of lung ([address]     spontaneous pneumo 5/07/2020    Glaucoma     Hypertension     Morbid obesity ([address]     Oxygen dependent     Uses O2 NC at 3L/min continuously    Rash     due to nasal canula    Shingles     recently no problems now    Sleep apnea     C pap     Past Surgical History:   Procedure Laterality Date    CATARACT REMOVAL WITH IMPLANT  3/9/2011    CATARACT REMOVAL WITH IMPLANT  3/23/2011    CHOLECYSTECTOMY      COLON SURGERY      11\" removed    COLONOSCOPY  11/18/13    Diverticulosis    HERNIA REPAIR      HYSTERECTOMY      LAP BAND  10/5/10    ADJUSTABLE GASTRIC RESTRICTIVE DEVICE     Family History   Problem Relation Age of Onset    Diabetes Mother     Arthritis Mother    Verl Raw Vision Loss Mother     High Blood Pressure Sister     Stroke Sister     Diabetes Sister     Depression Sister     Arthritis Sister     Diabetes Daughter     Substance Abuse Daughter     Depression Daughter     Asthma Daughter     Emphysema Father     Cancer Neg Hx     Heart Failure Neg Hx     Hypertension Neg Hx      Social History     Socioeconomic History    Marital status:      Spouse name: Not on file    Number of children: Not on file    Years of education: Not on file    Highest education level: Not on file   Occupational History    Not on file   Tobacco Use    Smoking status: Former Smoker     Packs/day: 1.50     Years: 10.00     Pack years: 15.00     Types: Cigarettes     Quit date: 2005     Years since quittin.8    Smokeless tobacco: Never Used   Vaping Use    Vaping Use: Never used   Substance and Sexual Activity    Alcohol use: No     Alcohol/week: 0.0 standard drinks    Drug use: No    Sexual activity: Not Currently   Other Topics Concern    Not on file   Social History Narrative    Not on file     Social Determinants of Health     Financial Resource Strain:     Difficulty of Paying Living Expenses: Not on file   Food Insecurity:     Worried About Running Out of Food in the Last Year: Not on file    Herlinda of Food in the Last Year: Not on file   Transportation Needs:     Lack of Transportation (Medical): Not on file    Lack of Transportation (Non-Medical):  Not on file   Physical Activity:     Days of Exercise per Week: Not on file    Minutes of Exercise per Session: Not on file   Stress:     Feeling of Stress : Not on file   Social Connections:     Frequency of Communication with Friends and Family: Not on file    Frequency of Social Gatherings with Friends and Family: Not on file    Attends Islam Services: Not on file    Active Member of Clubs or Organizations: Not on file    Attends Club or Organization Meetings: Not on file    Marital Status: Not on file   Intimate Partner Violence:     Fear of Current or Ex-Partner: Not on file    Emotionally Abused: Not on file    Physically Abused: Not on file    Sexually Abused: Not on file   Housing Stability:     Unable to Pay for Housing in the Last Year: Not on file    Number of Jermain in the Last Year: Not on file    Unstable Housing in the Last Year: Not on file     No current facility-administered medications for this encounter. Current Outpatient Medications   Medication Sig Dispense Refill    SPIRIVA HANDIHALER 18 MCG inhalation capsule INHALE 1 CAPSULE INTO THE LUNGS DAILY 30 capsule 5    fluticasone-salmeterol (ADVAIR) 500-50 MCG/DOSE diskus inhaler INHALE 1 PUFF INTO THE LUNGS EVERY 12 HOURS 60 each 5    cyanocobalamin 1000 MCG tablet Take 1,000 mcg by mouth daily      folic acid (FOLVITE) 1 MG tablet TAKE 1 TABLET (1 MG TOTAL) BY MOUTH DAILY.  traMADol (ULTRAM) 50 MG tablet TAKE ONE TABLET (50 MG. TOTAL) BY MOUTH EVERY SIX HOURS AS NEEDED FOR PAIN FOR UP TO 5 DAYS.  tiZANidine (ZANAFLEX) 4 MG tablet TAKE ONE TABLET (4 MG.  TOTAL) BY MOUTH THREE TIMES DAILY      albuterol sulfate  (90 Base) MCG/ACT inhaler INHALE 2 PUFFS INTO THE LUNGS EVERY 6 HOURS AS NEEDED FOR WHEEZING OR SHORTNESS OF BREATH 8.5 g 5    albuterol (PROVENTIL) (2.5 MG/3ML) 0.083% nebulizer solution Take 3 mLs by nebulization every 4 hours as needed for Wheezing 360 mL 3    pantoprazole (PROTONIX) 40 MG tablet Take 40 mg by mouth daily      oxybutynin (DITROPAN) 5 MG tablet Take 5 mg by mouth 3 times daily      ARIPiprazole (ABILIFY) 5 MG tablet Take 5 mg by mouth      IRON PO Take 325 mg by mouth      albuterol (PROVENTIL) (2.5 MG/3ML) 0.083% nebulizer solution Take 3 mLs by nebulization every 4 hours as needed for Wheezing or Shortness of Breath DX COPD J44.9 120 vial 6    DULoxetine (CYMBALTA) 60 MG extended release capsule Take 60 mg by mouth daily  losartan (COZAAR) 100 MG tablet Take 100 mg by mouth      OXYGEN Inhale 3 L/min into the lungs continuous.  meloxicam (MOBIC) 15 MG tablet Take 15 mg by mouth daily.  bumetanide (BUMEX) 1 MG tablet Take 1 mg by mouth daily. (Patient not taking: Reported on 8/25/2021)       No Known Allergies    REVIEW OF SYSTEMS  10 systems reviewed, pertinent positives per HPI otherwise noted to be negative    PHYSICAL EXAM  BP (!) 106/48   Pulse 99   Temp 98.7 °F (37.1 °C) (Oral)   Resp 18   Ht 5' 3\" (1.6 m)   Wt 230 lb (104.3 kg)   SpO2 97%   BMI 40.74 kg/m²   GENERAL APPEARANCE: Awake and alert. Cooperative. No acute distress. HEAD: Normocephalic. Atraumatic. EYES: PERRL. EOM's grossly intact. ENT: Mucous membranes are moist.   NECK: Supple. No JVD. No tracheal tenderness or deviation. No crepitus. HEART: RRR. No murmurs. LUNGS: Respirations unlabored. CTAB. Good air exchange. Speaking comfortably in full sentences. ABDOMEN: Soft. Non-distended. Non-tender. No guarding or rebound. No midline pulsatile mass. EXTREMITIES: No peripheral edema. No unilateral calf pain, redness or swelling. Moves all extremities equally. All extremities neurovascularly intact. SKIN: Warm and dry. No acute rashes. NEUROLOGICAL: Alert and oriented. CN's 2-12 intact. No gross facial drooping. Strength 5/5, sensation intact. PSYCHIATRIC: Normal mood and affect. RADIOLOGY  XR CHEST PORTABLE    Result Date: 11/26/2021  EXAMINATION: ONE XRAY VIEW OF THE CHEST 11/26/2021 11:58 am COMPARISON: CT chest September 13, 2021 HISTORY: ORDERING SYSTEM PROVIDED HISTORY: sob TECHNOLOGIST PROVIDED HISTORY: Reason for exam:->sob Reason for Exam: sob Acuity: Acute Type of Exam: Initial FINDINGS: The cardiomediastinal silhouette is stable. There are airspace opacities in the left lung apex and the right lung base, may be related to pneumonia. There is no pleural effusion. There is no pneumothorax.   There is no acute osseous abnormality. Airspace opacities in theleft lung apex and the right lung base, may be related to pneumonia. Recommend follow-up to resolution to exclude underlying lung mass/neoplasm     ED COURSE  Patient received DuoNeb and Solu-Medrol for pain, with good relief. Triage vitals showing tachycardia at 114. Remainder vitals stable. CBC without leukocytosis or anemia. CMP unremarkable. Troponin less than 0.01. BNP approximately 500. EKG was a normal sinus rhythm without evidence for acute ischemic change. Chest x-ray with airspace opacities in left lung apex and right lung base. EKG was a normal sinus rhythm without evidence for acute ischemic change. When ambulating patient's oxygen saturation on her home oxygen dipped to 81%. She becomes tachycardia to 120. Secondary to kidney dysfunction was unable to obtain a contrasted CT to rule out pulmonary embolus. Did obtain a dry CT showing chronic findings in the left upper lung as well as questionable endobronchial lesion which appears somewhat larger to the right lower lobe. Will obtain VQ scan. Discussed admittance and recommendations with hospital medicine and orders have been placed. A discussion was had with Ms. Ravi Bustamante regarding shortness of breath, ED findings and recommendations for admission. Risk management discussed and shared decision making had with patient and/or surrogate. All questions were answered. Patient in agreement. CRITICAL CARE TIME  45 Minutes of critical care time spent not including separately billable procedures.     MDM  Results for orders placed or performed during the hospital encounter of 11/26/21   COVID-19, Rapid    Specimen: Nasopharyngeal Swab   Result Value Ref Range    SARS-CoV-2, NAAT Not Detected Not Detected   CBC Auto Differential   Result Value Ref Range    WBC 5.6 4.0 - 11.0 K/uL    RBC 3.45 (L) 4.00 - 5.20 M/uL    Hemoglobin 10.6 (L) 12.0 - 16.0 g/dL    Hematocrit 32.3 (L) 36.0 - 48.0 %    MCV 93.5 80.0 - 100.0 fL    MCH 30.6 26.0 - 34.0 pg    MCHC 32.7 31.0 - 36.0 g/dL    RDW 16.2 (H) 12.4 - 15.4 %    Platelets 347 635 - 008 K/uL    MPV 7.8 5.0 - 10.5 fL    Neutrophils % 66.6 %    Lymphocytes % 22.5 %    Monocytes % 7.9 %    Eosinophils % 2.1 %    Basophils % 0.9 %    Neutrophils Absolute 3.7 1.7 - 7.7 K/uL    Lymphocytes Absolute 1.3 1.0 - 5.1 K/uL    Monocytes Absolute 0.4 0.0 - 1.3 K/uL    Eosinophils Absolute 0.1 0.0 - 0.6 K/uL    Basophils Absolute 0.0 0.0 - 0.2 K/uL   Comprehensive Metabolic Panel w/ Reflex to MG   Result Value Ref Range    Sodium 139 136 - 145 mmol/L    Potassium reflex Magnesium 4.5 3.5 - 5.1 mmol/L    Chloride 101 99 - 110 mmol/L    CO2 30 21 - 32 mmol/L    Anion Gap 8 3 - 16    Glucose 122 (H) 70 - 99 mg/dL    BUN 19 7 - 20 mg/dL    CREATININE 1.6 (H) 0.6 - 1.2 mg/dL    GFR Non- 31 (A) >60    GFR  38 (A) >60    Calcium 10.1 8.3 - 10.6 mg/dL    Total Protein 7.2 6.4 - 8.2 g/dL    Albumin 3.9 3.4 - 5.0 g/dL    Albumin/Globulin Ratio 1.2 1.1 - 2.2    Total Bilirubin 0.4 0.0 - 1.0 mg/dL    Alkaline Phosphatase 141 (H) 40 - 129 U/L    ALT 9 (L) 10 - 40 U/L    AST 21 15 - 37 U/L   Troponin   Result Value Ref Range    Troponin <0.01 <0.01 ng/mL   Brain Natriuretic Peptide   Result Value Ref Range    Pro- (H) 0 - 449 pg/mL   D-dimer, quantitative   Result Value Ref Range    D-Dimer, Quant 300 (H) 0 - 229 ng/mL DDU   EKG 12 Lead   Result Value Ref Range    Ventricular Rate 100 BPM    Atrial Rate 100 BPM    P-R Interval 180 ms    QRS Duration 92 ms    Q-T Interval 336 ms    QTc Calculation (Bazett) 433 ms    P Axis 80 degrees    R Axis 20 degrees    T Axis 63 degrees    Diagnosis       Normal sinus rhythmPoor R wave progressionBorderline EKGWhen compared with ECG of 06-APR-2021 00:47,Vent. rate has increased BY  37 BPMConfirmed by Swati Call ([id number]) on 11/26/2021 2:25:24 PM     I spoke with Humera Greene and LANDEN Spain.  We thoroughly discussed the history, physical exam, laboratory and imaging studies, as well as, emergency department course. Based upon that discussion, we've decided to admit Javier Melgoza to the hospital for further observation, evaluation, and treatment. Final Impression  1. Acute on chronic respiratory failure with hypoxia (HCC)      Blood pressure (!) 140/62, pulse 95, temperature 98.7 °F (37.1 °C), temperature source Oral, resp. rate 18, height 5' 3\" (1.6 m), weight 230 lb (104.3 kg), SpO2 95 %. DISPOSITION  Patient was admitted to the hospital in stable condition.          Britt Gandhi  11/26/21 8095

## 2021-11-27 LAB
ANION GAP SERPL CALCULATED.3IONS-SCNC: 9 MMOL/L (ref 3–16)
BASOPHILS ABSOLUTE: 0 K/UL (ref 0–0.2)
BASOPHILS RELATIVE PERCENT: 0.1 %
BUN BLDV-MCNC: 23 MG/DL (ref 7–20)
CALCIUM SERPL-MCNC: 9.8 MG/DL (ref 8.3–10.6)
CHLORIDE BLD-SCNC: 99 MMOL/L (ref 99–110)
CO2: 28 MMOL/L (ref 21–32)
CREAT SERPL-MCNC: 1.6 MG/DL (ref 0.6–1.2)
EOSINOPHILS ABSOLUTE: 0 K/UL (ref 0–0.6)
EOSINOPHILS RELATIVE PERCENT: 0 %
GFR AFRICAN AMERICAN: 38
GFR NON-AFRICAN AMERICAN: 31
GLUCOSE BLD-MCNC: 146 MG/DL (ref 70–99)
HCT VFR BLD CALC: 28.7 % (ref 36–48)
HEMOGLOBIN: 9.4 G/DL (ref 12–16)
LYMPHOCYTES ABSOLUTE: 0.6 K/UL (ref 1–5.1)
LYMPHOCYTES RELATIVE PERCENT: 12.1 %
MCH RBC QN AUTO: 30.5 PG (ref 26–34)
MCHC RBC AUTO-ENTMCNC: 32.7 G/DL (ref 31–36)
MCV RBC AUTO: 93.4 FL (ref 80–100)
MONOCYTES ABSOLUTE: 0.1 K/UL (ref 0–1.3)
MONOCYTES RELATIVE PERCENT: 1.5 %
NEUTROPHILS ABSOLUTE: 4.4 K/UL (ref 1.7–7.7)
NEUTROPHILS RELATIVE PERCENT: 86.3 %
PDW BLD-RTO: 16.3 % (ref 12.4–15.4)
PLATELET # BLD: 209 K/UL (ref 135–450)
PMV BLD AUTO: 7.9 FL (ref 5–10.5)
POTASSIUM REFLEX MAGNESIUM: 4.5 MMOL/L (ref 3.5–5.1)
RBC # BLD: 3.07 M/UL (ref 4–5.2)
SODIUM BLD-SCNC: 136 MMOL/L (ref 136–145)
WBC # BLD: 5.1 K/UL (ref 4–11)

## 2021-11-27 PROCEDURE — 97166 OT EVAL MOD COMPLEX 45 MIN: CPT

## 2021-11-27 PROCEDURE — 94640 AIRWAY INHALATION TREATMENT: CPT

## 2021-11-27 PROCEDURE — 36415 COLL VENOUS BLD VENIPUNCTURE: CPT

## 2021-11-27 PROCEDURE — 94761 N-INVAS EAR/PLS OXIMETRY MLT: CPT

## 2021-11-27 PROCEDURE — 97530 THERAPEUTIC ACTIVITIES: CPT

## 2021-11-27 PROCEDURE — 80048 BASIC METABOLIC PNL TOTAL CA: CPT

## 2021-11-27 PROCEDURE — 97162 PT EVAL MOD COMPLEX 30 MIN: CPT

## 2021-11-27 PROCEDURE — 97116 GAIT TRAINING THERAPY: CPT

## 2021-11-27 PROCEDURE — 6360000002 HC RX W HCPCS: Performed by: INTERNAL MEDICINE

## 2021-11-27 PROCEDURE — 97535 SELF CARE MNGMENT TRAINING: CPT

## 2021-11-27 PROCEDURE — 85025 COMPLETE CBC W/AUTO DIFF WBC: CPT

## 2021-11-27 PROCEDURE — 2700000000 HC OXYGEN THERAPY PER DAY

## 2021-11-27 PROCEDURE — 97110 THERAPEUTIC EXERCISES: CPT

## 2021-11-27 PROCEDURE — 2580000003 HC RX 258: Performed by: NURSE PRACTITIONER

## 2021-11-27 PROCEDURE — 1200000000 HC SEMI PRIVATE

## 2021-11-27 PROCEDURE — 6370000000 HC RX 637 (ALT 250 FOR IP): Performed by: NURSE PRACTITIONER

## 2021-11-27 PROCEDURE — 6370000000 HC RX 637 (ALT 250 FOR IP): Performed by: INTERNAL MEDICINE

## 2021-11-27 PROCEDURE — 99222 1ST HOSP IP/OBS MODERATE 55: CPT | Performed by: INTERNAL MEDICINE

## 2021-11-27 RX ADMIN — DOXYCYCLINE HYCLATE 100 MG: 100 TABLET, COATED ORAL at 20:58

## 2021-11-27 RX ADMIN — DOXYCYCLINE HYCLATE 100 MG: 100 TABLET, COATED ORAL at 09:58

## 2021-11-27 RX ADMIN — FOLIC ACID 1 MG: 1 TABLET ORAL at 09:58

## 2021-11-27 RX ADMIN — PREDNISONE 40 MG: 20 TABLET ORAL at 09:57

## 2021-11-27 RX ADMIN — ALBUTEROL SULFATE 2.5 MG: 2.5 SOLUTION RESPIRATORY (INHALATION) at 11:57

## 2021-11-27 RX ADMIN — OXYBUTYNIN CHLORIDE 5 MG: 5 TABLET ORAL at 14:51

## 2021-11-27 RX ADMIN — ACETAMINOPHEN 650 MG: 325 TABLET, FILM COATED ORAL at 20:58

## 2021-11-27 RX ADMIN — SODIUM CHLORIDE, PRESERVATIVE FREE 10 ML: 5 INJECTION INTRAVENOUS at 21:24

## 2021-11-27 RX ADMIN — DULOXETINE HYDROCHLORIDE 60 MG: 60 CAPSULE, DELAYED RELEASE ORAL at 09:57

## 2021-11-27 RX ADMIN — ARIPIPRAZOLE 5 MG: 10 TABLET ORAL at 09:57

## 2021-11-27 RX ADMIN — ALBUTEROL SULFATE 2.5 MG: 2.5 SOLUTION RESPIRATORY (INHALATION) at 20:20

## 2021-11-27 RX ADMIN — Medication 2 PUFF: at 20:20

## 2021-11-27 RX ADMIN — SODIUM CHLORIDE, PRESERVATIVE FREE 10 ML: 5 INJECTION INTRAVENOUS at 10:00

## 2021-11-27 RX ADMIN — OXYBUTYNIN CHLORIDE 5 MG: 5 TABLET ORAL at 20:58

## 2021-11-27 RX ADMIN — APIXABAN 10 MG: 5 TABLET, FILM COATED ORAL at 20:58

## 2021-11-27 RX ADMIN — APIXABAN 10 MG: 5 TABLET, FILM COATED ORAL at 09:58

## 2021-11-27 RX ADMIN — OXYBUTYNIN CHLORIDE 5 MG: 5 TABLET ORAL at 09:57

## 2021-11-27 RX ADMIN — ALBUTEROL SULFATE 2.5 MG: 2.5 SOLUTION RESPIRATORY (INHALATION) at 16:23

## 2021-11-27 RX ADMIN — PANTOPRAZOLE SODIUM 40 MG: 40 TABLET, DELAYED RELEASE ORAL at 09:57

## 2021-11-27 ASSESSMENT — PAIN DESCRIPTION - PROGRESSION
CLINICAL_PROGRESSION: NOT CHANGED

## 2021-11-27 ASSESSMENT — PAIN DESCRIPTION - FREQUENCY
FREQUENCY: CONTINUOUS

## 2021-11-27 ASSESSMENT — PAIN DESCRIPTION - ORIENTATION
ORIENTATION: LOWER

## 2021-11-27 ASSESSMENT — PAIN SCALES - GENERAL
PAINLEVEL_OUTOF10: 4
PAINLEVEL_OUTOF10: 1
PAINLEVEL_OUTOF10: 1
PAINLEVEL_OUTOF10: 4

## 2021-11-27 ASSESSMENT — PAIN DESCRIPTION - LOCATION
LOCATION: BACK
LOCATION: BACK
LOCATION: HEAD
LOCATION: BACK

## 2021-11-27 ASSESSMENT — PAIN DESCRIPTION - PAIN TYPE
TYPE: CHRONIC PAIN

## 2021-11-27 ASSESSMENT — ENCOUNTER SYMPTOMS
SHORTNESS OF BREATH: 1
CHEST TIGHTNESS: 0
COUGH: 1
WHEEZING: 1

## 2021-11-27 ASSESSMENT — PAIN DESCRIPTION - ONSET
ONSET: GRADUAL

## 2021-11-27 NOTE — PROGRESS NOTES
11/27/21 2145 -Pulmonology consult called in and RNs number provided for callback.    -Stalin Gamble, PCA

## 2021-11-27 NOTE — H&P
Hospital Medicine History & Physical      PCP: Willow Buckley MD    Date of Admission: 11/26/2021    Date of Service: Pt seen/examined on 11/26/2021 and Admitted to Inpatient with expected LOS greater than two midnights due to medical therapy. Chief Complaint:  SOB      History Of Present Illness:  64 y.o. female, with a PMH of chronic respiratory failure on 4 lpm O2, COPD, DM2, HTN, who presented to [facility] with SOB. The patient states she became acutely short of breath last evening. She complains of cough with increased mucus production. She denies fever, chills, headache, dizziness, chest pain, abdominal pain, n/v/d, edema. No recent travel. No history of blood clots. Past Medical History:          Diagnosis Date    Arthritis     Asthma     Bronchitis chronic     Chronic respiratory failure (HCC)     Depression     Diabetes mellitus ([address]     BORDERLINE NO MEDS    Emphysema of lung ([address]     spontaneous pneumo 5/07/2020    Glaucoma     Hypertension     Morbid obesity ([address]     Oxygen dependent     Uses O2 NC at 3L/min continuously    Rash     due to nasal canula    Shingles     recently no problems now    Sleep apnea     C pap       Past Surgical History:          Procedure Laterality Date    CATARACT REMOVAL WITH IMPLANT  3/9/2011    CATARACT REMOVAL WITH IMPLANT  3/23/2011    CHOLECYSTECTOMY      COLON SURGERY      11\" removed    COLONOSCOPY  11/18/13    Diverticulosis    HERNIA REPAIR      HYSTERECTOMY      LAP BAND  10/5/10    ADJUSTABLE GASTRIC RESTRICTIVE DEVICE       Medications Prior to Admission:      Prior to Admission medications    Medication Sig Start Date End Date Taking?  Authorizing Provider   Kiara Fulton 18 MCG inhalation capsule INHALE 1 CAPSULE INTO THE LUNGS DAILY 11/3/21   Nadiya Aguirre PA-C   fluticasone-salmeterol (ADVAIR) 500-50 MCG/DOSE diskus inhaler INHALE 1 PUFF INTO THE LUNGS EVERY 12 HOURS 7/15/21   Claude Barbosa MD cyanocobalamin 1000 MCG tablet Take 1,000 mcg by mouth daily 1/14/21   Historical Provider, MD   folic acid (FOLVITE) 1 MG tablet TAKE 1 TABLET (1 MG TOTAL) BY MOUTH DAILY. 3/19/21   Historical Provider, MD   traMADol (ULTRAM) 50 MG tablet TAKE ONE TABLET (50 MG. TOTAL) BY MOUTH EVERY SIX HOURS AS NEEDED FOR PAIN FOR UP TO 5 DAYS. 4/2/21   Historical Provider, MD   tiZANidine (ZANAFLEX) 4 MG tablet TAKE ONE TABLET (4 MG. TOTAL) BY MOUTH THREE TIMES DAILY 4/2/21   Historical Provider, MD   albuterol sulfate  (90 Base) MCG/ACT inhaler INHALE 2 PUFFS INTO THE LUNGS EVERY 6 HOURS AS NEEDED FOR WHEEZING OR SHORTNESS OF BREATH 2/22/21 2/22/22  Ayaan Brown MD   albuterol (PROVENTIL) (2.5 MG/3ML) 0.083% nebulizer solution Take 3 mLs by nebulization every 4 hours as needed for Wheezing 9/24/20 9/24/21  Ayaan Brown MD   pantoprazole (PROTONIX) 40 MG tablet Take 40 mg by mouth daily    Historical Provider, MD   oxybutynin (DITROPAN) 5 MG tablet Take 5 mg by mouth 3 times daily    Historical Provider, MD   albuterol sulfate HFA (PROAIR HFA) 108 (90 Base) MCG/ACT inhaler Inhale 2 puffs into the lungs every 6 hours as needed for Wheezing or Shortness of Breath 2/21/20   Jennifer Delarosa MD   ARIPiprazole (ABILIFY) 5 MG tablet Take 5 mg by mouth 12/19/19   Historical Provider, MD   IRON PO Take 325 mg by mouth 9/19/19   Historical Provider, MD   albuterol (PROVENTIL) (2.5 MG/3ML) 0.083% nebulizer solution Take 3 mLs by nebulization every 4 hours as needed for Wheezing or Shortness of Breath DX COPD J44.9 12/17/19   Ayaan Brown MD   DULoxetine (CYMBALTA) 60 MG extended release capsule Take 60 mg by mouth daily    Historical Provider, MD   losartan (COZAAR) 100 MG tablet Take 100 mg by mouth 9/6/18 5/15/20  Historical Provider, MD   OXYGEN Inhale 3 L/min into the lungs continuous. Historical Provider, MD   meloxicam (MOBIC) 15 MG tablet Take 15 mg by mouth daily.     Historical Provider, MD   bumetanide (BUMEX) 1 MG tablet Take 1 mg by mouth daily. Patient not taking: Reported on 8/25/2021    Historical Provider, MD       Allergies:  Patient has no known allergies. Social History:      The patient currently lives with her     TOBACCO:   reports that she quit smoking about 16 years ago. Her smoking use included cigarettes. She has a 15.00 pack-year smoking history. She has never used smokeless tobacco.  ETOH:   reports no history of alcohol use. E-Cigarettes/Vaping Use     Questions Responses    E-Cigarette/Vaping Use Never User    Start Date     Passive Exposure     Quit Date     Counseling Given     Comments             Family History:      Reviewed in detail. Positive as follows:        Problem Relation Age of Onset    Diabetes Mother    [facility] Arthritis Mother    [facility] Vision Loss Mother     High Blood Pressure Sister     Stroke Sister     Diabetes Sister     Depression Sister     Arthritis Sister     Diabetes Daughter     Substance Abuse Daughter     Depression Daughter     Asthma Daughter     Emphysema Father     Cancer Neg Hx     Heart Failure Neg Hx     Hypertension Neg Hx        REVIEW OF SYSTEMS COMPLETED:   Pertinent positives as noted in the HPI. All other systems reviewed and negative. PHYSICAL EXAM PERFORMED:    BP (!) 140/62   Pulse 95   Temp 98.7 °F (37.1 °C) (Oral)   Resp 18   Ht 5' 3\" (1.6 m)   Wt 230 lb (104.3 kg)   SpO2 95%   BMI 40.74 kg/m²     General appearance:  No apparent distress, appears stated age and cooperative. HEENT:  Normal cephalic, atraumatic without obvious deformity. Pupils equal, round, and reactive to light. Extra ocular muscles intact. Conjunctivae/corneas clear. Neck: Supple, with full range of motion. No jugular venous distention. Trachea midline. Respiratory: On 4 lpm per nc. Normal respiratory effort. Poor air movement. Cardiovascular:  Regular rate and rhythm with normal S1/S2 without murmurs, rubs or gallops.   Abdomen: Soft, non-tender, non-distended with normal bowel sounds. Musculoskeletal:  No clubbing, cyanosis or edema bilaterally. Full range of motion without deformity. Skin: Skin color, texture, turgor normal.  No rashes or lesions. Neurologic:  Neurovascularly intact without any focal sensory/motor deficits. Cranial nerves: II-XII intact, grossly non-focal.  Psychiatric:  Alert and oriented, thought content appropriate, normal insight  Capillary Refill: Brisk,3 seconds, normal  Peripheral Pulses: +2 palpable, equal bilaterally       Labs:     Recent Labs     11/26/21  1150   WBC 5.6   HGB 10.6*   HCT 32.3*        Recent Labs     11/26/21  1150      K 4.5      CO2 30   BUN 19   CREATININE 1.6*   CALCIUM 10.1     Recent Labs     11/26/21  1150   AST 21   ALT 9*   BILITOT 0.4   ALKPHOS 141*     No results for input(s): INR in the last 72 hours. Recent Labs     11/26/21  1150   TROPONINI <0.01       Urinalysis:      Lab Results   Component Value Date    NITRU POSITIVE 04/06/2021    WBCUA 6-9 04/06/2021    BACTERIA 3+ 04/06/2021    RBCUA 0-2 04/06/2021    BLOODU Negative 04/06/2021    SPECGRAV 1.010 04/06/2021    GLUCOSEU Negative 04/06/2021       Radiology:     CXR: I have reviewed the CXR with the following interpretation: Possible pneumonia. EKG:  I have reviewed the EKG with the following interpretation: Sinus tachycardia, rate 100, no acute ST changes. CT CHEST WO CONTRAST   Final Result   Previously seen defect in the right lower lobe persist and appears somewhat   larger and has not resolved. As a result, recommend bronchoscopy to rule out   endobronchial lesion. Increased volume loss in the right lower lobe anteriorly with improved   aeration of the right lower lobe posteriorly. Dominant curvilinear opacity in left upper lobe is seen, similar to prior         XR CHEST PORTABLE   Final Result   Airspace opacities in the left lung apex and the right lung base, may be   related to pneumonia.   Recommend follow-up to resolution to exclude   underlying lung mass/neoplasm         NM LUNG SCAN PERFUSION ONLY    (Results Pending)       ASSESSMENT:    Active Hospital Problems    Diagnosis Date Noted    [facility] [J44.1] 11/26/2021         PLAN:    COPD exacerbation - increased SOB and mucus production. - received 125 mg solu-medrol in the ED. - prednisone 40 mg daily x 5 days ordered  - started on Doxy.  - mucinex and nebulizers ordered. - follows with Dr. Madalyn Kirkland as outpatient    Right lower lobe defect - in the setting of [facility] 2/2020.    - concern for neoplasm  - pulmonary consulted for possible bronchoscopy. Elevated d-dimer  - VQ scan ordered per ED. CARLOS on CKD - baseline Cr 1.4  - avoid nephrotoxins. - follow BMP  - hold losartan for now. Chronic hypoxic respiratory failure  - on 4 lpm at baseline    Morbid obesity - body mass index is 40.74 kg/m². Complicating assessment and treatment. Placing patient at risk for multiple co-morbidities as well as early death and contributing to the patient's presentation. Counseled on weight loss      DVT Prophylaxis: Lovenox  Diet: No diet orders on file  Code Status: Prior    PT/OT Eval Status: not ordered    Dispo - likely 1-2 days       JUANY Pollack - CNP    Thank you Ivy Romano MD for the opportunity to be involved in this patient's care. If you have any questions or concerns please feel free to contact me at [phone].

## 2021-11-27 NOTE — PROGRESS NOTES
Hospitalist Progress Note      PCP: Nikia Vargas MD    Date of Admission: 11/26/2021    Chief Complaint:   SOB    Hospital Course:     64 y.o. female, with a PMH of chronic respiratory failure on home oxygen, 4 lpm O2, COPD, DM2, HTN, who presented to [facility] with SOB. She complained of cough with increased mucus production. CT scan of the chest without contrast was done at time of admission on 11/26/2021  It did reveal the previously seen defect right lower lung lobe which appeared somewhat larger has not resolved. Bronchoscopy was recommended to further evaluate to rule out endobronchial lesion. VQ scan also done on 11/26/2021. Renal functions were abnormal therefore contrast CT was not done. This did reveal high probability for pulmonary embolism and she was subsequently started on anticoagulation with Eliquis. Subjective: She is sitting up in bed, is on oxygen in no acute distress. She feels weak and little tired but denies any increasing shortness of breath no chest pain.       Medications:  Reviewed    Infusion Medications    sodium chloride       Scheduled Medications    ARIPiprazole  5 mg Oral Daily    DULoxetine  60 mg Oral Daily    budesonide-formoterol  2 puff Inhalation BID    folic acid  1 mg Oral Daily    oxybutynin  5 mg Oral TID    pantoprazole  40 mg Oral Daily    tiotropium  2 puff Inhalation Daily    sodium chloride flush  5-40 mL IntraVENous 2 times per day    predniSONE  40 mg Oral Daily    doxycycline hyclate  100 mg Oral 2 times per day    apixaban  10 mg Oral BID    albuterol  2.5 mg Nebulization Q4H WA     PRN Meds: sodium chloride flush, sodium chloride, ondansetron **OR** ondansetron, polyethylene glycol, acetaminophen **OR** acetaminophen    No intake or output data in the 24 hours ending 11/27/21 0829    Physical Exam Performed:    BP (!) 158/73   Pulse 94   Temp 97.6 °F (36.4 °C) (Oral)   Resp 18   Ht 5' 3\" (1.6 m)   Wt 230 lb (104.3 kg) SpO2 93%   BMI 40.74 kg/m²     General appearance: She is sitting up in bed in no acute t distress, alert and cooperative. HEENT: Pupils equal, round, and reactive to light. Conjunctivae/corneas clear. Neck: Supple, with full range of motion. No jugular venous distention. Trachea midline. Respiratory: bilateral breath, patient scattered rhonchi are noted in both lung fields, poor air entry, no rales are noted. There is no use of accessory muscle at this time  Cardiovascular: Regular rate and rhythm with normal S1/S2  Abdomen: Soft, non-tender, non-distended with normal bowel sounds. Musculoskeletal: No clubbing, cyanosis, +1 lower extremity edema bilaterally. Neurologic:  Neurovascularly intact without any focal sensory/motor deficits. Cranial nerves: II-XII intact, grossly non-focal.  Psychiatric: Alert and oriented, thought content appropriate, normal insight  Capillary Refill: Brisk,3 seconds, normal       Labs:   Recent Labs     11/26/21  1150 11/27/21  0603   WBC 5.6 5.1   HGB 10.6* 9.4*   HCT 32.3* 28.7*    209     Recent Labs     11/26/21  1150 11/27/21  0603    136   K 4.5 4.5    99   CO2 30 28   BUN 19 23*   CREATININE 1.6* 1.6*   CALCIUM 10.1 9.8     Recent Labs     11/26/21  1150   AST 21   ALT 9*   BILITOT 0.4   ALKPHOS 141*     No results for input(s): INR in the last 72 hours. Recent Labs     11/26/21  1150   TROPONINI <0.01       Urinalysis:      Lab Results   Component Value Date    NITRU POSITIVE 04/06/2021    WBCUA 6-9 04/06/2021    BACTERIA 3+ 04/06/2021    RBCUA 0-2 04/06/2021    BLOODU Negative 04/06/2021    SPECGRAV 1.010 04/06/2021    GLUCOSEU Negative 04/06/2021       Radiology:  NM LUNG SCAN PERFUSION ONLY   Final Result   High probability for pulmonary embolism.       Findings were discussed with ANGELES Arce on 11/26/2021 at 8:40 p.m.         CT CHEST WO CONTRAST   Final Result   Previously seen defect in the right lower lobe persist and appears somewhat larger and has not resolved. As a result, recommend bronchoscopy to rule out   endobronchial lesion. Increased volume loss in the right lower lobe anteriorly with improved   aeration of the right lower lobe posteriorly. Dominant curvilinear opacity in left upper lobe is seen, similar to prior         XR CHEST PORTABLE   Final Result   Airspace opacities in the left lung apex and the right lung base, may be   related to pneumonia. Recommend follow-up to resolution to exclude   underlying lung mass/neoplasm                 Assessment/Plan:    Active Hospital Problems    Diagnosis     COPD exacerbation ([facility] [J44.1]      COPD acute exacerbation: Presented with increased SOB and mucus production. She is on home oxygen at 4 L/min. received 125 mg solu-medrol in the ED. Continue on oral prednisone 40 mg daily x 5 days  Continue doxycycline.  - mucinex and nebulizers ordered. - follows with Dr. Candace Jordan as outpatient. Pulmonary also consulted this admission. Pulmonary embolism: As noted above VQ scan done 11/2621 with high probability for PE. Did not do CT scan with PE protocol as renal functions are abnormal.  Has been started on anticoagulation with Eliquis. Pulmonary consulted. We will check lower extremity Dopplers, check echocardiogram.  When renal functions improve could further evaluate with CT scan with PE protocol. Chronic hypoxic respiratory failure: Bronchodilators, continue supplemental oxygen. As noted she is on 4 L home oxygen at baseline. Right lower lobe defect : Noted on CT scan done 11/26/2021.  - concerning for endobronchial lesion  - pulmonary consulted for possible bronchoscopy. Depression: Mood currently appears stable. Continue Cymbalta, Abilify. Hypertension: She has been on Cozaar as an outpatient. Holding due to her renal functions will continue to follow blood pressure. Acute renal failure : Creatinine at time admission= 1.6.   In 5/2020 creatinine= 1.2  She has been on Bumex is currently being held. We will continue to follow renal functions closely. Start to improve may need to ask for nephrology input. DVT Prophylaxis: Eliquis  Diet: ADULT DIET;  Regular  Code Status: Full Code    PT/OT Eval Status: Ordered    Dispo -2 to 3 days    Misael Leyva MD

## 2021-11-27 NOTE — PROGRESS NOTES
Occupational Therapy   Occupational Therapy Initial Assessment/Treatment  Date: 2021   Patient Name: Karen Boyer  MRN: [id number]     : 1945    Date of Service: 2021    Discharge Recommendations:  Home with Home health OT, 24 hour supervision or assist  OT Equipment Recommendations  Equipment Needed: No    Assessment   Performance deficits / Impairments: Decreased functional mobility ; Decreased ADL status; Decreased strength; Decreased endurance; Decreased cognition; Decreased coordination; Decreased fine motor control; Decreased safe awareness    Assessment: Pt is a68yo female with deficits in the areas listed above with  exacerbation. Pt typically is mod (I)-supervision with ADLS/IADLS and function mobility at baseline with 4WW but receives assistance from  PRN. Today, pt performing all ADLS and functional mobility/t/fs with CGA and 4WW. Pt expressing decreased strength from baseline and having deficits in endurance. Pt should continue to receive skilled OT services to increase strength,endurance and independence for ADLS and functional mobility. Prognosis: Good  Decision Making: Medium Complexity  OT Education: ADL Adaptive Strategies; OT Role; Plan of Care; Transfer Training; Equipment  Patient Education: disease specific: AE for tremors, PLB, safe t/fs, importance of OOB mobility, general safety during hospitalization, importance of ther ex, prevention of complications of bedrest. Pt verbalized understanding but may need reinforcement. Barriers to Learning: cognition. REQUIRES OT FOLLOW UP: Yes  Activity Tolerance  Activity Tolerance: Patient Tolerated treatment well  Activity Tolerance: Initial: 158/73, HR 94, O2 88% on 4L and increasing to 94% with PLB. Pt O2 stats decreasing to 88% with activity but increasing to 90% or greater with rest and PLB on 4L.  HR increasing to 121 with activity but decreasing with rest.  Safety Devices  Safety Devices in place: Yes  Type of devices: Nurse notified; Gait belt; Call light within reach; Chair alarm in place; Left in chair           Patient Diagnosis(es): The encounter diagnosis was Acute on chronic respiratory failure with hypoxia ([address] has a past medical history of Arthritis, Asthma, Bronchitis chronic, Chronic respiratory failure ([address] Depression, Diabetes mellitus ([address] Emphysema of lung ([address] Glaucoma, Hypertension, Morbid obesity ([address] Oxygen dependent, Rash, Shingles, and Sleep apnea. has a past surgical history that includes hernia repair; Cholecystectomy; Hysterectomy; Colon surgery; Lap Band (10/5/10); Cataract removal with implant (3/9/2011); Cataract removal with implant (3/23/2011); and Colonoscopy (11/18/13). Restrictions  Restrictions/Precautions  Restrictions/Precautions: Fall Risk  Position Activity Restriction  Other position/activity restrictions: Up with assist, 4L O2    Subjective   General  Chart Reviewed: Yes  Patient assessed for rehabilitation services?: Yes  Response to previous treatment: Patient with no complaints from previous session  Family / Caregiver Present: No  Referring Practitioner: JUANY Villagran CNP  Diagnosis: COPD exacerbation  Subjective  Subjective: Pt agreeable to therapy. General Comment  Comments: RN approved therapy. Patient Currently in Pain: Yes  Pain Assessment  Pain Location: Head  Pre Treatment Pain Screening  Intervention List: Patient able to continue with treatment  Comments / Details: Ambulate, increased activity, Pt satisfied.   Vital Signs  Temp: 97.6 °F (36.4 °C)  Temp Source: Oral  Pulse: 94  Heart Rate Source: Monitor  Resp: 18  BP: (!) 158/73  BP Location: Left upper arm  Patient Position: Up in chair  Patient Currently in Pain: Yes  Oxygen Therapy  SpO2: 93 %  O2 Device: Nasal cannula  Social/Functional History  Social/Functional History  Lives With: Spouse  Type of Home: House  Home Layout: One level, Work area in basement  Home Access: Stairs to enter without rails  Entrance Stairs - Number of Steps: 3 + 1  Bathroom Shower/Tub: Tub/Shower unit  Bathroom Toilet: Standard  Bathroom Equipment: Shower chair, Grab bars in shower  Home Equipment: 4 wheeled walker, Oxygen, Cane, [employer], Electric scooter  ADL Assistance: Needs assistance (assist from  PRN)  [address]: Independent (shares with . Cleaning lady comes every over week)  Homemaking Responsibilities: Yes  Ambulation Assistance: Independent (with rollator)  Transfer Assistance: Independent  Active : Yes  Occupation: Retired  Additional Comments: 1 fall in the past 6 mo       Objective   Vision: Impaired  Vision Exceptions: Wears glasses for reading  Hearing: Within functional limits    Orientation  Overall Orientation Status: Within Functional Limits  Observation/Palpation  Posture: Fair  Balance  Sitting Balance: Supervision  Standing Balance: Contact guard assistance  Standing Balance  Time: ~1min, ~2minx2  Activity: to/from restroom standing ADLs  Comment: 4WW used. Functional Mobility  Functional - Mobility Device: 4-Wheeled Walker  Activity: To/from bathroom  Assist Level: Contact guard assistance  Toilet Transfers  Toilet - Technique: Ambulating  Equipment Used: Grab bars (on R)  Toilet Transfer: Contact guard assistance  ADL  Grooming: Contact guard assistance (in stance at sink)  LE Dressing: Contact guard assistance; Increased time to complete (to don/doff briefs.)  Toileting: Contact guard assistance (on standard toilet.)  Tone RUE  RUE Tone: Normotonic  Tone LUE  LUE Tone: Normotonic  Coordination  Movements Are Fluid And Coordinated: No  Coordination and Movement description: Fine motor impairments; Gross motor impairments; Tremors; Right UE; Left UE (Greater in LUE.)     Bed mobility  Supine to Sit: Stand by assistance (HOB elevated.)  Sit to Supine: Unable to assess  Scooting: Unable to assess  Comment: Pt ended session up in chair.   Transfers  Sit to stand: Contact guard assistance  Stand to sit: Contact guard assistance  Transfer Comments: with 4WW     Cognition  Overall Cognitive Status: Exceptions  Arousal/Alertness: Appropriate responses to stimuli  Following Commands: Follows one step commands with increased time; Follows one step commands with repetition  Attention Span: Attends with cues to redirect  Safety Judgement: Decreased awareness of need for safety  Problem Solving: Decreased awareness of errors  Insights: Decreased awareness of deficits  Initiation: Requires cues for some  Sequencing: Requires cues for some  Cognition Comment: Pt with some confusion in understandign questions requiring questions to be repeated or rephrased. Sensation  Overall Sensation Status: WFL  Type of ROM/Therapeutic Exercise  Type of ROM/Therapeutic Exercise: AROM  Comment: BUE seated in chair. Exercises  Shoulder Flexion: x10  Shoulder Extension: x10  Elbow Flexion: x10  Elbow Extension: x10     LUE AROM (degrees)  LUE AROM : Exceptions  LUE General AROM: Limited in ability to reach behind to small of back.   L Shoulder Flexion 0-180: ~90 degrees  Left Hand AROM (degrees)  Left Hand AROM: WFL  RUE AROM (degrees)  RUE AROM : WFL  Right Hand AROM (degrees)  Right Hand AROM: WFL  LUE Strength  Gross LUE Strength: WFL  L Hand General: 5/5  LUE Strength Comment: 5/5 grossly  RUE Strength  Gross RUE Strength: WFL  R Hand General: 5/5  RUE Strength Comment: 5/5 grossly                   Plan   Plan  Times per week: 3-5x/week  Current Treatment Recommendations: Strengthening, Endurance Training, Functional Mobility Training, Stair training, Safety Education & Training, Patient/Caregiver Education & Training, Equipment Evaluation, Education, & procurement, Self-Care / ADL, Home Management Training, Balance Training    AM-PAC Score        AM-[facility] Inpatient Daily Activity Raw Score: 19 (11/27/21 1232)  AM-PAC Inpatient ADL T-Scale Score : 40.22 (11/27/21 9692)  ADL Inpatient CMS 0-100% Score: 42.8 (11/27/21 9936)  ADL Inpatient CMS G-Code Modifier : CK (11/27/21 0217)    Goals  Short term goals  Time Frame for Short term goals: 1 week (12/4) unless stated otherwise. Short term goal 1: Pt will TBD with mod I. Short term goal 2: Pt will perform BUE ther ex x20 to increase strength/endurance for functional activities (12/2). Short term goal 3: Pt will perform at least 5min of functional standing activities with mod I. Short term goal 4: Pt will perform at least 3 standing ADLS with mod I. Short term goal 5: Pt will toilet with mod I. Patient Goals   Patient goals : \"to walk more\"       Therapy Time   Individual Concurrent Group Co-treatment   Time In 5240         Time Out 0919         Minutes 37         Timed Code Treatment Minutes: 27 Minutes (10min eval)       ZOFIA Cardona/L  If pt discharges prior to next session, this note will serve as discharge summary. See case management note for discharge disposition.

## 2021-11-27 NOTE — CONSULTS
Pulmonary Consult    Patient's PCP: Feliberto Crawford MD  Referred by: JUANY Harvey - CNP for RLL defect     HISTORY OF PRESENT ILLNESS:    This is a  64 y.o. female with PMH of asthma, emphysema, on chronic O2 therapy at 4 liters, PARK and others as listed below presented to the hospital with SOB increased cough and sputum production. She did not feel well. She describes it as feeling \"icky\" but she didn't have fever or chills. She is an ex smoker. She quit smoking 16 years ago. She is known to have pulmonary nodule being followed outpatient. She usually takes advair and spiriva. Since admission she feels better, but she is not back 100%. Past Medical / Surgical History:    Past Medical History:   Diagnosis Date    Arthritis     Asthma     Bronchitis chronic     Chronic respiratory failure ([address]     Depression     Diabetes mellitus ([address]     BORDERLINE NO MEDS    Emphysema of lung ([address]     spontaneous pneumo 5/07/2020    Glaucoma     Hypertension     Morbid obesity ([address]     Oxygen dependent     Uses O2 NC at 3L/min continuously    Rash     due to nasal canula    Shingles     recently no problems now    Sleep apnea     C pap     Past Surgical History:   Procedure Laterality Date    CATARACT REMOVAL WITH IMPLANT  3/9/2011    CATARACT REMOVAL WITH IMPLANT  3/23/2011    CHOLECYSTECTOMY      COLON SURGERY      11\" removed    COLONOSCOPY  11/18/13    Diverticulosis    HERNIA REPAIR      HYSTERECTOMY      LAP BAND  10/5/10    ADJUSTABLE GASTRIC RESTRICTIVE DEVICE     Prior to Admission:    No current facility-administered medications on file prior to encounter.      Current Outpatient Medications on File Prior to Encounter   Medication Sig Dispense Refill    SPIRIVA HANDIHALER 18 MCG inhalation capsule INHALE 1 CAPSULE INTO THE LUNGS DAILY 30 capsule 5    fluticasone-salmeterol (ADVAIR) 500-50 MCG/DOSE diskus inhaler INHALE 1 PUFF INTO THE LUNGS EVERY 12 HOURS 60 each 5    cyanocobalamin 1000 MCG tablet Take 1,000 mcg by mouth daily      folic acid (FOLVITE) 1 MG tablet TAKE 1 TABLET (1 MG TOTAL) BY MOUTH DAILY.  traMADol (ULTRAM) 50 MG tablet TAKE ONE TABLET (50 MG. TOTAL) BY MOUTH EVERY SIX HOURS AS NEEDED FOR PAIN FOR UP TO 5 DAYS.  tiZANidine (ZANAFLEX) 4 MG tablet TAKE ONE TABLET (4 MG. TOTAL) BY MOUTH THREE TIMES DAILY      albuterol sulfate  (90 Base) MCG/ACT inhaler INHALE 2 PUFFS INTO THE LUNGS EVERY 6 HOURS AS NEEDED FOR WHEEZING OR SHORTNESS OF BREATH 8.5 g 5    pantoprazole (PROTONIX) 40 MG tablet Take 40 mg by mouth daily      oxybutynin (DITROPAN) 5 MG tablet Take 5 mg by mouth 3 times daily      ARIPiprazole (ABILIFY) 5 MG tablet Take 5 mg by mouth      IRON PO Take 325 mg by mouth      albuterol (PROVENTIL) (2.5 MG/3ML) 0.083% nebulizer solution Take 3 mLs by nebulization every 4 hours as needed for Wheezing or Shortness of Breath DX COPD J44.9 120 vial 6    DULoxetine (CYMBALTA) 60 MG extended release capsule Take 60 mg by mouth daily      OXYGEN Inhale 3 L/min into the lungs continuous.  meloxicam (MOBIC) 15 MG tablet Take 15 mg by mouth daily.  bumetanide (BUMEX) 1 MG tablet Take 1 mg by mouth daily       albuterol (PROVENTIL) (2.5 MG/3ML) 0.083% nebulizer solution Take 3 mLs by nebulization every 4 hours as needed for Wheezing 360 mL 3    [DISCONTINUED] albuterol sulfate HFA (PROAIR HFA) 108 (90 Base) MCG/ACT inhaler Inhale 2 puffs into the lungs every 6 hours as needed for Wheezing or Shortness of Breath 8.5 g 5    losartan (COZAAR) 100 MG tablet Take 100 mg by mouth         Allergies:  Patient has no known allergies. Social History:   TOBACCO:   reports that she quit smoking about 16 years ago. Her smoking use included cigarettes. She has a 15.00 pack-year smoking history. She has never used smokeless tobacco.     ETOH:   reports no history of alcohol use.     Family History:       Problem Relation Age of Onset    Diabetes Mother     Arthritis Mother    [employer] Loss Mother     High Blood Pressure Sister     Stroke Sister     Diabetes Sister     Depression Sister     Arthritis Sister     Diabetes Daughter     Substance Abuse Daughter     Depression Daughter     Asthma Daughter     Emphysema Father     Cancer Neg Hx     Heart Failure Neg Hx     Hypertension Neg Hx          Review of Systems   Constitutional: Negative for chills, fever and unexpected weight change. HENT: Negative for congestion. Respiratory: Positive for cough, shortness of breath and wheezing. Negative for chest tightness. Cardiovascular: Negative for chest pain, palpitations and leg swelling. Neurological: Negative for weakness. Psychiatric/Behavioral: The patient is not nervous/anxious. All other systems reviewed and are negative. PHYSICAL EXAM:  BP (!) 146/71   Pulse 100   Temp 97.7 °F (36.5 °C) (Oral)   Resp 16   Ht 5' 3\" (1.6 m)   Wt 230 lb (104.3 kg)   SpO2 94%   BMI 40.74 kg/m²     Physical Exam  Vitals reviewed. Constitutional:       Appearance: She is well-developed. HENT:      Head: Normocephalic and atraumatic. Eyes:      Pupils: Pupils are equal, round, and reactive to light. Neck:      Vascular: No JVD. Cardiovascular:      Rate and Rhythm: Normal rate and regular rhythm. Heart sounds: No murmur heard. Pulmonary:      Effort: Pulmonary effort is normal.      Breath sounds: No wheezing or rales. Comments: Diminished air entry bilaterally   Abdominal:      General: Bowel sounds are normal.      Palpations: Abdomen is soft. Musculoskeletal:         General: No deformity. Cervical back: Neck supple. Skin:     General: Skin is warm and dry. Neurological:      Mental Status: She is alert and oriented to person, place, and time.    Psychiatric:         Behavior: Behavior normal.         LABS:  Recent Labs     11/26/21  1150 11/27/21  0603   WBC 5.6 5.1   HGB 10.6* 9.4*   HCT 32.3* 28.7*    209                                                                  Recent Labs     11/26/21  1150 11/27/21  0603    136   K 4.5 4.5    99   CO2 30 28   BUN 19 23*   CREATININE 1.6* 1.6*   GLUCOSE 122* 146*     Recent Labs     11/26/21  1150   AST 21   ALT 9*   BILITOT 0.4   ALKPHOS 141*     Recent Labs     11/26/21  1150   TROPONINI <0.01     No results for input(s): BNP in the last 72 hours. No results found for: PHART, SUY8RID, PO2ART  No results for input(s): INR in the last 72 hours. No results for input(s): NITRITE, COLORU, PHUR, LABCAST, WBCUA, RBCUA, MUCUS, TRICHOMONAS, YEAST, BACTERIA, CLARITYU, SPECGRAV, LEUKOCYTESUR, UROBILINOGEN, BILIRUBINUR, BLOODU, GLUCOSEU, AMORPHOUS in the last 72 hours. Invalid input(s): Tyrell Sammy     DATA:  CT chest  Previously seen defect in the right lower lobe persist and appears somewhat   larger and has not resolved.  As a result, recommend bronchoscopy to rule out   endobronchial lesion.       Increased volume loss in the right lower lobe anteriorly with improved   aeration of the right lower lobe posteriorly.       Dominant curvilinear opacity in left upper lobe is seen, similar to prior         6MWT  09/20/2021     INDICATION:  COPD.     FINDINGS:  Six-minute walk test per [facility] protocol. Baseline oxygen  saturation was 82% on room air. The patient required 2 liters of  supplemental oxygen to maintain a saturation of 92% at rest.  With  ambulation, the patient desaturated to 87% and required 4 liters of  supplemental oxygen to maintain saturation greater than 89%.   The  patient ambulated 280 feet.     IMPRESSION:  Successful completion of six-minute walk test demonstrating  the need for 2 liters of supplemental oxygen at rest and 4 liters with  exertion.     Assessment &Plan:    Patient Active Problem List:     Hypertension     Asthma     Pulmonary emphysema (HCC)     Arthritis     Depression     Morbid obesity ([address]     Oxygen dependent     Status post gastric banding     COPD (chronic obstructive pulmonary disease) ([facility])     PARK (obstructive sleep apnea)     Hypoxemia     SBO (small bowel obstruction) ([facility])     Rash     Chronic respiratory failure with hypoxia, on home O2 therapy ([facility])     Periodic limb movement disorder (PLMD)     Knee osteoarthritis     Spontaneous tension pneumothorax     Pulmonary nodule     COPD exacerbation (HCC)     COPD exacerbation - improving    Pulmonary nodule, looks larger. It is located in the apical subsegment of CARIDAD. Endobronchial lesion vs mucus plugging in RLL     She will benefit from bronchoscopy however this CARIDAD lesion by not be reached by regular scope. Theo bronch vs CT guided biopsy can be considered. This will be readdressed on Monday     Continue prednisone 40mg daily   Continue symbicort and spiriva    The patient and / or the family were informed of the results of any tests, a time was given to answer questions, a plan was proposed and they agreed with plan. Thank you Gio Monique MD for the opportunity to be involved in this patients care.  If you have any questions or concerns please feel free to contact me  Full Code

## 2021-11-27 NOTE — PROGRESS NOTES
Physical Therapy    Facility/Department: [facility] - MED SURG/ORTHO  Initial Assessment    NAME: Lex Dasilva  : 1945  MRN: [id number]    Date of Service: 2021    Discharge Recommendations:  24 hour supervision or assist, Home with Home health PT   PT Equipment Recommendations  Equipment Needed: No  If pt is unable to be seen after this session, please let this note serve as discharge summary. Please see case management note for discharge disposition. Thank you. Assessment   Body structures, Functions, Activity limitations: Decreased functional mobility ; Decreased balance; Decreased strength; Decreased endurance  Assessment: Pt fuctioning below baseline due to deconditioning. Pt on 4L O2 at baseline and fatigued quickly with short ambulation distances in room. No LOB with use of rollator. O2 remained stable on 4L. Pt positioned in chair and educated on LE exercises to improve endurance. REcommend home with 24 hr A and HHPT  Treatment Diagnosis: decreased mobility  Prognosis: Good  Decision Making: Medium Complexity  PT Education: Goals; General Safety; Gait Training; Disease Specific Education; PT Role; Plan of Care; Functional Mobility Training; Energy Conservation  Patient Education: Pt expressed understanding on benefits of OOB activity  Barriers to Learning: none  REQUIRES PT FOLLOW UP: Yes  Activity Tolerance  Activity Tolerance: Patient Tolerated treatment well; Patient limited by endurance; Patient limited by fatigue  Activity Tolerance: 94% on 4L O2, 158/73, 95 HR. 85% following ambulation, HR up to 120 bpm. Increased to 93% after 30 second sitting rest break. Patient Diagnosis(es): The encounter diagnosis was Acute on chronic respiratory failure with hypoxia ([address]      has a past medical history of Arthritis, Asthma, Bronchitis chronic, Chronic respiratory failure ([address] Depression, Diabetes mellitus ([address] Emphysema of lung ([address] Glaucoma, Hypertension, Morbid obesity ([address] Oxygen dependent, Rash, Shingles, and Sleep apnea. has a past surgical history that includes hernia repair; Cholecystectomy; Hysterectomy; Colon surgery; Lap Band (10/5/10); Cataract removal with implant (3/9/2011); Cataract removal with implant (3/23/2011); and Colonoscopy (11/18/13). Restrictions  Restrictions/Precautions  Restrictions/Precautions: Fall Risk  Position Activity Restriction  Other position/activity restrictions: Up with assist, 4L O2  Vision/Hearing  Vision: Impaired  Vision Exceptions: Wears glasses for reading  Hearing: Within functional limits     Subjective  General  Chart Reviewed: Yes  Patient assessed for rehabilitation services?: Yes  Response To Previous Treatment: Not applicable  Family / Caregiver Present: No  Referring Practitioner: JUANY Jacobson CNP  Referral Date : 11/26/21  Diagnosis: COPD  Follows Commands: Within Functional Limits  General Comment  Comments: cleared by nursing  Subjective  Subjective: Pt resting in bed. Denies pain  Pain Screening  Patient Currently in Pain: Yes          Orientation  Orientation  Overall Orientation Status: Within Functional Limits  Social/Functional History  Social/Functional History  Lives With: Spouse  Type of Home: House  Home Layout: One level, Work area in basement  Home Access: Stairs to enter without rails  Entrance Stairs - Number of Steps: 3 + 1  Bathroom Shower/Tub: Tub/Shower unit  Bathroom Toilet: Standard  Bathroom Equipment: Shower chair, Grab bars in [address] [place] [address] [place]: 4 wheeled walker, Oxygen, [place], [employer], Electric scooter  ADL Assistance: Needs assistance (assist from  PRN)  Homemaking Assistance: Independent (shares with .  Cleaning lady comes every over week)  Homemaking Responsibilities: Yes  Ambulation Assistance: Independent (with rollator)  Transfer Assistance: Independent  Active : Yes  Occupation: Retired  Additional Comments: 1 fall in the past 6 mo  Cognition Objective          PROM RLE (degrees)  RLE PROM: WFL  AROM RLE (degrees)  RLE AROM: WFL  PROM LLE (degrees)  LLE PROM: WFL  AROM LLE (degrees)  LLE AROM : WFL  Strength RLE  Strength RLE: WFL  Strength LLE  Strength LLE: WFL  Tone RLE  RLE Tone: Normotonic  Tone LLE  LLE Tone: Normotonic  Motor Control  Gross Motor?: WFL  Sensation  Overall Sensation Status: WFL  Bed mobility  Supine to Sit: Stand by assistance  Sit to Supine: Unable to assess  Transfers  Sit to Stand: Stand by assistance  Stand to sit: Stand by assistance  Ambulation  Ambulation?: Yes  More Ambulation?: No  Ambulation 1  Surface: level tile  Device: Rollator  Other Apparatus: O2 (4L)  Assistance: Stand by assistance  Gait Deviations: Slow [place]  Distance: 15' + 20'  Stairs/Curb  Stairs?: No     Balance  Posture: Good  Sitting - Static: Good  Sitting - Dynamic: Good  Standing - Static: Good  Standing - Dynamic: Fair; +  Exercises  Hip Flexion: x10 B  Knee Long Arc Quad: x10 B  Ankle Pumps: x10 B     Plan   Plan  Times per week: 3-5x/week  Current Treatment Recommendations: Strengthening, Balance Training, Functional Mobility Training, Transfer Training, Gait Training, Endurance Training, Positioning, Equipment Evaluation, Education, & procurement, Patient/Caregiver Education & Training, Safety Education & Training, Home Exercise Program  Safety Devices  Type of devices:  All fall risk precautions in place, Call light within reach, Chair alarm in place, Gait belt, Patient at risk for falls, Nurse notified, Left in chair  Restraints  Initially in place: No      Goals  Short term goals  Time Frame for Short term goals: 12/2  Short term goal 1: Pt will complete B LE exercises to improve endurance and strength for mobility by 11/30  Short term goal 2: Pt will ambulate x 61' with rollator with supervision  Patient Goals   Patient goals : to get stronger, to walk more       Therapy Time   Individual Concurrent Group Co-treatment   Time In 0740 Time Out 0815         Minutes 35         Timed Code Treatment Minutes: [address] Ethelda Cranker

## 2021-11-27 NOTE — PROGRESS NOTES
I was alerted about pt's VQ scan report just updated by [facility] that was ordered by ER    VQ scan  Impression   High probability for pulmonary embolism.       Findings were discussed with ANGELES Jane on 11/26/2021 at 8:40 p.m.             - start eliquis bid for high probability of PE. Pt is hemodynamically stable at baseline O2 requirements.   - will probably need a CTPA when kidney function improves to confirm PE

## 2021-11-27 NOTE — PROGRESS NOTES
Patient admitted to room 22 from ED  Patient oriented to room, call light, bed rails, phone, lights and bathroom. Patient instructed about the schedule of the day including: vital sign frequency, lab draws, possible tests, frequency of MD and staff rounds, including RN/MD rounding together at bedside, daily weights, and I &O's. Patient instructed about prescribed diet, how to use 8MENU, and television. bed alarm in place, patient aware of placement and reason. Telemetry box in place, patient aware of placement and reason. Bed locked, in lowest position, side rails up 2/4, call light within reach. On oxygen via nasal prongs 4litres. Will continue to monitor.

## 2021-11-28 ENCOUNTER — APPOINTMENT (OUTPATIENT)
Dept: ULTRASOUND IMAGING | Age: 76
DRG: 175 | End: 2021-11-28
Payer: MEDICARE

## 2021-11-28 LAB
ANION GAP SERPL CALCULATED.3IONS-SCNC: 7 MMOL/L (ref 3–16)
BUN BLDV-MCNC: 28 MG/DL (ref 7–20)
CALCIUM SERPL-MCNC: 9.9 MG/DL (ref 8.3–10.6)
CHLORIDE BLD-SCNC: 100 MMOL/L (ref 99–110)
CO2: 29 MMOL/L (ref 21–32)
CREAT SERPL-MCNC: 1.4 MG/DL (ref 0.6–1.2)
GFR AFRICAN AMERICAN: 44
GFR NON-AFRICAN AMERICAN: 36
GLUCOSE BLD-MCNC: 122 MG/DL (ref 70–99)
HCT VFR BLD CALC: 27.9 % (ref 36–48)
HEMOGLOBIN: 9.1 G/DL (ref 12–16)
MCH RBC QN AUTO: 30.5 PG (ref 26–34)
MCHC RBC AUTO-ENTMCNC: 32.6 G/DL (ref 31–36)
MCV RBC AUTO: 93.7 FL (ref 80–100)
PDW BLD-RTO: 16.1 % (ref 12.4–15.4)
PLATELET # BLD: 213 K/UL (ref 135–450)
PMV BLD AUTO: 7.8 FL (ref 5–10.5)
POTASSIUM SERPL-SCNC: 4.5 MMOL/L (ref 3.5–5.1)
RBC # BLD: 2.97 M/UL (ref 4–5.2)
SODIUM BLD-SCNC: 136 MMOL/L (ref 136–145)
WBC # BLD: 8.1 K/UL (ref 4–11)

## 2021-11-28 PROCEDURE — 94761 N-INVAS EAR/PLS OXIMETRY MLT: CPT

## 2021-11-28 PROCEDURE — 6370000000 HC RX 637 (ALT 250 FOR IP): Performed by: INTERNAL MEDICINE

## 2021-11-28 PROCEDURE — 6360000002 HC RX W HCPCS: Performed by: INTERNAL MEDICINE

## 2021-11-28 PROCEDURE — 2580000003 HC RX 258: Performed by: NURSE PRACTITIONER

## 2021-11-28 PROCEDURE — 76770 US EXAM ABDO BACK WALL COMP: CPT

## 2021-11-28 PROCEDURE — 85027 COMPLETE CBC AUTOMATED: CPT

## 2021-11-28 PROCEDURE — 36415 COLL VENOUS BLD VENIPUNCTURE: CPT

## 2021-11-28 PROCEDURE — 6370000000 HC RX 637 (ALT 250 FOR IP): Performed by: NURSE PRACTITIONER

## 2021-11-28 PROCEDURE — 80048 BASIC METABOLIC PNL TOTAL CA: CPT

## 2021-11-28 PROCEDURE — 2700000000 HC OXYGEN THERAPY PER DAY

## 2021-11-28 PROCEDURE — 1200000000 HC SEMI PRIVATE

## 2021-11-28 PROCEDURE — 94640 AIRWAY INHALATION TREATMENT: CPT

## 2021-11-28 RX ORDER — AMLODIPINE BESYLATE 5 MG/1
5 TABLET ORAL DAILY
Status: DISCONTINUED | OUTPATIENT
Start: 2021-11-28 | End: 2021-12-07 | Stop reason: HOSPADM

## 2021-11-28 RX ORDER — SODIUM CHLORIDE 9 MG/ML
25 INJECTION, SOLUTION INTRAVENOUS PRN
Status: DISCONTINUED | OUTPATIENT
Start: 2021-11-28 | End: 2021-12-07 | Stop reason: HOSPADM

## 2021-11-28 RX ORDER — LOSARTAN POTASSIUM 25 MG/1
25 TABLET ORAL DAILY
Status: DISCONTINUED | OUTPATIENT
Start: 2021-11-28 | End: 2021-12-07 | Stop reason: HOSPADM

## 2021-11-28 RX ADMIN — AMLODIPINE BESYLATE 5 MG: 5 TABLET ORAL at 14:16

## 2021-11-28 RX ADMIN — DULOXETINE HYDROCHLORIDE 60 MG: 60 CAPSULE, DELAYED RELEASE ORAL at 09:25

## 2021-11-28 RX ADMIN — ARIPIPRAZOLE 5 MG: 10 TABLET ORAL at 09:25

## 2021-11-28 RX ADMIN — DOXYCYCLINE HYCLATE 100 MG: 100 TABLET, COATED ORAL at 20:15

## 2021-11-28 RX ADMIN — ALBUTEROL SULFATE 2.5 MG: 2.5 SOLUTION RESPIRATORY (INHALATION) at 15:55

## 2021-11-28 RX ADMIN — FOLIC ACID 1 MG: 1 TABLET ORAL at 09:25

## 2021-11-28 RX ADMIN — DOXYCYCLINE HYCLATE 100 MG: 100 TABLET, COATED ORAL at 09:25

## 2021-11-28 RX ADMIN — ALBUTEROL SULFATE 2.5 MG: 2.5 SOLUTION RESPIRATORY (INHALATION) at 08:08

## 2021-11-28 RX ADMIN — LOSARTAN POTASSIUM 25 MG: 25 TABLET, FILM COATED ORAL at 10:59

## 2021-11-28 RX ADMIN — OXYBUTYNIN CHLORIDE 5 MG: 5 TABLET ORAL at 14:16

## 2021-11-28 RX ADMIN — Medication 2 PUFF: at 08:08

## 2021-11-28 RX ADMIN — APIXABAN 10 MG: 5 TABLET, FILM COATED ORAL at 20:15

## 2021-11-28 RX ADMIN — TIOTROPIUM BROMIDE INHALATION SPRAY 2 PUFF: 3.12 SPRAY, METERED RESPIRATORY (INHALATION) at 08:08

## 2021-11-28 RX ADMIN — SODIUM CHLORIDE, PRESERVATIVE FREE 10 ML: 5 INJECTION INTRAVENOUS at 20:16

## 2021-11-28 RX ADMIN — Medication 2 PUFF: at 19:24

## 2021-11-28 RX ADMIN — PANTOPRAZOLE SODIUM 40 MG: 40 TABLET, DELAYED RELEASE ORAL at 09:25

## 2021-11-28 RX ADMIN — OXYBUTYNIN CHLORIDE 5 MG: 5 TABLET ORAL at 20:15

## 2021-11-28 RX ADMIN — ACETAMINOPHEN 650 MG: 325 TABLET, FILM COATED ORAL at 23:17

## 2021-11-28 RX ADMIN — OXYBUTYNIN CHLORIDE 5 MG: 5 TABLET ORAL at 09:25

## 2021-11-28 RX ADMIN — ALBUTEROL SULFATE 2.5 MG: 2.5 SOLUTION RESPIRATORY (INHALATION) at 19:24

## 2021-11-28 RX ADMIN — APIXABAN 10 MG: 5 TABLET, FILM COATED ORAL at 09:25

## 2021-11-28 RX ADMIN — SODIUM CHLORIDE, PRESERVATIVE FREE 10 ML: 5 INJECTION INTRAVENOUS at 09:26

## 2021-11-28 RX ADMIN — ALBUTEROL SULFATE 2.5 MG: 2.5 SOLUTION RESPIRATORY (INHALATION) at 11:50

## 2021-11-28 RX ADMIN — PREDNISONE 40 MG: 20 TABLET ORAL at 09:25

## 2021-11-28 ASSESSMENT — ENCOUNTER SYMPTOMS
SHORTNESS OF BREATH: 1
GASTROINTESTINAL NEGATIVE: 1
COUGH: 1

## 2021-11-28 ASSESSMENT — PAIN DESCRIPTION - PROGRESSION
CLINICAL_PROGRESSION: NOT CHANGED

## 2021-11-28 ASSESSMENT — PAIN SCALES - GENERAL
PAINLEVEL_OUTOF10: 0
PAINLEVEL_OUTOF10: 0
PAINLEVEL_OUTOF10: 2

## 2021-11-28 NOTE — CONSULTS
Nephrology Consult Note  [email or web address]        Reason for Consult: CARLOS  Consulting Physician: Mel Stewart MD    HISTORY OF PRESENT ILLNESS:      The patient is a 64 y.o. female with significant past medical history of asthma, COPD, DM, HTN and obesity who presents on 11/26/21 with cough and SOB. No fever or chills. Diagnosed to have COPD exacerbation and VQ scan with high probability of PE. Labs showed a serum creatinine of 1.6mg/dL on admission, was 1.3mg/dL on 11/9/21. Home meds include Losartan, Bumetanide and Meloxicam. Reports she takes Advil every night chronically. Her Bumetanide had been held and we were consulted for further evaluation and management. Past Medical History:        Diagnosis Date    Arthritis     Asthma     Bronchitis chronic     Chronic respiratory failure (HCC)     Depression     Diabetes mellitus ([address]     BORDERLINE NO MEDS    Emphysema of lung ([address]     spontaneous pneumo 5/07/2020    Glaucoma     Hypertension     Morbid obesity ([address]     Oxygen dependent     Uses O2 NC at 3L/min continuously    Rash     due to nasal canula    Shingles     recently no problems now    Sleep apnea     C pap       Past Surgical History:        Procedure Laterality Date    CATARACT REMOVAL WITH IMPLANT  3/9/2011    CATARACT REMOVAL WITH IMPLANT  3/23/2011    CHOLECYSTECTOMY      COLON SURGERY      11\" removed    COLONOSCOPY  11/18/13    Diverticulosis    HERNIA REPAIR      HYSTERECTOMY      LAP BAND  10/5/10    ADJUSTABLE GASTRIC RESTRICTIVE DEVICE       Current Medications:    No current facility-administered medications on file prior to encounter.      Current Outpatient Medications on File Prior to Encounter   Medication Sig Dispense Refill    SPIRIVA HANDIHALER 18 MCG inhalation capsule INHALE 1 CAPSULE INTO THE LUNGS DAILY 30 capsule 5    fluticasone-salmeterol (ADVAIR) 500-50 MCG/DOSE diskus inhaler INHALE 1 PUFF INTO THE LUNGS EVERY 12 HOURS 60 each 5    cyanocobalamin 1000 MCG tablet Take 1,000 mcg by mouth daily      folic acid (FOLVITE) 1 MG tablet TAKE 1 TABLET (1 MG TOTAL) BY MOUTH DAILY.  traMADol (ULTRAM) 50 MG tablet TAKE ONE TABLET (50 MG. TOTAL) BY MOUTH EVERY SIX HOURS AS NEEDED FOR PAIN FOR UP TO 5 DAYS.  tiZANidine (ZANAFLEX) 4 MG tablet TAKE ONE TABLET (4 MG. TOTAL) BY MOUTH THREE TIMES DAILY      albuterol sulfate  (90 Base) MCG/ACT inhaler INHALE 2 PUFFS INTO THE LUNGS EVERY 6 HOURS AS NEEDED FOR WHEEZING OR SHORTNESS OF BREATH 8.5 g 5    pantoprazole (PROTONIX) 40 MG tablet Take 40 mg by mouth daily      oxybutynin (DITROPAN) 5 MG tablet Take 5 mg by mouth 3 times daily      ARIPiprazole (ABILIFY) 5 MG tablet Take 5 mg by mouth      IRON PO Take 325 mg by mouth      albuterol (PROVENTIL) (2.5 MG/3ML) 0.083% nebulizer solution Take 3 mLs by nebulization every 4 hours as needed for Wheezing or Shortness of Breath DX COPD J44.9 120 vial 6    DULoxetine (CYMBALTA) 60 MG extended release capsule Take 60 mg by mouth daily      OXYGEN Inhale 3 L/min into the lungs continuous.  meloxicam (MOBIC) 15 MG tablet Take 15 mg by mouth daily.  bumetanide (BUMEX) 1 MG tablet Take 1 mg by mouth daily       albuterol (PROVENTIL) (2.5 MG/3ML) 0.083% nebulizer solution Take 3 mLs by nebulization every 4 hours as needed for Wheezing 360 mL 3    [DISCONTINUED] albuterol sulfate HFA (PROAIR HFA) 108 (90 Base) MCG/ACT inhaler Inhale 2 puffs into the lungs every 6 hours as needed for Wheezing or Shortness of Breath 8.5 g 5    losartan (COZAAR) 100 MG tablet Take 100 mg by mouth         Allergies:  Patient has no known allergies.     Social History:    Social History     Socioeconomic History    Marital status:      Spouse name: Not on file    Number of children: Not on file    Years of education: Not on file    Highest education level: Not on file   Occupational History    Not on file   Tobacco Use    Smoking status: Former Smoker Packs/day: 1.50     Years: 10.00     Pack years: 15.00     Types: Cigarettes     Quit date: 2005     Years since quittin.8    Smokeless tobacco: Never Used   Vaping Use    Vaping Use: Never used   Substance and Sexual Activity    Alcohol use: No     Alcohol/week: 0.0 standard drinks    Drug use: No    Sexual activity: Not Currently   Other Topics Concern    Not on file   Social History Narrative    Not on file     Social Determinants of Health     Financial Resource Strain:     Difficulty of Paying Living Expenses: Not on file   Food Insecurity:     Worried About Running Out of Food in the Last Year: Not on file    Herlinda of Food in the Last Year: Not on file   Transportation Needs:     Lack of Transportation (Medical): Not on file    Lack of Transportation (Non-Medical):  Not on file   Physical Activity:     Days of Exercise per Week: Not on file    Minutes of Exercise per Session: Not on file   Stress:     Feeling of Stress : Not on file   Social Connections:     Frequency of Communication with Friends and Family: Not on file    Frequency of Social Gatherings with Friends and Family: Not on file    Attends Hinduism Services: Not on file    Active Member of Clubs or Organizations: Not on file    Attends Club or Organization Meetings: Not on file    Marital Status: Not on file   Intimate Partner Violence:     Fear of Current or Ex-Partner: Not on file    Emotionally Abused: Not on file    Physically Abused: Not on file    Sexually Abused: Not on file   Housing Stability:     Unable to Pay for Housing in the Last Year: Not on file    Number of [place] in the Last Year: Not on file    Unstable Housing in the Last Year: Not on file       Family History:       Problem Relation Age of Onset    Diabetes Mother     Arthritis Mother     Vision Loss Mother     High Blood Pressure Sister     Stroke Sister     Diabetes Sister     Depression Sister     Arthritis Sister    Morenita Camejo Diabetes Daughter     Substance Abuse Daughter     Depression Daughter     Asthma Daughter     Emphysema Father     Cancer Neg Hx     Heart Failure Neg Hx     Hypertension Neg Hx        REVIEW OF SYSTEMS:    Review of Systems   Constitutional: Positive for fatigue. HENT: Negative. Respiratory: Positive for cough and shortness of breath. Cardiovascular: Negative. Gastrointestinal: Negative. Genitourinary: Negative. Musculoskeletal: Positive for arthralgias. Neurological: Positive for weakness. PHYSICAL EXAM:    Vitals:    BP (!) 172/72   Pulse 102   Temp 97.9 °F (36.6 °C) (Oral)   Resp 18   Ht 5' 3\" (1.6 m)   Wt 230 lb (104.3 kg)   SpO2 96%   BMI 40.74 kg/m²   I/O last 3 completed shifts:  In: -   Out: 550 [Urine:550]  I/O this shift:  In: -   Out: 1000 [Urine:1000]    Physical Exam:  Physical Exam  Vitals reviewed. Constitutional:       General: She is not in acute distress. Appearance: Normal appearance. HENT:      Head: Normocephalic and atraumatic. Eyes:      General: No scleral icterus. Conjunctiva/sclera: Conjunctivae normal.   Cardiovascular:      Rate and Rhythm: Normal rate. Heart sounds: No friction rub. Pulmonary:      Effort: Pulmonary effort is normal. No respiratory distress. Comments: Diminished breath sounds bibasal  Abdominal:      General: Bowel sounds are normal. There is no distension. Tenderness: There is no abdominal tenderness. Musculoskeletal:      Right lower leg: No edema. Left lower leg: No edema. Neurological:      Mental Status: She is alert.          DATA:    Recent Labs     11/26/21  1150 11/27/21  0603 11/28/21  0609   WBC 5.6 5.1 8.1   HGB 10.6* 9.4* 9.1*   HCT 32.3* 28.7* 27.9*   MCV 93.5 93.4 93.7    209 213     Recent Labs     11/26/21  1150 11/27/21  0603 11/28/21  0609    136 136   K 4.5 4.5 4.5    99 100   CO2 30 28 29   GLUCOSE 122* 146* 122*   BUN 19 23* 28*   CREATININE 1.6* 1. 6* 1.4*   LABGLOM 31* 31* 36*   GFRAA 38* 38* 44*           IMPRESSION/RECOMMENDATIONS:      CARLOS on CKD Stage 3.  - CARLOS suspect pre-renal in the setting of diuretic and ARB use. CKD maybe from chronic NSAID use. - Urinalysis with electrolytes and UPC. - Renal US. - Serum creatinine is trending down. Continue holding diuretic, clinically seems euvolemic.  - Monitor kidney function with restarting Losartan. - Avoid hypotension and nephrotoxic medications. - Renal function panel daily. COPD Exacerbation.  - On steroid and Doxycycline per primary service. Hypertension.  - Low dose of Losartan started today. - Start Amlodipine 5mg daily. Presumed PE.  - VQ scan with high probability of PE.  - On Apixaban. Thank you for allowing me to participate in the care of this patient. Please do not hesitate to contact me at [phone] if with questions. Rivka Seaman MD  [facility]  11/28/2021

## 2021-11-28 NOTE — PROGRESS NOTES
11/28/21 1027 -Nephrology consult called in and RNs number provided for callback.    -Hailey Ackerman, PCA

## 2021-11-28 NOTE — PROGRESS NOTES
Hospitalist Progress Note      PCP: Charley Aguilar MD    Date of Admission: 11/26/2021    Chief Complaint: SOB    Hospital Course:      64 y. o. female, with a PMH of chronic respiratory failure on home oxygen, 4 lpm O2, COPD, DM2, HTN, who presented to [facility] with SOB.    She complained of cough with increased mucus production.     CT scan of the chest without contrast was done at time of admission on 11/26/2021  It did reveal the previously seen defect right lower lung lobe which appeared somewhat larger has not resolved. Bronchoscopy was recommended to further evaluate to rule out endobronchial lesion.     VQ scan also done on 11/26/2021. Renal functions were abnormal therefore contrast CT was not done. This did reveal high probability for pulmonary embolism and she was subsequently started on anticoagulation with Eliquis. Pulmonary has been consulted and is following    Subjective: She is sitting up in bed, still has shortness of breath but improved from yesterday. Denies any chest pain is eating and drinking well.       Medications:  Reviewed    Infusion Medications    sodium chloride       Scheduled Medications    ARIPiprazole  5 mg Oral Daily    DULoxetine  60 mg Oral Daily    budesonide-formoterol  2 puff Inhalation BID    folic acid  1 mg Oral Daily    oxybutynin  5 mg Oral TID    pantoprazole  40 mg Oral Daily    tiotropium  2 puff Inhalation Daily    sodium chloride flush  5-40 mL IntraVENous 2 times per day    predniSONE  40 mg Oral Daily    doxycycline hyclate  100 mg Oral 2 times per day    apixaban  10 mg Oral BID    albuterol  2.5 mg Nebulization Q4H WA     PRN Meds: perflutren lipid microspheres, sodium chloride flush, sodium chloride, ondansetron **OR** ondansetron, polyethylene glycol, acetaminophen **OR** acetaminophen      Intake/Output Summary (Last 24 hours) at 11/28/2021 1000  Last data filed at 11/28/2021 0648  Gross per 24 hour   Intake --   Output 550 ml   Net -550 ml       Physical Exam Performed:    BP (!) 172/72   Pulse 102   Temp 97.9 °F (36.6 °C) (Oral)   Resp 18   Ht 5' 3\" (1.6 m)   Wt 230 lb (104.3 kg)   SpO2 98%   BMI 40.74 kg/m²     General appearance: She is sitting up in bed in no acute  distress, alert and cooperative. HEENT: Pupils equal, round, and reactive to light. Conjunctivae/corneas clear. Neck: Supple, with full range of motion. No jugular venous distention. Trachea midline. Respiratory: bilateral breath, patient scattered rhonchi are noted in both lung fields, poor air entry, no rales are noted. There is no use of accessory muscle at this time  Cardiovascular: Regular rate and rhythm with normal S1/S2  Abdomen: Soft, non-tender, non-distended with normal bowel sounds. Musculoskeletal: No clubbing, cyanosis, +1 lower extremity edema bilaterally. Neurologic:  Neurovascularly intact without any focal sensory/motor deficits. Cranial nerves: II-XII intact, grossly non-focal.  Psychiatric: Alert and oriented, thought content appropriate, normal insight  Capillary Refill: Brisk,3 seconds, normal       Labs:   Recent Labs     11/26/21  1150 11/27/21  0603 11/28/21  0609   WBC 5.6 5.1 8.1   HGB 10.6* 9.4* 9.1*   HCT 32.3* 28.7* 27.9*    209 213     Recent Labs     11/26/21  1150 11/27/21  0603 11/28/21  0609    136 136   K 4.5 4.5 4.5    99 100   CO2 30 28 29   BUN 19 23* 28*   CREATININE 1.6* 1.6* 1.4*   CALCIUM 10.1 9.8 9.9     Recent Labs     11/26/21  1150   AST 21   ALT 9*   BILITOT 0.4   ALKPHOS 141*     No results for input(s): INR in the last 72 hours.   Recent Labs     11/26/21  1150   TROPONINI <0.01       Urinalysis:      Lab Results   Component Value Date    NITRU POSITIVE 04/06/2021    WBCUA 6-9 04/06/2021    BACTERIA 3+ 04/06/2021    RBCUA 0-2 04/06/2021    BLOODU Negative 04/06/2021    SPECGRAV 1.010 04/06/2021    GLUCOSEU Negative 04/06/2021       Radiology:  NM LUNG SCAN PERFUSION ONLY   Final Result   High probability for pulmonary embolism. Findings were discussed with ANGELES Doe on 11/26/2021 at 8:40 p.m.         CT CHEST WO CONTRAST   Final Result   Previously seen defect in the right lower lobe persist and appears somewhat   larger and has not resolved. As a result, recommend bronchoscopy to rule out   endobronchial lesion. Increased volume loss in the right lower lobe anteriorly with improved   aeration of the right lower lobe posteriorly. Dominant curvilinear opacity in left upper lobe is seen, similar to prior         XR CHEST PORTABLE   Final Result   Airspace opacities in the left lung apex and the right lung base, may be   related to pneumonia. Recommend follow-up to resolution to exclude   underlying lung mass/neoplasm         VL Extremity Venous Bilateral    (Results Pending)           Assessment/Plan:    Active Hospital Problems    Diagnosis     COPD exacerbation ([facility] [address] [address] [J44.1]      COPD acute exacerbation: Presented with increased SOB and mucus production. She is on home oxygen at 4 L/min. received 125 mg solu-medrol in the ED. Continue on oral prednisone 40 mg daily x 5 days  Continue doxycycline.  - mucinex and nebulizers ordered. - follows with Dr. Ronaldo Calix as outpatient. Pulmonary also consulted this admission and their input is noted and greatly appreciated.      Pulmonary embolism: As noted above VQ scan done 11/2621 with high probability for PE. Did not do CT scan with PE protocol as renal functions were abnormal.  Has been started on anticoagulation with Eliquis. Pulmonary consulted. We will check lower extremity Dopplers, check echocardiogram.  When renal functions improve could further evaluate with CT scan with PE protocol.     Chronic hypoxic respiratory failure: Bronchodilators, continue supplemental oxygen.   As noted she is on 4 L home oxygen at baseline.      Right lower lobe defect : Noted on CT scan done 11/26/2021.  - concerning for endobronchial lesion  - pulmonary consulted for further input. May benefit from bronchoscopy but will defer this decision to pulmonary     Depression: Mood currently appears stable. Continue Cymbalta, Abilify.      Hypertension: She has been on Cozaar as an outpatient. Holding due to her renal functions will continue to follow blood pressure.     Acute renal failure : Creatinine at time admission= 1.6. In 5/2020 creatinine= 1.2  She has been on Bumex is currently being held. We will continue to follow renal functions closely. Renal functions have improved from admit  and will ask for nephrology input. DVT Prophylaxis: On Eliquis  Diet: ADULT DIET;  Regular  Code Status: Full Code    PT/OT Eval Status: Ordered    Dispo -2 to 3 days based on her clinical improvement    Sandra Lozada MD

## 2021-11-29 ENCOUNTER — APPOINTMENT (OUTPATIENT)
Dept: VASCULAR LAB | Age: 76
DRG: 175 | End: 2021-11-29
Payer: MEDICARE

## 2021-11-29 LAB
ANION GAP SERPL CALCULATED.3IONS-SCNC: 7 MMOL/L (ref 3–16)
BUN BLDV-MCNC: 28 MG/DL (ref 7–20)
CALCIUM SERPL-MCNC: 9.9 MG/DL (ref 8.3–10.6)
CHLORIDE BLD-SCNC: 102 MMOL/L (ref 99–110)
CO2: 31 MMOL/L (ref 21–32)
CREAT SERPL-MCNC: 1.2 MG/DL (ref 0.6–1.2)
GFR AFRICAN AMERICAN: 52
GFR NON-AFRICAN AMERICAN: 43
GLUCOSE BLD-MCNC: 89 MG/DL (ref 70–99)
LV EF: 55 %
LVEF MODALITY: NORMAL
POTASSIUM SERPL-SCNC: 4.2 MMOL/L (ref 3.5–5.1)
SODIUM BLD-SCNC: 140 MMOL/L (ref 136–145)

## 2021-11-29 PROCEDURE — 6370000000 HC RX 637 (ALT 250 FOR IP): Performed by: INTERNAL MEDICINE

## 2021-11-29 PROCEDURE — 97110 THERAPEUTIC EXERCISES: CPT

## 2021-11-29 PROCEDURE — 93970 EXTREMITY STUDY: CPT

## 2021-11-29 PROCEDURE — 2580000003 HC RX 258: Performed by: NURSE PRACTITIONER

## 2021-11-29 PROCEDURE — 94640 AIRWAY INHALATION TREATMENT: CPT

## 2021-11-29 PROCEDURE — 97530 THERAPEUTIC ACTIVITIES: CPT

## 2021-11-29 PROCEDURE — 6370000000 HC RX 637 (ALT 250 FOR IP): Performed by: NURSE PRACTITIONER

## 2021-11-29 PROCEDURE — 6360000002 HC RX W HCPCS: Performed by: INTERNAL MEDICINE

## 2021-11-29 PROCEDURE — 1200000000 HC SEMI PRIVATE

## 2021-11-29 PROCEDURE — 97116 GAIT TRAINING THERAPY: CPT

## 2021-11-29 PROCEDURE — 93306 TTE W/DOPPLER COMPLETE: CPT

## 2021-11-29 PROCEDURE — 2700000000 HC OXYGEN THERAPY PER DAY

## 2021-11-29 PROCEDURE — 36415 COLL VENOUS BLD VENIPUNCTURE: CPT

## 2021-11-29 PROCEDURE — 94761 N-INVAS EAR/PLS OXIMETRY MLT: CPT

## 2021-11-29 PROCEDURE — 80048 BASIC METABOLIC PNL TOTAL CA: CPT

## 2021-11-29 PROCEDURE — 99232 SBSQ HOSP IP/OBS MODERATE 35: CPT | Performed by: INTERNAL MEDICINE

## 2021-11-29 RX ORDER — BUMETANIDE 1 MG/1
2 TABLET ORAL DAILY
Status: DISCONTINUED | OUTPATIENT
Start: 2021-11-29 | End: 2021-12-07 | Stop reason: HOSPADM

## 2021-11-29 RX ADMIN — ARIPIPRAZOLE 5 MG: 10 TABLET ORAL at 08:03

## 2021-11-29 RX ADMIN — ALBUTEROL SULFATE 2.5 MG: 2.5 SOLUTION RESPIRATORY (INHALATION) at 15:56

## 2021-11-29 RX ADMIN — PANTOPRAZOLE SODIUM 40 MG: 40 TABLET, DELAYED RELEASE ORAL at 08:03

## 2021-11-29 RX ADMIN — ALBUTEROL SULFATE 2.5 MG: 2.5 SOLUTION RESPIRATORY (INHALATION) at 08:32

## 2021-11-29 RX ADMIN — Medication 2 PUFF: at 08:32

## 2021-11-29 RX ADMIN — SODIUM CHLORIDE, PRESERVATIVE FREE 10 ML: 5 INJECTION INTRAVENOUS at 08:04

## 2021-11-29 RX ADMIN — ACETAMINOPHEN 650 MG: 325 TABLET, FILM COATED ORAL at 08:03

## 2021-11-29 RX ADMIN — DOXYCYCLINE HYCLATE 100 MG: 100 TABLET, COATED ORAL at 20:23

## 2021-11-29 RX ADMIN — ALBUTEROL SULFATE 2.5 MG: 2.5 SOLUTION RESPIRATORY (INHALATION) at 11:46

## 2021-11-29 RX ADMIN — SODIUM CHLORIDE, PRESERVATIVE FREE 10 ML: 5 INJECTION INTRAVENOUS at 20:24

## 2021-11-29 RX ADMIN — AMLODIPINE BESYLATE 5 MG: 5 TABLET ORAL at 08:03

## 2021-11-29 RX ADMIN — DULOXETINE HYDROCHLORIDE 60 MG: 60 CAPSULE, DELAYED RELEASE ORAL at 08:03

## 2021-11-29 RX ADMIN — OXYBUTYNIN CHLORIDE 5 MG: 5 TABLET ORAL at 13:49

## 2021-11-29 RX ADMIN — PREDNISONE 40 MG: 20 TABLET ORAL at 08:03

## 2021-11-29 RX ADMIN — FOLIC ACID 1 MG: 1 TABLET ORAL at 08:03

## 2021-11-29 RX ADMIN — APIXABAN 10 MG: 5 TABLET, FILM COATED ORAL at 20:23

## 2021-11-29 RX ADMIN — APIXABAN 10 MG: 5 TABLET, FILM COATED ORAL at 08:03

## 2021-11-29 RX ADMIN — ALBUTEROL SULFATE 2.5 MG: 2.5 SOLUTION RESPIRATORY (INHALATION) at 19:32

## 2021-11-29 RX ADMIN — TIOTROPIUM BROMIDE INHALATION SPRAY 2 PUFF: 3.12 SPRAY, METERED RESPIRATORY (INHALATION) at 08:32

## 2021-11-29 RX ADMIN — BUMETANIDE 2 MG: 1 TABLET ORAL at 13:48

## 2021-11-29 RX ADMIN — OXYBUTYNIN CHLORIDE 5 MG: 5 TABLET ORAL at 20:23

## 2021-11-29 RX ADMIN — OXYBUTYNIN CHLORIDE 5 MG: 5 TABLET ORAL at 08:03

## 2021-11-29 RX ADMIN — ACETAMINOPHEN 650 MG: 325 TABLET, FILM COATED ORAL at 23:05

## 2021-11-29 RX ADMIN — DOXYCYCLINE HYCLATE 100 MG: 100 TABLET, COATED ORAL at 08:03

## 2021-11-29 RX ADMIN — LOSARTAN POTASSIUM 25 MG: 25 TABLET, FILM COATED ORAL at 08:03

## 2021-11-29 RX ADMIN — Medication 2 PUFF: at 19:32

## 2021-11-29 ASSESSMENT — PAIN DESCRIPTION - PROGRESSION
CLINICAL_PROGRESSION: NOT CHANGED

## 2021-11-29 ASSESSMENT — PAIN SCALES - GENERAL
PAINLEVEL_OUTOF10: 3
PAINLEVEL_OUTOF10: 4
PAINLEVEL_OUTOF10: 6
PAINLEVEL_OUTOF10: 5

## 2021-11-29 ASSESSMENT — PAIN DESCRIPTION - PAIN TYPE
TYPE: CHRONIC PAIN
TYPE: CHRONIC PAIN

## 2021-11-29 NOTE — CARE COORDINATION
CASE MANAGEMENT INITIAL ASSESSMENT      Reviewed chart and completed assessment with patient: Pt  Explained Case Management role/services. Health Care Decision Maker :   Primary Decision Maker: Denise Amaya - Child - [phone]          Admit date/status: 11/26/21   Diagnosis: COPD   Is this a Readmission?:  No       Insurance: [facility] Medicare   Precert required for SNF: Y       3 night stay required: N    Living arrangements, Adls, care needs, prior to admission: Pt lives at home with her  and states she is independent of ADL'S     Transportation: Family, no preference     Durable Medical Equipment at home:  Walker_x_Cane__RTS__ BSC__Shower Chair__  02_x ([employer] 4LNC)_ HHN__ CPAP__  BiPap__  Hospital Bed__ W/C___ Other__________    Services in the home and/or outpatient, prior to admission: None     Dialysis Facility (if applicable) N/A   · Name:  · Address:  · Dialysis Schedule:  · Phone:  · Fax:    PT/OT recs: [facility] Exemption Notification (HEN): Needed for SNF     Barriers to discharge: None     Plan/comments: 11/29/21 Pt lives at home with , pt has home o2 with [employer], 4LNC at home, wants time to consider [facility].  Will follow up     [address] on chart for MD signature

## 2021-11-29 NOTE — PROGRESS NOTES
Occupational Therapy  OT follow up attempted, pt off floor for test. Will continue to follow per POC.   PATRICIA Srivastava/JOSH

## 2021-11-29 NOTE — PROGRESS NOTES
Hospitalist Progress Note      PCP: Ivy Romano MD    Date of Admission: 11/26/2021    Chief Complaint: SOB    Subjective: no new c/o. Medications:  Reviewed    Infusion Medications    sodium chloride       Scheduled Medications    losartan  25 mg Oral Daily    amLODIPine  5 mg Oral Daily    ARIPiprazole  5 mg Oral Daily    DULoxetine  60 mg Oral Daily    budesonide-formoterol  2 puff Inhalation BID    folic acid  1 mg Oral Daily    oxybutynin  5 mg Oral TID    pantoprazole  40 mg Oral Daily    tiotropium  2 puff Inhalation Daily    sodium chloride flush  5-40 mL IntraVENous 2 times per day    predniSONE  40 mg Oral Daily    doxycycline hyclate  100 mg Oral 2 times per day    apixaban  10 mg Oral BID    albuterol  2.5 mg Nebulization Q4H WA     PRN Meds: sodium chloride, perflutren lipid microspheres, sodium chloride flush, ondansetron **OR** ondansetron, polyethylene glycol, acetaminophen **OR** acetaminophen      Intake/Output Summary (Last 24 hours) at 11/29/2021 0901  Last data filed at 11/29/2021 0534  Gross per 24 hour   Intake --   Output 1725 ml   Net -1725 ml       Physical Exam Performed:    BP (!) 162/64   Pulse 78   Temp 98.2 °F (36.8 °C) (Oral)   Resp 16   Ht 5' 3\" (1.6 m)   Wt 230 lb (104.3 kg)   SpO2 96%   BMI 40.74 kg/m²     General appearance: No apparent distress, appears stated age and cooperative. HEENT: Pupils equal, round, and reactive to light. Conjunctivae/corneas clear. Neck: Supple, with full range of motion. No jugular venous distention. Trachea midline. Respiratory:  Normal respiratory effort. Clear to auscultation, bilaterally without Rales/Wheezes/Rhonchi. Cardiovascular: Regular rate and rhythm with normal S1/S2 without murmurs, rubs or gallops. Abdomen: Soft, non-tender, non-distended with normal bowel sounds. Musculoskeletal: No clubbing, cyanosis or edema bilaterally. Full range of motion without deformity.   Skin: Skin color, texture, turgor normal.  No rashes or lesions. Neurologic:  Neurovascularly intact without any focal sensory/motor deficits. Cranial nerves: II-XII intact, grossly non-focal.  Psychiatric: Alert and oriented, thought content appropriate, normal insight  Capillary Refill: Brisk,< 3 seconds   Peripheral Pulses: +2 palpable, equal bilaterally       Labs:   Recent Labs     11/26/21  1150 11/27/21  0603 11/28/21  0609   WBC 5.6 5.1 8.1   HGB 10.6* 9.4* 9.1*   HCT 32.3* 28.7* 27.9*    209 213     Recent Labs     11/27/21  0603 11/28/21  0609 11/29/21  0707    136 140   K 4.5 4.5 4.2   CL 99 100 102   CO2 28 29 31   BUN 23* 28* 28*   CREATININE 1.6* 1.4* 1.2   CALCIUM 9.8 9.9 9.9     Recent Labs     11/26/21  1150   AST 21   ALT 9*   BILITOT 0.4   ALKPHOS 141*     No results for input(s): INR in the last 72 hours. Recent Labs     11/26/21  1150   TROPONINI <0.01       Urinalysis:      Lab Results   Component Value Date    NITRU POSITIVE 04/06/2021    WBCUA 6-9 04/06/2021    BACTERIA 3+ 04/06/2021    RBCUA 0-2 04/06/2021    BLOODU Negative 04/06/2021    SPECGRAV 1.010 04/06/2021    GLUCOSEU Negative 04/06/2021       Consults:    IP CONSULT TO PULMONOLOGY  IP CONSULT TO NEPHROLOGY      Assessment/Plan:    Active Hospital Problems    Diagnosis     PARK (obstructive sleep apnea) [G47.33]     COPD (chronic obstructive pulmonary disease) ([facility] [address] [J44.9]     Hypertension [I10]     Morbid obesity ([facility] [address] [E66.01]          COPD acute exacerbation: Presented with increased SOB and mucus production. She is on home oxygen at 4 L/min.   received 125 mg solu-medrol in the ED. Continue on oral prednisone 40 mg daily x 5 days  Continue doxycycline.  - mucinex and nebulizers ordered. - follows with Dr. Idalia Patel as outpatient. Pulmonary consulted and greatly appreciated.      Pulmonary embolism: As noted above VQ scan done 11/2621 with high probability for PE.   Did not do CT scan with PE protocol as renal functions were abnormal.  Has

## 2021-11-29 NOTE — PROGRESS NOTES
Physical Therapy  Facility/Department: [facility] C5 - MED SURG/ORTHO  Daily Treatment Note  NAME: Vangie Nelosn  : 1945  MRN: [id number]    Date of Service: 2021    Discharge Recommendations:  24 hour supervision or assist, Home with Home health PT   PT Equipment Recommendations  Equipment Needed: No    Assessment   Body structures, Functions, Activity limitations: Decreased functional mobility ; Decreased balance; Decreased strength; Decreased endurance  Assessment: Pt fuctioning below baseline due to deconditioning. Pt fatigued quickly with short ambulation distances in room. No LOB with use of rollator. O2 remained stable when on 4L. Pt positioned in chair and educated on LE exercises to improve endurance. REcommend home with 24 hr A and HHPT  Treatment Diagnosis: decreased mobility  Prognosis: Good  Decision Making: Medium Complexity  Patient Education: Pt on transfer safety and pt expressed understanding  Barriers to Learning: none  REQUIRES PT FOLLOW UP: Yes  Activity Tolerance  Activity Tolerance: Patient limited by endurance; Patient Tolerated treatment well  Activity Tolerance: At approach, spo2 was 3L- pt desat to 77% after 1st walk and required PLB for 1 min to recover to > 91%. RN adjusted spo2 to 4L - pt sat >93 after 2nd walk. Resting HR 78  /64     Patient Diagnosis(es): The encounter diagnosis was Acute on chronic respiratory failure with hypoxia ([address] has a past medical history of Arthritis, Asthma, Bronchitis chronic, Chronic respiratory failure ([address] Depression, Diabetes mellitus ([address] Emphysema of lung ([address] Glaucoma, Hypertension, Morbid obesity ([address] Oxygen dependent, Rash, Shingles, and Sleep apnea. has a past surgical history that includes hernia repair; Cholecystectomy; Hysterectomy; Colon surgery; Lap Band (10/5/10); Cataract removal with implant (3/9/2011);  Cataract removal with implant (3/23/2011); and Colonoscopy (11/18/13). Restrictions  Restrictions/Precautions  Restrictions/Precautions: Fall Risk  Position Activity Restriction  Other position/activity restrictions: Up with assist, 4L O2  Subjective   General  Chart Reviewed: Yes  Response To Previous Treatment: Patient with no complaints from previous session. Family / Caregiver Present: No  Referring Practitioner: JUANY Mccartney CNP  Subjective  Subjective: Pt resting in bed. Denies pain  General Comment  Comments: cleared by nursing. Pt spo2 was on 3L at approach  Pain Screening  Patient Currently in Pain: Denies  Vital Signs  Patient Currently in Pain: Denies       Orientation  Orientation  Overall Orientation Status: Within Normal Limits  Cognition      Objective   Bed mobility  Supine to Sit: Supervision  Transfers  Sit to Stand: Stand by assistance  Stand to sit: Stand by assistance  Ambulation  Ambulation?: Yes  More Ambulation?: Yes  Ambulation 1  Surface: level tile  Device: Rollator  Other Apparatus: O2 (3L)  Assistance: Stand by assistance  Quality of Gait: steady  Gait Deviations: Slow Chinyere; Decreased step length  Distance: 50'  Comments: Pt's 4ww adjusted down in height as appropriate for her 5'3\" height. Pt required cues to lock breaks for sit<>stand transfer and to avoid ditching the 4ww prior to turning to sit down. Pt O2 sat was 77% after walk  and improved to 93% w/ PLB. RN adjusted spo2 to 4L.   Ambulation 2  Surface - 2: level tile  Device 2: Rollator  Other Apparatus 2: O2 (4 L (increased by RN))  Assistance 2: Stand by assistance  Quality of Gait 2: steady  Gait Deviations: Slow Chinyere; Decreased step length  Distance: 50'     Balance  Posture: Fair  Sitting - Static: Good  Sitting - Dynamic: Good  Standing - Static: Poor  Standing - Dynamic: Fair; +  Exercises  Hip Flexion: 15 B  Hip Abduction: 15 B  Knee Long Arc Quad: 15 B  Ankle Pumps: 15 B          AM-PAC Score  AM-PAC Inpatient Mobility Raw Score : 18 (11/29/21 1043)  AM-PAC

## 2021-11-29 NOTE — PROGRESS NOTES
[facility] Progress Note           Subjective/   64y.o. year old female who we are seeing in consultation for CARLOS. HPI:  Renal function better, diuretics on hold, urine output of 1,725 ml over last 24 hours. Intake adequate. ROS:  Shortness of breath stable, on 3 L NC, states uses 4 L NC at home. No fevers. Objective/   GEN:  Chronically ill, BP (!) 162/64   Pulse 78   Temp 98.2 °F (36.8 °C) (Oral)   Resp 16   Ht 5' 3\" (1.6 m)   Wt 230 lb (104.3 kg)   SpO2 96%   BMI 40.74 kg/m²   HEENT: non-icteric, no JVD  CV: S1, S2 without m/r/g; + LE edema  RESP: CTA B without w/r/r; breathing wnl  ABD: +bs, soft, nt, no hsm  SKIN: warm, no rashes    Data/  Recent Labs     11/26/21  1150 11/27/21  0603 11/28/21  0609   WBC 5.6 5.1 8.1   HGB 10.6* 9.4* 9.1*   HCT 32.3* 28.7* 27.9*   MCV 93.5 93.4 93.7    209 213     Recent Labs     11/27/21  0603 11/28/21  0609 11/29/21  0707    136 140   K 4.5 4.5 4.2   CL 99 100 102   CO2 28 29 31   GLUCOSE 146* 122* 89   BUN 23* 28* 28*   CREATININE 1.6* 1.4* 1.2   LABGLOM 31* 36* 43*   GFRAA 38* 44* 52*       Assessment/Plan     CARLOS on CKD Stage 3.  - CARLOS suspect pre-renal in the setting of diuretic and ARB use. CKD maybe from chronic NSAID use. - Urinalysis with electrolytes and UPC. - Renal US. - Serum creatinine is trending down. - Resume home dose of bumex 2 mg a day today. - Monitor kidney function with restarting Losartan. - Avoid hypotension and nephrotoxic medications. - Renal function panel daily.     COPD Exacerbation.  - On steroid and Doxycycline per primary service.     Hypertension.  - Low dose of Losartan resumed. - Started Amlodipine 5mg daily.     Presumed PE.  - VQ scan with high probability of PE.  - On Apixaban.    ____________________________________  Bisi Caicedo MD  [facility]  [email or web address]  Office: [phone]

## 2021-11-29 NOTE — PLAN OF CARE
Problem: Falls - Risk of:  Goal: Will remain free from falls  Description: Will remain free from falls  11/28/2021 2144 by Carmenza Sanford RN  Outcome: Ongoing  11/28/2021 1134 by Mirela Benton RN  Outcome: Ongoing  Note: Bed in lowest position, wheels locked, 2/4 side rails up, nonskid footwear on. Bed/ chair check alarm in place, call light within reach. Pt instructed to call out when needing assistance. Pt stated understanding. Nurse will continue to monitor.    Goal: Absence of physical injury  Description: Absence of physical injury  Outcome: Ongoing     Problem: Pain:  Goal: Pain level will decrease  Description: Pain level will decrease  Outcome: Ongoing  Goal: Control of acute pain  Description: Control of acute pain  Outcome: Ongoing  Goal: Control of chronic pain  Description: Control of chronic pain  Outcome: Ongoing     Problem: Skin Integrity:  Goal: Will show no infection signs and symptoms  Description: Will show no infection signs and symptoms  Outcome: Ongoing  Goal: Absence of new skin breakdown  Description: Absence of new skin breakdown  Outcome: Ongoing

## 2021-11-30 PROBLEM — R91.8 PULMONARY INFILTRATE: Status: ACTIVE | Noted: 2021-11-30

## 2021-11-30 PROBLEM — R09.89 CHEST CONGESTION: Status: ACTIVE | Noted: 2021-11-30

## 2021-11-30 PROBLEM — R06.89 INEFFECTIVE AIRWAY CLEARANCE: Status: ACTIVE | Noted: 2021-11-30

## 2021-11-30 PROBLEM — J98.11 ATELECTASIS: Status: ACTIVE | Noted: 2021-11-30

## 2021-11-30 LAB
ALBUMIN SERPL-MCNC: 3.4 G/DL (ref 3.4–5)
ANION GAP SERPL CALCULATED.3IONS-SCNC: 7 MMOL/L (ref 3–16)
BUN BLDV-MCNC: 30 MG/DL (ref 7–20)
CALCIUM SERPL-MCNC: 10 MG/DL (ref 8.3–10.6)
CHLORIDE BLD-SCNC: 100 MMOL/L (ref 99–110)
CO2: 33 MMOL/L (ref 21–32)
CREAT SERPL-MCNC: 1.3 MG/DL (ref 0.6–1.2)
GFR AFRICAN AMERICAN: 48
GFR NON-AFRICAN AMERICAN: 40
GLUCOSE BLD-MCNC: 91 MG/DL (ref 70–99)
HCT VFR BLD CALC: 28.2 % (ref 36–48)
HEMOGLOBIN: 9.3 G/DL (ref 12–16)
MCH RBC QN AUTO: 30.4 PG (ref 26–34)
MCHC RBC AUTO-ENTMCNC: 33 G/DL (ref 31–36)
MCV RBC AUTO: 92 FL (ref 80–100)
PDW BLD-RTO: 16 % (ref 12.4–15.4)
PHOSPHORUS: 3.6 MG/DL (ref 2.5–4.9)
PLATELET # BLD: 240 K/UL (ref 135–450)
PMV BLD AUTO: 8 FL (ref 5–10.5)
POTASSIUM SERPL-SCNC: 4.2 MMOL/L (ref 3.5–5.1)
RBC # BLD: 3.06 M/UL (ref 4–5.2)
SODIUM BLD-SCNC: 140 MMOL/L (ref 136–145)
WBC # BLD: 6.9 K/UL (ref 4–11)

## 2021-11-30 PROCEDURE — 87077 CULTURE AEROBIC IDENTIFY: CPT

## 2021-11-30 PROCEDURE — 87206 SMEAR FLUORESCENT/ACID STAI: CPT

## 2021-11-30 PROCEDURE — 6360000002 HC RX W HCPCS: Performed by: INTERNAL MEDICINE

## 2021-11-30 PROCEDURE — 7100000000 HC PACU RECOVERY - FIRST 15 MIN: Performed by: INTERNAL MEDICINE

## 2021-11-30 PROCEDURE — 99152 MOD SED SAME PHYS/QHP 5/>YRS: CPT | Performed by: INTERNAL MEDICINE

## 2021-11-30 PROCEDURE — 0B9G8ZX DRAINAGE OF LEFT UPPER LUNG LOBE, VIA NATURAL OR ARTIFICIAL OPENING ENDOSCOPIC, DIAGNOSTIC: ICD-10-PCS | Performed by: INTERNAL MEDICINE

## 2021-11-30 PROCEDURE — 87186 SC STD MICRODIL/AGAR DIL: CPT

## 2021-11-30 PROCEDURE — 31624 DX BRONCHOSCOPE/LAVAGE: CPT | Performed by: INTERNAL MEDICINE

## 2021-11-30 PROCEDURE — 0BC68ZZ EXTIRPATION OF MATTER FROM RIGHT LOWER LOBE BRONCHUS, VIA NATURAL OR ARTIFICIAL OPENING ENDOSCOPIC: ICD-10-PCS | Performed by: INTERNAL MEDICINE

## 2021-11-30 PROCEDURE — 2580000003 HC RX 258: Performed by: NURSE PRACTITIONER

## 2021-11-30 PROCEDURE — 94761 N-INVAS EAR/PLS OXIMETRY MLT: CPT

## 2021-11-30 PROCEDURE — 85027 COMPLETE CBC AUTOMATED: CPT

## 2021-11-30 PROCEDURE — 99232 SBSQ HOSP IP/OBS MODERATE 35: CPT | Performed by: INTERNAL MEDICINE

## 2021-11-30 PROCEDURE — 6370000000 HC RX 637 (ALT 250 FOR IP): Performed by: INTERNAL MEDICINE

## 2021-11-30 PROCEDURE — 87070 CULTURE OTHR SPECIMN AEROBIC: CPT

## 2021-11-30 PROCEDURE — 3609010800 HC BRONCHOSCOPY ALVEOLAR LAVAGE: Performed by: INTERNAL MEDICINE

## 2021-11-30 PROCEDURE — 87102 FUNGUS ISOLATION CULTURE: CPT

## 2021-11-30 PROCEDURE — 2500000003 HC RX 250 WO HCPCS: Performed by: INTERNAL MEDICINE

## 2021-11-30 PROCEDURE — 97116 GAIT TRAINING THERAPY: CPT

## 2021-11-30 PROCEDURE — 2580000003 HC RX 258: Performed by: INTERNAL MEDICINE

## 2021-11-30 PROCEDURE — 94640 AIRWAY INHALATION TREATMENT: CPT

## 2021-11-30 PROCEDURE — 99153 MOD SED SAME PHYS/QHP EA: CPT | Performed by: INTERNAL MEDICINE

## 2021-11-30 PROCEDURE — 87106 FUNGI IDENTIFICATION YEAST: CPT

## 2021-11-30 PROCEDURE — 2700000000 HC OXYGEN THERAPY PER DAY

## 2021-11-30 PROCEDURE — 7100000001 HC PACU RECOVERY - ADDTL 15 MIN: Performed by: INTERNAL MEDICINE

## 2021-11-30 PROCEDURE — 97110 THERAPEUTIC EXERCISES: CPT

## 2021-11-30 PROCEDURE — 6370000000 HC RX 637 (ALT 250 FOR IP): Performed by: NURSE PRACTITIONER

## 2021-11-30 PROCEDURE — 87015 SPECIMEN INFECT AGNT CONCNTJ: CPT

## 2021-11-30 PROCEDURE — 87116 MYCOBACTERIA CULTURE: CPT

## 2021-11-30 PROCEDURE — 7100000011 HC PHASE II RECOVERY - ADDTL 15 MIN: Performed by: INTERNAL MEDICINE

## 2021-11-30 PROCEDURE — 97530 THERAPEUTIC ACTIVITIES: CPT

## 2021-11-30 PROCEDURE — 80069 RENAL FUNCTION PANEL: CPT

## 2021-11-30 PROCEDURE — 2709999900 HC NON-CHARGEABLE SUPPLY: Performed by: INTERNAL MEDICINE

## 2021-11-30 PROCEDURE — 88305 TISSUE EXAM BY PATHOLOGIST: CPT

## 2021-11-30 PROCEDURE — 7100000010 HC PHASE II RECOVERY - FIRST 15 MIN: Performed by: INTERNAL MEDICINE

## 2021-11-30 PROCEDURE — 36415 COLL VENOUS BLD VENIPUNCTURE: CPT

## 2021-11-30 PROCEDURE — 88112 CYTOPATH CELL ENHANCE TECH: CPT

## 2021-11-30 PROCEDURE — 31645 BRNCHSC W/THER ASPIR 1ST: CPT | Performed by: INTERNAL MEDICINE

## 2021-11-30 PROCEDURE — 3609010900 HC BRONCHOSCOPY THERAPUTIC ASPIRATION INITIAL: Performed by: INTERNAL MEDICINE

## 2021-11-30 PROCEDURE — 87205 SMEAR GRAM STAIN: CPT

## 2021-11-30 PROCEDURE — 1200000000 HC SEMI PRIVATE

## 2021-11-30 RX ORDER — ALBUTEROL SULFATE 2.5 MG/3ML
2.5 SOLUTION RESPIRATORY (INHALATION) EVERY 4 HOURS PRN
Status: DISCONTINUED | OUTPATIENT
Start: 2021-11-30 | End: 2021-12-07 | Stop reason: HOSPADM

## 2021-11-30 RX ORDER — FENTANYL CITRATE 50 UG/ML
INJECTION, SOLUTION INTRAMUSCULAR; INTRAVENOUS PRN
Status: DISCONTINUED | OUTPATIENT
Start: 2021-11-30 | End: 2021-11-30 | Stop reason: ALTCHOICE

## 2021-11-30 RX ORDER — MAGNESIUM HYDROXIDE 1200 MG/15ML
LIQUID ORAL CONTINUOUS PRN
Status: COMPLETED | OUTPATIENT
Start: 2021-11-30 | End: 2021-11-30

## 2021-11-30 RX ORDER — MIDAZOLAM HYDROCHLORIDE 5 MG/ML
INJECTION INTRAMUSCULAR; INTRAVENOUS PRN
Status: DISCONTINUED | OUTPATIENT
Start: 2021-11-30 | End: 2021-11-30 | Stop reason: ALTCHOICE

## 2021-11-30 RX ORDER — ACETYLCYSTEINE 200 MG/ML
SOLUTION ORAL; RESPIRATORY (INHALATION) PRN
Status: DISCONTINUED | OUTPATIENT
Start: 2021-11-30 | End: 2021-11-30 | Stop reason: ALTCHOICE

## 2021-11-30 RX ORDER — CIPROFLOXACIN HYDROCHLORIDE 3.5 MG/ML
1 SOLUTION/ DROPS TOPICAL
Status: DISCONTINUED | OUTPATIENT
Start: 2021-11-30 | End: 2021-11-30

## 2021-11-30 RX ORDER — 0.9 % SODIUM CHLORIDE 0.9 %
500 INTRAVENOUS SOLUTION INTRAVENOUS ONCE
Status: COMPLETED | OUTPATIENT
Start: 2021-11-30 | End: 2021-11-30

## 2021-11-30 RX ORDER — CIPROFLOXACIN HYDROCHLORIDE 3.5 MG/ML
1 SOLUTION/ DROPS TOPICAL
Status: DISCONTINUED | OUTPATIENT
Start: 2021-11-30 | End: 2021-12-07 | Stop reason: HOSPADM

## 2021-11-30 RX ORDER — LIDOCAINE HYDROCHLORIDE 20 MG/ML
INJECTION, SOLUTION EPIDURAL; INFILTRATION; INTRACAUDAL; PERINEURAL PRN
Status: DISCONTINUED | OUTPATIENT
Start: 2021-11-30 | End: 2021-11-30 | Stop reason: ALTCHOICE

## 2021-11-30 RX ADMIN — SODIUM CHLORIDE 500 ML: 9 INJECTION, SOLUTION INTRAVENOUS at 20:26

## 2021-11-30 RX ADMIN — SODIUM CHLORIDE, PRESERVATIVE FREE 10 ML: 5 INJECTION INTRAVENOUS at 20:22

## 2021-11-30 RX ADMIN — SODIUM CHLORIDE 25 ML: 9 INJECTION, SOLUTION INTRAVENOUS at 09:34

## 2021-11-30 RX ADMIN — HYPROMELLOSE 2906 (4000 MPA.S) AND HYPROMELLOSE 2906 (50 MPA.S) 1 DROP: 25; 25 SOLUTION OPHTHALMIC at 15:24

## 2021-11-30 RX ADMIN — CIPROFLOXACIN 1 DROP: 3 SOLUTION OPHTHALMIC at 22:10

## 2021-11-30 RX ADMIN — ACETAMINOPHEN 650 MG: 325 TABLET, FILM COATED ORAL at 20:21

## 2021-11-30 RX ADMIN — SODIUM CHLORIDE, PRESERVATIVE FREE 10 ML: 5 INJECTION INTRAVENOUS at 09:00

## 2021-11-30 RX ADMIN — APIXABAN 10 MG: 5 TABLET, FILM COATED ORAL at 20:21

## 2021-11-30 RX ADMIN — Medication 2 PUFF: at 07:43

## 2021-11-30 RX ADMIN — ALBUTEROL SULFATE 2.5 MG: 2.5 SOLUTION RESPIRATORY (INHALATION) at 11:05

## 2021-11-30 RX ADMIN — CIPROFLOXACIN 1 DROP: 3 SOLUTION OPHTHALMIC at 16:32

## 2021-11-30 RX ADMIN — HYPROMELLOSE 2906 (4000 MPA.S) AND HYPROMELLOSE 2906 (50 MPA.S) 1 DROP: 25; 25 SOLUTION OPHTHALMIC at 23:19

## 2021-11-30 RX ADMIN — TIOTROPIUM BROMIDE INHALATION SPRAY 2 PUFF: 3.12 SPRAY, METERED RESPIRATORY (INHALATION) at 07:43

## 2021-11-30 RX ADMIN — Medication 2 PUFF: at 19:18

## 2021-11-30 RX ADMIN — OXYBUTYNIN CHLORIDE 5 MG: 5 TABLET ORAL at 20:21

## 2021-11-30 RX ADMIN — CIPROFLOXACIN 1 DROP: 3 SOLUTION OPHTHALMIC at 17:52

## 2021-11-30 ASSESSMENT — PAIN SCALES - GENERAL
PAINLEVEL_OUTOF10: 0
PAINLEVEL_OUTOF10: 9
PAINLEVEL_OUTOF10: 5

## 2021-11-30 ASSESSMENT — PAIN DESCRIPTION - LOCATION: LOCATION: LEG

## 2021-11-30 ASSESSMENT — PAIN DESCRIPTION - ORIENTATION: ORIENTATION: LEFT

## 2021-11-30 NOTE — PROGRESS NOTES
INPATIENT PULMONARY CRITICAL CARE PROGRESS NOTE      Reason for visit    RLL defect     SUBJECTIVE: Patient when seen this morning continues to have cough with chest congestion and patient states that the phlegm is not coming out, patient continues to have some shortness of breath and wheezing, patient does not have any chest pain, patient does not have any significant hemoptysis, patient does have some leg edema, patient was afebrile and hemodynamically maintained, patient was on 3 L of nasal oxygen with saturation 97% when seen, patient's blood pressure was on the higher side, patient urine output as documented is in the lower side, patient blood sugars are acceptable, no other pertinent review of system of concern             Physical Exam:  Blood pressure (!) 167/69, pulse 100, temperature 98.5 °F (36.9 °C), temperature source Oral, resp. rate 18, height 5' 3\" (1.6 m), weight 230 lb (104.3 kg), SpO2 97 %.'   Constitutional:  No acute distress. HENT:  Oropharynx is clear and moist. No thyromegaly. Eyes:  Conjunctivae are normal. Pupils equal, round, and reactive to light. No scleral icterus. Neck: . No tracheal deviation present. No obvious thyroid mass. Short large plaque  Cardiovascular: Normal rate, regular rhythm, normal heart sounds. No right ventricular heave. No lower extremity edema. Pulmonary/Chest: No wheezes. Basilar right more than left rales. Chest wall is not dull to percussion. No accessory muscle usage or stridor. Decreased breath sound density  Abdominal: Soft. Bowel sounds present. No distension or hernia. No tenderness. Musculoskeletal: No cyanosis. No clubbing. No obvious joint deformity. Lymphadenopathy: No cervical or supraclavicular adenopathy. Skin: Skin is warm and dry. No rash or nodules on the exposed extremities. Psychiatric: Normal mood and affect. Behavior is normal.  No anxiety. Neurologic: Alert, awake and oriented. PERRL.   Speech fluent        Results:  CBC: Recent Labs     11/27/21  0603 11/28/21  0609   WBC 5.1 8.1   HGB 9.4* 9.1*   HCT 28.7* 27.9*   MCV 93.4 93.7    213     BMP:   Recent Labs     11/27/21  0603 11/28/21  0609 11/29/21  0707    136 140   K 4.5 4.5 4.2   CL 99 100 102   CO2 28 29 31   BUN 23* 28* 28*   CREATININE 1.6* 1.4* 1.2       Imaging:  I have reviewed radiology images personally. VL Extremity Venous Bilateral         US RETROPERITONEAL COMPLETE   Final Result   Symmetric bilateral renal atrophy. No obstruction. Unremarkable urinary bladder ultrasound. NM LUNG SCAN PERFUSION ONLY   Final Result   High probability for pulmonary embolism. Findings were discussed with ANGELES Lynn on 11/26/2021 at 8:40 p.m.         CT CHEST WO CONTRAST   Final Result   Previously seen defect in the right lower lobe persist and appears somewhat   larger and has not resolved. As a result, recommend bronchoscopy to rule out   endobronchial lesion. Increased volume loss in the right lower lobe anteriorly with improved   aeration of the right lower lobe posteriorly. Dominant curvilinear opacity in left upper lobe is seen, similar to prior         XR CHEST PORTABLE   Final Result   Airspace opacities in the left lung apex and the right lung base, may be   related to pneumonia.   Recommend follow-up to resolution to exclude   underlying lung mass/neoplasm           Echo Complete    Result Date: 11/29/2021  Transthoracic Echocardiography Report (TTE)  Demographics   Patient Name       Ana Maria Fuchs   Date of Study      11/29/2021        Gender              Female   Patient Number     [id number]        Date of Birth       1945   Visit Number       [id number]         Age                 64 year(s)   Accession Number   [id number]        Room Number         0283   Corporate ID       [id number]            Sonographer         Noel Hand, [facility]   Ordering Physician Owen Winston MD Interpreting        [address] [place] [address]. Physician           Christi Kuo MD, [facility], RPVI  Procedure Type of Study   TTE procedure:ECHOCARDIOGRAM COMPLETE 2D W DOPPLER W COLOR. Procedure Date Date: 11/29/2021 Start: 09:09 AM Study Location: [address] [place] [address] - Echo Lab Technical Quality: Adequate visualization Indications:Dyspnea/SOB. Patient Status: Routine Height: 63 inches Weight: 230.01 pounds BSA: 2.05 m2 BMI: 40.74 kg/m2 BP: 140/70 mmHg  Conclusions   Summary  Normal left ventricle systolic function with an estimated ejection fraction  of 55%. No regional wall motion abnormalities are seen. Grade I diastolic dysfunction with normal filing pressure. Mild posterior mitral annular calcification is present. Mild mitral regurgitation. Trace tricuspid regurgitation. Systolic pulmonary artery pressure (SPAP) is normal and estimated at 30 mmHg  (right atrial pressure 3 mmHg). Signature   ------------------------------------------------------------------  Electronically signed by Christi Kuo MD, Ulysses Hopkins  (Interpreting physician) on 11/29/2021 at 10:58 AM  ------------------------------------------------------------------   Findings   Left Ventricle  Normal left ventricle size, wall thickness and systolic function with an  estimated ejection fraction of 55%. No regional wall motion abnormalities are seen. Grade I diastolic dysfunction with normal filing pressure. Mitral Valve  Mild posterior mitral annular calcification is present. Mild mitral regurgitation. Left Atrium  The left atrium is normal in size. Aortic Valve  The aortic valve is normal in structure and function. There is no evidence of aortic regurgitation. Aorta  The aortic root is normal in size. Right Ventricle  The right ventricle is normal in size and function. TAPSE is measured at 22 mm. S'' prime velocity is measured at 19.3 cm/s.    Tricuspid Valve  The tricuspid valve is normal in structure and function. Trace tricuspid regurgitation. Systolic pulmonary artery pressure (SPAP) is normal and estimated at 30mmHg  (right atrial pressure 3 mmHg). Right Atrium  The right atrium is normal in size. Right atrial area is 15 cm2. Pulmonic Valve  The pulmonic valve is not well visualized. No evidence of pulmonic regurgitation. Pericardial Effusion  No pericardial effusion noted. Pleural Effusion  No pleural effusion. Miscellaneous  No obvious masses, thrombi or vegetations are noted. IVC is normal in size (< 2.1 cm) and collapses > 50% with respiration  consistent with normal right atrial pressure (3 mmHg). M-Mode/2D Measurements (cm)   LV Diastolic Dimension: 2.59 cm LV Systolic Dimension: 3.55 cm  LV Septum Diastolic: 7.56 cm  LV PW Diastolic: 1.51 cm        AO Root Dimension: 3 cm                                  AV Cusp Separation: 1.7 cm                                  LA Dimension: 3.8 cm                                  LA Area: 15.4 cm2                                  LA volume/Index: 39 ml /19 ml/m2  Doppler Measurements   AV Peak Velocity: 166 cm/s     MV Peak E-Wave: 91.3 cm/s  AV Peak Gradient: 11.02 mmHg   MV Peak A-Wave: 125 cm/s  LVOT Peak Velocity: 105 cm/s   MV E/A Ratio: 0.73   TR Velocity:246 cm/s  TR Gradient:24.21 mmHg  Estimated RAP:3 mmHg  Estimated RVSP: 30 mmHg  E' Septal Velocity: 9.26 cm/s  E' Lateral Velocity: 10.8 cm/s  E/E' ratio: 9.2   Aortic Valve   Peak Velocity: 166 cm/s  Peak Gradient: 11.02 mmHg   Cusp Separation: 1.7 cm  Aorta   Aortic Root: 3 cm      Results for Alistair Seay (MRN [id number]) as of 11/30/2021 00:34   Ref.  Range 4/6/2021 01:20 11/26/2021 11:50 11/27/2021 06:03 11/28/2021 06:09 11/29/2021 07:07   Sodium Latest Ref Range: 136 - 145 mmol/L 128 (L) 139 136 136 140   Potassium Latest Ref Range: 3.5 - 5.1 mmol/L 4.1 4.5 4.5 4.5 4.2   Chloride Latest Ref Range: 99 - 110 mmol/L 92 (L) 101 99 100 102   CO2 Latest Ref Range: 21 - 32 mmol/L 32 30 28 29 31   BUN Latest Ref Range: 7 - 20 mg/dL 28 (H) 19 23 (H) 28 (H) 28 (H)   Creatinine Latest Ref Range: 0.6 - 1.2 mg/dL 1.7 (H) 1.6 (H) 1.6 (H) 1.4 (H) 1.2   Anion Gap Latest Ref Range: 3 - 16  4 8 9 7 7   GFR Non- Latest Ref Range: >60  29 (A) 31 (A) 31 (A) 36 (A) 43 (A)   GFR  Latest Ref Range: >60  35 (A) 38 (A) 38 (A) 44 (A) 52 (A)   Glucose Latest Ref Range: 70 - 99 mg/dL 107 (H) 122 (H) 146 (H) 122 (H) 89   Calcium Latest Ref Range: 8.3 - 10.6 mg/dL 9.4 10.1 9.8 9.9 9.9   Total Protein Latest Ref Range: 6.4 - 8.2 g/dL  7.2      Pro-BNP Latest Ref Range: 0 - 449 pg/mL 865 (H) 494 (H)      Troponin Latest Ref Range: <0.01 ng/mL <0.01 <0.01      Albumin Latest Ref Range: 3.4 - 5.0 g/dL  3.9      Albumin/Globulin Ratio Latest Ref Range: 1.1 - 2.2   1.2      Alk Phos Latest Ref Range: 40 - 129 U/L  141 (H)      ALT Latest Ref Range: 10 - 40 U/L  9 (L)      AST Latest Ref Range: 15 - 37 U/L  21      Bilirubin Latest Ref Range: 0.0 - 1.0 mg/dL  0.4          Assessment:  Principal Problem:    COPD (chronic obstructive pulmonary disease) ([facility])  Active Problems:    Hypertension    Morbid obesity with BMI of 40.0-44.9, adult ([facility])    PARK (obstructive sleep apnea)    Chronic respiratory failure with hypoxia, on home O2 therapy ([facility])    Pulmonary infiltrate    Atelectasis    Chest congestion    Ineffective airway clearance  Resolved Problems:    * No resolved hospital problems. *          Plan:   · Oxygen supplementation to keep saturation between 90 and 94% only  · Please titrate the oxygen as per the above parameters  · Home CPAP/BiPAP on intermittent basis  · Patient has been started on doxycycline which can be continued for now and reassess as per clinical status and cultures  · Bronchodilators  · P.o.  prednisone today can be continued  · Patient continues to have chest congestion along with abnormal imaging-we will consider bronchoscopy in the morning-patient was told about the procedure and pros and cons and patient was agreeable-we will keep patient n.p.o. after midnight  · Cardiac medications as per IM  · Monitor input output and BMP   · Correct electrolytes on whenever necessary basis  · Fluid management/diuretics as per nephrology  · Patient is on Eliquis  · Monitor for any bleeding  · Monitor for any anticholinergic side effects with combination of Spiriva and Ditropan  · PUD prophylaxis    Case discussed with patient and nursing    Further management depending on patient clinical status and the follow-up on above recommendation along with the bronchoscopy findings               Electronically signed by:  Elmo Robledo MD    11/30/2021    12:36 AM.

## 2021-11-30 NOTE — FLOWSHEET NOTE
Pt returned to room C523 s/p bronchoscopy. VS as listed. Pt resting in bed comfortably at this time. Will continue to monitor.

## 2021-11-30 NOTE — PROGRESS NOTES
Hospitalist Progress Note      PCP: Karine Castaneda MD    Date of Admission: 11/26/2021    Chief Complaint: SOB    Subjective: no new c/o. Medications:  Reviewed    Infusion Medications    sodium chloride       Scheduled Medications    bumetanide  2 mg Oral Daily    losartan  25 mg Oral Daily    amLODIPine  5 mg Oral Daily    ARIPiprazole  5 mg Oral Daily    DULoxetine  60 mg Oral Daily    budesonide-formoterol  2 puff Inhalation BID    folic acid  1 mg Oral Daily    oxybutynin  5 mg Oral TID    pantoprazole  40 mg Oral Daily    tiotropium  2 puff Inhalation Daily    sodium chloride flush  5-40 mL IntraVENous 2 times per day    predniSONE  40 mg Oral Daily    doxycycline hyclate  100 mg Oral 2 times per day    apixaban  10 mg Oral BID     PRN Meds: albuterol, sodium chloride, perflutren lipid microspheres, sodium chloride flush, ondansetron **OR** ondansetron, polyethylene glycol, acetaminophen **OR** acetaminophen      Intake/Output Summary (Last 24 hours) at 11/30/2021 0917  Last data filed at 11/30/2021 0608  Gross per 24 hour   Intake --   Output 1850 ml   Net -1850 ml       Physical Exam Performed:    BP (!) 140/73   Pulse 91   Temp 98.1 °F (36.7 °C)   Resp 18   Ht 5' 3\" (1.6 m)   Wt 230 lb (104.3 kg)   SpO2 94%   BMI 40.74 kg/m²     General appearance: No apparent distress, appears stated age and cooperative. HEENT: Pupils equal, round, and reactive to light. Conjunctivae/corneas clear. Neck: Supple, with full range of motion. No jugular venous distention. Trachea midline. Respiratory:  Normal respiratory effort. Clear to auscultation, bilaterally without Rales/Wheezes/Rhonchi. Cardiovascular: Regular rate and rhythm with normal S1/S2 without murmurs, rubs or gallops. Abdomen: Soft, non-tender, non-distended with normal bowel sounds. Musculoskeletal: No clubbing, cyanosis or edema bilaterally. Full range of motion without deformity.   Skin: Skin color, texture, turgor Doxycycline.  - mucinex and nebulizers ordered. - follows with Dr. Lissa Holt as outpatient. Pulmonary consulted and greatly appreciated.      Pulmonary embolism: As noted above VQ scan done 11/2621 with high probability for PE. Did not do CT scan with PE protocol as renal functions were abnormal.  Has been started on anticoagulation with Eliquis. Pulmonary consulted. We will check lower extremity Dopplers, check echocardiogram.  When renal functions improve could further evaluate with CT scan with PE protocol.     Chronic hypoxic respiratory failure: Bronchodilators, continue supplemental oxygen.  As noted she is on 4 L home oxygen at baseline.      Right lower lobe defect : Noted on CT scan done 11/26/2021.  - concerning for endobronchial lesion  - pulmonary consulted for further input. Bronchoscopy tentatively planned 30 Nov.      Depression - controlled on home Cymbalta/Abilify - continued.      HTN - w/out known CAD and no evidence of active signs/sxs of ischemia/failure. Currently controlled on home meds w/ vitals reviewed and documented as above. Cozaar held due to soft BP on admission. ARF - w/ elevated BUN/Cr ratio c/w pre-renal azotemia. Given IVF hydration and follow serial labs. Reviewed and documented as above. She has been on Bumex - held.  Nephrology consulted and appreciated.      Anemia - etiology clinically unable to determine, w/out evidence of active bleeding/hemolysis. Asymptomatic w/out indication for transfusion. Follow serial labs. Reviewed and documented as above. Morbid Obesity -  With Body mass index is 40.74 kg/m². Complicating assessment and treatment. Placing patient at risk for multiple co-morbidities as well as early death and contributing to the patient's presentation. Counseled on weight loss.          DVT Prophylaxis: Eliquis     Recent Labs     11/28/21  0609 11/30/21  0546    240     Diet: Diet NPO  Code Status: Full Code      PT/OT Eval Status: seen w/ recs for

## 2021-11-30 NOTE — PROGRESS NOTES
Physical Therapy  Facility/Department: [facility] C5 - MED SURG/ORTHO  Daily Treatment Note  NAME: Woodford Holter  : 1945  MRN: [id number]    Date of Service: 2021    Discharge Recommendations:  24 hour supervision or assist, Home with Home health PT   PT Equipment Recommendations  Equipment Needed: No    Assessment   Body structures, Functions, Activity limitations: Decreased functional mobility ; Decreased balance; Decreased strength; Decreased endurance  Assessment: Pt fuctioning below baseline due to deconditioning. Pt fatigued quickly with short ambulation distances in room. No LOB with use of rollator. O2 remained stable when on 4L. Pt positioned in chair and educated on LE exercises to improve endurance. REcommend home with 24 hr A and HHPT  Treatment Diagnosis: decreased mobility  Prognosis: Good  Decision Making: Medium Complexity  Patient Education: Pt on transfer safety and pt expressed understanding  Barriers to Learning: none  REQUIRES PT FOLLOW UP: Yes  Activity Tolerance  Activity Tolerance: Patient Tolerated treatment well  Activity Tolerance: 175/84    O2 94%  4 L spo2     Patient Diagnosis(es): The encounter diagnosis was Acute on chronic respiratory failure with hypoxia ([address] has a past medical history of Arthritis, Asthma, Bronchitis chronic, Chronic respiratory failure ([address] Depression, Diabetes mellitus ([address] Emphysema of lung ([address] Glaucoma, Hypertension, Morbid obesity ([address] Oxygen dependent, Rash, Shingles, and Sleep apnea. has a past surgical history that includes hernia repair; Cholecystectomy; Hysterectomy; Colon surgery; Lap Band (10/5/10); Cataract removal with implant (3/9/2011); Cataract removal with implant (3/23/2011); and Colonoscopy (13). Restrictions  Restrictions/Precautions  Restrictions/Precautions: Fall Risk  Position Activity Restriction  Other position/activity restrictions:  Up with assist, 4L O2  Subjective   General  Chart Reviewed: Yes  Response To Previous Treatment: Patient with no complaints from previous session. Family / Caregiver Present: No  Referring Practitioner: JUANY Vera CNP  Subjective  Subjective: Pt resting in bed. Denies pain  General Comment  Comments: cleared by nursing. Pain Screening  Patient Currently in Pain: Denies  Pain Assessment  Pain Level: 9  Pain Location: Leg  Pain Orientation: Left  Vital Signs  Patient Currently in Pain: Denies       Orientation  Orientation  Overall Orientation Status: Within Functional Limits  Cognition      Objective   Bed mobility  Supine to Sit: Minimal assistance  Transfers  Sit to Stand: Stand by assistance  Stand to sit: Stand by assistance  Ambulation  Ambulation?: Yes  More Ambulation?: Yes  Ambulation 1  Surface: level tile  Device: Rollator  Other Apparatus: O2 (4L)  Assistance: Stand by assistance  Quality of Gait: steady  Gait Deviations: Slow Chiny[place]; Decreased step length  Distance: 50' x 2  Comments: Pt required cues to lock breaks for sit<>stand transfer and to avoid ditching the 4ww prior to turning to sit down. Pt O2 sat was 77% after walk  and improved to 93% w/ PLB. RN adjusted spo2 to 4L.      Balance  Posture: Fair  Sitting - Static: Good  Sitting - Dynamic: Good  Standing - Static: Good; -  Standing - Dynamic: Fair; +  Exercises  Heelslides: 15 B  Hip Abduction: 15 B  Knee Long Arc Quad: 15 B  Ankle Pumps: 20 B         Comment: Pt education on safe STS (locking 4ww brakes, hand placement), pt verbalized understanding         AM-PAC Score  AM-PAC Inpatient Mobility Raw Score : 18 (11/29/21 1043)  AM-PAC Inpatient T-Scale Score : 43.63 (11/29/21 1043)  Mobility Inpatient CMS 0-100% Score: 46.58 (11/29/21 1043)  Mobility Inpatient CMS G-Code Modifier : CK (11/29/21 1043)          Goals  Short term goals  Time Frame for Short term goals: 12/2  Short term goal 1: Pt will complete B LE exercises to improve endurance and strength for mobility by 11/30  -11/30 on-going  Short term goal 2: Pt will ambulate x 61' with rollator with supervision   -11/30 sba 4ww 48'  Patient Goals   Patient goals : to get stronger, to walk more    Plan    Plan  Times per week: 3-5x/week  Current Treatment Recommendations: Strengthening, Balance Training, Functional Mobility Training, Transfer Training, Gait Training, Endurance Training, Positioning, Equipment Evaluation, Education, & procurement, Patient/Caregiver Education & Training, Safety Education & Training, Home Exercise Program  Safety Devices  Type of devices:  All fall risk precautions in place, Call light within reach, Chair alarm in place, Gait belt, Patient at risk for falls, Nurse notified, Left in chair  Restraints  Initially in place: No     Therapy Time   Individual Concurrent Group Co-treatment   Time In 0800         Time Out 0840         Minutes 40         Timed Code Treatment Minutes: Su Salazar

## 2021-11-30 NOTE — PROGRESS NOTES
Received cross cover page requesting bedside presence to assess patient following injury to eye during ADLs. Entered room and found patient covering right eye with cold wash cloth. Per pt, she had been repositioning during ADLs and inadvertently injured her eye with one of her fingernails. Wash cloth removed and eye inspected. Sclera red/bloody just inferomedial to cornea. There does not appear to be any corneal involvement from the laceration. Pupils both 2 mm, equally round and reactive to light. She denies any visual disturbances at this time. EOM intact without nystagmus. No notable proptosis. This does appear to be a simple scleral laceration without any subsequent complications, however consult to ophthalmology placed to determine any additional interventions that may be needed. Patient was informed of the presentation and the plan to contact ophthalmology. She is without any questions or needs at this time. Cold wash cloth returned to eye.      Gladys Chinchilla, APRN - CNP

## 2021-11-30 NOTE — PROGRESS NOTES
Updated report called to [facility]. Patient received albuteral tx and sats fluctuate 89-96%. Patient more awake now. Remains NPO until 1220h. No family in [place] on check.

## 2021-11-30 NOTE — PROGRESS NOTES
Speech Language Pathology    SLP acknowledged order for BSE, reviewed pt's chart, spoke with RN. Pt sleeping upon SLP entry, briefly woke to SLP's voice and sternal rub. Pt does not demonstrate adequate ANDREI for PO trials at this time. ST to continue to follow and re-attempt eval tomorrow as pt is able and appropriate.     Deb Morrell M.S. [address] [facility]  Speech-language pathologist  [id number]  Speech Desk Phone: [phone]

## 2021-11-30 NOTE — PROGRESS NOTES
Occupational Therapy  OT follow up attempted, pt fatigued and unable to stay alert for OT treatment at this time. Will continue to follow per POC.   PATRICIA Resendiz/JOSH

## 2021-12-01 ENCOUNTER — APPOINTMENT (OUTPATIENT)
Dept: GENERAL RADIOLOGY | Age: 76
DRG: 175 | End: 2021-12-01
Payer: MEDICARE

## 2021-12-01 PROBLEM — J15.1 PNEUMONIA OF LEFT UPPER LOBE DUE TO PSEUDOMONAS SPECIES (HCC): Status: ACTIVE | Noted: 2021-12-01

## 2021-12-01 LAB
ALBUMIN SERPL-MCNC: 3 G/DL (ref 3.4–5)
ANION GAP SERPL CALCULATED.3IONS-SCNC: 7 MMOL/L (ref 3–16)
BUN BLDV-MCNC: 31 MG/DL (ref 7–20)
CALCIUM SERPL-MCNC: 9.4 MG/DL (ref 8.3–10.6)
CHLORIDE BLD-SCNC: 103 MMOL/L (ref 99–110)
CO2: 32 MMOL/L (ref 21–32)
CREAT SERPL-MCNC: 1.2 MG/DL (ref 0.6–1.2)
GFR AFRICAN AMERICAN: 52
GFR NON-AFRICAN AMERICAN: 43
GLUCOSE BLD-MCNC: 91 MG/DL (ref 70–99)
HCT VFR BLD CALC: 27 % (ref 36–48)
HEMOGLOBIN: 8.7 G/DL (ref 12–16)
MCH RBC QN AUTO: 30.4 PG (ref 26–34)
MCHC RBC AUTO-ENTMCNC: 32.3 G/DL (ref 31–36)
MCV RBC AUTO: 94.3 FL (ref 80–100)
PDW BLD-RTO: 16 % (ref 12.4–15.4)
PHOSPHORUS: 3.6 MG/DL (ref 2.5–4.9)
PLATELET # BLD: 224 K/UL (ref 135–450)
PMV BLD AUTO: 7.9 FL (ref 5–10.5)
POTASSIUM SERPL-SCNC: 4.3 MMOL/L (ref 3.5–5.1)
RBC # BLD: 2.86 M/UL (ref 4–5.2)
SODIUM BLD-SCNC: 142 MMOL/L (ref 136–145)
TROPONIN: <0.01 NG/ML
WBC # BLD: 11 K/UL (ref 4–11)

## 2021-12-01 PROCEDURE — 97530 THERAPEUTIC ACTIVITIES: CPT

## 2021-12-01 PROCEDURE — 6370000000 HC RX 637 (ALT 250 FOR IP): Performed by: NURSE PRACTITIONER

## 2021-12-01 PROCEDURE — 80069 RENAL FUNCTION PANEL: CPT

## 2021-12-01 PROCEDURE — 93005 ELECTROCARDIOGRAM TRACING: CPT | Performed by: NURSE PRACTITIONER

## 2021-12-01 PROCEDURE — 74230 X-RAY XM SWLNG FUNCJ C+: CPT

## 2021-12-01 PROCEDURE — 2700000000 HC OXYGEN THERAPY PER DAY

## 2021-12-01 PROCEDURE — 97110 THERAPEUTIC EXERCISES: CPT

## 2021-12-01 PROCEDURE — 6370000000 HC RX 637 (ALT 250 FOR IP): Performed by: INTERNAL MEDICINE

## 2021-12-01 PROCEDURE — 92611 MOTION FLUOROSCOPY/SWALLOW: CPT

## 2021-12-01 PROCEDURE — 94761 N-INVAS EAR/PLS OXIMETRY MLT: CPT

## 2021-12-01 PROCEDURE — 92526 ORAL FUNCTION THERAPY: CPT

## 2021-12-01 PROCEDURE — 36415 COLL VENOUS BLD VENIPUNCTURE: CPT

## 2021-12-01 PROCEDURE — 85027 COMPLETE CBC AUTOMATED: CPT

## 2021-12-01 PROCEDURE — 84484 ASSAY OF TROPONIN QUANT: CPT

## 2021-12-01 PROCEDURE — 6360000002 HC RX W HCPCS: Performed by: INTERNAL MEDICINE

## 2021-12-01 PROCEDURE — 2580000003 HC RX 258: Performed by: NURSE PRACTITIONER

## 2021-12-01 PROCEDURE — 97535 SELF CARE MNGMENT TRAINING: CPT

## 2021-12-01 PROCEDURE — 6360000002 HC RX W HCPCS: Performed by: NURSE PRACTITIONER

## 2021-12-01 PROCEDURE — 2580000003 HC RX 258: Performed by: INTERNAL MEDICINE

## 2021-12-01 PROCEDURE — 1200000000 HC SEMI PRIVATE

## 2021-12-01 PROCEDURE — 92610 EVALUATE SWALLOWING FUNCTION: CPT

## 2021-12-01 PROCEDURE — 99232 SBSQ HOSP IP/OBS MODERATE 35: CPT | Performed by: INTERNAL MEDICINE

## 2021-12-01 RX ORDER — NITROGLYCERIN 0.4 MG/1
0.4 TABLET SUBLINGUAL EVERY 5 MIN PRN
Status: DISCONTINUED | OUTPATIENT
Start: 2021-12-01 | End: 2021-12-07 | Stop reason: HOSPADM

## 2021-12-01 RX ADMIN — ACETAMINOPHEN 650 MG: 325 TABLET, FILM COATED ORAL at 13:40

## 2021-12-01 RX ADMIN — FOLIC ACID 1 MG: 1 TABLET ORAL at 08:34

## 2021-12-01 RX ADMIN — ONDANSETRON 4 MG: 2 INJECTION INTRAMUSCULAR; INTRAVENOUS at 18:54

## 2021-12-01 RX ADMIN — OXYBUTYNIN CHLORIDE 5 MG: 5 TABLET ORAL at 08:35

## 2021-12-01 RX ADMIN — APIXABAN 10 MG: 5 TABLET, FILM COATED ORAL at 22:26

## 2021-12-01 RX ADMIN — CIPROFLOXACIN 1 DROP: 3 SOLUTION OPHTHALMIC at 18:02

## 2021-12-01 RX ADMIN — HYPROMELLOSE 2906 (4000 MPA.S) AND HYPROMELLOSE 2906 (50 MPA.S) 1 DROP: 25; 25 SOLUTION OPHTHALMIC at 22:26

## 2021-12-01 RX ADMIN — AMLODIPINE BESYLATE 5 MG: 5 TABLET ORAL at 08:31

## 2021-12-01 RX ADMIN — SODIUM CHLORIDE, PRESERVATIVE FREE 10 ML: 5 INJECTION INTRAVENOUS at 08:35

## 2021-12-01 RX ADMIN — DULOXETINE HYDROCHLORIDE 60 MG: 60 CAPSULE, DELAYED RELEASE ORAL at 08:34

## 2021-12-01 RX ADMIN — APIXABAN 10 MG: 5 TABLET, FILM COATED ORAL at 08:35

## 2021-12-01 RX ADMIN — CIPROFLOXACIN 1 DROP: 3 SOLUTION OPHTHALMIC at 10:42

## 2021-12-01 RX ADMIN — CIPROFLOXACIN 1 DROP: 3 SOLUTION OPHTHALMIC at 23:36

## 2021-12-01 RX ADMIN — LOSARTAN POTASSIUM 25 MG: 25 TABLET, FILM COATED ORAL at 08:34

## 2021-12-01 RX ADMIN — PANTOPRAZOLE SODIUM 40 MG: 40 TABLET, DELAYED RELEASE ORAL at 08:34

## 2021-12-01 RX ADMIN — ARIPIPRAZOLE 5 MG: 10 TABLET ORAL at 08:34

## 2021-12-01 RX ADMIN — OXYBUTYNIN CHLORIDE 5 MG: 5 TABLET ORAL at 22:20

## 2021-12-01 RX ADMIN — CIPROFLOXACIN 1 DROP: 3 SOLUTION OPHTHALMIC at 07:00

## 2021-12-01 RX ADMIN — BUMETANIDE 2 MG: 1 TABLET ORAL at 08:35

## 2021-12-01 RX ADMIN — OXYBUTYNIN CHLORIDE 5 MG: 5 TABLET ORAL at 13:40

## 2021-12-01 RX ADMIN — CIPROFLOXACIN 1 DROP: 3 SOLUTION OPHTHALMIC at 13:41

## 2021-12-01 RX ADMIN — PIPERACILLIN AND TAZOBACTAM 3375 MG: 3; .375 INJECTION, POWDER, FOR SOLUTION INTRAVENOUS at 18:18

## 2021-12-01 ASSESSMENT — PAIN DESCRIPTION - PROGRESSION
CLINICAL_PROGRESSION: NOT CHANGED

## 2021-12-01 ASSESSMENT — PAIN DESCRIPTION - LOCATION
LOCATION: LEG
LOCATION: LEG

## 2021-12-01 ASSESSMENT — PAIN DESCRIPTION - ONSET
ONSET: GRADUAL
ONSET: GRADUAL

## 2021-12-01 ASSESSMENT — PAIN DESCRIPTION - FREQUENCY
FREQUENCY: CONTINUOUS
FREQUENCY: CONTINUOUS

## 2021-12-01 ASSESSMENT — PAIN SCALES - GENERAL
PAINLEVEL_OUTOF10: 8
PAINLEVEL_OUTOF10: 6
PAINLEVEL_OUTOF10: 0
PAINLEVEL_OUTOF10: 8

## 2021-12-01 ASSESSMENT — PAIN DESCRIPTION - ORIENTATION
ORIENTATION: LEFT
ORIENTATION: LEFT

## 2021-12-01 ASSESSMENT — PAIN DESCRIPTION - PAIN TYPE
TYPE: CHRONIC PAIN
TYPE: CHRONIC PAIN

## 2021-12-01 NOTE — PROGRESS NOTES
[facility] Progress Note           Subjective/   64y.o. year old female who we are seeing in consultation for CARLOS. HPI:  Renal function better, diuretics resumed. Intake adequate. ROS:  Shortness of breath stable, on 3 L NC, states uses 4 L NC at home. +fevers. Objective/   GEN:  Chronically ill, BP (!) 161/60   Pulse 88   Temp 98.1 °F (36.7 °C) (Oral)   Resp 20   Ht 5' 3\" (1.6 m)   Wt 230 lb (104.3 kg)   SpO2 96%   BMI 40.74 kg/m²   HEENT: non-icteric, no JVD  CV: S1, S2 without m/r/g; + LE edema  RESP: CTA B without w/r/r; breathing wnl  ABD: +bs, soft, nt, no hsm  SKIN: warm, no rashes    Data/  Recent Labs     11/30/21  0546 12/01/21  0532   WBC 6.9 11.0   HGB 9.3* 8.7*   HCT 28.2* 27.0*   MCV 92.0 94.3    224     Recent Labs     11/29/21  0707 11/30/21  0546 12/01/21  0532    140 142   K 4.2 4.2 4.3    100 103   CO2 31 33* 32   GLUCOSE 89 91 91   PHOS  --  3.6 3.6   BUN 28* 30* 31*   CREATININE 1.2 1.3* 1.2   LABGLOM 43* 40* 43*   GFRAA 52* 48* 52*     Renal US ->  Kidneys:       The right kidney measures 8.9 cm in length with cortical thickness of 8 mm   and the left kidney 8.7 cm in length with cortical thickness of 11 mm.  Renal   parenchymal echogenicity is normal and there is no hydronephrosis.  No cystic   or solid renal mass.           Bladder:       Partial distention of the urinary bladder with a volume of 87 mL.  No   significant wall thickening.  Bilateral ureteral jets are documented.  The   patient was unable to void. Assessment/Plan     CARLOS on CKD Stage 3.  - CARLOS suspect pre-renal in the setting of diuretic and ARB use. CKD maybe from chronic NSAID use. - Urinalysis with electrolytes and UPC. - Renal US. - Serum creatinine is trending down. - Resumed home dose of bumex 2 mg a day. - Monitor kidney function with restarting Losartan.   - Trend labs.     COPD Exacerbation.  - Plans per Admitting.     Hypertension.  - Low dose of Losartan resumed. - Started Amlodipine 5mg daily.     Presumed PE.  - VQ scan with high probability of PE.  - On Apixaban.    ____________________________________  Josué Lee MD  [facility]  [email or web address]  Office: [phone]

## 2021-12-01 NOTE — CARE COORDINATION
Chart review- From home with . 4L O2 basline; Considering HHC. Bronch revealed aspiration. SLP recommending MBS. Will follow for possible home care.

## 2021-12-01 NOTE — PROGRESS NOTES
Physical Therapy    Pt follow up attempted, currently off floor for MBS, will con't to follow.   [employer], PTA #[id number]

## 2021-12-01 NOTE — PROGRESS NOTES
INPATIENT PULMONARY CRITICAL CARE PROGRESS NOTE      Reason for visit    RLL defect     SUBJECTIVE: Patient underwent bronchoscopy and thick mucous plugs were lavaged out on the left side along with that patient also had a food particles in the right lower lobe which was dislodged which was reportedly aspirated out, patient has less chest congestion, no increasing shortness of breath,, patient was afebrile and hemodynamically maintained, patient was on 4L of nasal oxygen with saturation 96% when seen patient was evaluated in speech therapist and modified barium swallow being contemplated and seen, , patient urine output has not been documented in the epic for review patient blood sugars are acceptable, no other pertinent review of system of concern             Physical Exam:  Blood pressure 127/73, pulse 84, temperature 97.3 °F (36.3 °C), temperature source Oral, resp. rate 18, height 5' 3\" (1.6 m), weight 230 lb (104.3 kg), SpO2 93 %.'   Constitutional:  No acute distress. HENT:  Oropharynx is clear and moist. No thyromegaly. Eyes: Right subconjunctival hemorrhage. Pupils equal, round, and reactive to light. No scleral icterus. Neck: . No tracheal deviation present. No obvious thyroid mass. Short large plaque  Cardiovascular: Normal rate, regular rhythm, normal heart sounds. No right ventricular heave. No lower extremity edema. Pulmonary/Chest: No wheezes. Decreased basilar crackles. Chest wall is not dull to percussion. No accessory muscle usage or stridor. Decreased breath sound density  Abdominal: Soft. Bowel sounds present. No distension or hernia. No tenderness. Musculoskeletal: No cyanosis. No clubbing. No obvious joint deformity. Lymphadenopathy: No cervical or supraclavicular adenopathy. Skin: Skin is warm and dry. No rash or nodules on the exposed extremities. Psychiatric: Normal mood and affect. Behavior is normal.  No anxiety. Neurologic: Alert, awake and oriented. PERRL.   Speech fluent        Results:  CBC:   Recent Labs     11/30/21  0546 12/01/21  0532   WBC 6.9 11.0   HGB 9.3* 8.7*   HCT 28.2* 27.0*   MCV 92.0 94.3    224     BMP:   Recent Labs     11/29/21  0707 11/30/21  0546 12/01/21  0532    140 142   K 4.2 4.2 4.3    100 103   CO2 31 33* 32   PHOS  --  3.6 3.6   BUN 28* 30* 31*   CREATININE 1.2 1.3* 1.2       Imaging:  I have reviewed radiology images personally. FL MODIFIED BARIUM SWALLOW W VIDEO   Final Result   No keira aspiration. Minimal intermittent laryngeal penetration with thin   liquid. Please see separate speech pathology report for full discussion of findings   and recommendations. VL Extremity Venous Bilateral   Final Result      US RETROPERITONEAL COMPLETE   Final Result   Symmetric bilateral renal atrophy. No obstruction. Unremarkable urinary bladder ultrasound. NM LUNG SCAN PERFUSION ONLY   Final Result   High probability for pulmonary embolism. Findings were discussed with ANGELES Sommer on 11/26/2021 at 8:40 p.m.         CT CHEST WO CONTRAST   Final Result   Previously seen defect in the right lower lobe persist and appears somewhat   larger and has not resolved. As a result, recommend bronchoscopy to rule out   endobronchial lesion. Increased volume loss in the right lower lobe anteriorly with improved   aeration of the right lower lobe posteriorly. Dominant curvilinear opacity in left upper lobe is seen, similar to prior         XR CHEST PORTABLE   Final Result   Airspace opacities in the left lung apex and the right lung base, may be   related to pneumonia. Recommend follow-up to resolution to exclude   underlying lung mass/neoplasm             Echo- Summary   Normal left ventricle systolic function with an estimated ejection fraction   of 55%. No regional wall motion abnormalities are seen. Grade I diastolic dysfunction with normal filing pressure.    Mild posterior mitral annular calcification is present. Mild mitral regurgitation. Trace tricuspid regurgitation. Systolic pulmonary artery pressure (SPAP) is normal and estimated at 30 mmHg   (right atrial pressure 3 mmHg). CULTURE, RESPIRATORY 10,000 to 50,000 CFU/mL Normal respiratory arcelia Abnormal  P  [address] [facility]   Gram Stain Result 3+ WBC's (Polymorphonuclear)  present   2+ Gram positive cocci  in chains and pairs- resembling Strep   1+ Gram positive cocci  in clusters  [facility]   Organism Enterobacter cloacae complex Abnormal  P  [facility]   CULTURE, RESPIRATORY 10,000 to 50,000 CFU/mL   Sensitivity to follow  P    [address] [facility]   Organism Pseudomonas aeruginosa Abnormal  P  [address] [facility]   CULTURE, RESPIRATORY 10,000 to 50,000 CFU/mL   Sensitivity to follow              Assessment:  Principal Problem:    COPD (chronic obstructive pulmonary disease) ([facility])  Active Problems:    Hypertension    Morbid obesity with BMI of 40.0-44.9, adult ([facility])    PARK (obstructive sleep apnea)    Chronic respiratory failure with hypoxia, on home O2 therapy ([facility])    Pulmonary infiltrate    Atelectasis    Chest congestion    Ineffective airway clearance  Resolved Problems:    * No resolved hospital problems. *          Plan:   · Oxygen supplementation to keep saturation between 90 and 94% only  · Please titrate the oxygen as per the above parameters  · Home CPAP/BiPAP on intermittent basis  · Patient's preliminary data from bronchoscopy shows patient to have Pseudomonas and Enterobacteriaceae pneumonia  · Patient was empirically started on Zosyn and final titration as per final culture reports  · Bronchodilators  · P.o.  prednisone today can be continued  · Status post bronchoscopy with aspiration of food particles  · Cardiac medications as per IM  · Monitor input output and BMP   · Correct electrolytes on whenever necessary basis  · Fluid management/diuretics as per nephrology  · Patient is on

## 2021-12-01 NOTE — PROGRESS NOTES
failure on 4 lpm O2, COPD, DM2, HTN, who presented to [facility] with SOB. The patient states she became acutely short of breath last evening. She complains of cough with increased mucus production. She denies fever, chills, headache, dizziness, chest pain, abdominal pain, n/v/d, edema. No recent travel. No history of blood clots\". Pain:  Pain Assessment  Pain Assessment: 0-10  Pain Level: 5  Patient's Stated Pain Goal: No pain  Pain Type: Chronic pain  Pain Location: Leg  Pain Orientation: Left  Pain Frequency: Continuous  Pain Onset: Gradual  Clinical Progression: Not changed  Response to Pain Intervention: Patient Satisfied  POSS Score (Patient Ctrl Analgesia): 2    Reason for Referral  Brett Mancuso was referred for a bedside swallow evaluation to assess the efficiency of her swallow function, identify signs and symptoms of aspiration and make recommendations regarding safe dietary consistencies, effective compensatory strategies, and safe eating environment. Impression  Dysphagia Diagnosis: Mild oral stage dysphagia; Mild pharyngeal stage dysphagia  Dysphagia Outcome Severity Scale: Level 6: Within functional limits/Modified independence   Pt seen upright in bed, alert and agreeable to evaluation, RN OK'd SLP entry and evaluation. Pt denied dysphagia at baseline. SLP and pt discussed importance of adherence to strict aspiration precautions, emphasized slow rate with PO intake particularly in light of pt's respiratory status, breath/swallow coordination with PO intake. Medical record review/interview:   Predisposing dysphagia risk factors:  Other chronic respiratory illness and Chronic Respiratory Failure  Clinical signs of possible chronic dysphagia: N/A  Precipitating dysphagia risk factors: N/A    Vitals/labs:   Temp: n/a  SpO2: 96%  RR: 18/min throughout  BP: 161/60  HR: 88  WBCs: 11.0     Laryngeal function exam:   Secretions: n/a  Vocal quality: perceptually weak, hoarse  MPT: DNT  S/Z ratio: DNT   Pitch range: reduced  Cough: perceptually strong    Oral Care Status: adequate    PO trials:   IDDSI 0 (thin) via cup and straw: grossly timely swallow initiation throughout. x1 immediate strong cough post-swallow when TL used as liquid wash with regular solid trial.  Suspect d/t possible pharyngeal residue from regular solid. No further clinical s/s of aspiration noted with remainder of TL trials. Laryngeal elevation appeared reduced upon palpation of the anterior neck, however, difficult to fully assess d/t pt's body habitus. IDDSI 4 (puree): no anterior bolus loss , suspect functional A-P bolus transit, swallow timing subjectively appears timely, grossly functional mastication, oral clearance grossly WFL and no clinical s/s of aspiration  IDDSI 7 (regular): no anterior bolus loss , suspect functional A-P bolus transit, swallow timing subjectively appears timely, grossly functional mastication, oral clearance grossly WFL and no clinical s/s of aspiration. *Pt did endorse sore on lower aspect of oral cavity, choosing softer foods at baseline. 3 oz water: PASS    Clinical signs of oropharyngeal dysphagia likely chronic related to respiratory illness, acute infection, impaired respiratory-swallow coordination and the aging swallow. Swallow prognosis is good. Instrumental swallow study is indicated. Given tolerance to PO at bedside, stable respiratory status, good oral care status and willingness to participate in therapy, pt is safe for oral diet at this time      Treatment Plan  Requires SLP Intervention: Yes  Duration/Frequency of Treatment: 2-4x/week for LOS  D/C Recommendations: To be determined       Recommended Diet and Intervention  Diet Solids Recommendation: Regular (Pt encouraged to choose softer foods)  Liquid Consistency Recommendation: Thin  Recommended Form of Meds: PO  Recommendations: Modified barium swallow study;  Dysphagia treatment  Therapeutic Interventions: Diet tolerance monitoring; Patient/Family education; Oral care    Compensatory Swallowing Strategies  Compensatory Swallowing Strategies: Eat/Feed slowly; Alternate solids and liquids; Upright as possible for all oral intake; Remain upright for 30-45 minutes after meals; Small bites/sips    Treatment/Goals  Short-term Goals  Timeframe for Short-term Goals: 3 days (12/4/21)  Long-term Goals  Timeframe for Long-term Goals: 5 days (12/6/21)  Goal 1: The pt will tolerate safest and least restrictive diet without clinical s/s of aspiration or change in respiratory status. Dysphagia Goals: The patient will tolerate recommended diet without observed clinical signs of aspiration; The patient/caregiver will demonstrate understanding of compensatory strategies for improved swallowing safety.; The patient will tolerate instrumental swallowing procedure    General  Chart Reviewed: Yes  Comments: Pt admitted d/t COPD exacerbation, acute on chronic respiratory failure. Pt has PMHx of asthma and emphysema per chart. Pt had bronchoscopy completed yesterday with reported food particles noted. Behavior/Cognition: Alert; Pleasant mood; Cooperative  Respiratory Status: O2 via nasual cannula  O2 Device: Nasal cannula  Liters of Oxygen: 4 L  Communication Observation: Functional  Follows Directions: Simple  Dentition: Dentures bottom; Dentures top  Patient Positioning: Upright in bed  Baseline Vocal Quality: Weak; Hoarse (Baseline per pt)  Volitional Cough: Strong  Prior Dysphagia History: No xh of dysphagia per chart review. Consistencies Administered: Dysphagia Pureed (Dysphagia I); Reg solid; Thin - cup; Thin - straw    Vision/Hearing  Vision  Vision: Impaired  Vision Exceptions: Wears glasses for reading  Hearing  Hearing: Within functional limits    Oral Motor Deficits  Oral/Motor  Oral Motor:  Within functional limits    Prognosis  Prognosis  Prognosis for safe diet advancement: good  Barriers to reach goals: age; other (comment)  Barriers/Prognosis Comment: respiratory illnesses  Individuals consulted  Consulted and agree with results and recommendations: Patient; RN    Education  Patient Education:Pt educated on reason for referral, role of ST, assessment results and recommendations. Patient Education Response: Verbalizes understanding; Needs reinforcement  Safety Devices in place: Yes  Type of devices: Bed alarm in place; Call light within reach; Nurse notified;  Left in bed       Therapy Time  SLP Individual Minutes  Time In: 9116  Time Out: 4131  Minutes: 24; dysphagia eval and tx    Narendra Seaman M.S. Nohelia Martinez  Speech-language pathologist  [id number]  Speech Desk Phone: [phone]

## 2021-12-01 NOTE — PROCEDURES
Bronchoscopy note    Patient with pulmonary states/shortness of breath/chest congestion/mucous plugging along with ineffective clearance and given the patient's clinical status and radiology along with data available it was decided to do a bronchoscopy for diagnostic and therapeutic purposes and for that reason after informed consent, the patient was taken to the endoscopy suite, patient was given oropharyngeal analgesia with benzocaine after timeout, patient was given tracheobronchial anesthesia with lidocaine, patient was given conscious sedation with 3 mg of Versed and 50 mg of fentanyl for the procedure and the details of the sedation are as following-    Physician/patient of face-to-face sedation start time was 10:07 AM  Physician/patient face-to-face sedation stop time was 10:29 AM  Total moderate sedation time in minutes was 22 minutes  The patient was monitored continuously  throughout the entire procedure while the sedation was being administered    The bronchoscope was used with the most recent a bite block, patient was found to have normal vocal cords without any paralysis, patient was found to have extensive thick mucous plugs plugging the left upper lobe and to some extent left lower lobe bronchus which were quite tenacious and viscous, along with that patient was found to have a food particle lodged in the basilar segment of the right lower lobe which appeared to be like a puffed corn; the bronchoscope was wedged into the left upper lobe bronchus and BAL was done from the area which was sent for various culture and cytology, using saline/bronchial forceps the food particle was gradually suctioned out, patient had some increased erythema of the tracheobronchial tree, patient did not have any endobronchial lesion per se, patient had improvement in the patency of the airways after the food particles were removed  Patient tolerated the procedure well and did not have any apparent immediate complications  Estimated blood loss was 0  Further management depending on patient clinical status and the bronchoscopy results    Esau Trinidad MD

## 2021-12-01 NOTE — PROGRESS NOTES
INPATIENT PULMONARY CRITICAL CARE PROGRESS NOTE      Reason for visit    RLL defect     SUBJECTIVE: Patient when seen this morning continues to have cough with chest congestion and patient states that the phlegm is not coming out, patient continues to have some shortness of breath and wheezing, patient does not have any chest pain, , patient does have some leg edema, patient was afebrile and hemodynamically maintained, patient was on 4L of nasal oxygen with saturation 99% when seen, patient's blood pressure was on the higher side, patient urine output as documented was adequate, patient blood sugars are acceptable, no other pertinent review of system of concern             Physical Exam:  Blood pressure 119/78, pulse 85, temperature 98.1 °F (36.7 °C), temperature source Axillary, resp. rate 18, height 5' 3\" (1.6 m), weight 230 lb (104.3 kg), SpO2 99 %.'   Constitutional:  No acute distress. HENT:  Oropharynx is clear and moist. No thyromegaly. Eyes:  Conjunctivae are normal. Pupils equal, round, and reactive to light. No scleral icterus. Neck: . No tracheal deviation present. No obvious thyroid mass. Short large plaque  Cardiovascular: Normal rate, regular rhythm, normal heart sounds. No right ventricular heave. No lower extremity edema. Pulmonary/Chest: No wheezes. Basilar right more than left rales. Chest wall is not dull to percussion. No accessory muscle usage or stridor. Decreased breath sound density  Abdominal: Soft. Bowel sounds present. No distension or hernia. No tenderness. Musculoskeletal: No cyanosis. No clubbing. No obvious joint deformity. Lymphadenopathy: No cervical or supraclavicular adenopathy. Skin: Skin is warm and dry. No rash or nodules on the exposed extremities. Psychiatric: Normal mood and affect. Behavior is normal.  No anxiety. Neurologic: Alert, awake and oriented. PERRL.   Speech fluent        Results:  CBC:   Recent Labs     11/28/21  0609 11/30/21  0546   WBC 8.1 6. 9   HGB 9.1* 9.3*   HCT 27.9* 28.2*   MCV 93.7 92.0    240     BMP:   Recent Labs     11/28/21  0609 11/29/21  0707 11/30/21  0546    140 140   K 4.5 4.2 4.2    102 100   CO2 29 31 33*   PHOS  --   --  3.6   BUN 28* 28* 30*   CREATININE 1.4* 1.2 1.3*       Imaging:  I have reviewed radiology images personally. VL Extremity Venous Bilateral   Final Result      US RETROPERITONEAL COMPLETE   Final Result   Symmetric bilateral renal atrophy. No obstruction. Unremarkable urinary bladder ultrasound. NM LUNG SCAN PERFUSION ONLY   Final Result   High probability for pulmonary embolism. Findings were discussed with ANGELES Martin on 11/26/2021 at 8:40 p.m.         CT CHEST WO CONTRAST   Final Result   Previously seen defect in the right lower lobe persist and appears somewhat   larger and has not resolved. As a result, recommend bronchoscopy to rule out   endobronchial lesion. Increased volume loss in the right lower lobe anteriorly with improved   aeration of the right lower lobe posteriorly. Dominant curvilinear opacity in left upper lobe is seen, similar to prior         XR CHEST PORTABLE   Final Result   Airspace opacities in the left lung apex and the right lung base, may be   related to pneumonia. Recommend follow-up to resolution to exclude   underlying lung mass/neoplasm             Echo- Summary   Normal left ventricle systolic function with an estimated ejection fraction   of 55%. No regional wall motion abnormalities are seen. Grade I diastolic dysfunction with normal filing pressure. Mild posterior mitral annular calcification is present. Mild mitral regurgitation. Trace tricuspid regurgitation. Systolic pulmonary artery pressure (SPAP) is normal and estimated at 30 mmHg   (right atrial pressure 3 mmHg).     Assessment:  Principal Problem:    COPD (chronic obstructive pulmonary disease) (HCC)  Active Problems:    Hypertension    Morbid obesity with BMI of 40.0-44.9, adult (HCC)    PARK (obstructive sleep apnea)    Chronic respiratory failure with hypoxia, on home O2 therapy (HCC)    Pulmonary infiltrate    Atelectasis    Chest congestion    Ineffective airway clearance  Resolved Problems:    * No resolved hospital problems. *          Plan:   · Oxygen supplementation to keep saturation between 90 and 94% only  · Please titrate the oxygen as per the above parameters  · Home CPAP/BiPAP on intermittent basis  · Patient has been started on doxycycline which can be continued for now and reassess as per clinical status and cultures  · Bronchodilators  · P.o.  prednisone today can be continued  · Patient continues to have chest congestion along with abnormal imaging-we will consider bronchoscopy in the morning-patient was told about the procedure and pros and cons and patient was agreeable-will be done today  · Cardiac medications as per IM  · Monitor input output and BMP   · Correct electrolytes on whenever necessary basis  · Fluid management/diuretics as per nephrology  · Patient is on Eliquis  · Monitor for any bleeding  · Monitor for any anticholinergic side effects with combination of Spiriva and Ditropan  · PUD prophylaxis    Case discussed with patient and nursing    Further management depending on patient clinical status and the follow-up on above recommendation along with the bronchoscopy findings               Electronically signed by:  Yovanny Romano MD    11/30/2021    9:51 PM.

## 2021-12-01 NOTE — PROGRESS NOTES
Hospitalist Progress Note      PCP: Jorge Diamond MD    Date of Admission: 11/26/2021    Chief Complaint: SOB    Subjective: no new c/o, but SOB when lying flat and feels that legs are swelling. Medications:  Reviewed    Infusion Medications    sodium chloride 25 mL (11/30/21 0934)     Scheduled Medications    ciprofloxacin  1 drop Right Eye Q4H While awake    bumetanide  2 mg Oral Daily    losartan  25 mg Oral Daily    amLODIPine  5 mg Oral Daily    ARIPiprazole  5 mg Oral Daily    DULoxetine  60 mg Oral Daily    budesonide-formoterol  2 puff Inhalation BID    folic acid  1 mg Oral Daily    oxybutynin  5 mg Oral TID    pantoprazole  40 mg Oral Daily    tiotropium  2 puff Inhalation Daily    sodium chloride flush  5-40 mL IntraVENous 2 times per day    apixaban  10 mg Oral BID     PRN Meds: albuterol, hydroxypropyl methylcellulose, sodium chloride, perflutren lipid microspheres, sodium chloride flush, ondansetron **OR** ondansetron, polyethylene glycol, acetaminophen **OR** acetaminophen      Intake/Output Summary (Last 24 hours) at 12/1/2021 0903  Last data filed at 11/30/2021 1330  Gross per 24 hour   Intake 25 ml   Output 50 ml   Net -25 ml       Physical Exam Performed:    BP (!) 161/60   Pulse 88   Temp 98.1 °F (36.7 °C) (Oral)   Resp 20   Ht 5' 3\" (1.6 m)   Wt 230 lb (104.3 kg)   SpO2 96%   BMI 40.74 kg/m²     General appearance: No apparent distress, appears stated age and cooperative. HEENT: Pupils equal, round, and reactive to light. Conjunctivae/corneas clear. Neck: Supple, with full range of motion. No jugular venous distention. Trachea midline. Respiratory:  Normal respiratory effort. Clear to auscultation, bilaterally without Rales/Wheezes/Rhonchi. Cardiovascular: Regular rate and rhythm with normal S1/S2 without murmurs, rubs or gallops. Abdomen: Soft, non-tender, non-distended with normal bowel sounds. Musculoskeletal: No clubbing, cyanosis or edema bilaterally. Full range of motion without deformity. Skin: Skin color, texture, turgor normal.  No rashes or lesions. Neurologic:  Neurovascularly intact without any focal sensory/motor deficits. Cranial nerves: II-XII intact, grossly non-focal.  Psychiatric: Alert and oriented, thought content appropriate, normal insight  Capillary Refill: Brisk,< 3 seconds   Peripheral Pulses: +2 palpable, equal bilaterally       Labs:   Recent Labs     11/30/21  0546 12/01/21  0532   WBC 6.9 11.0   HGB 9.3* 8.7*   HCT 28.2* 27.0*    224     Recent Labs     11/29/21  0707 11/30/21  0546 12/01/21  0532    140 142   K 4.2 4.2 4.3    100 103   CO2 31 33* 32   BUN 28* 30* 31*   CREATININE 1.2 1.3* 1.2   CALCIUM 9.9 10.0 9.4   PHOS  --  3.6 3.6     No results for input(s): AST, ALT, BILIDIR, BILITOT, ALKPHOS in the last 72 hours. No results for input(s): INR in the last 72 hours. No results for input(s): Adonica Hoose in the last 72 hours. Urinalysis:      Lab Results   Component Value Date    NITRU POSITIVE 04/06/2021    WBCUA 6-9 04/06/2021    BACTERIA 3+ 04/06/2021    RBCUA 0-2 04/06/2021    BLOODU Negative 04/06/2021    SPECGRAV 1.010 04/06/2021    GLUCOSEU Negative 04/06/2021       Consults:    IP CONSULT TO PULMONOLOGY  IP CONSULT TO NEPHROLOGY  IP CONSULT TO OPHTHALMOLOGY      Assessment/Plan:    Active Hospital Problems    Diagnosis     Pulmonary infiltrate [R91.8]     Atelectasis [J98.11]     Chest congestion [R09.89]     Ineffective airway clearance [R06.89]     Chronic respiratory failure with hypoxia, on home O2 therapy (HCC) [J96.11, Z99.81]     PARK (obstructive sleep apnea) [G47.33]     COPD (chronic obstructive pulmonary disease) ([facility] [J44.9]     Hypertension [I10]     Morbid obesity with BMI of 40.0-44.9, adult ([facility] [E66.01, Z68.41]          COPD acute exacerbation: Presented with increased SOB and mucus production.   She is on home oxygen at 4 L/min.   received 125 mg solu-medrol in the ED.  Completed oral prednisone 40 mg daily x 5 days  Completed Doxycycline.  - mucinex and nebulizers ordered. - follows with Dr. Idalia Patel as outpatient. Pulmonary consulted and appreciated.      Pulmonary embolism: As noted above VQ scan done 11/2621 with high probability for PE. Did not do CT scan with PE protocol as renal functions were abnormal.  Has been started on anticoagulation with Eliquis. Pulmonary consulted. We will check lower extremity Dopplers, check echocardiogram.  When renal functions improve could further evaluate with CT scan with PE protocol.     Chronic hypoxic respiratory failure: Bronchodilators, continue supplemental oxygen.  As noted she is on 4 L home oxygen at baseline.      Right lower lobe defect : Noted on CT scan done 11/26/2021.  - concerning for endobronchial lesion  - pulmonary consulted for further input. Bronchoscopy tentatively planned 30 Nov.      Depression - controlled on home Cymbalta/Abilify - continued.      HTN - w/out known CAD and no evidence of active signs/sxs of ischemia/failure. Currently controlled on home meds w/ vitals reviewed and documented as above. Cozaar held due to soft BP on admission. ARF - w/ elevated BUN/Cr ratio c/w pre-renal azotemia. Given IVF hydration and follow serial labs - now d/c'd. Reviewed and documented as above. She has been on Bumex - initially held and now resumed.  Nephrology consulted and appreciated.      Anemia - etiology clinically unable to determine, w/out evidence of active bleeding/hemolysis. Stable and asymptomatic w/out indication for transfusion. Follow serial labs. Reviewed and documented as above. Morbid Obesity -  With Body mass index is 40.74 kg/m². Complicating assessment and treatment. Placing patient at risk for multiple co-morbidities as well as early death and contributing to the patient's presentation. Counseled on weight loss.          DVT Prophylaxis: Eliquis     Recent Labs     11/30/21  9300 12/01/21  0532    224     Diet: Diet NPO  Code Status: Full Code      PT/OT Eval Status: seen w/ recs for home w/ assist    Dispo - possibly Thurs/Friday 2/3 Dec at the earliest pending clinical course and subspecialty recs.      Harper Canales MD

## 2021-12-01 NOTE — PROCEDURES
INSTRUMENTAL SWALLOW REPORT  MODIFIED BARIUM SWALLOW      Recommended Diet:  Solid consistency: Regular (Avoid stringy, fibrous foods)  Liquid consistency: Thin  Liquid administration via: Straw; Cup (Small, single sips)  Medication administration: PO  · Strict aspiration precautions, *slow rate of PO intake      NAME: Karen Boyer   : 1945  MRN: [id number]       Date of Eval: 2021     Ordering Physician: Dr. Reina Christianson  Radiologist: Dr. Meliton Rios     Referring Diagnosis(es): Referring Diagnosis: Dysphagia    Past Medical History:  has a past medical history of Arthritis, Asthma, Bronchitis chronic, Chronic respiratory failure ([address] Depression, Diabetes mellitus ([address] Emphysema of lung ([address] Glaucoma, Hypertension, Morbid obesity ([address] Oxygen dependent, Rash, Shingles, and Sleep apnea. Past Surgical History:  has a past surgical history that includes hernia repair; Cholecystectomy; Hysterectomy; Colon surgery; Lap Band (10/5/10); Cataract removal with implant (3/9/2011); Cataract removal with implant (3/23/2011); Colonoscopy (13); bronchoscopy (N/A, 2021); and bronchoscopy (2021). Current Diet Solid Consistency: NPO (Regular diet with thin liquids recommended after BSE earlier this date, as well as, MBSS)  Current Diet Liquid Consistency: NPO    Date of Prior Study: n/a  Type of Study: Initial MBS  Results of Prior Study: n/a    Recent CXR (21):   Impression   Previously seen defect in the right lower lobe persist and appears somewhat   larger and has not resolved.  As a result, recommend bronchoscopy to rule out   endobronchial lesion.       Increased volume loss in the right lower lobe anteriorly with improved   aeration of the right lower lobe posteriorly.       Dominant curvilinear opacity in left upper lobe is seen, similar to prior       Patient Complaints/Reason for Referral:  Karen Boyer was referred for a MBS to assess the efficiency of his/her swallow function, assess for aspiration, and to make recommendations regarding safe dietary consistencies, effective compensatory strategies, and safe eating environment. Patient complaints: Per MD H&P, \"68 y.o. female, with a PMH of chronic respiratory failure on 4 lpm O2, COPD, DM2, HTN, who presented to [facility] with SOB. The patient states she became acutely short of breath last evening. She complains of cough with increased mucus production. She denies fever, chills, headache, dizziness, chest pain, abdominal pain, n/v/d, edema. No recent travel. No history of blood clots\". General Comment: Pt admitted d/t COPD exacerbation, acute on chronic respiratory failure. Pt has PMHx of asthma and emphysema per chart. Pt had bronchoscopy completed yesterday with reported food particles noted. Behavior/Cognition/Vision/Hearing:  Behavior/Cognition: Alert; Pleasant mood; Cooperative  Vision: Impaired  Vision Exceptions: Wears glasses for reading  Hearing: Within functional limits    Impressions:  Treatment Dx: Dysphagia  · Pt presents with delayed swallow initiation, reduced hyolaryngeal excursion, and reduced airway/laryngeal closure with instances of shallow penetration before the swallow with thin liquid via cup (x2; 1 occurred with pt independently taking large bolus). Penetration partially cleared x1, completely cleared with the swallow x1 (*this is considered grossly WNL in the aging swallow). No aspiration. No penetration/aspiration noted with solid PO trials. Consistent min residue noted at valleculae post-swallow with all PO trials. Pt benefited from intermittent double swallow and/or liquid wash that successfully cleared this residue. Oral Preparation / Oral Phase  Impaired  Oral Phase - Major Contributing Deficits  Premature Bolus Loss to Pharynx:  All    Pharyngeal Phase  Impaired  Pharyngeal Phase - Major Contributing Deficits  Delayed Swallow Initiation: All  Premature Spillage to Valleculae: All  Reduced Laryngeal Elevation: All  Reduced Anterior Laryngeal Movement: All  Reduced Airway/laryngeal Closure: All  Reduced Tongue Base Retraction: All  Shallow Penetration Before: Thin cup  Complete Self-clearing (shallow): Thin cup  Parital Self-clearing (shallow): Thin cup  Pharyngeal Residue - Valleculae: All (Minimal)    Esophageal Phase  Appears WFL when viewed at the cervical level throughout the duration of the study       Patient Position: Lateral and Patient Degrees: 90, pt seated upright in [facility]  Consistencies Administered: Reg solid; Dysphagia Pureed (Dysphagia I); Thin straw; Thin cup    Dysphagia Outcome Severity Scale: Level 5: Mild dysphagia- Distant supervision. May need one diet consistency restricted  Penetration-Aspiration Scale (PAS): 3 - Material enters the airway, remains above the vocal folds, and is not ejected from airway    Recommended Diet:  Solid consistency: Regular (Avoid stringy, fibrous foods)  Liquid consistency: Thin  Liquid administration via: Straw; Cup (Small, single sips)  Medication administration: PO    Safe Swallow Protocol:  Supervision: Distant  Compensatory Swallowing Strategies: Eat/Feed slowly; Alternate solids and liquids; Upright as possible for all oral intake; Remain upright for 30-45 minutes after meals; Small bites/sips    Recommendations/Treatment  Requires SLP Intervention: Yes  D/C Recommendations: To be determined (per PT/OT recs)      Recommended Exercises:    Therapeutic Interventions: Diet tolerance monitoring; Patient/Family education; Oral care; Pharyngeal exercises; Laryngeal exercises; Tongue base strengthening    Education: Images and recommendations were reviewed with pt following this exam.   Patient Education: Pt educated on [facility] procedure, nature of oropharyngeal deficits, assessment results and recommendations.    Patient Education Response: Verbalizes understanding    Prognosis  Prognosis for safe diet advancement:

## 2021-12-01 NOTE — PLAN OF CARE
Problem: Nutrition  Intervention: Swallowing evaluation  Note: Bedside swallow evaluation completed this date. Cece Hardy M.S. [id number] [facility]  Speech-language pathologist  [phone]      Intervention: Aspiration precautions  Note: Bedside swallow evaluation completed this date.     Cece Hardy M.S. [id number] [facility]  Speech-language pathologist  [phone]

## 2021-12-01 NOTE — PROGRESS NOTES
Occupational Therapy  Facility/Department: [facility] C5 - MED SURG/ORTHO  Daily Treatment Note  NAME: Dari Roberson  : 1945  MRN: [id number]    Date of Service: 2021    Discharge Recommendations:  Home with Home health OT, 24 hour supervision or assist       Assessment   Performance deficits / Impairments: Decreased functional mobility ; Decreased ADL status; Decreased strength; Decreased endurance; Decreased cognition; Decreased safe awareness  Assessment: Pt with fair tolerance of OT treatment. Pt fatigues quickly with standing activity, demos disregard for safe hand placement due to urgency to sit from fatigue. Pt educated on safe hand placement and use of brakes on 4WW, pt verbalizes understanding. Pt grossly CGA throughout for transfers and mobility. Pt requiring increased time and rest breaks for all LE ADLs including toileting. Pt functioning below her baseline and demos the above noted occupational performance deficits. Pt would benefit from continued skilled OT in home setting at d/c. Prognosis: Good  OT Education: OT Role; Plan of Care; Home Exercise Program; ADL Adaptive Strategies; Transfer Training  Disease Specific Education: Pt educated on importance of OOB mobility, prevention of complications of bedrest, and general safety during hospitalization. Pt verbalized understanding, would benefit from reinforcement. Barriers to Learning: none perceived  REQUIRES OT FOLLOW UP: Yes  Activity Tolerance  Activity Tolerance: Patient limited by fatigue  Activity Tolerance: Vitals: BP= 119/71, HR= 102, SPO2= 88-92% 4L  Safety Devices  Safety Devices in place: Yes  Type of devices: Left in chair; Chair alarm in place; Call light within reach; Nurse notified; Gait belt         Patient Diagnosis(es): The encounter diagnosis was Acute on chronic respiratory failure with hypoxia ([facility]       has a past medical history of Arthritis, Asthma, Bronchitis chronic, Chronic respiratory failure ([address] Depression, Diabetes mellitus ([facility] Emphysema of lung ([facility] Glaucoma, Hypertension, Morbid obesity ([facility] Oxygen dependent, Rash, Shingles, and Sleep apnea. has a past surgical history that includes hernia repair; Cholecystectomy; Hysterectomy; Colon surgery; Lap Band (10/5/10); Cataract removal with implant (3/9/2011); Cataract removal with implant (3/23/2011); Colonoscopy (11/18/13); bronchoscopy (N/A, 11/30/2021); and bronchoscopy (11/30/2021). Restrictions  Restrictions/Precautions  Restrictions/Precautions: Fall Risk, General Precautions  Position Activity Restriction  Other position/activity restrictions: Up with assist, 4L O2     Subjective   General  Chart Reviewed: Yes  Patient assessed for rehabilitation services?: Yes  Response to previous treatment: Patient with no complaints from previous session  Family / Caregiver Present: No  Referring Practitioner: JUANY Jaimes CNP  Diagnosis: COPD exacerbation    Subjective  Subjective: Pt resting in bed, pleasant and agreeable to OT treatment. Vital Signs  Patient Currently in Pain: Denies     Orientation  Orientation  Overall Orientation Status: Within Functional Limits     Objective    ADL  LE Dressing: Contact guard assistance; Verbal cueing; Increased time to complete (to doff/don briefs)  Toileting: Contact guard assistance;  Increased time to complete     Balance  Sitting Balance: Supervision  Standing Balance: Contact guard assistance (4WW)  Standing Balance  Activity: functional/bathroom mobility, toileting    Functional Mobility  Functional - Mobility Device: 4-Wheeled Walker  Activity: To/from bathroom  Assist Level: Contact guard assistance    Toilet Transfers  Toilet - Technique: Ambulating  Equipment Used: Standard toilet (with marlo grab bars)  Toilet Transfer: Contact guard assistance    Bed mobility  Supine to Sit: Contact guard assistance (to R with HOB elevated, use of bedrails, and increased time)     Transfers  Sit to stand: Contact guard assistance  Stand to sit: Contact guard assistance  Transfer Comments: with 4WW, VCs for safety     Cognition  Overall Cognitive Status: Exceptions  Following Commands: Follows one step commands with repetition  Attention Span: Attends with cues to redirect  Safety Judgement: Decreased awareness of need for safety  Initiation: Requires cues for some     Type of ROM/Therapeutic Exercise  Type of ROM/Therapeutic Exercise: AROM  Comment: BUE seated in chair  Exercises  Shoulder Elevation: 15x  Shoulder Flexion: 15x  Horizontal ABduction: 15x  Horizontal ADduction: 15x  Elbow Flexion: 20x  Elbow Extension: 20x  Supination: 20x  Pronation: 20x  Wrist Flexion: 20x  Wrist Extension: 20x  Finger Flexion: 20x  Finger Extension: 20x     Plan   Plan  Times per week: 3-5x/week  Current Treatment Recommendations: Strengthening, Endurance Training, Functional Mobility Training, Stair training, Safety Education & Training, Patient/Caregiver Education & Training, Equipment Evaluation, Education, & procurement, Self-Care / ADL, Home Management Training, Balance Training    AM-PAC Score  AM-PAC Inpatient Daily Activity Raw Score: 19 (12/01/21 [facility])  AM-PAC Inpatient ADL T-Scale Score : 40.22 (12/01/21 1145)  ADL Inpatient CMS 0-100% Score: 42.8 (12/01/21 [facility])  ADL Inpatient CMS G-Code Modifier : CK (12/01/21 [facility])    Goals  Short term goals  Time Frame for Short term goals: 1 week (12/4) unless stated otherwise. Short term goal 1: Pt will TBD with mod I.-- ongoing 12/1/21  Short term goal 2: Pt will perform BUE ther ex x20 to increase strength/endurance for functional activities (12/2). -- ongoing 12/1/21  Short term goal 3: Pt will perform at least 5min of functional standing activities with mod I.-- ongoing 12/1/21  Short term goal 4: Pt will perform at least 3 standing ADLS with mod I.-- ongoing 12/1/21  Short term goal 5: Pt will toilet with mod I.-- ongoing 12/1/21  Patient Goals   Patient goals : \"to walk more\"       Therapy Time   Individual Concurrent Group Co-treatment   Time In 1020         Time Out 1100         Minutes [address], GOMEZ/L

## 2021-12-01 NOTE — PRE SEDATION
Historical Provider, MD   meloxicam (MOBIC) 15 MG tablet Take 15 mg by mouth daily. Yes Historical Provider, MD   bumetanide (BUMEX) 1 MG tablet Take 1 mg by mouth daily    Yes Historical Provider, MD   albuterol (PROVENTIL) (2.5 MG/3ML) 0.083% nebulizer solution Take 3 mLs by nebulization every 4 hours as needed for Wheezing 9/24/20 9/24/21  Claude Barbosa MD   albuterol sulfate HFA (PROAIR HFA) 108 (90 Base) MCG/ACT inhaler Inhale 2 puffs into the lungs every 6 hours as needed for Wheezing or Shortness of Breath 2/21/20   Barbara Matos MD   losartan (COZAAR) 100 MG tablet Take 100 mg by mouth 9/6/18 5/15/20  Historical Provider, MD       Pre-Sedation Documentation and Exam:   Vital signs have been reviewed (see flow sheet for vitals).     Mallampati Airway Assessment:  Mallampati Class III - (soft palate & base of uvula are visible)    Prior History of Anesthesia Complications:   none    ASA Classification:  Class 3 - A patient with severe systemic disease that limits activity but is not incapacitating    Sedation/ Anesthesia Plan:   intravenous sedation    Medications Planned:   midazolam (Versed) intravenously and fentanyl intravenously    Patient is an appropriate candidate for plan of sedation: yes    Electronically signed by Clayton Chadwick MD on 11/30/2021 at 11:51 PM

## 2021-12-02 ENCOUNTER — APPOINTMENT (OUTPATIENT)
Dept: GENERAL RADIOLOGY | Age: 76
DRG: 175 | End: 2021-12-02
Payer: MEDICARE

## 2021-12-02 LAB
CULTURE, RESPIRATORY: ABNORMAL
EKG ATRIAL RATE: 91 BPM
EKG DIAGNOSIS: NORMAL
EKG P AXIS: 78 DEGREES
EKG P-R INTERVAL: 172 MS
EKG Q-T INTERVAL: 356 MS
EKG QRS DURATION: 92 MS
EKG QTC CALCULATION (BAZETT): 437 MS
EKG R AXIS: -31 DEGREES
EKG T AXIS: 72 DEGREES
EKG VENTRICULAR RATE: 91 BPM
GRAM STAIN RESULT: ABNORMAL
ORGANISM: ABNORMAL
ORGANISM: ABNORMAL
TROPONIN: <0.01 NG/ML

## 2021-12-02 PROCEDURE — 6370000000 HC RX 637 (ALT 250 FOR IP): Performed by: NURSE PRACTITIONER

## 2021-12-02 PROCEDURE — 6360000002 HC RX W HCPCS: Performed by: INTERNAL MEDICINE

## 2021-12-02 PROCEDURE — 93010 ELECTROCARDIOGRAM REPORT: CPT | Performed by: INTERNAL MEDICINE

## 2021-12-02 PROCEDURE — 94761 N-INVAS EAR/PLS OXIMETRY MLT: CPT

## 2021-12-02 PROCEDURE — 6370000000 HC RX 637 (ALT 250 FOR IP): Performed by: INTERNAL MEDICINE

## 2021-12-02 PROCEDURE — 36415 COLL VENOUS BLD VENIPUNCTURE: CPT

## 2021-12-02 PROCEDURE — 73620 X-RAY EXAM OF FOOT: CPT

## 2021-12-02 PROCEDURE — 2700000000 HC OXYGEN THERAPY PER DAY

## 2021-12-02 PROCEDURE — 99232 SBSQ HOSP IP/OBS MODERATE 35: CPT | Performed by: INTERNAL MEDICINE

## 2021-12-02 PROCEDURE — 2580000003 HC RX 258: Performed by: NURSE PRACTITIONER

## 2021-12-02 PROCEDURE — 2580000003 HC RX 258: Performed by: INTERNAL MEDICINE

## 2021-12-02 PROCEDURE — 84484 ASSAY OF TROPONIN QUANT: CPT

## 2021-12-02 PROCEDURE — 94640 AIRWAY INHALATION TREATMENT: CPT

## 2021-12-02 PROCEDURE — 1200000000 HC SEMI PRIVATE

## 2021-12-02 PROCEDURE — 97110 THERAPEUTIC EXERCISES: CPT

## 2021-12-02 PROCEDURE — 97530 THERAPEUTIC ACTIVITIES: CPT

## 2021-12-02 RX ORDER — HYDROCODONE BITARTRATE AND ACETAMINOPHEN 5; 325 MG/1; MG/1
1 TABLET ORAL EVERY 6 HOURS PRN
Status: COMPLETED | OUTPATIENT
Start: 2021-12-02 | End: 2021-12-03

## 2021-12-02 RX ADMIN — DULOXETINE HYDROCHLORIDE 60 MG: 60 CAPSULE, DELAYED RELEASE ORAL at 09:51

## 2021-12-02 RX ADMIN — ACETAMINOPHEN 650 MG: 325 TABLET, FILM COATED ORAL at 19:49

## 2021-12-02 RX ADMIN — APIXABAN 10 MG: 5 TABLET, FILM COATED ORAL at 09:51

## 2021-12-02 RX ADMIN — ACETAMINOPHEN 650 MG: 325 TABLET, FILM COATED ORAL at 14:29

## 2021-12-02 RX ADMIN — PIPERACILLIN AND TAZOBACTAM 3375 MG: 3; .375 INJECTION, POWDER, FOR SOLUTION INTRAVENOUS at 02:06

## 2021-12-02 RX ADMIN — CIPROFLOXACIN 1 DROP: 3 SOLUTION OPHTHALMIC at 05:51

## 2021-12-02 RX ADMIN — BUMETANIDE 2 MG: 1 TABLET ORAL at 09:50

## 2021-12-02 RX ADMIN — ACETAMINOPHEN 650 MG: 325 TABLET, FILM COATED ORAL at 02:15

## 2021-12-02 RX ADMIN — CIPROFLOXACIN 1 DROP: 3 SOLUTION OPHTHALMIC at 09:58

## 2021-12-02 RX ADMIN — CIPROFLOXACIN 1 DROP: 3 SOLUTION OPHTHALMIC at 19:50

## 2021-12-02 RX ADMIN — Medication 2 PUFF: at 20:09

## 2021-12-02 RX ADMIN — HYDROCODONE BITARTRATE AND ACETAMINOPHEN 1 TABLET: 5; 325 TABLET ORAL at 23:41

## 2021-12-02 RX ADMIN — ARIPIPRAZOLE 5 MG: 10 TABLET ORAL at 09:51

## 2021-12-02 RX ADMIN — CIPROFLOXACIN 1 DROP: 3 SOLUTION OPHTHALMIC at 14:00

## 2021-12-02 RX ADMIN — HYPROMELLOSE 2906 (4000 MPA.S) AND HYPROMELLOSE 2906 (50 MPA.S) 1 DROP: 25; 25 SOLUTION OPHTHALMIC at 09:58

## 2021-12-02 RX ADMIN — AMLODIPINE BESYLATE 5 MG: 5 TABLET ORAL at 09:51

## 2021-12-02 RX ADMIN — PANTOPRAZOLE SODIUM 40 MG: 40 TABLET, DELAYED RELEASE ORAL at 09:51

## 2021-12-02 RX ADMIN — ACETAMINOPHEN 650 MG: 325 TABLET, FILM COATED ORAL at 09:51

## 2021-12-02 RX ADMIN — LOSARTAN POTASSIUM 25 MG: 25 TABLET, FILM COATED ORAL at 09:51

## 2021-12-02 RX ADMIN — Medication 2 PUFF: at 07:49

## 2021-12-02 RX ADMIN — SODIUM CHLORIDE, PRESERVATIVE FREE 10 ML: 5 INJECTION INTRAVENOUS at 22:09

## 2021-12-02 RX ADMIN — TIOTROPIUM BROMIDE INHALATION SPRAY 2 PUFF: 3.12 SPRAY, METERED RESPIRATORY (INHALATION) at 07:49

## 2021-12-02 RX ADMIN — OXYBUTYNIN CHLORIDE 5 MG: 5 TABLET ORAL at 09:51

## 2021-12-02 RX ADMIN — CIPROFLOXACIN 1 DROP: 3 SOLUTION OPHTHALMIC at 18:00

## 2021-12-02 RX ADMIN — OXYBUTYNIN CHLORIDE 5 MG: 5 TABLET ORAL at 19:49

## 2021-12-02 RX ADMIN — FOLIC ACID 1 MG: 1 TABLET ORAL at 09:50

## 2021-12-02 RX ADMIN — APIXABAN 10 MG: 5 TABLET, FILM COATED ORAL at 19:49

## 2021-12-02 RX ADMIN — PIPERACILLIN AND TAZOBACTAM 3375 MG: 3; .375 INJECTION, POWDER, FOR SOLUTION INTRAVENOUS at 09:57

## 2021-12-02 RX ADMIN — OXYBUTYNIN CHLORIDE 5 MG: 5 TABLET ORAL at 14:29

## 2021-12-02 RX ADMIN — PIPERACILLIN AND TAZOBACTAM 3375 MG: 3; .375 INJECTION, POWDER, FOR SOLUTION INTRAVENOUS at 18:00

## 2021-12-02 ASSESSMENT — PAIN DESCRIPTION - PAIN TYPE
TYPE: CHRONIC PAIN

## 2021-12-02 ASSESSMENT — PAIN SCALES - GENERAL
PAINLEVEL_OUTOF10: 7
PAINLEVEL_OUTOF10: 7
PAINLEVEL_OUTOF10: 8
PAINLEVEL_OUTOF10: 7
PAINLEVEL_OUTOF10: 4
PAINLEVEL_OUTOF10: 4
PAINLEVEL_OUTOF10: 8
PAINLEVEL_OUTOF10: 0
PAINLEVEL_OUTOF10: 7

## 2021-12-02 ASSESSMENT — PAIN DESCRIPTION - ORIENTATION
ORIENTATION: LEFT
ORIENTATION: RIGHT
ORIENTATION: LEFT
ORIENTATION: LEFT
ORIENTATION: RIGHT;LEFT

## 2021-12-02 ASSESSMENT — PAIN DESCRIPTION - FREQUENCY
FREQUENCY: CONTINUOUS

## 2021-12-02 ASSESSMENT — PAIN DESCRIPTION - ONSET
ONSET: GRADUAL
ONSET: ON-GOING

## 2021-12-02 ASSESSMENT — PAIN DESCRIPTION - DESCRIPTORS
DESCRIPTORS: ACHING

## 2021-12-02 ASSESSMENT — PAIN - FUNCTIONAL ASSESSMENT: PAIN_FUNCTIONAL_ASSESSMENT: PREVENTS OR INTERFERES SOME ACTIVE ACTIVITIES AND ADLS

## 2021-12-02 ASSESSMENT — PAIN DESCRIPTION - PROGRESSION
CLINICAL_PROGRESSION: NOT CHANGED
CLINICAL_PROGRESSION: NOT CHANGED

## 2021-12-02 ASSESSMENT — PAIN DESCRIPTION - LOCATION
LOCATION: LEG
LOCATION: FOOT
LOCATION: LEG

## 2021-12-02 NOTE — PLAN OF CARE
Pt educated on the need to turn every 2 hours to prevent any skin integrity breakdown. Pt verbalizes understanding. Pt compliant with q2 hour turns, turns self. See documentation.

## 2021-12-02 NOTE — PROGRESS NOTES
signed by Juan Aguilar MS, RD, LD on 12/2/21 at 2:31 PM EST    Contact: Office: [phone]; [facility]: [phone]

## 2021-12-02 NOTE — PROGRESS NOTES
[facility] Progress Note           Subjective/   64y.o. year old female who we are seeing in consultation for CARLOS. HPI:  Renal function better, diuretics resumed. Intake adequate. ROS:  Shortness of breath stable, uses 4 L NC at home. No fevers. Objective/   GEN:  Chronically ill, /73   Pulse 85   Temp 98.9 °F (37.2 °C) (Oral)   Resp 18   Ht 5' 3\" (1.6 m)   Wt 230 lb (104.3 kg)   SpO2 97%   BMI 40.74 kg/m²   HEENT: non-icteric, no JVD  CV: S1, S2 without m/r/g; + LE edema  RESP: CTA B without w/r/r; breathing wnl  ABD: +bs, soft, nt, no hsm  SKIN: warm, no rashes    Data/  Recent Labs     11/30/21  0546 12/01/21  0532   WBC 6.9 11.0   HGB 9.3* 8.7*   HCT 28.2* 27.0*   MCV 92.0 94.3    224     Recent Labs     11/30/21  0546 12/01/21  0532    142   K 4.2 4.3    103   CO2 33* 32   GLUCOSE 91 91   PHOS 3.6 3.6   BUN 30* 31*   CREATININE 1.3* 1.2   LABGLOM 40* 43*   GFRAA 48* 52*     Renal US ->  Kidneys:       The right kidney measures 8.9 cm in length with cortical thickness of 8 mm   and the left kidney 8.7 cm in length with cortical thickness of 11 mm.  Renal   parenchymal echogenicity is normal and there is no hydronephrosis.  No cystic   or solid renal mass.           Bladder:       Partial distention of the urinary bladder with a volume of 87 mL.  No   significant wall thickening.  Bilateral ureteral jets are documented.  The   patient was unable to void. Assessment/Plan     CARLOS on CKD Stage 3.  - CARLOS suspect pre-renal in the setting of diuretic and ARB use. CKD maybe from chronic NSAID use. - Urinalysis with electrolytes and UPC. - Renal US. - Serum creatinine is trending down. - Resumed home dose of bumex 2 mg a day. - Monitor kidney function with restarting Losartan. - Trend labs.     COPD Exacerbation.  - Plans per Admitting.     Hypertension.  - Low dose of Losartan resumed.   - Started Amlodipine 5mg daily.     Presumed PE.  - VQ scan with high probability of PE.  - On Apixaban.    ____________________________________  Romeo Izaguirre MD  [facility]  [email or web address]  Office: [phone]

## 2021-12-02 NOTE — PROGRESS NOTES
Hospitalist Progress Note      PCP: Madeleine Aguilar MD    Date of Admission: 11/26/2021    Chief Complaint: SOB    Subjective: c/o R foot pain    Medications:  Reviewed    Infusion Medications    sodium chloride 25 mL (11/30/21 0934)     Scheduled Medications    piperacillin-tazobactam  3,375 mg IntraVENous Q8H    ciprofloxacin  1 drop Right Eye Q4H While awake    bumetanide  2 mg Oral Daily    losartan  25 mg Oral Daily    amLODIPine  5 mg Oral Daily    ARIPiprazole  5 mg Oral Daily    DULoxetine  60 mg Oral Daily    budesonide-formoterol  2 puff Inhalation BID    folic acid  1 mg Oral Daily    oxybutynin  5 mg Oral TID    pantoprazole  40 mg Oral Daily    tiotropium  2 puff Inhalation Daily    sodium chloride flush  5-40 mL IntraVENous 2 times per day    apixaban  10 mg Oral BID     PRN Meds: nitroGLYCERIN, albuterol, hydroxypropyl methylcellulose, sodium chloride, perflutren lipid microspheres, sodium chloride flush, ondansetron **OR** ondansetron, polyethylene glycol, acetaminophen **OR** acetaminophen    No intake or output data in the 24 hours ending 12/02/21 0930    Physical Exam Performed:    /73   Pulse 85   Temp 98.9 °F (37.2 °C) (Oral)   Resp 18   Ht 5' 3\" (1.6 m)   Wt 230 lb (104.3 kg)   SpO2 97%   BMI 40.74 kg/m²     General appearance: No apparent distress, appears stated age and cooperative. HEENT: Pupils equal, round, and reactive to light. Conjunctivae/corneas clear. Neck: Supple, with full range of motion. No jugular venous distention. Trachea midline. Respiratory:  Normal respiratory effort. Clear to auscultation, bilaterally without Rales/Wheezes/Rhonchi. Cardiovascular: Regular rate and rhythm with normal S1/S2 without murmurs, rubs or gallops. Abdomen: Soft, non-tender, non-distended with normal bowel sounds. Musculoskeletal: No clubbing, cyanosis or edema bilaterally. Full range of motion without deformity.   Skin: Skin color, texture, turgor normal.  No rashes or lesions. Neurologic:  Neurovascularly intact without any focal sensory/motor deficits. Cranial nerves: II-XII intact, grossly non-focal.  Psychiatric: Alert and oriented, thought content appropriate, normal insight  Capillary Refill: Brisk,< 3 seconds   Peripheral Pulses: +2 palpable, equal bilaterally       Labs:   Recent Labs     11/30/21  0546 12/01/21  0532   WBC 6.9 11.0   HGB 9.3* 8.7*   HCT 28.2* 27.0*    224     Recent Labs     11/30/21  0546 12/01/21  0532    142   K 4.2 4.3    103   CO2 33* 32   BUN 30* 31*   CREATININE 1.3* 1.2   CALCIUM 10.0 9.4   PHOS 3.6 3.6     No results for input(s): AST, ALT, BILIDIR, BILITOT, ALKPHOS in the last 72 hours. No results for input(s): INR in the last 72 hours. Recent Labs     12/01/21 2008 12/02/21  0239   TROPONINI <0.01 <0.01       Urinalysis:      Lab Results   Component Value Date    NITRU POSITIVE 04/06/2021    WBCUA 6-9 04/06/2021    BACTERIA 3+ 04/06/2021    RBCUA 0-2 04/06/2021    BLOODU Negative 04/06/2021    SPECGRAV 1.010 04/06/2021    GLUCOSEU Negative 04/06/2021       Consults:    IP CONSULT TO PULMONOLOGY  IP CONSULT TO NEPHROLOGY  IP CONSULT TO OPHTHALMOLOGY      Assessment/Plan:    Active Hospital Problems    Diagnosis     Pneumonia of left upper lobe due to Pseudomonas species ([facility]) [J15.1]     Pulmonary infiltrate [R91.8]     Atelectasis [J98.11]     Chest congestion [R09.89]     Ineffective airway clearance [R06.89]     Chronic respiratory failure with hypoxia, on home O2 therapy ([facility]) [J96.11, Z99.81]     PARK (obstructive sleep apnea) [G47.33]     [facility]ch[facility] obstructive [facility] disease[facility] [J44.9]     Hypertension [I10]     Morbid obesity with BMI of 40.0-44.9, adult ([facility]) [E66.01, Z68.41]        PNA - likely Pseudomonas and Enterobacteriaceae pneumonia based on Bronch Cx.   Started on or before Zosyn 1 Dec. Continue current ABX pending Cx results    COPD - w/out chronic respiratory failure on baseline home O2 at 4L  Normally controlled on home medication regimen - continued. Here w/ acute exacerbation, completed IV/PO Steroids. Doxy completed. Follows with Dr. Madalyn Kirkland as outpatient. Pulmonary consulted and appreciated.      Pulmonary embolism - POArrival w/ VQ scan done 26 Nov w/ high probability for PE. Did not do CT scan with PE protocol as renal functions were abnormal.  Started on Eliquis. LE dopplers w/ acute Superficial thrombosis of L saphenous vein. Echo w/ preserved EF 55% and no evidence of strain.      Acute on Chronic Respiratory Failure - w/ hypoxia, POArrival.  Presence of clinical respiratory distress w/ tachypnea/dyspnea/SOB and wheezing w/ use of accessory muscles to breath. Supplemental O2 and wean as tolerated.      Right lower lobe defect - Noted on CT scan concerning for endobronchial lesion. Pulmonary consulted s/p Bronchoscopy 30 Nov.      Depression - controlled on home Cymbalta/Abilify - continued.      HTN - w/out known CAD and no evidence of active signs/sxs of ischemia/failure. Currently controlled on home meds w/ vitals reviewed and documented as above. Cozaar held due to soft BP on admission. ARF - w/ elevated BUN/Cr ratio c/w pre-renal azotemia. Given IVF hydration and follow serial labs - now d/c'd. Reviewed and documented as above. She has been on Bumex - initially held and now resumed.  Nephrology consulted and appreciated.      Anemia - etiology clinically unable to determine, w/out evidence of active bleeding/hemolysis. Stable and asymptomatic w/out indication for transfusion. Follow serial labs. Reviewed and documented as above. Morbid Obesity -  With Body mass index is 40.74 kg/m². Complicating assessment and treatment. Placing patient at risk for multiple co-morbidities as well as early death and contributing to the patient's presentation. Counseled on weight loss.          DVT Prophylaxis: Eliquis     Recent Labs     11/30/21  0546 12/01/21  0532   PLT 240 224     Diet: ADULT DIET; Regular  Code Status: Full Code      PT/OT Eval Status: seen w/ recs for home w/ assist    Dispo - possibly Thurs/Friday 2/3 Dec at the earliest pending clinical course and subspecialty recs.      Martha Ruvalcaba MD

## 2021-12-03 LAB
ALBUMIN SERPL-MCNC: 2.7 G/DL (ref 3.4–5)
ANION GAP SERPL CALCULATED.3IONS-SCNC: 8 MMOL/L (ref 3–16)
BUN BLDV-MCNC: 25 MG/DL (ref 7–20)
CALCIUM SERPL-MCNC: 9.3 MG/DL (ref 8.3–10.6)
CHLORIDE BLD-SCNC: 99 MMOL/L (ref 99–110)
CO2: 28 MMOL/L (ref 21–32)
CREAT SERPL-MCNC: 1.4 MG/DL (ref 0.6–1.2)
GFR AFRICAN AMERICAN: 44
GFR NON-AFRICAN AMERICAN: 36
GLUCOSE BLD-MCNC: 104 MG/DL (ref 70–99)
HCT VFR BLD CALC: 25.6 % (ref 36–48)
HEMOGLOBIN: 8.5 G/DL (ref 12–16)
MCH RBC QN AUTO: 30.8 PG (ref 26–34)
MCHC RBC AUTO-ENTMCNC: 33.3 G/DL (ref 31–36)
MCV RBC AUTO: 92.6 FL (ref 80–100)
PDW BLD-RTO: 15.7 % (ref 12.4–15.4)
PHOSPHORUS: 3.7 MG/DL (ref 2.5–4.9)
PLATELET # BLD: 217 K/UL (ref 135–450)
PMV BLD AUTO: 8 FL (ref 5–10.5)
POTASSIUM SERPL-SCNC: 4.2 MMOL/L (ref 3.5–5.1)
RBC # BLD: 2.77 M/UL (ref 4–5.2)
SODIUM BLD-SCNC: 135 MMOL/L (ref 136–145)
URIC ACID, SERUM: 6.3 MG/DL (ref 2.6–6)
WBC # BLD: 8.9 K/UL (ref 4–11)

## 2021-12-03 PROCEDURE — 2700000000 HC OXYGEN THERAPY PER DAY

## 2021-12-03 PROCEDURE — 85027 COMPLETE CBC AUTOMATED: CPT

## 2021-12-03 PROCEDURE — 97110 THERAPEUTIC EXERCISES: CPT

## 2021-12-03 PROCEDURE — 2580000003 HC RX 258: Performed by: INTERNAL MEDICINE

## 2021-12-03 PROCEDURE — 6370000000 HC RX 637 (ALT 250 FOR IP): Performed by: INTERNAL MEDICINE

## 2021-12-03 PROCEDURE — 94640 AIRWAY INHALATION TREATMENT: CPT

## 2021-12-03 PROCEDURE — 84550 ASSAY OF BLOOD/URIC ACID: CPT

## 2021-12-03 PROCEDURE — 99232 SBSQ HOSP IP/OBS MODERATE 35: CPT | Performed by: INTERNAL MEDICINE

## 2021-12-03 PROCEDURE — 6370000000 HC RX 637 (ALT 250 FOR IP): Performed by: NURSE PRACTITIONER

## 2021-12-03 PROCEDURE — 2580000003 HC RX 258: Performed by: NURSE PRACTITIONER

## 2021-12-03 PROCEDURE — 80069 RENAL FUNCTION PANEL: CPT

## 2021-12-03 PROCEDURE — 36415 COLL VENOUS BLD VENIPUNCTURE: CPT

## 2021-12-03 PROCEDURE — 97530 THERAPEUTIC ACTIVITIES: CPT

## 2021-12-03 PROCEDURE — 6360000002 HC RX W HCPCS: Performed by: INTERNAL MEDICINE

## 2021-12-03 PROCEDURE — 1200000000 HC SEMI PRIVATE

## 2021-12-03 PROCEDURE — 94761 N-INVAS EAR/PLS OXIMETRY MLT: CPT

## 2021-12-03 RX ORDER — AMOXICILLIN AND CLAVULANATE POTASSIUM 875; 125 MG/1; MG/1
1 TABLET, FILM COATED ORAL 2 TIMES DAILY
Qty: 14 TABLET | Refills: 0 | Status: SHIPPED | OUTPATIENT
Start: 2021-12-03 | End: 2021-12-10

## 2021-12-03 RX ADMIN — Medication 2 PUFF: at 19:23

## 2021-12-03 RX ADMIN — CIPROFLOXACIN 1 DROP: 3 SOLUTION OPHTHALMIC at 09:24

## 2021-12-03 RX ADMIN — PIPERACILLIN AND TAZOBACTAM 3375 MG: 3; .375 INJECTION, POWDER, FOR SOLUTION INTRAVENOUS at 09:33

## 2021-12-03 RX ADMIN — LOSARTAN POTASSIUM 25 MG: 25 TABLET, FILM COATED ORAL at 09:23

## 2021-12-03 RX ADMIN — OXYBUTYNIN CHLORIDE 5 MG: 5 TABLET ORAL at 09:22

## 2021-12-03 RX ADMIN — AMLODIPINE BESYLATE 5 MG: 5 TABLET ORAL at 09:22

## 2021-12-03 RX ADMIN — PANTOPRAZOLE SODIUM 40 MG: 40 TABLET, DELAYED RELEASE ORAL at 09:23

## 2021-12-03 RX ADMIN — CIPROFLOXACIN 1 DROP: 3 SOLUTION OPHTHALMIC at 12:58

## 2021-12-03 RX ADMIN — DULOXETINE HYDROCHLORIDE 60 MG: 60 CAPSULE, DELAYED RELEASE ORAL at 09:22

## 2021-12-03 RX ADMIN — APIXABAN 5 MG: 5 TABLET, FILM COATED ORAL at 09:22

## 2021-12-03 RX ADMIN — BUMETANIDE 2 MG: 1 TABLET ORAL at 09:23

## 2021-12-03 RX ADMIN — OXYBUTYNIN CHLORIDE 5 MG: 5 TABLET ORAL at 12:54

## 2021-12-03 RX ADMIN — ARIPIPRAZOLE 5 MG: 10 TABLET ORAL at 09:22

## 2021-12-03 RX ADMIN — FOLIC ACID 1 MG: 1 TABLET ORAL at 09:25

## 2021-12-03 RX ADMIN — OXYBUTYNIN CHLORIDE 5 MG: 5 TABLET ORAL at 19:28

## 2021-12-03 RX ADMIN — APIXABAN 5 MG: 5 TABLET, FILM COATED ORAL at 19:29

## 2021-12-03 RX ADMIN — CIPROFLOXACIN 1 DROP: 3 SOLUTION OPHTHALMIC at 18:53

## 2021-12-03 RX ADMIN — SODIUM CHLORIDE, PRESERVATIVE FREE 10 ML: 5 INJECTION INTRAVENOUS at 09:23

## 2021-12-03 RX ADMIN — HYDROCODONE BITARTRATE AND ACETAMINOPHEN 1 TABLET: 5; 325 TABLET ORAL at 05:39

## 2021-12-03 RX ADMIN — Medication 2 PUFF: at 08:19

## 2021-12-03 RX ADMIN — HYDROCODONE BITARTRATE AND ACETAMINOPHEN 1 TABLET: 5; 325 TABLET ORAL at 12:53

## 2021-12-03 RX ADMIN — PIPERACILLIN AND TAZOBACTAM 3375 MG: 3; .375 INJECTION, POWDER, FOR SOLUTION INTRAVENOUS at 01:31

## 2021-12-03 RX ADMIN — TIOTROPIUM BROMIDE INHALATION SPRAY 2 PUFF: 3.12 SPRAY, METERED RESPIRATORY (INHALATION) at 08:19

## 2021-12-03 ASSESSMENT — PAIN - FUNCTIONAL ASSESSMENT: PAIN_FUNCTIONAL_ASSESSMENT: PREVENTS OR INTERFERES WITH MANY ACTIVE NOT PASSIVE ACTIVITIES

## 2021-12-03 ASSESSMENT — PAIN DESCRIPTION - PAIN TYPE: TYPE: ACUTE PAIN

## 2021-12-03 ASSESSMENT — PAIN SCALES - GENERAL
PAINLEVEL_OUTOF10: 7
PAINLEVEL_OUTOF10: 8
PAINLEVEL_OUTOF10: 8

## 2021-12-03 ASSESSMENT — PAIN DESCRIPTION - LOCATION: LOCATION: FOOT

## 2021-12-03 ASSESSMENT — PAIN DESCRIPTION - ORIENTATION: ORIENTATION: RIGHT

## 2021-12-03 NOTE — CARE COORDINATION
12/3/21 Spoke with the pt and her  at the bedside to discuss d/c home. Pt and her  have changed their minds and the are interested in SNF. They do not feel like they can manage at home. Writer gave them a SNF list to review, [facility] will need precert.

## 2021-12-03 NOTE — DISCHARGE INSTR - COC
Continuity of Care Form    Patient Name: Vangie Nelson   :  1945  MRN:  [id number]    Admit date:  2021  Discharge date:  7 Dec 2021    Code Status Order: Full Code   Advance Directives:      Admitting Physician:  Dorann Lesch, MD  PCP: Dominick Bella MD    Discharging Nurse: [facility] Unit/Room#: 6797/0437-17  Discharging Unit Phone Number: [phone]    Emergency Contact:   Extended Emergency Contact Information  Primary Emergency Contact: Baldo Mckeon  Address: [address] Phone: [phone]  Work Phone: [phone]  Mobile Phone: [phone]  Relation: Spouse  Secondary Emergency Contact: Nimo Hernandez   [address] Phone: [phone]  Mobile Phone: [phone]  Relation: Child    Past Surgical History:  Past Surgical History:   Procedure Laterality Date    BRONCHOSCOPY N/A 2021    BRONCHOSCOPY ALVEOLAR LAVAGE performed by Bonifacio Clark MD at [address] [place] [address]  2021    BRONCHOSCOPY THERAPUTIC ASPIRATION INITIAL performed by Bonifacio Clark MD at [address] [place] [address]  3/9/2011    CATARACT REMOVAL WITH IMPLANT  3/23/2011    CHOLECYSTECTOMY      COLON SURGERY      11\" removed    COLONOSCOPY  13    Diverticulosis    HERNIA REPAIR      HYSTERECTOMY      LAP BAND  10/5/10    ADJUSTABLE GASTRIC RESTRICTIVE DEVICE       Immunization History:   Immunization History   Administered Date(s) Administered    COVID-19, Charlcie Arn, Primary or Immunocompromised, PF, 100mcg/0.5mL 2021, 2021    FLUZONE 3 YEARS AND OVER 10/01/2015    Influenza 2011, 10/29/2013, 10/23/2014, 10/15/2015, 10/06/2016    Influenza Vaccine, unspecified formulation 10/29/2013, 10/23/2014, 10/01/2015, 10/06/2016, 10/31/2017, 10/04/2018, 10/15/2019    Influenza Virus Vaccine 2009, 10/15/2013, 10/29/2013, 10/23/2014, 10/01/2015, 10/06/2016, 10/31/2017, 10/04/2018, 10/15/2019, 10/26/2020    Influenza Whole 11/09/2009    Influenza, High Dose (Fluzone 65 yrs and older) 10/15/2015, 10/04/2018    Pneumococcal Conjugate 13-valent (Pyvbvsa31) 03/01/2018    Pneumococcal Conjugate 7-valent (Prevnar7) 10/01/2012    Pneumococcal Polysaccharide (Feajbcpdr79) 10/01/2007, 10/01/2012    Tdap (Boostrix, Adacel) 02/04/2019       Active Problems:  Patient Active Problem List   Diagnosis Code    Hypertension I10    Asthma J45.909    Pulmonary emphysema ([facility] J43.9    Arthritis M19.90    Depression F32. A    Morbid obesity with BMI of 40.0-44.9, adult ([facility]) E66.01, Z68.41    Oxygen dependent Z99.81    Status post gastric banding Z98.84    COPD (chronic obstructive pulmonary disease) ([facility]) J44.9    PARK (obstructive sleep apnea) G47.33    Hypoxemia R09.02    SBO (small bowel obstruction) ([facility]) K56.609    Rash R21    Chronic respiratory failure with hypoxia, on home O2 therapy ([facility]) J96.11, Z99.81    Periodic limb movement disorder (PLMD) G47.61    Knee osteoarthritis M17.10    Spontaneous tension pneumothorax J93.0    Pulmonary nodule R91.1    COPD exacerbation ([facility]) J44.1    Pulmonary infiltrate R91.8    Atelectasis J98.11    Chest congestion R09.89    Ineffective airway clearance R06.89    Pneumonia of left upper lobe due to Pseudomonas species ([facility]) J15.1       Isolation/Infection:   Isolation            No Isolation          Patient Infection Status       Infection Onset Added Last Indicated Last Indicated By Review Planned Expiration Resolved Resolved By    None active    Resolved    COVID-19 (Rule Out) 11/26/21 11/26/21 11/26/21 COVID-19, Rapid (Ordered)   11/26/21 Rule-Out Test Resulted            Nurse Assessment:  Last Vital Signs: BP (!) 148/85   Pulse 80   Temp 98.3 °F (36.8 °C) (Oral)   Resp 18   Ht 5' 3\" (1.6 m)   Wt 230 lb (104.3 kg)   SpO2 97%   BMI 40.74 kg/m²     Last documented pain score (0-10 scale): Pain Level: 7  Last Weight:    Wt Readings from Last 1 Encounters:   11/26/21 230 lb (104.3 kg)     Mental Status:  disoriented, oriented, and alert (DISORIENTED AT TIMES, ESPECIALLY AT NIGHT)    IV Access:  - None    Nursing Mobility/ADLs:  Walking   Assisted  Transfer  Assisted  Bathing  Dependent  Dressing  Assisted  Toileting  Assisted  Feeding  Independent  Med [address]  Assisted  Med Delivery   whole    Wound Care Documentation and Therapy:        Elimination:  Continence: Bowel: No  Bladder: No  Urinary Catheter: None   Colostomy/Ileostomy/Ileal Conduit: No       Date of Last BM: 12/5/21    Intake/Output Summary (Last 24 hours) at 12/3/2021 1402  Last data filed at 12/3/2021 1053  Gross per 24 hour   Intake 600 ml   Output 1250 ml   Net -650 ml     I/O last 3 completed shifts: In: 480 [P.O.:480]  Out: 1250 [Urine:1250]    Safety Concerns:     Sundowner's; Risk for Falls    Impairments/Disabilities:      None    Nutrition Therapy:  Current Nutrition Therapy:   - Oral Diet:  General    Routes of Feeding: Oral  Liquids: Thin Liquids  Daily Fluid Restriction: no  Last Modified Barium Swallow with Video (Video Swallowing Test): not done    Treatments at the Time of Hospital Discharge:   Respiratory Treatments: N/A  Oxygen Therapy:  is on oxygen at 3-4 L/min per nasal cannula.   Ventilator:    - No ventilator support    Rehab Therapies: Physical Therapy and Occupational Therapy  Weight Bearing Status/Restrictions: No weight bearing restirctions  Other Medical Equipment (for information only, NOT a DME order):  wheelchair, walker, and hospital bed  Other Treatments: N/A    Patient's personal belongings (please select all that are sent with patient):  Dentures upper and lower    RN SIGNATURE:  Electronically signed by Dany Saunders RN on 12/7/21 at 2:37 PM EST    CASE MANAGEMENT/SOCIAL WORK SECTION    Inpatient Status Date: 11/26/21     Readmission Risk Assessment Score:  Readmission Risk              Risk of Unplanned Readmission:  16 Discharging to Facility/ Agency   Name: [facility]   Address:  Phone: [phone]  Fax:    Dialysis Facility (if applicable) N/A   Name:  Address:  Dialysis Schedule:  Phone:  Fax:    / signature: Electronically signed by Ariel Gao RN on 12/7/21 at 4:17 PM EST    PHYSICIAN SECTION    Prognosis: Good    Condition at Discharge: Stable    Rehab Potential (if transferring to Rehab): Good    Recommended Labs or Other Treatments After Discharge: None    Physician Certification: I certify the above information and transfer of Javier Melgoza  is necessary for the continuing treatment of the diagnosis listed and that she requires Skilled Nursing  for less 30 days.      Update Admission H&P: No change in H&P    PHYSICIAN SIGNATURE:  Electronically signed by Lilli Razo MD on 12/7/21 at 2:02 PM EST

## 2021-12-03 NOTE — CARE COORDINATION
12/3/21 Pt and her  have requested a referral to [facility]. Referral made will follow. Update 1602: [facility] has accepted and started precert.

## 2021-12-03 NOTE — PROGRESS NOTES
Hospitalist Progress Note      PCP: Cecile Salas MD    Date of Admission: 11/26/2021    Chief Complaint: SOB    Subjective: no new c/o    Medications:  Reviewed    Infusion Medications    sodium chloride 25 mL (11/30/21 0934)     Scheduled Medications    piperacillin-tazobactam  3,375 mg IntraVENous Q8H    ciprofloxacin  1 drop Right Eye Q4H While awake    bumetanide  2 mg Oral Daily    losartan  25 mg Oral Daily    amLODIPine  5 mg Oral Daily    ARIPiprazole  5 mg Oral Daily    DULoxetine  60 mg Oral Daily    budesonide-formoterol  2 puff Inhalation BID    folic acid  1 mg Oral Daily    oxybutynin  5 mg Oral TID    pantoprazole  40 mg Oral Daily    tiotropium  2 puff Inhalation Daily    sodium chloride flush  5-40 mL IntraVENous 2 times per day    apixaban  10 mg Oral BID     PRN Meds: HYDROcodone 5 mg - acetaminophen, nitroGLYCERIN, albuterol, hydroxypropyl methylcellulose, sodium chloride, perflutren lipid microspheres, sodium chloride flush, ondansetron **OR** ondansetron, polyethylene glycol, acetaminophen **OR** acetaminophen      Intake/Output Summary (Last 24 hours) at 12/3/2021 0917  Last data filed at 12/3/2021 1987  Gross per 24 hour   Intake 480 ml   Output 1250 ml   Net -770 ml       Physical Exam Performed:    /62   Pulse 82   Temp 98.5 °F (36.9 °C) (Oral)   Resp 16   Ht 5' 3\" (1.6 m)   Wt 230 lb (104.3 kg)   SpO2 95%   BMI 40.74 kg/m²     General appearance: No apparent distress, appears stated age and cooperative. HEENT: Pupils equal, round, and reactive to light. Conjunctivae/corneas clear. Neck: Supple, with full range of motion. No jugular venous distention. Trachea midline. Respiratory:  Normal respiratory effort. Clear to auscultation, bilaterally without Rales/Wheezes/Rhonchi. Cardiovascular: Regular rate and rhythm with normal S1/S2 without murmurs, rubs or gallops. Abdomen: Soft, non-tender, non-distended with normal bowel sounds.   Musculoskeletal: No clubbing, cyanosis or edema bilaterally. Full range of motion without deformity. Skin: Skin color, texture, turgor normal.  No rashes or lesions. Neurologic:  Neurovascularly intact without any focal sensory/motor deficits. Cranial nerves: II-XII intact, grossly non-focal.  Psychiatric: Alert and oriented, thought content appropriate, normal insight  Capillary Refill: Brisk,< 3 seconds   Peripheral Pulses: +2 palpable, equal bilaterally       Labs:   Recent Labs     12/01/21  0532 12/03/21  0534   WBC 11.0 8.9   HGB 8.7* 8.5*   HCT 27.0* 25.6*    217     Recent Labs     12/01/21  0532 12/03/21  0534    135*   K 4.3 4.2    99   CO2 32 28   BUN 31* 25*   CREATININE 1.2 1.4*   CALCIUM 9.4 9.3   PHOS 3.6 3.7     No results for input(s): AST, ALT, BILIDIR, BILITOT, ALKPHOS in the last 72 hours. No results for input(s): INR in the last 72 hours. Recent Labs     12/01/21 2008 12/02/21  0239   TROPONINI <0.01 <0.01       Urinalysis:      Lab Results   Component Value Date    NITRU POSITIVE 04/06/2021    WBCUA 6-9 04/06/2021    BACTERIA 3+ 04/06/2021    RBCUA 0-2 04/06/2021    BLOODU Negative 04/06/2021    SPECGRAV 1.010 04/06/2021    GLUCOSEU Negative 04/06/2021       Consults:    IP CONSULT TO PULMONOLOGY  IP CONSULT TO NEPHROLOGY  IP CONSULT TO OPHTHALMOLOGY      Assessment/Plan:    Active Hospital Problems    Diagnosis     Pneumonia of left upper lobe due to Pseudomonas species ([facility]) [J15.1]     Pulmonary infiltrate [R91.8]     Atelectasis [J98.11]     Chest congestion [R09.89]     Ineffective airway clearance [R06.89]     Chronic respiratory failure with hypoxia, on home O2 therapy ([facility]) [J96.11, Z99.81]     PARK (obstructive sleep apnea) [G47.33]     [facility] [J44.9]     Hypertension [I10]     Morbid obesity with BMI of 40.0-44.9, adult ([facility]) [E66.01, Z68.41]        PNA - likely Pseudomonas and Enterobacteriaceae pneumonia based on Bronch Cx. Started on or before Zosyn 1 Dec. Continue current ABX pending Cx results    COPD - w/out chronic respiratory failure on baseline home O2 at 4L  Normally controlled on home medication regimen - continued. Here w/ acute exacerbation, completed IV/PO Steroids. Doxy completed. Follows with Dr. Radha Sanford as outpatient. Pulmonary consulted and appreciated.      Pulmonary embolism - POArrival w/ VQ scan done 26 Nov w/ high probability for PE. Did not do CT scan with PE protocol as renal functions were abnormal.  Started on Eliquis. LE dopplers w/ acute Superficial thrombosis of L saphenous vein. Echo w/ preserved EF 55% and no evidence of strain.      Acute on Chronic Respiratory Failure - w/ hypoxia, POArrival.  Presence of clinical respiratory distress w/ tachypnea/dyspnea/SOB and wheezing w/ use of accessory muscles to breath. Supplemental O2 and wean as tolerated.      Right lower lobe defect - Noted on CT scan concerning for endobronchial lesion. Pulmonary consulted s/p Bronchoscopy 30 Nov.      Depression - controlled on home Cymbalta/Abilify - continued.      HTN - w/out known CAD and no evidence of active signs/sxs of ischemia/failure. Currently controlled on home meds w/ vitals reviewed and documented as above. Cozaar held due to soft BP on admission. ARF - w/ elevated BUN/Cr ratio c/w pre-renal azotemia. Given IVF hydration and follow serial labs - now d/c'd. Reviewed and documented as above. She has been on Bumex - initially held and now resumed.  Nephrology consulted and appreciated.      Anemia - etiology clinically unable to determine, w/out evidence of active bleeding/hemolysis. Stable and asymptomatic w/out indication for transfusion. Follow serial labs. Reviewed and documented as above. F foot pain - Plain films 2 Dec negative for fx. Morbid Obesity -  With Body mass index is 40.74 kg/m². Complicating assessment and treatment.  Placing patient at risk for multiple co-morbidities as well as early death and contributing to the patient's presentation. Counseled on weight loss. DVT Prophylaxis: Eliquis     Recent Labs     12/01/21  0532 12/03/21  0534    217     Diet: ADULT DIET; Regular  Code Status: Full Code      PT/OT Eval Status: seen w/ recs for home w/ assist    Dispo - to remain inpatient at least until Friday/Sat 3/4 Dec pending clinical course and subspecialty recs.      Alton Kiran MD

## 2021-12-03 NOTE — PROGRESS NOTES
INPATIENT PULMONARY CRITICAL CARE PROGRESS NOTE      Reason for visit    RLL defect     SUBJECTIVE: Patient seen this morning was feeling better in terms of her breathing  no increasing cough expectoration or shortness of breath of concern, patient did not have any pleuritic chest pain patient continues to have  significant right foot pain and patient states that she cannot put pressure on that which is new for her,, patient was afebrile and hemodynamically maintained, patient was on 4L of nasal oxygen with saturation 97% when seen, patient is tolerating oral diet, , patient has adequate documented urine output, patient blood sugars are acceptable, no other pertinent review of system of concern             Physical Exam:  Blood pressure (!) 148/85, pulse 80, temperature 98.3 °F (36.8 °C), temperature source Oral, resp. rate 18, height 5' 3\" (1.6 m), weight 230 lb (104.3 kg), SpO2 97 %.'   Constitutional:  No acute distress. HENT:  Oropharynx is clear and moist. No thyromegaly. Eyes: Right subconjunctival hemorrhage. Pupils equal, round, and reactive to light. No scleral icterus. Neck: . No tracheal deviation present. No obvious thyroid mass. Short large plaque  Cardiovascular: Normal rate, regular rhythm, normal heart sounds. No right ventricular heave. No lower extremity edema. Pulmonary/Chest: No wheezes. Minimal  basilar crackles. Chest wall is not dull to percussion. No accessory muscle usage or stridor. Decreased breath sound density  Abdominal: Soft. Bowel sounds present. No distension or hernia. No tenderness. Musculoskeletal: No cyanosis. No clubbing. No obvious joint deformity. Right foot tenderness  Lymphadenopathy: No cervical or supraclavicular adenopathy. Skin: Skin is warm and dry. No rash or nodules on the exposed extremities. Psychiatric: Normal mood and affect. Behavior is normal.  No anxiety. Neurologic: Alert, awake and oriented. PERRL.   Speech fluent        Results:  CBC: Recent Labs     12/01/21  0532 12/03/21  0534   WBC 11.0 8.9   HGB 8.7* 8.5*   HCT 27.0* 25.6*   MCV 94.3 92.6    217     BMP:   Recent Labs     12/01/21  0532 12/03/21  0534    135*   K 4.3 4.2    99   CO2 32 28   PHOS 3.6 3.7   BUN 31* 25*   CREATININE 1.2 1.4*       Imaging:  I have reviewed radiology images personally. XR FOOT RIGHT (2 VIEWS)   Final Result   No acute osseous abnormality of the right foot. FL MODIFIED BARIUM SWALLOW W VIDEO   Final Result   No keira aspiration. Minimal intermittent laryngeal penetration with thin   liquid. Please see separate speech pathology report for full discussion of findings   and recommendations. VL Extremity Venous Bilateral   Final Result      US RETROPERITONEAL COMPLETE   Final Result   Symmetric bilateral renal atrophy. No obstruction. Unremarkable urinary bladder ultrasound. NM LUNG SCAN PERFUSION ONLY   Final Result   High probability for pulmonary embolism. Findings were discussed with ANGELES Landaverde on 11/26/2021 at 8:40 p.m.         CT CHEST WO CONTRAST   Final Result   Previously seen defect in the right lower lobe persist and appears somewhat   larger and has not resolved. As a result, recommend bronchoscopy to rule out   endobronchial lesion. Increased volume loss in the right lower lobe anteriorly with improved   aeration of the right lower lobe posteriorly. Dominant curvilinear opacity in left upper lobe is seen, similar to prior         XR CHEST PORTABLE   Final Result   Airspace opacities in the left lung apex and the right lung base, may be   related to pneumonia. Recommend follow-up to resolution to exclude   underlying lung mass/neoplasm             Echo- Summary   Normal left ventricle systolic function with an estimated ejection fraction   of 55%. No regional wall motion abnormalities are seen. Grade I diastolic dysfunction with normal filing pressure.    Mild posterior mitral annular calcification is present. Mild mitral regurgitation. Trace tricuspid regurgitation. Systolic pulmonary artery pressure (SPAP) is normal and estimated at 30 mmHg   (right atrial pressure 3 mmHg).     Component 11/30/21 1029 Resulting Agency   CULTURE, RESPIRATORY 10,000 to 50,000 CFU/mL Normal respiratory arcelia Abnormal   [facility]   Gram Stain Result 3+ WBC's (Polymorphonuclear)  present   2+ Gram positive cocci  in chains and pairs- resembling Strep   1+ Gram positive cocci  in clusters  [facility]   Organism Pseudomonas aeruginosa Abnormal   [address] [facility]   CULTURE, RESPIRATORY 10,000 to 50,000 CFU/mL  [address] [facility]   Organism Enterobacter cloacae complex Abnormal   [address] [facility]   CULTURE, RESPIRATORY 10,000 to 50,000 CFU/mL  [facility]          Susceptibility     Enterobacter cloacae complex Pseudomonas aeruginosa     BACTERIAL SUSCEPTIBILITY PANEL BY LUDIVINA BACTERIAL SUSCEPTIBILITY PANEL BY LUDIVINA     ampicillin <=8 mcg/mL Resistant       ceFAZolin 4 mcg/mL Resistant       cefepime <=2 mcg/mL Sensitive <=2 mcg/mL Sensitive     cefTRIAXone <=1 mcg/mL Sensitive       cefuroxime 8 mcg/mL Sensitive       ciprofloxacin <=1 mcg/mL Sensitive <=1 mcg/mL Sensitive     ertapenem <=0.5 mcg/mL Sensitive       gentamicin <=4 mcg/mL Sensitive <=4 mcg/mL Sensitive     meropenem <=1 mcg/mL Sensitive <=1 mcg/mL Sensitive     piperacillin-tazobactam <=16 mcg/mL Sensitive <=16 mcg/mL Sensitive     tobramycin   <=4 mcg/mL Sensitive     trimethoprim-sulfamethoxazole <=2/38 mcg/mL Sensitive                                 Assessment:  Principal Problem:    COPD (chronic obstructive pulmonary disease) ([facility])  Active Problems:    Hypertension    Morbid obesity with BMI of 40.0-44.9, adult ([facility])    PARK (obstructive sleep apnea)    Chronic respiratory failure with hypoxia, on home O2 therapy ([facility])    Pulmonary infiltrate    Atelectasis    Chest congestion Ineffective airway clearance    Pneumonia of left upper lobe due to Pseudomonas species [facility])  Resolved Problems:    * No resolved hospital problems. *          Plan:   · Oxygen supplementation to keep saturation between 90 and 94% only  · Please titrate the oxygen as per the above parameters  · Home CPAP/BiPAP on intermittent basis  · Patient's preliminary data from bronchoscopy shows patient to have Pseudomonas and Enterobacteriaceae pneumonia  · Patient is on  IV Zosyn which can be transitioned to PO Augmentin   · Bronchodilators  · OFF PO prednisone  · Cardiac medications as per IM  · Monitor input output and BMP   · Correct electrolytes on whenever necessary basis  · Fluid management/diuretics as per nephrology  · Patient is on Eliquis  · Monitor for any bleeding  · Monitor for any anticholinergic side effects with combination of Spiriva and Ditropan  · Patient has been started on low-dose of Bumex by nephrology  · Diet as per speech therapist and metabolic support  · Evaluation and management of foot pain as per IM-uric acid levels added to morning labs -to be followed by IM and do needful ,if abnormal   · PUD prophylaxis    Case discussed with patient and nursing    ? Discharge planning       Electronically signed by:  Elmo Robledo MD    12/3/2021    12:08 PM.

## 2021-12-03 NOTE — PROGRESS NOTES
[facility] Progress Note           Subjective/   64y.o. year old female who we are seeing in consultation for CARLOS. HPI:  Cr up to 1.4   Pt not able to get up from bed with PT    ROS:  Shortness of breath stable, uses 4 L NC at home. No fevers. Objective/   GEN:  Chronically ill, BP (!) 148/85   Pulse 80   Temp 98.3 °F (36.8 °C) (Oral)   Resp 18   Ht 5' 3\" (1.6 m)   Wt 230 lb (104.3 kg)   SpO2 97%   BMI 40.74 kg/m²   HEENT: non-icteric, no JVD  CV: S1, S2 without m/r/g; + LE edema  RESP: CTA B without w/r/r; breathing wnl  ABD: +bs, soft, nt, no hsm  SKIN: warm, no rashes    Data/  Recent Labs     12/01/21  0532 12/03/21  0534   WBC 11.0 8.9   HGB 8.7* 8.5*   HCT 27.0* 25.6*   MCV 94.3 92.6    217     Recent Labs     12/01/21  0532 12/03/21  0534    135*   K 4.3 4.2    99   CO2 32 28   GLUCOSE 91 104*   PHOS 3.6 3.7   BUN 31* 25*   CREATININE 1.2 1.4*   LABGLOM 43* 36*   GFRAA 52* 44*     Renal US ->  Kidneys:       The right kidney measures 8.9 cm in length with cortical thickness of 8 mm   and the left kidney 8.7 cm in length with cortical thickness of 11 mm.  Renal   parenchymal echogenicity is normal and there is no hydronephrosis.  No cystic   or solid renal mass.           Bladder:       Partial distention of the urinary bladder with a volume of 87 mL.  No   significant wall thickening.  Bilateral ureteral jets are documented.  The   patient was unable to void. Assessment/Plan     CARLOS on CKD Stage 3.  - CARLOS suspect pre-renal in the setting of diuretic and ARB use. CKD maybe from chronic NSAID use. - Urinalysis with electrolytes and UPC. - Renal US. - Serum creatinine is trending down. - cont home dose of bumex 2 mg a day. - Monitor kidney function with restarting Losartan. - Trend labs. -baseline cr 1.2-1.6     COPD Exacerbation.  - Plans per Admitting.     Hypertension.  - Low dose of Losartan resumed.   - Started Amlodipine 5mg daily.     Presumed PE.  - VQ scan with high probability of PE.  - On Apixaban.    ____________________________________  Tiana Sanchez MD  [facility]  [email or web address]  Office: [phone]

## 2021-12-03 NOTE — PROGRESS NOTES
Occupational Therapy  Facility/Department: [facility] - MED SURG/ORTHO  Daily Treatment Note  NAME: Adriana Friday  : 1945  MRN: [id number]    Date of Service: 12/3/2021    Discharge Recommendations:  [address]  (pending Right foot pain and ability to transfer/walk)   Barriers to home discharge:   [x] Steps to access home entry or bed/bath:           Assessment   Performance deficits / Impairments: Decreased functional mobility ; Decreased ADL status; Decreased balance; Decreased safe awareness; Decreased strength; Decreased ROM; Decreased endurance  Assessment: pt normally independent with BADL's & functional mobility with 4 WW PTA, now with \"7/10\" Right foot pain, requiring max assist sit<-->stand with AUSTYN PELAYO, dependent with LE self care; pt to benefit from skilled OT services  OT Education: OT Role; Plan of Care; Precautions  Patient Education: disease specific: importance of OOB for cardiopulmonary benefits, safe eating, postural/pressure relief, use of RED/nurse call light for assist with ADL & transfer needs  Barriers to Learning: none perceived  REQUIRES OT FOLLOW UP: Yes  Activity Tolerance  Activity Tolerance: Patient limited by pain  Activity Tolerance: sitting in chair on 4 L O 2: BP = 124/75, 93 % O 2 sats, HR = 83;  Safety Devices  Safety Devices in place: Yes  Type of devices: Call light within reach; Chair alarm in place; Left in chair; Nurse notified         Patient Diagnosis(es): The encounter diagnosis was Acute on chronic respiratory failure with hypoxia ([address] has a past medical history of Arthritis, Asthma, Bronchitis chronic, Chronic respiratory failure ([address] Depression, Diabetes mellitus ([address] Emphysema of lung ([address] Glaucoma, Hypertension, Morbid obesity ([address] Oxygen dependent, Pneumonia of left upper lobe due to Pseudomonas species ([address] Rash, Shingles, and Sleep apnea. has a past surgical history that includes hernia repair;  Cholecystectomy; Hysterectomy; Colon surgery; Lap Band (10/5/10); Cataract removal with implant (3/9/2011); Cataract removal with implant (3/23/2011); Colonoscopy (11/18/13); bronchoscopy (N/A, 11/30/2021); and bronchoscopy (11/30/2021). Restrictions  Restrictions/Precautions  Restrictions/Precautions: Fall Risk, General Precautions  Position Activity Restriction  Other position/activity restrictions: Up with assist, 4L O2, Purewick, IV  Subjective   General  Chart Reviewed: Yes  Patient assessed for rehabilitation services?: Yes  Response to previous treatment: Patient with no complaints from previous session  Family / Caregiver Present: No (spouse left room)  Referring Practitioner: JUANY Rosas CNP  Diagnosis: COPD exacerbation  Subjective  Subjective: Pt with complaints right foot pain  General Comment  Comments: RN cleared pt for OT; pt awake in bed, agreeable to OT  Pain Assessment  Pain Assessment: 0-10  Pain Level: 7  Pain Type: Acute pain  Pain Location: Foot  Pain Orientation: Right  Functional Pain Assessment: Prevents or interferes with many active not passive activities  Non-Pharmaceutical Pain Intervention(s): Therapeutic presence; Repositioned  Pre Treatment Pain Screening  Intervention List: Patient able to continue with treatment; Nurse called to administer meds  Vital Signs  Patient Currently in Pain: Yes   Orientation  Orientation  Overall Orientation Status: Within Functional Limits  Objective    ADL  Grooming: Setup (in chair to wash face and hands)  LE Dressing: Dependent/Total (for right non-skid sock)  Toileting: Dependent/Total (purewick)        Balance  Sitting Balance: Supervision  Standing Balance: Dependent/Total (with AUSTYN PIERCE, unable to stand from elevated bed height with 4 WW due to Right foot pain)  Bed mobility  Supine to Sit: Contact guard assistance  Sit to Supine: Unable to assess (Left up in chair upon exiting)  Scooting: Minimal assistance  Transfers  Sit to stand:  Moderate assistance (with AUSTYN STEDY)  Stand to sit: Moderate assistance (from AUSTYN STEDY)         Cognition  Overall Cognitive Status: Exceptions (anxious due to Right foot pain, but cooperative)  Arousal/Alertness: Appropriate responses to stimuli  Following Commands: Follows one step commands consistently  Attention Span: Attends with cues to redirect  Memory: Decreased recall of precautions  Safety Judgement: Decreased awareness of need for safety; Decreased awareness of need for assistance  Insights: Decreased awareness of deficits  Initiation: Requires cues for some  Sequencing: Requires cues for some       Type of ROM/Therapeutic Exercise: AROM  Comment: BUE seated in chair  Hand flex/ext: x   10 Reps  Elbow flex/ext:  x  10  Reps  Forearm sup/pron:  x  10  Reps    LUE AROM : Exceptions  LUE General AROM: Limited in ability to reach behind to small of back. L Shoulder Flexion 0-180: ~90 degrees  Left Hand AROM: WFL    RUE AROM : WFL  Right Hand AROM: WFL                 Plan   Plan  Times per week: 3-5x/week  Current Treatment Recommendations: Strengthening, Endurance Training, Functional Mobility Training, Safety Education & Training, Patient/Caregiver Education & Training, Equipment Evaluation, Education, & procurement, Self-Care / ADL, Balance Training           AM-PAC Score        -[facility] Inpatient Daily Activity Raw Score: 14 (12/03/21 1309)  -PAC Inpatient ADL T-Scale Score : 33.39 (12/03/21 1309)  ADL Inpatient CMS 0-100% Score: 59.67 (12/03/21 1309)  ADL Inpatient CMS G-Code Modifier : CK (12/03/21 1309)    Goals  Short term goals  Time Frame for Short term goals: 1 week (12/4) unless stated otherwise. Short term goal 1: Pt will TBD with mod I; 12/03 max assist with socks  Short term goal 2: Pt will perform BUE ther ex x20 to increase strength/endurance for functional activities (12/2). -- 12/03 tolerated 10 reps BUE AROM elbow-->hand  Short term goal 3: Pt will perform at least 5min of functional standing activities with mod I.-- ongoing 12/1/21  Short term goal 4: Pt will perform at least 3 standing ADLS with mod I.-- 65/47 dependent static standing with AUSTYN PELAYO  Short term goal 5: Pt will toilet with mod I.-- 12/03 dependent with Judah  Patient Goals   Patient goals : \"to walk more\"       Therapy Time   Individual Concurrent Group Co-treatment   Time In 1150         Time Out [facility]         Minutes [address] [place], [place]

## 2021-12-03 NOTE — PROGRESS NOTES
INPATIENT PULMONARY CRITICAL CARE PROGRESS NOTE      Reason for visit    RLL defect     SUBJECTIVE: Patient seen this morning was feeling better in terms of her respirations, no increasing cough expectoration or shortness of breath of concern, patient did not have any pleuritic chest pain patient having significant right foot pain and patient states that she cannot put pressure on that which is new for her,, patient was afebrile and hemodynamically maintained, patient was on 4L of nasal oxygen with saturation 97% when seen, patient is tolerating oral diet, , patient has adequate documented urine output, patient blood sugars are acceptable, no other pertinent review of system of concern             Physical Exam:  Blood pressure 106/66, pulse 75, temperature 98.1 °F (36.7 °C), temperature source Oral, resp. rate 18, height 5' 3\" (1.6 m), weight 230 lb (104.3 kg), SpO2 94 %.'   Constitutional:  No acute distress. HENT:  Oropharynx is clear and moist. No thyromegaly. Eyes: Right subconjunctival hemorrhage. Pupils equal, round, and reactive to light. No scleral icterus. Neck: . No tracheal deviation present. No obvious thyroid mass. Short large plaque  Cardiovascular: Normal rate, regular rhythm, normal heart sounds. No right ventricular heave. No lower extremity edema. Pulmonary/Chest: No wheezes. Decreased basilar crackles. Chest wall is not dull to percussion. No accessory muscle usage or stridor. Decreased breath sound density  Abdominal: Soft. Bowel sounds present. No distension or hernia. No tenderness. Musculoskeletal: No cyanosis. No clubbing. No obvious joint deformity. Right foot tenderness  Lymphadenopathy: No cervical or supraclavicular adenopathy. Skin: Skin is warm and dry. No rash or nodules on the exposed extremities. Psychiatric: Normal mood and affect. Behavior is normal.  No anxiety. Neurologic: Alert, awake and oriented. PERRL.   Speech fluent        Results:  CBC:   Recent Labs 11/30/21  0546 12/01/21  0532   WBC 6.9 11.0   HGB 9.3* 8.7*   HCT 28.2* 27.0*   MCV 92.0 94.3    224     BMP:   Recent Labs     11/30/21  0546 12/01/21  0532    142   K 4.2 4.3    103   CO2 33* 32   PHOS 3.6 3.6   BUN 30* 31*   CREATININE 1.3* 1.2       Imaging:  I have reviewed radiology images personally. XR FOOT RIGHT (2 VIEWS)   Final Result   No acute osseous abnormality of the right foot. FL MODIFIED BARIUM SWALLOW W VIDEO   Final Result   No keira aspiration. Minimal intermittent laryngeal penetration with thin   liquid. Please see separate speech pathology report for full discussion of findings   and recommendations. VL Extremity Venous Bilateral   Final Result      US RETROPERITONEAL COMPLETE   Final Result   Symmetric bilateral renal atrophy. No obstruction. Unremarkable urinary bladder ultrasound. NM LUNG SCAN PERFUSION ONLY   Final Result   High probability for pulmonary embolism. Findings were discussed with ANGELES Jane on 11/26/2021 at 8:40 p.m.         CT CHEST WO CONTRAST   Final Result   Previously seen defect in the right lower lobe persist and appears somewhat   larger and has not resolved. As a result, recommend bronchoscopy to rule out   endobronchial lesion. Increased volume loss in the right lower lobe anteriorly with improved   aeration of the right lower lobe posteriorly. Dominant curvilinear opacity in left upper lobe is seen, similar to prior         XR CHEST PORTABLE   Final Result   Airspace opacities in the left lung apex and the right lung base, may be   related to pneumonia. Recommend follow-up to resolution to exclude   underlying lung mass/neoplasm             Echo- Summary   Normal left ventricle systolic function with an estimated ejection fraction   of 55%. No regional wall motion abnormalities are seen. Grade I diastolic dysfunction with normal filing pressure.    Mild posterior mitral annular calcification is present. Mild mitral regurgitation. Trace tricuspid regurgitation. Systolic pulmonary artery pressure (SPAP) is normal and estimated at 30 mmHg   (right atrial pressure 3 mmHg).     Component 11/30/21 1029 Resulting Agency   CULTURE, RESPIRATORY 10,000 to 50,000 CFU/mL Normal respiratory arcelia Abnormal   [facility]   Gram Stain Result 3+ WBC's (Polymorphonuclear)  present   2+ Gram positive cocci  in chains and pairs- resembling Strep   1+ Gram positive cocci  in clusters  [facility]   Organism Pseudomonas aeruginosa Abnormal   [address] [facility]   CULTURE, RESPIRATORY 10,000 to 50,000 CFU/mL  [address] [facility]   Organism Enterobacter cloacae complex Abnormal   [facility]   CULTURE, RESPIRATORY 10,000 to 50,000 CFU/mL  [facility]          Susceptibility     Enterobacter cloacae complex Pseudomonas aeruginosa     BACTERIAL SUSCEPTIBILITY PANEL BY LUDIVINA BACTERIAL SUSCEPTIBILITY PANEL BY LUDIVINA     ampicillin <=8 mcg/mL Resistant       ceFAZolin 4 mcg/mL Resistant       cefepime <=2 mcg/mL Sensitive <=2 mcg/mL Sensitive     cefTRIAXone <=1 mcg/mL Sensitive       cefuroxime 8 mcg/mL Sensitive       ciprofloxacin <=1 mcg/mL Sensitive <=1 mcg/mL Sensitive     ertapenem <=0.5 mcg/mL Sensitive       gentamicin <=4 mcg/mL Sensitive <=4 mcg/mL Sensitive     meropenem <=1 mcg/mL Sensitive <=1 mcg/mL Sensitive     piperacillin-tazobactam <=16 mcg/mL Sensitive <=16 mcg/mL Sensitive     tobramycin   <=4 mcg/mL Sensitive     trimethoprim-sulfamethoxazole <=2/38 mcg/mL Sensitive                                 Assessment:  Principal Problem:    COPD (chronic obstructive pulmonary disease) ([facility])  Active Problems:    Hypertension    Morbid obesity with BMI of 40.0-44.9, adult ([facility])    PARK (obstructive sleep apnea)    Chronic respiratory failure with hypoxia, on home O2 therapy ([facility])    Pulmonary infiltrate    Atelectasis    Chest congestion

## 2021-12-04 PROBLEM — E79.0 HYPERURICEMIA: Status: ACTIVE | Noted: 2021-12-04

## 2021-12-04 LAB
ALBUMIN SERPL-MCNC: 2.9 G/DL (ref 3.4–5)
ANION GAP SERPL CALCULATED.3IONS-SCNC: 12 MMOL/L (ref 3–16)
BUN BLDV-MCNC: 21 MG/DL (ref 7–20)
CALCIUM SERPL-MCNC: 9.6 MG/DL (ref 8.3–10.6)
CHLORIDE BLD-SCNC: 92 MMOL/L (ref 99–110)
CO2: 29 MMOL/L (ref 21–32)
CREAT SERPL-MCNC: 1.3 MG/DL (ref 0.6–1.2)
GFR AFRICAN AMERICAN: 48
GFR NON-AFRICAN AMERICAN: 40
GLUCOSE BLD-MCNC: 104 MG/DL (ref 70–99)
HCT VFR BLD CALC: 25.1 % (ref 36–48)
HEMOGLOBIN: 8.3 G/DL (ref 12–16)
MCH RBC QN AUTO: 30.1 PG (ref 26–34)
MCHC RBC AUTO-ENTMCNC: 33 G/DL (ref 31–36)
MCV RBC AUTO: 91.3 FL (ref 80–100)
PDW BLD-RTO: 15.6 % (ref 12.4–15.4)
PHOSPHORUS: 2.6 MG/DL (ref 2.5–4.9)
PLATELET # BLD: 256 K/UL (ref 135–450)
PMV BLD AUTO: 8.6 FL (ref 5–10.5)
POTASSIUM SERPL-SCNC: 3.6 MMOL/L (ref 3.5–5.1)
RBC # BLD: 2.74 M/UL (ref 4–5.2)
SODIUM BLD-SCNC: 133 MMOL/L (ref 136–145)
WBC # BLD: 7.9 K/UL (ref 4–11)

## 2021-12-04 PROCEDURE — 1200000000 HC SEMI PRIVATE

## 2021-12-04 PROCEDURE — 99232 SBSQ HOSP IP/OBS MODERATE 35: CPT | Performed by: INTERNAL MEDICINE

## 2021-12-04 PROCEDURE — 94640 AIRWAY INHALATION TREATMENT: CPT

## 2021-12-04 PROCEDURE — 6370000000 HC RX 637 (ALT 250 FOR IP): Performed by: NURSE PRACTITIONER

## 2021-12-04 PROCEDURE — 6360000002 HC RX W HCPCS: Performed by: INTERNAL MEDICINE

## 2021-12-04 PROCEDURE — 2580000003 HC RX 258: Performed by: INTERNAL MEDICINE

## 2021-12-04 PROCEDURE — 2700000000 HC OXYGEN THERAPY PER DAY

## 2021-12-04 PROCEDURE — 6370000000 HC RX 637 (ALT 250 FOR IP): Performed by: INTERNAL MEDICINE

## 2021-12-04 PROCEDURE — 85027 COMPLETE CBC AUTOMATED: CPT

## 2021-12-04 PROCEDURE — 80069 RENAL FUNCTION PANEL: CPT

## 2021-12-04 PROCEDURE — 94761 N-INVAS EAR/PLS OXIMETRY MLT: CPT

## 2021-12-04 PROCEDURE — 2580000003 HC RX 258: Performed by: NURSE PRACTITIONER

## 2021-12-04 PROCEDURE — 36415 COLL VENOUS BLD VENIPUNCTURE: CPT

## 2021-12-04 RX ORDER — HYDROCODONE BITARTRATE AND ACETAMINOPHEN 5; 325 MG/1; MG/1
1 TABLET ORAL EVERY 6 HOURS PRN
Status: DISCONTINUED | OUTPATIENT
Start: 2021-12-04 | End: 2021-12-07 | Stop reason: HOSPADM

## 2021-12-04 RX ORDER — HYDROCODONE BITARTRATE AND ACETAMINOPHEN 5; 325 MG/1; MG/1
1 TABLET ORAL ONCE
Status: COMPLETED | OUTPATIENT
Start: 2021-12-04 | End: 2021-12-04

## 2021-12-04 RX ORDER — ALLOPURINOL 100 MG/1
100 TABLET ORAL 2 TIMES DAILY
Status: DISCONTINUED | OUTPATIENT
Start: 2021-12-04 | End: 2021-12-07 | Stop reason: HOSPADM

## 2021-12-04 RX ADMIN — ALLOPURINOL 100 MG: 100 TABLET ORAL at 20:12

## 2021-12-04 RX ADMIN — HYDROCODONE BITARTRATE AND ACETAMINOPHEN 1 TABLET: 5; 325 TABLET ORAL at 15:28

## 2021-12-04 RX ADMIN — DULOXETINE HYDROCHLORIDE 60 MG: 60 CAPSULE, DELAYED RELEASE ORAL at 09:48

## 2021-12-04 RX ADMIN — LOSARTAN POTASSIUM 25 MG: 25 TABLET, FILM COATED ORAL at 09:47

## 2021-12-04 RX ADMIN — CIPROFLOXACIN 1 DROP: 3 SOLUTION OPHTHALMIC at 21:58

## 2021-12-04 RX ADMIN — CIPROFLOXACIN 1 DROP: 3 SOLUTION OPHTHALMIC at 09:49

## 2021-12-04 RX ADMIN — Medication 2 PUFF: at 08:34

## 2021-12-04 RX ADMIN — AMLODIPINE BESYLATE 5 MG: 5 TABLET ORAL at 09:48

## 2021-12-04 RX ADMIN — OXYBUTYNIN CHLORIDE 5 MG: 5 TABLET ORAL at 20:12

## 2021-12-04 RX ADMIN — APIXABAN 5 MG: 5 TABLET, FILM COATED ORAL at 20:13

## 2021-12-04 RX ADMIN — HYDROCODONE BITARTRATE AND ACETAMINOPHEN 1 TABLET: 5; 325 TABLET ORAL at 03:23

## 2021-12-04 RX ADMIN — APIXABAN 5 MG: 5 TABLET, FILM COATED ORAL at 09:47

## 2021-12-04 RX ADMIN — OXYBUTYNIN CHLORIDE 5 MG: 5 TABLET ORAL at 09:47

## 2021-12-04 RX ADMIN — PANTOPRAZOLE SODIUM 40 MG: 40 TABLET, DELAYED RELEASE ORAL at 09:48

## 2021-12-04 RX ADMIN — Medication 2 PUFF: at 19:10

## 2021-12-04 RX ADMIN — OXYBUTYNIN CHLORIDE 5 MG: 5 TABLET ORAL at 15:24

## 2021-12-04 RX ADMIN — ARIPIPRAZOLE 5 MG: 10 TABLET ORAL at 09:47

## 2021-12-04 RX ADMIN — SODIUM CHLORIDE, PRESERVATIVE FREE 10 ML: 5 INJECTION INTRAVENOUS at 09:48

## 2021-12-04 RX ADMIN — PIPERACILLIN AND TAZOBACTAM 3375 MG: 3; .375 INJECTION, POWDER, FOR SOLUTION INTRAVENOUS at 11:07

## 2021-12-04 RX ADMIN — TIOTROPIUM BROMIDE INHALATION SPRAY 2 PUFF: 3.12 SPRAY, METERED RESPIRATORY (INHALATION) at 08:34

## 2021-12-04 RX ADMIN — CIPROFLOXACIN 1 DROP: 3 SOLUTION OPHTHALMIC at 15:24

## 2021-12-04 RX ADMIN — CIPROFLOXACIN 1 DROP: 3 SOLUTION OPHTHALMIC at 17:38

## 2021-12-04 RX ADMIN — BUMETANIDE 2 MG: 1 TABLET ORAL at 09:47

## 2021-12-04 RX ADMIN — FOLIC ACID 1 MG: 1 TABLET ORAL at 09:47

## 2021-12-04 RX ADMIN — PIPERACILLIN AND TAZOBACTAM 3375 MG: 3; .375 INJECTION, POWDER, FOR SOLUTION INTRAVENOUS at 17:39

## 2021-12-04 RX ADMIN — HYDROCODONE BITARTRATE AND ACETAMINOPHEN 1 TABLET: 5; 325 TABLET ORAL at 21:58

## 2021-12-04 ASSESSMENT — PAIN DESCRIPTION - PAIN TYPE: TYPE: ACUTE PAIN

## 2021-12-04 ASSESSMENT — PAIN SCALES - GENERAL
PAINLEVEL_OUTOF10: 0
PAINLEVEL_OUTOF10: 0
PAINLEVEL_OUTOF10: 8
PAINLEVEL_OUTOF10: 6
PAINLEVEL_OUTOF10: 7
PAINLEVEL_OUTOF10: 0
PAINLEVEL_OUTOF10: 8

## 2021-12-04 ASSESSMENT — PAIN DESCRIPTION - LOCATION: LOCATION: FOOT

## 2021-12-04 ASSESSMENT — PAIN DESCRIPTION - ORIENTATION: ORIENTATION: RIGHT

## 2021-12-04 NOTE — PROGRESS NOTES
Hospitalist Progress Note      PCP: Julisa Morillo MD    Date of Admission: 11/26/2021    Chief Complaint: SOB    Subjective: no new c/o    Medications:  Reviewed    Infusion Medications    sodium chloride 25 mL (11/30/21 0934)     Scheduled Medications    apixaban  5 mg Oral BID    piperacillin-tazobactam  3,375 mg IntraVENous Q8H    ciprofloxacin  1 drop Right Eye Q4H While awake    bumetanide  2 mg Oral Daily    losartan  25 mg Oral Daily    amLODIPine  5 mg Oral Daily    ARIPiprazole  5 mg Oral Daily    DULoxetine  60 mg Oral Daily    budesonide-formoterol  2 puff Inhalation BID    folic acid  1 mg Oral Daily    oxybutynin  5 mg Oral TID    pantoprazole  40 mg Oral Daily    tiotropium  2 puff Inhalation Daily    sodium chloride flush  5-40 mL IntraVENous 2 times per day     PRN Meds: nitroGLYCERIN, albuterol, hydroxypropyl methylcellulose, sodium chloride, perflutren lipid microspheres, sodium chloride flush, ondansetron **OR** ondansetron, polyethylene glycol, acetaminophen **OR** acetaminophen      Intake/Output Summary (Last 24 hours) at 12/4/2021 0847  Last data filed at 12/4/2021 0626  Gross per 24 hour   Intake 120 ml   Output 900 ml   Net -780 ml       Physical Exam Performed:    /68   Pulse 95   Temp 97.7 °F (36.5 °C) (Oral)   Resp 18   Ht 5' 3\" (1.6 m)   Wt 230 lb (104.3 kg)   SpO2 90%   BMI 40.74 kg/m²     General appearance: No apparent distress, appears stated age and cooperative. HEENT: Pupils equal, round, and reactive to light. Conjunctivae/corneas clear. Neck: Supple, with full range of motion. No jugular venous distention. Trachea midline. Respiratory:  Normal respiratory effort. Clear to auscultation, bilaterally without Rales/Wheezes/Rhonchi. Cardiovascular: Regular rate and rhythm with normal S1/S2 without murmurs, rubs or gallops. Abdomen: Soft, non-tender, non-distended with normal bowel sounds.   Musculoskeletal: No clubbing, cyanosis or edema bilaterally. Full range of motion without deformity. Skin: Skin color, texture, turgor normal.  No rashes or lesions. Neurologic:  Neurovascularly intact without any focal sensory/motor deficits. Cranial nerves: II-XII intact, grossly non-focal.  Psychiatric: Alert and oriented, thought content appropriate, normal insight  Capillary Refill: Brisk,< 3 seconds   Peripheral Pulses: +2 palpable, equal bilaterally       Labs:   Recent Labs     12/03/21  0534 12/04/21  0651   WBC 8.9 7.9   HGB 8.5* 8.3*   HCT 25.6* 25.1*    256     Recent Labs     12/03/21  0534   *   K 4.2   CL 99   CO2 28   BUN 25*   CREATININE 1.4*   CALCIUM 9.3   PHOS 3.7     No results for input(s): AST, ALT, BILIDIR, BILITOT, ALKPHOS in the last 72 hours. No results for input(s): INR in the last 72 hours. Recent Labs     12/01/21 2008 12/02/21  0239   TROPONINI <0.01 <0.01       Urinalysis:      Lab Results   Component Value Date    NITRU POSITIVE 04/06/2021    WBCUA 6-9 04/06/2021    BACTERIA 3+ 04/06/2021    RBCUA 0-2 04/06/2021    BLOODU Negative 04/06/2021    SPECGRAV 1.010 04/06/2021    GLUCOSEU Negative 04/06/2021       Consults:    IP CONSULT TO PULMONOLOGY  IP CONSULT TO NEPHROLOGY  IP CONSULT TO OPHTHALMOLOGY  IP CONSULT TO HOME CARE NEEDS      Assessment/Plan:    Active Hospital Problems    Diagnosis     Pneumonia of left upper lobe due to Pseudomonas species ([facility] [J15.1]     Pulmonary infiltrate [R91.8]     Atelectasis [J98.11]     Chest congestion [R09.89]     Ineffective airway clearance [R06.89]     Chronic respiratory failure with hypoxia, on home O2 therapy ([facility]) [J96.11, Z99.81]     PARK (obstructive sleep apnea) [G47.33]     COPD (chronic obstructive pulmonary disease) ([facility] [J44.9]     Hypertension [I10]     Morbid obesity with BMI of 40.0-44.9, adult ([facility]) [E66.01, Z68.41]        PNA - likely Pseudomonas and Enterobacteriaceae pneumonia based on Bronch Cx.   Started on or before Zosyn 1 Dec. Continue current ABX w/ sensitivities reviewed. COPD - w/out chronic respiratory failure on baseline home O2 at 4L  Normally controlled on home medication regimen - continued. Here w/ acute exacerbation, completed IV/PO Steroids. Doxy completed. Follows with Dr. Idalia Patel as outpatient. Pulmonary consulted and appreciated.      Pulmonary embolism - POArrival w/ VQ scan done 26 Nov w/ high probability for PE. Did not do CT scan with PE protocol as renal functions were abnormal.  Started on Eliquis. LE dopplers w/ acute Superficial thrombosis of L saphenous vein. Echo w/ preserved EF 55% and no evidence of strain.      Acute on Chronic Respiratory Failure - w/ hypoxia, POArrival.  Presence of clinical respiratory distress w/ tachypnea/dyspnea/SOB and wheezing w/ use of accessory muscles to breath. Supplemental O2 and wean as tolerated.      Right lower lobe defect - Noted on CT scan concerning for endobronchial lesion. Pulmonary consulted s/p Bronchoscopy 30 Nov.      Depression - controlled on home Cymbalta/Abilify - continued.      HTN - w/out known CAD and no evidence of active signs/sxs of ischemia/failure. Currently controlled on home meds w/ vitals reviewed and documented as above. Cozaar held due to soft BP on admission. ARF - w/ elevated BUN/Cr ratio c/w pre-renal azotemia. Given IVF hydration and follow serial labs - now d/c'd. Reviewed and documented as above. She has been on Bumex - initially held and now resumed.  Nephrology consulted and appreciated.      Anemia - etiology clinically unable to determine, w/out evidence of active bleeding/hemolysis. Stable and asymptomatic w/out indication for transfusion. Follow serial labs. Reviewed and documented as above. F foot pain - Plain films 2 Dec negative for fx. Continue PRN narcotic analgesia. Morbid Obesity -  With Body mass index is 40.74 kg/m². Complicating assessment and treatment.  Placing patient at risk for multiple co-morbidities as well as early death and contributing to the patient's presentation. Counseled on weight loss. DVT Prophylaxis: Eliquis     Recent Labs     12/03/21  0534 12/04/21  0651    256     Diet: ADULT DIET; Regular  Code Status: Full Code      PT/OT Eval Status: seen w/ recs for home w/ assist    Dispo - to remain inpatient at least until Sat/Jason 4/5 Dec pending clinical course and subspecialty recs.      Mey Hart MD

## 2021-12-04 NOTE — PROGRESS NOTES
INPATIENT PULMONARY CRITICAL CARE PROGRESS NOTE      Reason for visit    RLL defect     SUBJECTIVE: Patient seen this morning was feeling better in terms of her breathing  no increasing cough expectoration or shortness of breath of concern, patient continues to have  significant right foot pain and patient states that and X-ray was done  And no other intervention , patient was afebrile and hemodynamically maintained, patient was on 4L of nasal oxygen with saturation 92% when seen, patient is tolerating oral diet, , patient has adequate documented urine output, patient blood sugars are acceptable, patient was alert and communicative ;no other pertinent review of system of concern         Physical Exam:  Blood pressure 97/67, pulse 83, temperature 97.3 °F (36.3 °C), temperature source Oral, resp. rate 16, height 5' 3\" (1.6 m), weight 230 lb (104.3 kg), SpO2 97 %.'   Constitutional:  No acute distress. HENT:  Oropharynx is clear and moist. No thyromegaly. Eyes: Right subconjunctival hemorrhage. Pupils equal, round, and reactive to light. No scleral icterus. Neck: . No tracheal deviation present. No obvious thyroid mass. Short large plaque  Cardiovascular: Normal rate, regular rhythm, normal heart sounds. No right ventricular heave. No lower extremity edema. Pulmonary/Chest: No wheezes. Minimal  basilar crackles. Chest wall is not dull to percussion. No accessory muscle usage or stridor. Decreased breath sound density  Abdominal: Soft. Bowel sounds present. No distension or hernia. No tenderness. Musculoskeletal: No cyanosis. No clubbing. No obvious joint deformity. Right foot tenderness  Lymphadenopathy: No cervical or supraclavicular adenopathy. Skin: Skin is warm and dry. No rash or nodules on the exposed extremities. Psychiatric: Normal mood and affect. Behavior is normal.  No anxiety. Neurologic: Alert, awake and oriented. PERRL.   Speech fluent        Results:  CBC:   Recent Labs 12/03/21  0534 12/04/21  0651   WBC 8.9 7.9   HGB 8.5* 8.3*   HCT 25.6* 25.1*   MCV 92.6 91.3    256     BMP:   Recent Labs     12/03/21  0534 12/04/21  0651   * 133*   K 4.2 3.6   CL 99 92*   CO2 28 29   PHOS 3.7 2.6   BUN 25* 21*   CREATININE 1.4* 1.3*       Imaging:  I have reviewed radiology images personally. XR FOOT RIGHT (2 VIEWS)   Final Result   No acute osseous abnormality of the right foot. FL MODIFIED BARIUM SWALLOW W VIDEO   Final Result   No keira aspiration. Minimal intermittent laryngeal penetration with thin   liquid. Please see separate speech pathology report for full discussion of findings   and recommendations. VL Extremity Venous Bilateral   Final Result      US RETROPERITONEAL COMPLETE   Final Result   Symmetric bilateral renal atrophy. No obstruction. Unremarkable urinary bladder ultrasound. NM LUNG SCAN PERFUSION ONLY   Final Result   High probability for pulmonary embolism. Findings were discussed with ANGELES Orellana on 11/26/2021 at 8:40 p.m.         CT CHEST WO CONTRAST   Final Result   Previously seen defect in the right lower lobe persist and appears somewhat   larger and has not resolved. As a result, recommend bronchoscopy to rule out   endobronchial lesion. Increased volume loss in the right lower lobe anteriorly with improved   aeration of the right lower lobe posteriorly. Dominant curvilinear opacity in left upper lobe is seen, similar to prior         XR CHEST PORTABLE   Final Result   Airspace opacities in the left lung apex and the right lung base, may be   related to pneumonia. Recommend follow-up to resolution to exclude   underlying lung mass/neoplasm             Echo- Summary   Normal left ventricle systolic function with an estimated ejection fraction   of 55%. No regional wall motion abnormalities are seen. Grade I diastolic dysfunction with normal filing pressure.    Mild posterior mitral annular sleep apnea)    Chronic respiratory failure with hypoxia, on home O2 therapy ([facility])    Pulmonary infiltrate    Atelectasis    Chest congestion    Ineffective airway clearance    Pneumonia of left upper lobe due to Pseudomonas species ([facility])    Hyperuricemia  Resolved Problems:    * No resolved hospital problems. *          Plan:   · Oxygen supplementation to keep saturation between 90 and 94% only  · Please titrate the oxygen as per the above parameters-nursing communication sent to that effect   · Home CPAP/BiPAP on intermittent basis  · Patient's preliminary data from bronchoscopy shows patient to have Pseudomonas and Enterobacteriaceae pneumonia  · Patient is on  IV Zosyn which can be transitioned to PO Augmentin at the time of discharge   · Bronchodilators  · Off  PO prednisone  · Cardiac medications as per IM  · Monitor input output and BMP   · Correct electrolytes on whenever necessary basis  · Fluid management/diuretics as per nephrology  · Patient has elevated uric acid levels -allopurinoll started -IM can decide about its continuation/discontinuation at discharge  · ? Role of colchicine  · Patient is on Eliquis  · Monitor for any bleeding  · Monitor for any anticholinergic side effects with combination of Spiriva and Ditropan  · Patient has been started on low-dose of Bumex by nephrology  · Diet as per speech therapist and metabolic support  · Further Evaluation and management of foot pain as per IM  · PUD prophylaxis        Electronically signed by:  Servando Yuen MD    12/4/2021    5:09 PM.

## 2021-12-05 LAB
ALBUMIN SERPL-MCNC: 2.9 G/DL (ref 3.4–5)
ANION GAP SERPL CALCULATED.3IONS-SCNC: 13 MMOL/L (ref 3–16)
BUN BLDV-MCNC: 25 MG/DL (ref 7–20)
CALCIUM SERPL-MCNC: 10.4 MG/DL (ref 8.3–10.6)
CHLORIDE BLD-SCNC: 93 MMOL/L (ref 99–110)
CO2: 27 MMOL/L (ref 21–32)
CREAT SERPL-MCNC: 1.5 MG/DL (ref 0.6–1.2)
GFR AFRICAN AMERICAN: 41
GFR NON-AFRICAN AMERICAN: 34
GLUCOSE BLD-MCNC: 92 MG/DL (ref 70–99)
HCT VFR BLD CALC: 29.8 % (ref 36–48)
HEMOGLOBIN: 9.5 G/DL (ref 12–16)
MCH RBC QN AUTO: 29.8 PG (ref 26–34)
MCHC RBC AUTO-ENTMCNC: 32.1 G/DL (ref 31–36)
MCV RBC AUTO: 92.9 FL (ref 80–100)
PDW BLD-RTO: 15.4 % (ref 12.4–15.4)
PHOSPHORUS: 2.9 MG/DL (ref 2.5–4.9)
PLATELET # BLD: 276 K/UL (ref 135–450)
PMV BLD AUTO: 9.2 FL (ref 5–10.5)
POTASSIUM SERPL-SCNC: 3.9 MMOL/L (ref 3.5–5.1)
RBC # BLD: 3.2 M/UL (ref 4–5.2)
SODIUM BLD-SCNC: 133 MMOL/L (ref 136–145)
WBC # BLD: 9.4 K/UL (ref 4–11)

## 2021-12-05 PROCEDURE — 2700000000 HC OXYGEN THERAPY PER DAY

## 2021-12-05 PROCEDURE — 99232 SBSQ HOSP IP/OBS MODERATE 35: CPT | Performed by: INTERNAL MEDICINE

## 2021-12-05 PROCEDURE — 6370000000 HC RX 637 (ALT 250 FOR IP): Performed by: INTERNAL MEDICINE

## 2021-12-05 PROCEDURE — 94640 AIRWAY INHALATION TREATMENT: CPT

## 2021-12-05 PROCEDURE — 6360000002 HC RX W HCPCS: Performed by: INTERNAL MEDICINE

## 2021-12-05 PROCEDURE — 85027 COMPLETE CBC AUTOMATED: CPT

## 2021-12-05 PROCEDURE — 94761 N-INVAS EAR/PLS OXIMETRY MLT: CPT

## 2021-12-05 PROCEDURE — 2580000003 HC RX 258: Performed by: INTERNAL MEDICINE

## 2021-12-05 PROCEDURE — 6370000000 HC RX 637 (ALT 250 FOR IP): Performed by: NURSE PRACTITIONER

## 2021-12-05 PROCEDURE — 80069 RENAL FUNCTION PANEL: CPT

## 2021-12-05 PROCEDURE — 2580000003 HC RX 258: Performed by: NURSE PRACTITIONER

## 2021-12-05 PROCEDURE — 36415 COLL VENOUS BLD VENIPUNCTURE: CPT

## 2021-12-05 PROCEDURE — 1200000000 HC SEMI PRIVATE

## 2021-12-05 RX ORDER — SACCHAROMYCES BOULARDII 250 MG
250 CAPSULE ORAL 2 TIMES DAILY
Status: DISCONTINUED | OUTPATIENT
Start: 2021-12-05 | End: 2021-12-07 | Stop reason: HOSPADM

## 2021-12-05 RX ORDER — AMOXICILLIN AND CLAVULANATE POTASSIUM 875; 125 MG/1; MG/1
1 TABLET, FILM COATED ORAL EVERY 12 HOURS SCHEDULED
Status: DISCONTINUED | OUTPATIENT
Start: 2021-12-06 | End: 2021-12-07 | Stop reason: HOSPADM

## 2021-12-05 RX ADMIN — Medication 2 PUFF: at 19:52

## 2021-12-05 RX ADMIN — AMLODIPINE BESYLATE 5 MG: 5 TABLET ORAL at 09:35

## 2021-12-05 RX ADMIN — SODIUM CHLORIDE, PRESERVATIVE FREE 10 ML: 5 INJECTION INTRAVENOUS at 09:36

## 2021-12-05 RX ADMIN — Medication 250 MG: at 21:50

## 2021-12-05 RX ADMIN — PANTOPRAZOLE SODIUM 40 MG: 40 TABLET, DELAYED RELEASE ORAL at 09:35

## 2021-12-05 RX ADMIN — DULOXETINE HYDROCHLORIDE 60 MG: 60 CAPSULE, DELAYED RELEASE ORAL at 09:35

## 2021-12-05 RX ADMIN — CIPROFLOXACIN 1 DROP: 3 SOLUTION OPHTHALMIC at 17:20

## 2021-12-05 RX ADMIN — CIPROFLOXACIN 1 DROP: 3 SOLUTION OPHTHALMIC at 05:43

## 2021-12-05 RX ADMIN — APIXABAN 5 MG: 5 TABLET, FILM COATED ORAL at 21:50

## 2021-12-05 RX ADMIN — PIPERACILLIN AND TAZOBACTAM 3375 MG: 3; .375 INJECTION, POWDER, FOR SOLUTION INTRAVENOUS at 09:38

## 2021-12-05 RX ADMIN — FOLIC ACID 1 MG: 1 TABLET ORAL at 09:35

## 2021-12-05 RX ADMIN — ARIPIPRAZOLE 5 MG: 10 TABLET ORAL at 09:34

## 2021-12-05 RX ADMIN — PIPERACILLIN AND TAZOBACTAM 3375 MG: 3; .375 INJECTION, POWDER, FOR SOLUTION INTRAVENOUS at 02:18

## 2021-12-05 RX ADMIN — ALLOPURINOL 100 MG: 100 TABLET ORAL at 09:35

## 2021-12-05 RX ADMIN — OXYBUTYNIN CHLORIDE 5 MG: 5 TABLET ORAL at 09:34

## 2021-12-05 RX ADMIN — CIPROFLOXACIN 1 DROP: 3 SOLUTION OPHTHALMIC at 21:51

## 2021-12-05 RX ADMIN — LOSARTAN POTASSIUM 25 MG: 25 TABLET, FILM COATED ORAL at 09:35

## 2021-12-05 RX ADMIN — APIXABAN 5 MG: 5 TABLET, FILM COATED ORAL at 09:35

## 2021-12-05 RX ADMIN — OXYBUTYNIN CHLORIDE 5 MG: 5 TABLET ORAL at 21:50

## 2021-12-05 RX ADMIN — SODIUM CHLORIDE, PRESERVATIVE FREE 10 ML: 5 INJECTION INTRAVENOUS at 21:51

## 2021-12-05 RX ADMIN — BUMETANIDE 2 MG: 1 TABLET ORAL at 09:34

## 2021-12-05 RX ADMIN — OXYBUTYNIN CHLORIDE 5 MG: 5 TABLET ORAL at 13:37

## 2021-12-05 RX ADMIN — PIPERACILLIN AND TAZOBACTAM 3375 MG: 3; .375 INJECTION, POWDER, FOR SOLUTION INTRAVENOUS at 17:20

## 2021-12-05 RX ADMIN — CIPROFLOXACIN 1 DROP: 3 SOLUTION OPHTHALMIC at 13:37

## 2021-12-05 RX ADMIN — CIPROFLOXACIN 1 DROP: 3 SOLUTION OPHTHALMIC at 09:35

## 2021-12-05 RX ADMIN — ACETAMINOPHEN 650 MG: 325 TABLET, FILM COATED ORAL at 17:20

## 2021-12-05 RX ADMIN — ALLOPURINOL 100 MG: 100 TABLET ORAL at 21:50

## 2021-12-05 ASSESSMENT — PAIN DESCRIPTION - LOCATION: LOCATION: FOOT

## 2021-12-05 ASSESSMENT — PAIN SCALES - GENERAL
PAINLEVEL_OUTOF10: 7
PAINLEVEL_OUTOF10: 4

## 2021-12-05 NOTE — PROGRESS NOTES
[facility] Progress Note           Subjective/   64y.o. year old female who we are seeing in consultation for CARLOS. HPI:  Awaiting pre cert for NH  Renal function stable    ROS:  Shortness of breath stable, uses 4 L NC at home. No fevers. Objective/   GEN:  Chronically ill, BP (!) 141/73   Pulse 96   Temp 98 °F (36.7 °C) (Oral)   Resp 18   Ht 5' 3\" (1.6 m)   Wt 230 lb (104.3 kg)   SpO2 91%   BMI 40.74 kg/m²   HEENT: non-icteric, no JVD  CV: S1, S2 without m/r/g; + LE edema  RESP: CTA B without w/r/r; breathing wnl  ABD: +bs, soft, nt, no hsm  SKIN: warm, no rashes    Data/  Recent Labs     12/03/21  0534 12/04/21  0651 12/05/21  0834   WBC 8.9 7.9 9.4   HGB 8.5* 8.3* 9.5*   HCT 25.6* 25.1* 29.8*   MCV 92.6 91.3 92.9    256 276     Recent Labs     12/03/21  0534 12/04/21  0651 12/05/21  0701   * 133* 133*   K 4.2 3.6 3.9   CL 99 92* 93*   CO2 28 29 27   GLUCOSE 104* 104* 92   PHOS 3.7 2.6 2.9   BUN 25* 21* 25*   CREATININE 1.4* 1.3* 1.5*   LABGLOM 36* 40* 34*   GFRAA 44* 48* 41*     Renal US ->  Kidneys:       The right kidney measures 8.9 cm in length with cortical thickness of 8 mm   and the left kidney 8.7 cm in length with cortical thickness of 11 mm.  Renal   parenchymal echogenicity is normal and there is no hydronephrosis.  No cystic   or solid renal mass.           Bladder:       Partial distention of the urinary bladder with a volume of 87 mL.  No   significant wall thickening.  Bilateral ureteral jets are documented.  The   patient was unable to void. Assessment/Plan     CARLOS on CKD Stage 3.  - CARLOS suspect pre-renal in the setting of diuretic and ARB use. CKD maybe from chronic NSAID use. - Urinalysis with electrolytes and UPC. - Renal US. - Serum creatinine is trending down. - cont home dose of bumex 2 mg a day. - Monitor kidney function with restarting Losartan. - Trend labs.   -baseline cr 1.2-1.6     COPD Exacerbation.  - Plans per Admitting.     Hypertension.  - Low dose of Losartan resumed. - Started Amlodipine 5mg daily.     Presumed PE.  - VQ scan with high probability of PE.  - On Apixaban.    ____________________________________  Mandie Zamorano MD  [facility]  [email or web address]  Office: [phone]

## 2021-12-05 NOTE — PROGRESS NOTES
Page sent to Dr. Kelli Sutton: Prince Carbajal RM: [id number] Patients oxygen has been in the 80's at times when I go in to do her vitals- stephan been adjusting her oxygen from 4-6 l/min. Can I put her on a monitor so I can keep a better eye on her o2 levels?  Thanks.      See new orders

## 2021-12-05 NOTE — PROGRESS NOTES
INPATIENT PULMONARY CRITICAL CARE PROGRESS NOTE      Reason for visit    RLL defect     SUBJECTIVE: Patient seen states that she does not have any respiratory issues of concern, no increasing cough expectoration or chest congestion, patient continues to have right heel pain, patient was on 4 L of nasal cannula oxygen with saturation 91%, patient was in sinus rhythm on the monitor,, patient was afebrile and hemodynamically maintained, patient was on 4L of nasal oxygen with saturation 92% when seen, patient is tolerating oral diet, , patient has adequate documented urine output, patient blood sugars are acceptable, patient was alert and communicative ;no other pertinent review of system of concern         Physical Exam:  Blood pressure 126/69, pulse 91, temperature 98.5 °F (36.9 °C), temperature source Oral, resp. rate 18, height 5' 3\" (1.6 m), weight 230 lb (104.3 kg), SpO2 91 %.'   Constitutional:  No acute distress. HENT:  Oropharynx is clear and moist. No thyromegaly. Eyes: Right subconjunctival hemorrhage. Pupils equal, round, and reactive to light. No scleral icterus. Neck: . No tracheal deviation present. No obvious thyroid mass. Short large plaque  Cardiovascular: Normal rate, regular rhythm, normal heart sounds. No right ventricular heave. No lower extremity edema. Pulmonary/Chest: No wheezes. No significant crackles. Chest wall is not dull to percussion. No accessory muscle usage or stridor. Decreased breath sound density  Abdominal: Soft. Bowel sounds present. No distension or hernia. No tenderness. Musculoskeletal: No cyanosis. No clubbing. No obvious joint deformity. Right foot tenderness  Lymphadenopathy: No cervical or supraclavicular adenopathy. Skin: Skin is warm and dry. No rash or nodules on the exposed extremities. Psychiatric: Normal mood and affect. Behavior is normal.  No anxiety. Neurologic: Alert, awake and oriented. PERRL.   Speech fluent        Results:  CBC:   Recent Labs     12/03/21  0534 12/04/21  0651 12/05/21  0834   WBC 8.9 7.9 9.4   HGB 8.5* 8.3* 9.5*   HCT 25.6* 25.1* 29.8*   MCV 92.6 91.3 92.9    256 276     BMP:   Recent Labs     12/03/21  0534 12/04/21  0651 12/05/21  0701   * 133* 133*   K 4.2 3.6 3.9   CL 99 92* 93*   CO2 28 29 27   PHOS 3.7 2.6 2.9   BUN 25* 21* 25*   CREATININE 1.4* 1.3* 1.5*       Imaging:  I have reviewed radiology images personally. XR FOOT RIGHT (2 VIEWS)   Final Result   No acute osseous abnormality of the right foot. FL MODIFIED BARIUM SWALLOW W VIDEO   Final Result   No keira aspiration. Minimal intermittent laryngeal penetration with thin   liquid. Please see separate speech pathology report for full discussion of findings   and recommendations. VL Extremity Venous Bilateral   Final Result      US RETROPERITONEAL COMPLETE   Final Result   Symmetric bilateral renal atrophy. No obstruction. Unremarkable urinary bladder ultrasound. NM LUNG SCAN PERFUSION ONLY   Final Result   High probability for pulmonary embolism. Findings were discussed with ANGELES Elliott on 11/26/2021 at 8:40 p.m.         CT CHEST WO CONTRAST   Final Result   Previously seen defect in the right lower lobe persist and appears somewhat   larger and has not resolved. As a result, recommend bronchoscopy to rule out   endobronchial lesion. Increased volume loss in the right lower lobe anteriorly with improved   aeration of the right lower lobe posteriorly. Dominant curvilinear opacity in left upper lobe is seen, similar to prior         XR CHEST PORTABLE   Final Result   Airspace opacities in the left lung apex and the right lung base, may be   related to pneumonia. Recommend follow-up to resolution to exclude   underlying lung mass/neoplasm             Echo- Summary   Normal left ventricle systolic function with an estimated ejection fraction   of 55%. No regional wall motion abnormalities are seen. Grade I diastolic dysfunction with normal filing pressure. Mild posterior mitral annular calcification is present. Mild mitral regurgitation. Trace tricuspid regurgitation. Systolic pulmonary artery pressure (SPAP) is normal and estimated at 30 mmHg   (right atrial pressure 3 mmHg). Results for Arnaud Cisneros (MRN [id number]) as of 12/4/2021 17:08   Ref. Range 12/3/2021 05:34   Uric Acid, Serum Latest Ref Range: 2.6 - 6.0 mg/dL 6.3 (H)     Component 11/30/21 1029 Resulting Agency   CULTURE, RESPIRATORY 10,000 to 50,000 CFU/mL Normal respiratory arcelia Abnormal   [address] [facility]   Gram Stain Result 3+ WBC's (Polymorphonuclear)  present   2+ Gram positive cocci  in chains and pairs- resembling Strep   1+ Gram positive cocci  in clusters  [facility]   Organism Pseudomonas aeruginosa Abnormal   [address] [facility]   CULTURE, RESPIRATORY 10,000 to 50,000 CFU/mL  [address] [facility]   Organism Enterobacter cloacae complex Abnormal   [address] [facility]   CULTURE, RESPIRATORY 10,000 to 50,000 CFU/mL  [address] [facility]          Susceptibility     Enterobacter cloacae complex Pseudomonas aeruginosa     BACTERIAL SUSCEPTIBILITY PANEL BY LUDIVINA BACTERIAL SUSCEPTIBILITY PANEL BY LUDIVINA     ampicillin <=8 mcg/mL Resistant       ceFAZolin 4 mcg/mL Resistant       cefepime <=2 mcg/mL Sensitive <=2 mcg/mL Sensitive     cefTRIAXone <=1 mcg/mL Sensitive       cefuroxime 8 mcg/mL Sensitive       ciprofloxacin <=1 mcg/mL Sensitive <=1 mcg/mL Sensitive     ertapenem <=0.5 mcg/mL Sensitive       gentamicin <=4 mcg/mL Sensitive <=4 mcg/mL Sensitive     meropenem <=1 mcg/mL Sensitive <=1 mcg/mL Sensitive     piperacillin-tazobactam <=16 mcg/mL Sensitive <=16 mcg/mL Sensitive     tobramycin   <=4 mcg/mL Sensitive     trimethoprim-sulfamethoxazole <=2/38 mcg/mL Sensitive                           Results for Arnaud Cisneros (MRN [id number]) as of 12/5/2021 15:09   Ref.  Range 12/3/2021 05:34 Pseudomonas species ([facility] [address]    Hyperuricemia  Resolved Problems:    * No resolved hospital problems. *          Plan:   · Oxygen supplementation to keep saturation between 90 and 94% only  · Please titrate the oxygen as per the above parameters-nursing communication sent to that effect   · Home CPAP/BiPAP on intermittent basis  · Patient's preliminary data from bronchoscopy shows patient to have Pseudomonas and Enterobacteriaceae pneumonia  · Patient is on  IV Zosyn transitioned to PO Augmentin with a stop date with a probiotic   · Bronchodilators  · Off  PO prednisone  · Cardiac medications as per IM  · Monitor input output and BMP   · Correct electrolytes on whenever necessary basis  · Fluid management/diuretics as per nephrology-patient's creatinine is slightly worse and ARB has been restarted   · Patient has elevated uric acid levels -allopurinol has been  started -IM can decide about its continuation/discontinuation at discharge  · ? Role of colchicine  · Patient is on Eliquis  · Monitor for any bleeding  · Monitor for any anticholinergic side effects with combination of Spiriva and Ditropan  · Patient has been started on low-dose of Bumex by nephrology  · Diet as per speech therapist and metabolic support  · Further Evaluation and management of foot pain as per IM  · PUD prophylaxis    Case d/w patient and family     ? Discharge planning      No other recommendations from Pulmonary/CCM stand point -will sign off -please call on PRN basis,if the patient is not discharged          Electronically signed by:  Zoya Powell MD    12/5/2021    3:09 PM.

## 2021-12-05 NOTE — PROGRESS NOTES
Hospitalist Progress Note      PCP: Cecile Salas MD    Date of Admission: 11/26/2021    Chief Complaint: SOB    Subjective: no new c/o    Medications:  Reviewed    Infusion Medications    sodium chloride 25 mL (11/30/21 0934)     Scheduled Medications    allopurinol  100 mg Oral BID    apixaban  5 mg Oral BID    piperacillin-tazobactam  3,375 mg IntraVENous Q8H    ciprofloxacin  1 drop Right Eye Q4H While awake    bumetanide  2 mg Oral Daily    losartan  25 mg Oral Daily    amLODIPine  5 mg Oral Daily    ARIPiprazole  5 mg Oral Daily    DULoxetine  60 mg Oral Daily    budesonide-formoterol  2 puff Inhalation BID    folic acid  1 mg Oral Daily    oxybutynin  5 mg Oral TID    pantoprazole  40 mg Oral Daily    tiotropium  2 puff Inhalation Daily    sodium chloride flush  5-40 mL IntraVENous 2 times per day     PRN Meds: HYDROcodone 5 mg - acetaminophen, nitroGLYCERIN, albuterol, hydroxypropyl methylcellulose, sodium chloride, perflutren lipid microspheres, sodium chloride flush, ondansetron **OR** ondansetron, polyethylene glycol, acetaminophen **OR** acetaminophen      Intake/Output Summary (Last 24 hours) at 12/5/2021 0820  Last data filed at 12/5/2021 0600  Gross per 24 hour   Intake 600 ml   Output 800 ml   Net -200 ml       Physical Exam Performed:    BP (!) 134/56   Pulse 82   Temp 98.4 °F (36.9 °C) (Oral)   Resp 18   Ht 5' 3\" (1.6 m)   Wt 230 lb (104.3 kg)   SpO2 91%   BMI 40.74 kg/m²     General appearance: No apparent distress, appears stated age and cooperative. HEENT: Pupils equal, round, and reactive to light. Conjunctivae/corneas clear. Neck: Supple, with full range of motion. No jugular venous distention. Trachea midline. Respiratory:  Normal respiratory effort. Clear to auscultation, bilaterally without Rales/Wheezes/Rhonchi. Cardiovascular: Regular rate and rhythm with normal S1/S2 without murmurs, rubs or gallops.   Abdomen: Soft, non-tender, non-distended with normal bowel sounds. Musculoskeletal: No clubbing, cyanosis or edema bilaterally. Full range of motion without deformity. Skin: Skin color, texture, turgor normal.  No rashes or lesions. Neurologic:  Neurovascularly intact without any focal sensory/motor deficits. Cranial nerves: II-XII intact, grossly non-focal.  Psychiatric: Alert and oriented, thought content appropriate, normal insight  Capillary Refill: Brisk,< 3 seconds   Peripheral Pulses: +2 palpable, equal bilaterally       Labs:   Recent Labs     12/03/21  0534 12/04/21  0651   WBC 8.9 7.9   HGB 8.5* 8.3*   HCT 25.6* 25.1*    256     Recent Labs     12/03/21  0534 12/04/21  0651 12/05/21  0701   * 133* 133*   K 4.2 3.6 3.9   CL 99 92* 93*   CO2 28 29 27   BUN 25* 21* 25*   CREATININE 1.4* 1.3* 1.5*   CALCIUM 9.3 9.6 10.4   PHOS 3.7 2.6 2.9     No results for input(s): AST, ALT, BILIDIR, BILITOT, ALKPHOS in the last 72 hours. No results for input(s): INR in the last 72 hours. No results for input(s): Disha Kaska in the last 72 hours.     Urinalysis:      Lab Results   Component Value Date    NITRU POSITIVE 04/06/2021    WBCUA 6-9 04/06/2021    BACTERIA 3+ 04/06/2021    RBCUA 0-2 04/06/2021    BLOODU Negative 04/06/2021    SPECGRAV 1.010 04/06/2021    GLUCOSEU Negative 04/06/2021       Consults:    IP CONSULT TO PULMONOLOGY  IP CONSULT TO NEPHROLOGY  IP CONSULT TO OPHTHALMOLOGY  IP CONSULT TO HOME CARE NEEDS      Assessment/Plan:    Active Hospital Problems    Diagnosis     Hyperuricemia [E79.0]     Pneumonia of left upper lobe due to Pseudomonas species ([facility] [address] [J15.1]     Pulmonary infiltrate [R91.8]     Atelectasis [J98.11]     Chest congestion [R09.89]     Ineffective airway clearance [R06.89]     Chronic respiratory failure with hypoxia, on home O2 therapy (HCC) [J96.11, Z99.81]     PARK (obstructive sleep apnea) [G47.33]     COPD [facility]ch[facility] obstructive [facility] disease[facility] [J44.9]     Hypertension [I10]     Morbid obesity narcotic analgesia. Morbid Obesity -  With Body mass index is 40.74 kg/m². Complicating assessment and treatment. Placing patient at risk for multiple co-morbidities as well as early death and contributing to the patient's presentation. Counseled on weight loss. DVT Prophylaxis: Eliquis     Recent Labs     12/03/21  0534 12/04/21  0651    256     Diet: ADULT DIET; Regular  Code Status: Full Code      PT/OT Eval Status: seen w/ recs for home w/ assist initially but now going to SNF    Dispo - to remain inpatient at least until Sunday/Monday 5/6 Dec pending clinical course and placement decision.      Yuridia Carreon MD

## 2021-12-06 LAB
BACTERIA: ABNORMAL /HPF
BASE EXCESS ARTERIAL: 6.7 MMOL/L (ref -3–3)
BILIRUBIN URINE: NEGATIVE
BLOOD, URINE: NEGATIVE
CARBOXYHEMOGLOBIN ARTERIAL: 0.1 % (ref 0–1.5)
CLARITY: CLEAR
COLOR: YELLOW
EPITHELIAL CELLS, UA: ABNORMAL /HPF (ref 0–5)
GLUCOSE URINE: NEGATIVE MG/DL
HCO3 ARTERIAL: 32.8 MMOL/L (ref 21–29)
HEMOGLOBIN, ART, EXTENDED: 12 G/DL (ref 12–16)
KETONES, URINE: NEGATIVE MG/DL
LEUKOCYTE ESTERASE, URINE: ABNORMAL
METHEMOGLOBIN ARTERIAL: 0.5 %
MICROSCOPIC EXAMINATION: YES
NITRITE, URINE: NEGATIVE
O2 SAT, ARTERIAL: 93.3 %
O2 THERAPY: ABNORMAL
PCO2 ARTERIAL: 54.1 MMHG (ref 35–45)
PH ARTERIAL: 7.4 (ref 7.35–7.45)
PH UA: 6.5 (ref 5–8)
PO2 ARTERIAL: 73.8 MMHG (ref 75–108)
PROTEIN UA: NEGATIVE MG/DL
RBC UA: ABNORMAL /HPF (ref 0–4)
SARS-COV-2, NAAT: NOT DETECTED
SPECIFIC GRAVITY UA: 1.01 (ref 1–1.03)
TCO2 ARTERIAL: 34.5 MMOL/L
URINE REFLEX TO CULTURE: ABNORMAL
URINE TYPE: ABNORMAL
UROBILINOGEN, URINE: 0.2 E.U./DL
WBC UA: ABNORMAL /HPF (ref 0–5)
YEAST: PRESENT /HPF

## 2021-12-06 PROCEDURE — 87635 SARS-COV-2 COVID-19 AMP PRB: CPT

## 2021-12-06 PROCEDURE — 2700000000 HC OXYGEN THERAPY PER DAY

## 2021-12-06 PROCEDURE — 82803 BLOOD GASES ANY COMBINATION: CPT

## 2021-12-06 PROCEDURE — 97530 THERAPEUTIC ACTIVITIES: CPT

## 2021-12-06 PROCEDURE — 1200000000 HC SEMI PRIVATE

## 2021-12-06 PROCEDURE — 97110 THERAPEUTIC EXERCISES: CPT

## 2021-12-06 PROCEDURE — 81001 URINALYSIS AUTO W/SCOPE: CPT

## 2021-12-06 PROCEDURE — 6370000000 HC RX 637 (ALT 250 FOR IP): Performed by: INTERNAL MEDICINE

## 2021-12-06 PROCEDURE — 97116 GAIT TRAINING THERAPY: CPT

## 2021-12-06 PROCEDURE — 94640 AIRWAY INHALATION TREATMENT: CPT

## 2021-12-06 PROCEDURE — 94761 N-INVAS EAR/PLS OXIMETRY MLT: CPT

## 2021-12-06 PROCEDURE — 6370000000 HC RX 637 (ALT 250 FOR IP): Performed by: NURSE PRACTITIONER

## 2021-12-06 RX ORDER — BUMETANIDE 2 MG/1
2 TABLET ORAL DAILY
Qty: 30 TABLET | Refills: 0 | Status: SHIPPED | OUTPATIENT
Start: 2021-12-07 | End: 2022-06-14

## 2021-12-06 RX ORDER — ALLOPURINOL 100 MG/1
100 TABLET ORAL 2 TIMES DAILY
Qty: 60 TABLET | Refills: 0 | Status: SHIPPED | OUTPATIENT
Start: 2021-12-06 | End: 2022-06-14

## 2021-12-06 RX ORDER — AMLODIPINE BESYLATE 5 MG/1
5 TABLET ORAL DAILY
Qty: 30 TABLET | Refills: 0 | Status: SHIPPED | OUTPATIENT
Start: 2021-12-07 | End: 2022-06-14

## 2021-12-06 RX ORDER — TRAMADOL HYDROCHLORIDE 50 MG/1
50 TABLET ORAL EVERY 6 HOURS PRN
Qty: 28 TABLET | Refills: 0 | Status: SHIPPED | OUTPATIENT
Start: 2021-12-06 | End: 2021-12-13

## 2021-12-06 RX ADMIN — ALLOPURINOL 100 MG: 100 TABLET ORAL at 20:14

## 2021-12-06 RX ADMIN — APIXABAN 5 MG: 5 TABLET, FILM COATED ORAL at 09:11

## 2021-12-06 RX ADMIN — CIPROFLOXACIN 1 DROP: 3 SOLUTION OPHTHALMIC at 17:13

## 2021-12-06 RX ADMIN — Medication 250 MG: at 20:13

## 2021-12-06 RX ADMIN — Medication 250 MG: at 09:12

## 2021-12-06 RX ADMIN — OXYBUTYNIN CHLORIDE 5 MG: 5 TABLET ORAL at 20:13

## 2021-12-06 RX ADMIN — BUMETANIDE 2 MG: 1 TABLET ORAL at 09:11

## 2021-12-06 RX ADMIN — DULOXETINE HYDROCHLORIDE 60 MG: 60 CAPSULE, DELAYED RELEASE ORAL at 09:11

## 2021-12-06 RX ADMIN — LOSARTAN POTASSIUM 25 MG: 25 TABLET, FILM COATED ORAL at 09:12

## 2021-12-06 RX ADMIN — ARIPIPRAZOLE 5 MG: 10 TABLET ORAL at 09:11

## 2021-12-06 RX ADMIN — ALLOPURINOL 100 MG: 100 TABLET ORAL at 09:11

## 2021-12-06 RX ADMIN — CIPROFLOXACIN 1 DROP: 3 SOLUTION OPHTHALMIC at 20:14

## 2021-12-06 RX ADMIN — FOLIC ACID 1 MG: 1 TABLET ORAL at 09:11

## 2021-12-06 RX ADMIN — AMOXICILLIN AND CLAVULANATE POTASSIUM 1 TABLET: 875; 125 TABLET, FILM COATED ORAL at 20:13

## 2021-12-06 RX ADMIN — HYDROCODONE BITARTRATE AND ACETAMINOPHEN 1 TABLET: 5; 325 TABLET ORAL at 20:14

## 2021-12-06 RX ADMIN — PANTOPRAZOLE SODIUM 40 MG: 40 TABLET, DELAYED RELEASE ORAL at 09:12

## 2021-12-06 RX ADMIN — OXYBUTYNIN CHLORIDE 5 MG: 5 TABLET ORAL at 09:11

## 2021-12-06 RX ADMIN — Medication 2 PUFF: at 19:39

## 2021-12-06 RX ADMIN — CIPROFLOXACIN 1 DROP: 3 SOLUTION OPHTHALMIC at 13:44

## 2021-12-06 RX ADMIN — CIPROFLOXACIN 1 DROP: 3 SOLUTION OPHTHALMIC at 09:12

## 2021-12-06 RX ADMIN — OXYBUTYNIN CHLORIDE 5 MG: 5 TABLET ORAL at 13:44

## 2021-12-06 RX ADMIN — HYDROCODONE BITARTRATE AND ACETAMINOPHEN 1 TABLET: 5; 325 TABLET ORAL at 01:31

## 2021-12-06 RX ADMIN — AMLODIPINE BESYLATE 5 MG: 5 TABLET ORAL at 09:11

## 2021-12-06 RX ADMIN — APIXABAN 5 MG: 5 TABLET, FILM COATED ORAL at 20:13

## 2021-12-06 RX ADMIN — AMOXICILLIN AND CLAVULANATE POTASSIUM 1 TABLET: 875; 125 TABLET, FILM COATED ORAL at 09:11

## 2021-12-06 ASSESSMENT — PAIN DESCRIPTION - ORIENTATION
ORIENTATION: RIGHT
ORIENTATION: RIGHT

## 2021-12-06 ASSESSMENT — PAIN DESCRIPTION - PROGRESSION
CLINICAL_PROGRESSION: NOT CHANGED
CLINICAL_PROGRESSION: NOT CHANGED

## 2021-12-06 ASSESSMENT — PAIN DESCRIPTION - ONSET: ONSET: GRADUAL

## 2021-12-06 ASSESSMENT — PAIN DESCRIPTION - PAIN TYPE
TYPE: ACUTE PAIN

## 2021-12-06 ASSESSMENT — PAIN DESCRIPTION - LOCATION
LOCATION: HEAD
LOCATION: FOOT
LOCATION: FOOT

## 2021-12-06 ASSESSMENT — PAIN SCALES - WONG BAKER
WONGBAKER_NUMERICALRESPONSE: 6
WONGBAKER_NUMERICALRESPONSE: 6
WONGBAKER_NUMERICALRESPONSE: 8

## 2021-12-06 ASSESSMENT — PAIN DESCRIPTION - FREQUENCY: FREQUENCY: CONTINUOUS

## 2021-12-06 ASSESSMENT — PAIN SCALES - GENERAL
PAINLEVEL_OUTOF10: 6
PAINLEVEL_OUTOF10: 4
PAINLEVEL_OUTOF10: 7

## 2021-12-06 ASSESSMENT — PAIN DESCRIPTION - DESCRIPTORS: DESCRIPTORS: ACHING;DISCOMFORT

## 2021-12-06 ASSESSMENT — PAIN - FUNCTIONAL ASSESSMENT: PAIN_FUNCTIONAL_ASSESSMENT: ACTIVITIES ARE NOT PREVENTED

## 2021-12-06 NOTE — CARE COORDINATION
12/6/21 Spoke to the liaison at [facility]  And she has reached out to [facility] and the precert for SNF is still pending, we are expecting some movement today.

## 2021-12-06 NOTE — PROGRESS NOTES
Pt updated that DC is cancelled due to pending further work up that  wanted due to increased confusion, Pt okay with staying,  is upset that pt DC got cancelled despite requesting further work up regarding pts 'forgetfulness'. Pt able to tell RN person, place , time ( month, day and year) UA pending. RN offered reassurance and support to spouse, spouse denied due to being frustrated that she isn't being DCd. Frustrated it was delayed previously due to insurance precert. Pt resting in bed with call light within reach, bed alarm in place.

## 2021-12-06 NOTE — PROGRESS NOTES
Urine sample obtained and sent at this time, pending results,  at bedside, update provided.  Pt currently on PO ABX

## 2021-12-06 NOTE — PROGRESS NOTES
Pt DC cancelled per Venancio Bernardo MD, will inform pt, family, facility and transport      Facility updated as well as transportation cancelled.

## 2021-12-06 NOTE — PROGRESS NOTES
Occupational Therapy  Facility/Department: [facility] C5 - MED SURG/ORTHO  Daily Treatment Note  NAME: Rohini Frederick  : 1945  MRN: [id number]    Date of Service: 2021    Discharge Recommendations:  Subacute/Skilled Nursing Facility       Assessment   Performance deficits / Impairments: Decreased functional mobility ; Decreased ADL status; Decreased balance; Decreased safe awareness; Decreased strength; Decreased ROM; Decreased endurance; Decreased cognition  Assessment: Session limited by pt impaired cognition on RLE foot pain. Pt disoriented requiring redirection to task, pleasant throughout. Pt min A for bed mobility and min-mod A with funtional transfers. Pt unable to complete mobility away from bed d/t pain. RN notified of increased confusion compared to previous OT sessions. Pt functioning below her baseline and would benefit from continued skilled OT in SNF setting at d/c. Prognosis: Good  OT Education: OT Role; Plan of Care; Transfer Training; Home Exercise Program; Orientation; Family Education  Disease Specific Education: Pt educated on importance of OOB mobility, prevention of complications of bedrest, and general safety during hospitalization. Pt verbalized understanding, but will require reinforcement. Barriers to Learning: cognition  REQUIRES OT FOLLOW UP: Yes  Activity Tolerance  Activity Tolerance: Patient limited by pain; Treatment limited secondary to decreased cognition  Activity Tolerance: Vitals: BP= 96/61, HR= 94, SPO2= 94% 4L  Safety Devices  Safety Devices in place: Yes  Type of devices: Call light within reach; Nurse notified; Gait belt; Left in bed; Bed alarm in place         Patient Diagnosis(es): The encounter diagnosis was Acute on chronic respiratory failure with hypoxia ([facility] [address]       has a past medical history of Arthritis, Asthma, Bronchitis chronic, Chronic respiratory failure ([address] Depression, Diabetes mellitus ([address] Emphysema of lung ([address] Glaucoma, Hypertension, Morbid obesity ([facility] [address] Oxygen dependent, Pneumonia of left upper lobe due to Pseudomonas species ([facility] [address] Rash, Shingles, and Sleep apnea. has a past surgical history that includes hernia repair; Cholecystectomy; Hysterectomy; Colon surgery; Lap Band (10/5/10); Cataract removal with implant (3/9/2011); Cataract removal with implant (3/23/2011); Colonoscopy (11/18/13); bronchoscopy (N/A, 11/30/2021); and bronchoscopy (11/30/2021). Restrictions  Restrictions/Precautions  Restrictions/Precautions: Fall Risk, General Precautions  Position Activity Restriction  Other position/activity restrictions: Up with assist, 4L O2, Purewick     Subjective   General  Chart Reviewed: Yes  Patient assessed for rehabilitation services?: Yes  Response to previous treatment: Patient with no complaints from previous session  Family / Caregiver Present: Yes (spouse)  Referring Practitioner: JUANY Stanford CNP  Diagnosis: COPD exacerbation    Subjective  Subjective: Pt resting in bed at approach, asking writer to [facility] the meat off the grill before it goes bad. \"    Pain Assessment  Pain Assessment: Faces  Matos-Baker Pain Rating: Hurts whole lot  Pain Type: Acute pain  Pain Location: Foot  Pain Orientation: Right  Non-Pharmaceutical Pain Intervention(s): Repositioned  Vital Signs  Patient Currently in Pain: Yes     Orientation  Orientation  Overall Orientation Status: Impaired  Orientation Level: Oriented to person; Disoriented to place; Disoriented to situation; Oriented to time     Objective    ADL  LE Dressing: Dependent/Total (socks)  Toileting: Dependent/Total (purewick)     Balance  Sitting Balance: Stand by assistance  Standing Balance:  Moderate assistance (min-mod A with 4WW static)  Standing Balance  Activity: static stand x2, attempted side stepping toward HOB but pt unable d/t pain    Bed mobility  Supine to Sit: Contact guard assistance (to L with HOB elevated)  Sit to Supine: Minimal assistance    Transfers  Sit to stand: Moderate assistance  Stand to sit: Moderate assistance     Cognition  Overall Cognitive Status: Exceptions  Following Commands: Follows one step commands with repetition; Follows one step commands with increased time  Attention Span: Attends with cues to redirect  Memory: Decreased recall of recent events  Safety Judgement: Decreased awareness of need for safety; Decreased awareness of need for assistance  Insights: Decreased awareness of deficits  Initiation: Requires cues for some  Sequencing: Requires cues for some     Type of ROM/Therapeutic Exercise  Type of ROM/Therapeutic Exercise: AROM  Comment: at EOB  Exercises  Shoulder Flexion: 10x  Horizontal ABduction: 10x  Horizontal ADduction: 10x  Elbow Flexion: 10x  Elbow Extension: 10x  Supination: 10x  Pronation: 10x  Wrist Flexion: 10x  Wrist Extension: 10x  Finger Flexion: 10x  Finger Extension: 10x     Plan   Plan  Times per week: 3-5x/week  Current Treatment Recommendations: Strengthening, Endurance Training, Functional Mobility Training, Safety Education & Training, Patient/Caregiver Education & Training, Equipment Evaluation, Education, & procurement, Self-Care / ADL, Balance Training    AM-PAC Score  AM-PAC Inpatient Daily Activity Raw Score: 13 (12/06/21 1514)  AM-PAC Inpatient ADL T-Scale Score : 32.03 (12/06/21 1514)  ADL Inpatient CMS 0-100% Score: 63.03 (12/06/21 1514)  ADL Inpatient CMS G-Code Modifier : CL (12/06/21 1514)    Goals  Short term goals  Time Frame for Short term goals: 1 week (12/4) unless stated otherwise. -- extend to 12/11 d/t LOS  Short term goal 1: Pt will TBD with mod I-- ongoing 12/6/21  Short term goal 2: Pt will perform BUE ther ex x20 to increase strength/endurance for functional activities. -- ongoing, pt visibly fatigued with 10 reps 12/6/21  Short term goal 3: Pt will perform at least 5 min of functional standing activities with mod I.-- ongoing 12/6/21  Short term goal 4: Pt will perform at least 3 standing ADLS with mod

## 2021-12-06 NOTE — CARE COORDINATION
CASE MANAGEMENT DISCHARGE SUMMARY    DISCHARGE TO: [facility]  SKILLED     TRANSPORTATION:  7 pm[facility]     NOTIFIED: PATIENT, facility, RN and pt's  Deirdre Morillo at the bedside      HENS: YES    PRECERTIFICATION OBTAINED: YES/BY WHOM?  [facility]     REPORT: NURSE TO CALL REPORT TO: # [phone]    SHAHID/AVS faxed to facility

## 2021-12-06 NOTE — PROGRESS NOTES
Hospitalist Progress Note      PCP: Gio Monique MD    Date of Admission: 11/26/2021    Chief Complaint: SOB    Subjective: no new c/o    Medications:  Reviewed    Infusion Medications    sodium chloride 25 mL (11/30/21 0934)     Scheduled Medications    amoxicillin-clavulanate  1 tablet Oral 2 times per day    saccharomyces boulardii  250 mg Oral BID    allopurinol  100 mg Oral BID    apixaban  5 mg Oral BID    piperacillin-tazobactam  3,375 mg IntraVENous Q8H    ciprofloxacin  1 drop Right Eye Q4H While awake    bumetanide  2 mg Oral Daily    losartan  25 mg Oral Daily    amLODIPine  5 mg Oral Daily    ARIPiprazole  5 mg Oral Daily    DULoxetine  60 mg Oral Daily    budesonide-formoterol  2 puff Inhalation BID    folic acid  1 mg Oral Daily    oxybutynin  5 mg Oral TID    pantoprazole  40 mg Oral Daily    tiotropium  2 puff Inhalation Daily    sodium chloride flush  5-40 mL IntraVENous 2 times per day     PRN Meds: HYDROcodone 5 mg - acetaminophen, nitroGLYCERIN, albuterol, hydroxypropyl methylcellulose, sodium chloride, perflutren lipid microspheres, sodium chloride flush, ondansetron **OR** ondansetron, polyethylene glycol, acetaminophen **OR** acetaminophen      Intake/Output Summary (Last 24 hours) at 12/6/2021 0828  Last data filed at 12/6/2021 0630  Gross per 24 hour   Intake 360 ml   Output 1130 ml   Net -770 ml       Physical Exam Performed:    BP (!) 157/68   Pulse 88   Temp 98.3 °F (36.8 °C) (Oral)   Resp 18   Ht 5' 3\" (1.6 m)   Wt 230 lb (104.3 kg)   SpO2 94%   BMI 40.74 kg/m²     General appearance: No apparent distress, appears stated age and cooperative. HEENT: Pupils equal, round, and reactive to light. Conjunctivae/corneas clear. Neck: Supple, with full range of motion. No jugular venous distention. Trachea midline. Respiratory:  Normal respiratory effort. Clear to auscultation, bilaterally without Rales/Wheezes/Rhonchi.   Cardiovascular: Regular rate and rhythm with normal S1/S2 without murmurs, rubs or gallops. Abdomen: Soft, non-tender, non-distended with normal bowel sounds. Musculoskeletal: No clubbing, cyanosis or edema bilaterally. Full range of motion without deformity. Skin: Skin color, texture, turgor normal.  No rashes or lesions. Neurologic:  Neurovascularly intact without any focal sensory/motor deficits. Cranial nerves: II-XII intact, grossly non-focal.  Psychiatric: Alert and oriented, thought content appropriate, normal insight  Capillary Refill: Brisk,< 3 seconds   Peripheral Pulses: +2 palpable, equal bilaterally       Labs:   Recent Labs     12/04/21  0651 12/05/21  0834   WBC 7.9 9.4   HGB 8.3* 9.5*   HCT 25.1* 29.8*    276     Recent Labs     12/04/21  0651 12/05/21  0701   * 133*   K 3.6 3.9   CL 92* 93*   CO2 29 27   BUN 21* 25*   CREATININE 1.3* 1.5*   CALCIUM 9.6 10.4   PHOS 2.6 2.9     No results for input(s): AST, ALT, BILIDIR, BILITOT, ALKPHOS in the last 72 hours. No results for input(s): INR in the last 72 hours. No results for input(s): [place]unta Miles in the last 72 hours.     Urinalysis:      Lab Results   Component Value Date    NITRU POSITIVE 04/06/2021    WBCUA 6-9 04/06/2021    BACTERIA 3+ 04/06/2021    RBCUA 0-2 04/06/2021    BLOODU Negative 04/06/2021    SPECGRAV 1.010 04/06/2021    GLUCOSEU Negative 04/06/2021       Consults:    IP CONSULT TO PULMONOLOGY  IP CONSULT TO NEPHROLOGY  IP CONSULT TO OPHTHALMOLOGY  IP CONSULT TO HOME CARE NEEDS      Assessment/Plan:    Active Hospital Problems    Diagnosis     Hyperuricemia [E79.0]     Pneumonia of left upper lobe due to Pseudomonas species ([facility] [J15.1]     Pulmonary infiltrate [R91.8]     Atelectasis [J98.11]     Chest congestion [R09.89]     Ineffective airway clearance [R06.89]     Chronic respiratory failure with hypoxia, on home O2 therapy (HCC) [J96.11, Z99.81]     PARK (obstructive sleep apnea) [G47.33]     COPD (chronic obstructive pulmonary disease) ([facility] [J44.9]     Hypertension [I10]     Morbid obesity with BMI of 40.0-44.9, adult ([facility] [E66.01, Z68.41]        PNA - likely Pseudomonas and Enterobacteriaceae pneumonia based on Bronch Cx. Started on or before Zosyn 1 Dec. Continue current ABX w/ sensitivities reviewed. COPD - w/out chronic respiratory failure on baseline home O2 at 4L  Normally controlled on home medication regimen - continued. Here w/ acute exacerbation, completed IV/PO Steroids. Doxy completed. Follows with Dr. Glenn Mercedes as outpatient. Pulmonary consulted and appreciated.      Pulmonary embolism - POArrival w/ VQ scan done 26 Nov w/ high probability for PE. Did not do CT scan with PE protocol as renal functions were abnormal.  Started on Eliquis. LE dopplers w/ acute Superficial thrombosis of L saphenous vein. Echo w/ preserved EF 55% and no evidence of strain.      Acute on Chronic Respiratory Failure - w/ hypoxia, POArrival.  Presence of clinical respiratory distress w/ tachypnea/dyspnea/SOB and wheezing w/ use of accessory muscles to breath. Supplemental O2 and wean as tolerated.      Right lower lobe defect - Noted on CT scan concerning for endobronchial lesion. Pulmonary consulted s/p Bronchoscopy 30 Nov.      Depression - controlled on home Cymbalta/Abilify - continued.      HTN - w/out known CAD and no evidence of active signs/sxs of ischemia/failure. Currently controlled on home meds w/ vitals reviewed and documented as above. Cozaar held due to soft BP on admission. ARF - w/ elevated BUN/Cr ratio c/w pre-renal azotemia. Given IVF hydration and follow serial labs - now d/c'd. Reviewed and documented as above. She has been on Bumex - initially held and now resumed.  Nephrology consulted and appreciated.      Anemia - etiology clinically unable to determine, w/out evidence of active bleeding/hemolysis. Stable and asymptomatic w/out indication for transfusion. Followed serial labs. Reviewed and documented as above.     F foot pain - Plain films 2 Dec negative for fx. Continue PRN narcotic analgesia. Morbid Obesity -  With Body mass index is 40.74 kg/m². Complicating assessment and treatment. Placing patient at risk for multiple co-morbidities as well as early death and contributing to the patient's presentation. Counseled on weight loss. DVT Prophylaxis: Eliquis     Recent Labs     12/04/21  0651 12/05/21  0834    276     Diet: ADULT DIET; Regular  Code Status: Full Code      PT/OT Eval Status: seen w/ recs for home w/ assist initially but now going to SNF    Dispo - Medically stable for discharge since Friday 3 Dec pending clinical course and placement decision.      Charley Francois MD

## 2021-12-06 NOTE — PROGRESS NOTES
Physical Therapy  Facility/Department: [facility] C[facility] - MED SURG/ORTHO  Daily Treatment Note  NAME: Markie Keene  : 1945  MRN: [id number]    Date of Service: 2021    Discharge Recommendations:  Subacute/Skilled Nursing Facility   PT Equipment Recommendations  Equipment Needed: No    Assessment   Body structures, Functions, Activity limitations: Decreased functional mobility ; Decreased balance; Decreased strength; Decreased endurance  Assessment: Pt has unstable O2 sat when on her feet walking when on 4L. Pt required min/mod A for ambulation which was limited to 15' d/t pt fatigue and decreased safety. Pt positioned in chair and educated on LE exercises to improve endurance. Recommend continued PT and d/c to snf. Treatment Diagnosis: decreased mobility  Prognosis: Good  Decision Making: Medium Complexity  PT Education: Goals; General Safety; Gait Training; Disease Specific Education; PT Role; Plan of Care; Functional Mobility Training; Energy Conservation  Patient Education: Pt on transfer ambulation safety and pt expressed understanding  Barriers to Learning: none  REQUIRES PT FOLLOW UP: Yes  Activity Tolerance  Activity Tolerance: Patient limited by endurance  Activity Tolerance: 126/50  HR 87  O2 95% resting  decreased to 86% after ambulation all on 4L spo2     Patient Diagnosis(es): The encounter diagnosis was Acute on chronic respiratory failure with hypoxia ([address] has a past medical history of Arthritis, Asthma, Bronchitis chronic, Chronic respiratory failure ([address] Depression, Diabetes mellitus ([address] Emphysema of lung ([address] Glaucoma, Hypertension, Morbid obesity ([address] Oxygen dependent, Pneumonia of left upper lobe due to Pseudomonas species ([address] Rash, Shingles, and Sleep apnea. has a past surgical history that includes hernia repair; Cholecystectomy; Hysterectomy; Colon surgery; Lap Band (10/5/10); Cataract removal with implant (3/9/2011);  Cataract removal with implant (3/23/2011); Colonoscopy (11/18/13); bronchoscopy (N/A, 11/30/2021); and bronchoscopy (11/30/2021). Restrictions  Restrictions/Precautions  Restrictions/Precautions: Fall Risk, General Precautions  Position Activity Restriction  Other position/activity restrictions: Up with assist, 4L O2, Purewick, IV  Subjective   General  Chart Reviewed: Yes  Response To Previous Treatment: Patient with no complaints from previous session. Family / Caregiver Present: No  Referring Practitioner: Daryle Denver, APRN - CNP  Subjective  Subjective: Pt resting in bed. Denies pain in R foot today  General Comment  Comments: cleared by nursing. Pain Screening  Patient Currently in Pain: Denies  Vital Signs  Patient Currently in Pain: Denies       Orientation  Orientation  Overall Orientation Status: Within Functional Limits  Cognition      Objective   Bed mobility  Supine to Sit: Contact guard assistance  Transfers  Sit to Stand: Minimal Assistance  Stand to sit: Minimal Assistance  Comment: reminded pt to lock breaks on 4ww, on proper hand placement and more upright posture  Ambulation  Ambulation?: Yes  Ambulation 1  Surface: level tile  Device: Rollator  Assistance: Minimal assistance;  Moderate assistance  Quality of Gait: unsteady at start and near end  (with min/mod cues and assist for safe turning and backing up prior to sitting)  Gait Deviations: Slow Ch[place]; Decreased step length; Deviated path  Distance: 15' x 2     Balance  Posture: Fair  Sitting - Static: Good  Sitting - Dynamic: Good; -  Standing - Static: Fair  Standing - Dynamic: Fair; -  Exercises  Heelslides: 15 B  Knee Long Arc Quad: 15 B  Ankle Pumps: 20 B         Comment: toilet transfers min A         AM-PAC Score  AM-PAC Inpatient Mobility Raw Score : 15 (12/02/21 1248)  AM-PAC Inpatient T-Scale Score : 39.45 (12/02/21 1248)  Mobility Inpatient CMS 0-100% Score: 57.7 (12/02/21 1248)  Mobility Inpatient CMS G-Code Modifier : CK (12/02/21 1248)          Goals  Short term goals  Time Frame for Short term goals: 12/2 -- Goals extended to 12/09 d/t prolonged hospital stay  Short term goal 1: Pt will complete B LE exercises to improve endurance and strength for mobility by 11/30  -12/06 on-going  Short term goal 2: Pt will ambulate x 61' with rollator with supervision   -11/30 sba 4ww 48'  -12/02 pt unable to amb today d/t c/o pain in R ft.  -12/06 min/mod A rw 15'  Patient Goals   Patient goals : to get stronger, to walk more -12/06 on-going    Plan    Plan  Times per week: 3-5x/week  Current Treatment Recommendations: Strengthening, Balance Training, Functional Mobility Training, Transfer Training, Gait Training, Endurance Training, Positioning, Equipment Evaluation, Education, & procurement, Patient/Caregiver Education & Training, Safety Education & Training, Home Exercise Program  Safety Devices  Type of devices: All fall risk precautions in place, Call light within reach, Chair alarm in place, Gait belt, Patient at risk for falls, Nurse notified, Left in chair  Restraints  Initially in place: No     Therapy Time   Individual Concurrent Group Co-treatment   Time In 1035         Time Out 1100         Minutes 25         Timed Code Treatment Minutes: 25 Minutes     If pt is unable to be seen after this session, please let this note serve as discharge summary. Please see case management note for discharge disposition. Thank you.     Sima Perkins

## 2021-12-06 NOTE — PROGRESS NOTES
[facility] Progress Note           Subjective/   64y.o. year old female who we are seeing in consultation for CARLOS. HPI:  Renal function stable with diuresis, urine output of 1.1 liters over last 24 hours. Bp' stable. +weak. ROS:  Shortness of breath stable, uses 4 L NC at home. No fevers. Objective/   GEN:  Chronically ill, BP (!) 157/68   Pulse 88   Temp 98.3 °F (36.8 °C) (Oral)   Resp 18   Ht 5' 3\" (1.6 m)   Wt 230 lb (104.3 kg)   SpO2 94%   BMI 40.74 kg/m²   HEENT: non-icteric, no JVD  CV: S1, S2 without m/r/g; + LE edema  RESP: CTA B without w/r/r; breathing wnl  ABD: +bs, soft, nt, no hsm  SKIN: warm, no rashes    Data/  Recent Labs     12/04/21  0651 12/05/21  0834   WBC 7.9 9.4   HGB 8.3* 9.5*   HCT 25.1* 29.8*   MCV 91.3 92.9    276     Recent Labs     12/04/21  0651 12/05/21  0701   * 133*   K 3.6 3.9   CL 92* 93*   CO2 29 27   GLUCOSE 104* 92   PHOS 2.6 2.9   BUN 21* 25*   CREATININE 1.3* 1.5*   LABGLOM 40* 34*   GFRAA 48* 41*     Renal US ->  Kidneys:       The right kidney measures 8.9 cm in length with cortical thickness of 8 mm   and the left kidney 8.7 cm in length with cortical thickness of 11 mm.  Renal   parenchymal echogenicity is normal and there is no hydronephrosis.  No cystic   or solid renal mass.           Bladder:       Partial distention of the urinary bladder with a volume of 87 mL.  No   significant wall thickening.  Bilateral ureteral jets are documented.  The   patient was unable to void. Assessment/Plan     CARLOS on CKD Stage 3.  - CARLOS suspect pre-renal in the setting of diuretic and ARB use. CKD maybe from chronic NSAID use. - Urinalysis with electrolytes and UPC. - Serum creatinine is at baseline - SCr 1.2-1.6  - Continue home dose of bumex 2 mg a day. - Monitor kidney function with restarting Losartan.   - Trend labs.     COPD Exacerbation.  - Plans per Admitting.     Hypertension.  - Low dose of Losartan resumed. - Started Amlodipine 5mg daily.     Presumed PE.  - VQ scan with high probability of PE.  - On Apixaban.    ____________________________________  Jojo Cruz MD  [facility]  [email or web address]  Office: [phone]

## 2021-12-06 NOTE — CARE COORDINATION
12/6/21 Call placed to [facility] to inquire about status of precert. Precert still pending for [facility], will follow.

## 2021-12-06 NOTE — PROGRESS NOTES
Rapid covid swab for SNF obtained and sent at this time, pending results     UPDATE 1806: Negative results.

## 2021-12-07 VITALS
SYSTOLIC BLOOD PRESSURE: 150 MMHG | RESPIRATION RATE: 18 BRPM | DIASTOLIC BLOOD PRESSURE: 66 MMHG | TEMPERATURE: 98.5 F | HEIGHT: 63 IN | WEIGHT: 230 LBS | HEART RATE: 101 BPM | BODY MASS INDEX: 40.75 KG/M2 | OXYGEN SATURATION: 94 %

## 2021-12-07 LAB
ALBUMIN SERPL-MCNC: 3.1 G/DL (ref 3.4–5)
ANION GAP SERPL CALCULATED.3IONS-SCNC: 9 MMOL/L (ref 3–16)
BUN BLDV-MCNC: 27 MG/DL (ref 7–20)
CALCIUM SERPL-MCNC: 10.2 MG/DL (ref 8.3–10.6)
CHLORIDE BLD-SCNC: 93 MMOL/L (ref 99–110)
CO2: 32 MMOL/L (ref 21–32)
CREAT SERPL-MCNC: 1.2 MG/DL (ref 0.6–1.2)
GFR AFRICAN AMERICAN: 52
GFR NON-AFRICAN AMERICAN: 43
GLUCOSE BLD-MCNC: 98 MG/DL (ref 70–99)
HCT VFR BLD CALC: 24.9 % (ref 36–48)
HEMOGLOBIN: 8.1 G/DL (ref 12–16)
MCH RBC QN AUTO: 29.8 PG (ref 26–34)
MCHC RBC AUTO-ENTMCNC: 32.5 G/DL (ref 31–36)
MCV RBC AUTO: 91.6 FL (ref 80–100)
PDW BLD-RTO: 15.1 % (ref 12.4–15.4)
PHOSPHORUS: 3.1 MG/DL (ref 2.5–4.9)
PLATELET # BLD: 292 K/UL (ref 135–450)
PMV BLD AUTO: 8.1 FL (ref 5–10.5)
POTASSIUM SERPL-SCNC: 3.6 MMOL/L (ref 3.5–5.1)
RBC # BLD: 2.71 M/UL (ref 4–5.2)
SODIUM BLD-SCNC: 134 MMOL/L (ref 136–145)
WBC # BLD: 7.5 K/UL (ref 4–11)

## 2021-12-07 PROCEDURE — 6370000000 HC RX 637 (ALT 250 FOR IP): Performed by: INTERNAL MEDICINE

## 2021-12-07 PROCEDURE — 2700000000 HC OXYGEN THERAPY PER DAY

## 2021-12-07 PROCEDURE — 92526 ORAL FUNCTION THERAPY: CPT

## 2021-12-07 PROCEDURE — 6370000000 HC RX 637 (ALT 250 FOR IP): Performed by: NURSE PRACTITIONER

## 2021-12-07 PROCEDURE — 36415 COLL VENOUS BLD VENIPUNCTURE: CPT

## 2021-12-07 PROCEDURE — 94640 AIRWAY INHALATION TREATMENT: CPT

## 2021-12-07 PROCEDURE — 85027 COMPLETE CBC AUTOMATED: CPT

## 2021-12-07 PROCEDURE — 80069 RENAL FUNCTION PANEL: CPT

## 2021-12-07 PROCEDURE — 94761 N-INVAS EAR/PLS OXIMETRY MLT: CPT

## 2021-12-07 RX ADMIN — Medication 2 PUFF: at 08:07

## 2021-12-07 RX ADMIN — BUMETANIDE 2 MG: 1 TABLET ORAL at 08:59

## 2021-12-07 RX ADMIN — TIOTROPIUM BROMIDE INHALATION SPRAY 2 PUFF: 3.12 SPRAY, METERED RESPIRATORY (INHALATION) at 08:08

## 2021-12-07 RX ADMIN — AMOXICILLIN AND CLAVULANATE POTASSIUM 1 TABLET: 875; 125 TABLET, FILM COATED ORAL at 08:59

## 2021-12-07 RX ADMIN — PANTOPRAZOLE SODIUM 40 MG: 40 TABLET, DELAYED RELEASE ORAL at 08:59

## 2021-12-07 RX ADMIN — HYDROCODONE BITARTRATE AND ACETAMINOPHEN 1 TABLET: 5; 325 TABLET ORAL at 13:30

## 2021-12-07 RX ADMIN — CIPROFLOXACIN 1 DROP: 3 SOLUTION OPHTHALMIC at 09:00

## 2021-12-07 RX ADMIN — ALLOPURINOL 100 MG: 100 TABLET ORAL at 08:59

## 2021-12-07 RX ADMIN — OXYBUTYNIN CHLORIDE 5 MG: 5 TABLET ORAL at 13:30

## 2021-12-07 RX ADMIN — FOLIC ACID 1 MG: 1 TABLET ORAL at 08:59

## 2021-12-07 RX ADMIN — Medication 250 MG: at 08:59

## 2021-12-07 RX ADMIN — AMLODIPINE BESYLATE 5 MG: 5 TABLET ORAL at 08:59

## 2021-12-07 RX ADMIN — HYDROCODONE BITARTRATE AND ACETAMINOPHEN 1 TABLET: 5; 325 TABLET ORAL at 03:20

## 2021-12-07 RX ADMIN — CIPROFLOXACIN 1 DROP: 3 SOLUTION OPHTHALMIC at 13:30

## 2021-12-07 RX ADMIN — OXYBUTYNIN CHLORIDE 5 MG: 5 TABLET ORAL at 08:58

## 2021-12-07 RX ADMIN — APIXABAN 5 MG: 5 TABLET, FILM COATED ORAL at 08:58

## 2021-12-07 RX ADMIN — ARIPIPRAZOLE 5 MG: 10 TABLET ORAL at 08:59

## 2021-12-07 RX ADMIN — DULOXETINE HYDROCHLORIDE 60 MG: 60 CAPSULE, DELAYED RELEASE ORAL at 08:59

## 2021-12-07 RX ADMIN — LOSARTAN POTASSIUM 25 MG: 25 TABLET, FILM COATED ORAL at 08:59

## 2021-12-07 ASSESSMENT — PAIN DESCRIPTION - ONSET: ONSET: ON-GOING

## 2021-12-07 ASSESSMENT — PAIN SCALES - WONG BAKER
WONGBAKER_NUMERICALRESPONSE: 6

## 2021-12-07 ASSESSMENT — PAIN DESCRIPTION - PROGRESSION
CLINICAL_PROGRESSION: NOT CHANGED

## 2021-12-07 ASSESSMENT — PAIN SCALES - GENERAL
PAINLEVEL_OUTOF10: 6
PAINLEVEL_OUTOF10: 6

## 2021-12-07 ASSESSMENT — PAIN DESCRIPTION - DESCRIPTORS: DESCRIPTORS: ACHING;DISCOMFORT

## 2021-12-07 ASSESSMENT — PAIN DESCRIPTION - FREQUENCY: FREQUENCY: CONTINUOUS

## 2021-12-07 ASSESSMENT — PAIN - FUNCTIONAL ASSESSMENT: PAIN_FUNCTIONAL_ASSESSMENT: ACTIVITIES ARE NOT PREVENTED

## 2021-12-07 ASSESSMENT — PAIN DESCRIPTION - PAIN TYPE: TYPE: ACUTE PAIN

## 2021-12-07 ASSESSMENT — PAIN DESCRIPTION - LOCATION: LOCATION: HEAD

## 2021-12-07 NOTE — PROGRESS NOTES
[facility] Progress Note           Subjective/   64y.o. year old female who we are seeing in consultation for CARLOS. HPI:  Renal function stable with diuresis, urine output of 300 ml over last 24 hours. Bp's stable. +weak. ROS:  Shortness of breath stable, uses 4 L NC at home. Intake adequate. No fevers. Objective/   GEN:  Chronically ill, BP (!) 150/66   Pulse 101   Temp 98.5 °F (36.9 °C) (Oral)   Resp 18   Ht 5' 3\" (1.6 m)   Wt 230 lb (104.3 kg)   SpO2 94%   BMI 40.74 kg/m²   HEENT: non-icteric, no JVD  CV: S1, S2 without m/r/g; + LE edema  RESP: CTA B without w/r/r; breathing wnl  ABD: +bs, soft, nt, no hsm  SKIN: warm, no rashes    Data/  Recent Labs     12/05/21  0834 12/07/21  0650   WBC 9.4 7.5   HGB 9.5* 8.1*   HCT 29.8* 24.9*   MCV 92.9 91.6    292     Recent Labs     12/05/21  0701 12/07/21  0650   * 134*   K 3.9 3.6   CL 93* 93*   CO2 27 32   GLUCOSE 92 98   PHOS 2.9 3.1   BUN 25* 27*   CREATININE 1.5* 1.2   LABGLOM 34* 43*   GFRAA 41* 52*     Renal US ->  Kidneys:       The right kidney measures 8.9 cm in length with cortical thickness of 8 mm   and the left kidney 8.7 cm in length with cortical thickness of 11 mm.  Renal   parenchymal echogenicity is normal and there is no hydronephrosis.  No cystic   or solid renal mass.           Bladder:       Partial distention of the urinary bladder with a volume of 87 mL.  No   significant wall thickening.  Bilateral ureteral jets are documented.  The   patient was unable to void. Assessment/Plan     CARLOS on CKD Stage 3.  - CARLOS suspect pre-renal in the setting of diuretic and ARB use. CKD maybe from chronic NSAID use. - Urinalysis with electrolytes and UPC. - Serum creatinine is at baseline - SCr 1.2-1.6  - Continue home dose of bumex 2 mg a day. - Monitor kidney function with restarting Losartan.   - Trend labs.     COPD Exacerbation.  - Plans per Admitting.     Hypertension.  - Low dose of Losartan resumed. - Started Amlodipine 5mg daily.     Presumed PE.  - VQ scan with high probability of PE.  - On Apixaban.    ____________________________________  Bisi Caicedo MD  [facility]  [email or web address]  Office: [phone]

## 2021-12-07 NOTE — CARE COORDINATION
CASE MANAGEMENT DISCHARGE SUMMARY    DISCHARGE TO: [facility]  SKILLED     TRANSPORTATION:  3 pm/ [facility]     NOTIFIED: PATIENT, facility, RN and pt's  Prentis Chimera at the bedside      HENS: YES    PRECERTIFICATION OBTAINED: YES/BY WHOM?  [facility]     REPORT: NURSE TO CALL REPORT TO: # [id number]     SHAHID/AVS faxed to facility

## 2021-12-07 NOTE — CARE COORDINATION
12/7/21 Pt will go to [facility] skilled on d/c. Transport tentatively set up for 3 pm, pending d/c order and pt's  being agreeable to d/c today.

## 2021-12-07 NOTE — FLOWSHEET NOTE
Pt discharged via transport to [facility]; sent with belongings and scripts. Pt's IV discontinued with no complications noted. Telemetry monitor discontinued/removed; [facility] notified of pt discharging. Discharge instructions explained and paperwork given to pt/; pt and  verbalize understanding, questions encouraged and answered with no further questions at this time. Pt sent home with belongings and discharge paperwork. Report given to [facility] from [employer].

## 2021-12-07 NOTE — PROGRESS NOTES
Speech Language Pathology  Facility/Department: [place] [address] - MED SURG/ORTHO  Dysphagia Daily Treatment Note    NAME: Rohini Frederick  : 1945  MRN: [id number]    Patient Diagnosis(es):   Patient Active Problem List    Diagnosis Date Noted    SBO (small bowel obstruction) ([address] 02/10/2012    Hyperuricemia 2021    Pneumonia of left upper lobe due to Pseudomonas species ([address] 2021    Pulmonary infiltrate 2021    Atelectasis 2021    Chest congestion 2021    Ineffective airway clearance 2021    COPD exacerbation ([address] 2021    Spontaneous tension pneumothorax 2020    Pulmonary nodule     Knee osteoarthritis 2015    Periodic limb movement disorder (PLMD) 2015    Chronic respiratory failure with hypoxia, on home O2 therapy ([address]     Rash     COPD (chronic obstructive pulmonary disease) ([facility] 04/15/2011    PARK (obstructive sleep apnea) 04/15/2011    Hypoxemia 04/15/2011    Status post gastric banding 2011    Oxygen dependent 2010    Hypertension 2010    Asthma 2010    Pulmonary emphysema ([address] 2010    Arthritis 2010    Depression 2010    Morbid obesity with BMI of 40.0-44.9, adult ([address] 2010     Subjective: Asking for her lower denture (found by SLP and replaced), shows the pictures from bronchoscopy showing food particles. Pt denies cough with PO but endorses a baseline cough    Pain: denied    Current Diet: ADULT DIET; Regular    Diet Tolerance:  Patient tolerating current diet level without signs/symptoms of penetration / aspiration. P.O. Trials: Thin    Water by cup, straw   Puree    Not trialed   Solid    Saltine cracker     Dysphagia Treatment and Impressions:  * Pt wheezing on arrival, note air hunger during conversation which pt reports is her recent baseline.    * pt stated three new compensatory swallow strategies she has implemented since admission: slowed rate, smaller bites and sips, sit upright  * Pt demonstrated use of her stated strategies across a serving of dry cracker and water, alternating as needed. * One cough present (congested) across PO, unable to distinguish from her baseline cough, not definitively related to the PO    Dysphagia Goals:  Timeframe for Long-term Goals: 5 days (12/6/21)  Goal 1: The pt will tolerate safest and least restrictive diet without clinical s/s of aspiration or change in respiratory status. Short-term Goals  Timeframe for Short-term Goals: 3 days (12/4/21)    Dysphagia Goals:   1) The patient will tolerate recommended diet without observed clinical signs of aspiration- Addressed 12/7: Progressing, continue  2) The patient/caregiver will demonstrate understanding of compensatory strategies for improved swallowing safety. Addressed 12/7: Progressing, continue  3) The patient will tolerate instrumental swallowing procedure- Met 12/1/2021  Goal 4: The pt will complete laryngeal/pharyngeal strengthening and TBR exercises in 10/10 trials, min cues. Not addressed 12/7, may continue at next level of care per [facility]    Recommendations:  Solid Consistency: Regular solids  Liquid Consistency: Thin liquids  Medication: whole with liquid or embedded in puree (per pt tolerance)    Patient/Family/Caregiver Education: compensatory strategies for safer swallow and to reduce aspiration risk, follow up ST at [facility] level to which pt agrees    Compensatory Strategies: Sit up for all meals and thereafter for 30 minutes, Eat with small bites (1/2 tsp; 1 tsp) and Eat small meals throughout the day (5x/day), eat slowly and take rest breaks to recover easy breathing as needed    Plan:    Continued Dysphagia treatment with goals per plan of care. Discharge Recommendations:  Follow up ST eval and tx at next level of care to address clinical swallow safety, education and implementation of compensatory strategies to support safe swallow  If pt discharges from hospital prior to Speech/Swallowing discharge, this note serves as tx and discharge summary. Total Treatment Time / Charges     Time in Time out Total Time / units   Cognitive Tx         Speech Tx      Dysphagia Tx 1050 1110 20 min/ 1 unit     Signature: Ayah Carmona MS CCC-SLP  Speech Language Pathologist   Phone: Edy Baptiste, SLP

## 2021-12-07 NOTE — PROGRESS NOTES
Hospitalist Progress Note      PCP: Gio Monique MD    Date of Admission: 11/26/2021    Chief Complaint: SOB    Subjective: no new c/o    Medications:  Reviewed    Infusion Medications    sodium chloride 25 mL (11/30/21 0934)     Scheduled Medications    amoxicillin-clavulanate  1 tablet Oral 2 times per day    saccharomyces boulardii  250 mg Oral BID    allopurinol  100 mg Oral BID    apixaban  5 mg Oral BID    ciprofloxacin  1 drop Right Eye Q4H While awake    bumetanide  2 mg Oral Daily    losartan  25 mg Oral Daily    amLODIPine  5 mg Oral Daily    ARIPiprazole  5 mg Oral Daily    DULoxetine  60 mg Oral Daily    budesonide-formoterol  2 puff Inhalation BID    folic acid  1 mg Oral Daily    oxybutynin  5 mg Oral TID    pantoprazole  40 mg Oral Daily    tiotropium  2 puff Inhalation Daily    sodium chloride flush  5-40 mL IntraVENous 2 times per day     PRN Meds: HYDROcodone 5 mg - acetaminophen, nitroGLYCERIN, albuterol, hydroxypropyl methylcellulose, sodium chloride, perflutren lipid microspheres, sodium chloride flush, ondansetron **OR** ondansetron, polyethylene glycol, acetaminophen **OR** acetaminophen      Intake/Output Summary (Last 24 hours) at 12/7/2021 0831  Last data filed at 12/7/2021 2282  Gross per 24 hour   Intake 320 ml   Output 300 ml   Net 20 ml       Physical Exam Performed:    BP (!) 149/48   Pulse 89   Temp 98.6 °F (37 °C) (Oral)   Resp 18   Ht 5' 3\" (1.6 m)   Wt 230 lb (104.3 kg)   SpO2 96%   BMI 40.74 kg/m²     General appearance: No apparent distress, appears stated age and cooperative. HEENT: Pupils equal, round, and reactive to light. Conjunctivae/corneas clear. Neck: Supple, with full range of motion. No jugular venous distention. Trachea midline. Respiratory:  Normal respiratory effort. Clear to auscultation, bilaterally without Rales/Wheezes/Rhonchi. Cardiovascular: Regular rate and rhythm with normal S1/S2 without murmurs, rubs or gallops.   Abdomen: Soft, non-tender, non-distended with normal bowel sounds. Musculoskeletal: No clubbing, cyanosis or edema bilaterally. Full range of motion without deformity. Skin: Skin color, texture, turgor normal.  No rashes or lesions. Neurologic:  Neurovascularly intact without any focal sensory/motor deficits. Cranial nerves: II-XII intact, grossly non-focal.  Psychiatric: Alert and oriented, thought content appropriate, normal insight  Capillary Refill: Brisk,< 3 seconds   Peripheral Pulses: +2 palpable, equal bilaterally       Labs:   Recent Labs     12/05/21  0834 12/07/21  0650   WBC 9.4 7.5   HGB 9.5* 8.1*   HCT 29.8* 24.9*    292     Recent Labs     12/05/21  0701 12/07/21  0650   * 134*   K 3.9 3.6   CL 93* 93*   CO2 27 32   BUN 25* 27*   CREATININE 1.5* 1.2   CALCIUM 10.4 10.2   PHOS 2.9 3.1     No results for input(s): AST, ALT, BILIDIR, BILITOT, ALKPHOS in the last 72 hours. No results for input(s): INR in the last 72 hours. No results for input(s): Akbar Shackle in the last 72 hours.     Urinalysis:      Lab Results   Component Value Date    NITRU Negative 12/06/2021    WBCUA 3-5 12/06/2021    BACTERIA Rare 12/06/2021    RBCUA None seen 12/06/2021    BLOODU Negative 12/06/2021    SPECGRAV 1.010 12/06/2021    GLUCOSEU Negative 12/06/2021       Consults:    IP CONSULT TO PULMONOLOGY  IP CONSULT TO NEPHROLOGY  IP CONSULT TO OPHTHALMOLOGY  IP CONSULT TO HOME CARE NEEDS      Assessment/Plan:    Active Hospital Problems    Diagnosis     Hyperuricemia [E79.0]     Pneumonia of left upper lobe due to Pseudomonas species ([facility] [J15.1]     Pulmonary infiltrate [R91.8]     Atelectasis [J98.11]     Chest congestion [R09.89]     Ineffective airway clearance [R06.89]     Chronic respiratory failure with hypoxia, on home O2 therapy (HCC) [J96.11, Z99.81]     PARK (obstructive sleep apnea) [G47.33]     COPD (chronic obstructive pulmonary disease) ([facility] [J44.9]     Hypertension [I10]     Morbid obesity with BMI of 40.0-44.9, adult ([facility]) [E66.01, Z68.41]        PNA - likely Pseudomonas and Enterobacteriaceae pneumonia based on Bronch Cx. Started on or before Zosyn 1 Dec. Continue current ABX w/ sensitivities reviewed. COPD - w/out chronic respiratory failure on baseline home O2 at 4L  Normally controlled on home medication regimen - continued. Here w/ acute exacerbation, completed IV/PO Steroids. Doxy completed. Follows with Dr. Radha Sanford as outpatient. Pulmonary consulted and appreciated.      Pulmonary embolism - POArrival w/ VQ scan done 26 Nov w/ high probability for PE. Did not do CT scan with PE protocol as renal functions were abnormal.  Started on Eliquis. LE dopplers w/ acute Superficial thrombosis of L saphenous vein. Echo w/ preserved EF 55% and no evidence of strain.      Acute on Chronic Respiratory Failure - w/ hypoxia, POArrival.  Presence of clinical respiratory distress w/ tachypnea/dyspnea/SOB and wheezing w/ use of accessory muscles to breath. Supplemental O2 and wean as tolerated.      Right lower lobe defect - Noted on CT scan concerning for endobronchial lesion. Pulmonary consulted s/p Bronchoscopy 30 Nov.      Depression - controlled on home Cymbalta/Abilify - continued.      HTN - w/out known CAD and no evidence of active signs/sxs of ischemia/failure. Currently controlled on home meds w/ vitals reviewed and documented as above. Cozaar held due to soft BP on admission. ARF - w/ elevated BUN/Cr ratio c/w pre-renal azotemia. Given IVF hydration and follow serial labs - now d/c'd. Reviewed and documented as above. She has been on Bumex - initially held and now resumed.  Nephrology consulted and appreciated.      Anemia - etiology clinically unable to determine, w/out evidence of active bleeding/hemolysis. Stable and asymptomatic w/out indication for transfusion. Followed serial labs. Reviewed and documented as above. F foot pain - Plain films 2 Dec negative for fx. Continue PRN narcotic analgesia. Morbid Obesity -  With Body mass index is 40.74 kg/m². Complicating assessment and treatment. Placing patient at risk for multiple co-morbidities as well as early death and contributing to the patient's presentation. Counseled on weight loss. DVT Prophylaxis: Eliquis     Recent Labs     12/05/21  0834 12/07/21  0650    292     Diet: ADULT DIET; Regular  Code Status: Full Code      PT/OT Eval Status: seen w/ recs for home w/ assist initially but now going to SNF    Dispo - Medically stable for discharge since Friday 3 Dec pending clinical course and placement decision - but held due to family concerns of new MS changes.       Allyssa Callaway MD

## 2021-12-08 NOTE — DISCHARGE SUMMARY
Hospital Medicine Discharge Summary    Patient ID: Markie Keene      Patient's PCP: Phillip Hutchins MD    Admit Date: 11/26/2021     Discharge Date: 12/7/2021      Admitting Physician: Rik Corea MD     Discharge Physician: Eugene Glover MD     Discharge Diagnoses: Active Hospital Problems    Diagnosis     Hyperuricemia [E79.0]     Pneumonia of left upper lobe due to Pseudomonas species ([facility]) [J15.1]     Pulmonary infiltrate [R91.8]     Atelectasis [J98.11]     Chest congestion [R09.89]     Ineffective airway clearance [R06.89]     Chronic respiratory failure with hypoxia, on home O2 therapy ([facility]) [J96.11, Z99.81]     PARK (obstructive sleep apnea) [G47.33]     [facility]ch[facility] obstructive [facility]) [facility] [J44.9]     Hypertension [I10]     Morbid obesity with BMI of 40.0-44.9, adult ([facility]) [E66.01, Z68.41]        The patient was seen and examined on day of discharge and this discharge summary is in conjunction with any daily progress note from day of discharge. Hospital Course:         PNA - likely Pseudomonas and Enterobacteriaceae pneumonia based on Bronch Cx. Started on or before Zosyn 1 Dec. Continued current ABX w/ sensitivities reviewed.     COPD - w/out chronic respiratory failure on baseline home O2 at 4L  Normally controlled on home medication regimen - continued. Here w/ acute exacerbation, completed IV/PO Steroids. Doxy completed. Follows with Dr. Glenn Mercedes as outpatient. Pulmonary consulted and appreciated.      Pulmonary embolism - POArrival w/ VQ scan done 26 Nov w/ high probability for PE. Did not do CT scan with PE protocol as renal functions were abnormal.  Started on Eliquis. LE dopplers w/ acute Superficial thrombosis of L saphenous vein.  Echo w/ preserved EF 55% and no evidence of strain.      Acute on Chronic Respiratory Failure - w/ hypoxia, POArrival.  Presence of clinical respiratory distress w/ tachypnea/dyspnea/SOB and wheezing w/ use of accessory muscles to breath. Supplemental O2 and wean as tolerated.      Right lower lobe defect - Noted on CT scan concerning for endobronchial lesion. Pulmonary consulted s/p Bronchoscopy 30 Nov.      Depression - controlled on home Cymbalta/Abilify - continued.      HTN - w/out known CAD and no evidence of active signs/sxs of ischemia/failure. Currently controlled on home meds w/ vitals reviewed and documented as above. Cozaar held due to soft BP on admission.      ARF - w/ elevated BUN/Cr ratio c/w pre-renal azotemia. Given IVF hydration and follow serial labs - now d/c'd. Reviewed and documented as above. She has been on Bumex - initially held and now resumed.  Nephrology consulted and appreciated.      Anemia - etiology clinically unable to determine, w/out evidence of active bleeding/hemolysis. Stable and asymptomatic w/out indication for transfusion. Followed serial labs.       F foot pain - Plain films 2 Dec negative for fx. Continue PRN narcotic analgesia.      Morbid Obesity -  With Body mass index is 40.74 kg/m². Complicating assessment and treatment. Placing patient at risk for multiple co-morbidities as well as early death and contributing to the patient's presentation. Counseled on weight loss.          Labs: For convenience and continuity at follow-up the following most recent labs are provided:      CBC:    Lab Results   Component Value Date    WBC 7.5 12/07/2021    HGB 8.1 12/07/2021    HCT 24.9 12/07/2021     12/07/2021       Renal:    Lab Results   Component Value Date     12/07/2021    K 3.6 12/07/2021    K 4.5 11/27/2021    CL 93 12/07/2021    CO2 32 12/07/2021    BUN 27 12/07/2021    CREATININE 1.2 12/07/2021    CALCIUM 10.2 12/07/2021    PHOS 3.1 12/07/2021         Significant Diagnostic Studies    Radiology:   XR FOOT RIGHT (2 VIEWS)   Final Result   No acute osseous abnormality of the right foot. FL MODIFIED BARIUM SWALLOW W VIDEO   Final Result   No keira aspiration.   Minimal intermittent laryngeal penetration with thin   liquid. Please see separate speech pathology report for full discussion of findings   and recommendations. VL Extremity Venous Bilateral   Final Result      US RETROPERITONEAL COMPLETE   Final Result   Symmetric bilateral renal atrophy. No obstruction. Unremarkable urinary bladder ultrasound. NM LUNG SCAN PERFUSION ONLY   Final Result   High probability for pulmonary embolism. Findings were discussed with ANGELES Hernandez on 11/26/2021 at 8:40 p.m.         CT CHEST WO CONTRAST   Final Result   Previously seen defect in the right lower lobe persist and appears somewhat   larger and has not resolved. As a result, recommend bronchoscopy to rule out   endobronchial lesion. Increased volume loss in the right lower lobe anteriorly with improved   aeration of the right lower lobe posteriorly. Dominant curvilinear opacity in left upper lobe is seen, similar to prior         XR CHEST PORTABLE   Final Result   Airspace opacities in the left lung apex and the right lung base, may be   related to pneumonia. Recommend follow-up to resolution to exclude   underlying lung mass/neoplasm                Consults:     IP CONSULT TO PULMONOLOGY  IP CONSULT TO NEPHROLOGY  IP CONSULT TO OPHTHALMOLOGY  IP CONSULT TO HOME CARE NEEDS    Disposition: SNF - [facility]     Condition at Discharge: Stable    Discharge Instructions/Follow-up:  w/ PCP 1-2 weeks and subspecialists as arranged.      Code Status:  Full Code    Activity: activity as tolerated    Diet: regular diet      Discharge Medications:     Discharge Medication List as of 12/7/2021  4:01 PM           Details   !! bumetanide (BUMEX) 2 MG tablet Take 1 tablet by mouth daily, Disp-30 tablet, R-0Print      amLODIPine (NORVASC) 5 MG tablet Take 1 tablet by mouth daily, Disp-30 tablet, R-0Print      allopurinol (ZYLOPRIM) 100 MG tablet Take 1 tablet by mouth 2 times daily, Disp-60 tablet, R-0Print      apixaban (ELIQUIS) 5 MG TABS tablet Take 1 tablet by mouth 2 times daily, Disp-60 tablet, R-0Print      amoxicillin-clavulanate (AUGMENTIN) 875-125 MG per tablet Take 1 tablet by mouth 2 times daily for 7 days, Disp-14 tablet, R-0Print       !! - Potential duplicate medications found. Please discuss with provider. Details   traMADol (ULTRAM) 50 MG tablet Take 1 tablet by mouth every 6 hours as needed for Pain for up to 7 days. Intended supply: 7 days. Take lowest dose possible to manage pain, Disp-28 tablet, R-0Print              Details   SPIRIVA HANDIHALER 18 MCG inhalation capsule INHALE 1 CAPSULE INTO THE LUNGS DAILY, Disp-30 capsule, R-5Normal      fluticasone-salmeterol (ADVAIR) 500-50 MCG/DOSE diskus inhaler INHALE 1 PUFF INTO THE LUNGS EVERY 12 HOURS, Disp-60 each, R-5Normal      cyanocobalamin 1000 MCG tablet Take 1,000 mcg by mouth dailyHistorical Med      folic acid (FOLVITE) 1 MG tablet TAKE 1 TABLET (1 MG TOTAL) BY MOUTH DAILY. Historical Med      tiZANidine (ZANAFLEX) 4 MG tablet TAKE ONE TABLET (4 MG. TOTAL) BY MOUTH THREE TIMES DAILYHistorical Med      albuterol sulfate  (90 Base) MCG/ACT inhaler INHALE 2 PUFFS INTO THE LUNGS EVERY 6 HOURS AS NEEDED FOR WHEEZING OR SHORTNESS OF BREATH, Disp-8.5 g, R-5RX IS .  THANK YOUNormal      pantoprazole (PROTONIX) 40 MG tablet Take 40 mg by mouth dailyHistorical Med      oxybutynin (DITROPAN) 5 MG tablet Take 5 mg by mouth 3 times dailyHistorical Med      ARIPiprazole (ABILIFY) 5 MG tablet Take 5 mg by mouthHistorical Med      IRON PO Take 325 mg by mouthHistorical Med      albuterol (PROVENTIL) (2.5 MG/3ML) 0.083% nebulizer solution Take 3 mLs by nebulization every 4 hours as needed for Wheezing or Shortness of Breath DX COPD J44.9, Disp-120 vial, R-6Normal      DULoxetine (CYMBALTA) 60 MG extended release capsule Take 60 mg by mouth dailyHistorical Med      losartan (COZAAR) 100 MG tablet Take 100 mg by mouthHistorical Med OXYGEN Inhale 3 L/min into the lungs continuous. meloxicam (MOBIC) 15 MG tablet Take 15 mg by mouth daily. !! bumetanide (BUMEX) 1 MG tablet Take 1 mg by mouth daily Historical Med       !! - Potential duplicate medications found. Please discuss with provider. Time Spent on discharge is more than 30 minutes in the examination, evaluation, counseling and review of medications and discharge plan. Signed:    Kerri Hua MD   12/8/2021      Thank you Delio Montes MD for the opportunity to be involved in this patient's care. If you have any questions or concerns please feel free to contact me at [phone].

## 2021-12-15 ENCOUNTER — APPOINTMENT (OUTPATIENT)
Dept: CT IMAGING | Age: 76
DRG: 377 | End: 2021-12-15
Payer: MEDICARE

## 2021-12-15 ENCOUNTER — APPOINTMENT (OUTPATIENT)
Dept: GENERAL RADIOLOGY | Age: 76
DRG: 377 | End: 2021-12-15
Payer: MEDICARE

## 2021-12-15 ENCOUNTER — HOSPITAL ENCOUNTER (INPATIENT)
Age: 76
LOS: 3 days | Discharge: NURSING, REHAB OR LONG-TERM CARE | DRG: 377 | End: 2021-12-18
Attending: EMERGENCY MEDICINE | Admitting: INTERNAL MEDICINE
Payer: MEDICARE

## 2021-12-15 DIAGNOSIS — K92.1 GASTROINTESTINAL HEMORRHAGE WITH MELENA: Primary | ICD-10-CM

## 2021-12-15 DIAGNOSIS — R40.0 SOMNOLENCE: ICD-10-CM

## 2021-12-15 PROBLEM — K92.2 GI BLEED: Status: ACTIVE | Noted: 2021-12-15

## 2021-12-15 LAB
A/G RATIO: 1 (ref 1.1–2.2)
ABO/RH: NORMAL
ALBUMIN SERPL-MCNC: 2.7 G/DL (ref 3.4–5)
ALP BLD-CCNC: 86 U/L (ref 40–129)
ALT SERPL-CCNC: 11 U/L (ref 10–40)
AMPHETAMINE SCREEN, URINE: NORMAL
ANION GAP SERPL CALCULATED.3IONS-SCNC: 8 MMOL/L (ref 3–16)
ANTIBODY SCREEN: NORMAL
AST SERPL-CCNC: 17 U/L (ref 15–37)
BACTERIA: ABNORMAL /HPF
BARBITURATE SCREEN URINE: NORMAL
BASE EXCESS VENOUS: 6.1 MMOL/L (ref -3–3)
BASOPHILS ABSOLUTE: 0.1 K/UL (ref 0–0.2)
BASOPHILS RELATIVE PERCENT: 1 %
BENZODIAZEPINE SCREEN, URINE: NORMAL
BILIRUB SERPL-MCNC: <0.2 MG/DL (ref 0–1)
BILIRUBIN URINE: NEGATIVE
BLOOD, URINE: NEGATIVE
BUN BLDV-MCNC: 32 MG/DL (ref 7–20)
CALCIUM SERPL-MCNC: 8.9 MG/DL (ref 8.3–10.6)
CANNABINOID SCREEN URINE: NORMAL
CARBOXYHEMOGLOBIN: 3.3 % (ref 0–1.5)
CHLORIDE BLD-SCNC: 99 MMOL/L (ref 99–110)
CLARITY: CLEAR
CO2: 30 MMOL/L (ref 21–32)
COCAINE METABOLITE SCREEN URINE: NORMAL
COLOR: YELLOW
CREAT SERPL-MCNC: 1.6 MG/DL (ref 0.6–1.2)
EOSINOPHILS ABSOLUTE: 0.2 K/UL (ref 0–0.6)
EOSINOPHILS RELATIVE PERCENT: 3.2 %
EPITHELIAL CELLS, UA: ABNORMAL /HPF (ref 0–5)
GFR AFRICAN AMERICAN: 38
GFR NON-AFRICAN AMERICAN: 31
GLUCOSE BLD-MCNC: 115 MG/DL (ref 70–99)
GLUCOSE BLD-MCNC: 134 MG/DL (ref 70–99)
GLUCOSE BLD-MCNC: 148 MG/DL (ref 70–99)
GLUCOSE BLD-MCNC: 91 MG/DL (ref 70–99)
GLUCOSE URINE: NEGATIVE MG/DL
HCO3 VENOUS: 30.9 MMOL/L (ref 23–29)
HCT VFR BLD CALC: 15.5 % (ref 36–48)
HCT VFR BLD CALC: 20.1 % (ref 36–48)
HEMOGLOBIN: 5 G/DL (ref 12–16)
HEMOGLOBIN: 6.5 G/DL (ref 12–16)
HYALINE CASTS: ABNORMAL /LPF (ref 0–2)
KETONES, URINE: NEGATIVE MG/DL
LACTIC ACID: 2 MMOL/L (ref 0.4–2)
LEUKOCYTE ESTERASE, URINE: ABNORMAL
LYMPHOCYTES ABSOLUTE: 1.4 K/UL (ref 1–5.1)
LYMPHOCYTES RELATIVE PERCENT: 23.1 %
Lab: NORMAL
MCH RBC QN AUTO: 30 PG (ref 26–34)
MCHC RBC AUTO-ENTMCNC: 32.3 G/DL (ref 31–36)
MCV RBC AUTO: 92.9 FL (ref 80–100)
METHADONE SCREEN, URINE: NORMAL
METHEMOGLOBIN VENOUS: 0.6 %
MICROSCOPIC EXAMINATION: YES
MONOCYTES ABSOLUTE: 0.4 K/UL (ref 0–1.3)
MONOCYTES RELATIVE PERCENT: 6.1 %
NEUTROPHILS ABSOLUTE: 4.1 K/UL (ref 1.7–7.7)
NEUTROPHILS RELATIVE PERCENT: 66.6 %
NITRITE, URINE: NEGATIVE
O2 SAT, VEN: 91 %
O2 THERAPY: ABNORMAL
OCCULT BLOOD SCREENING: ABNORMAL
OPIATE SCREEN URINE: NORMAL
OXYCODONE URINE: NORMAL
PCO2, VEN: 47.6 MMHG (ref 40–50)
PDW BLD-RTO: 16.4 % (ref 12.4–15.4)
PERFORMED ON: ABNORMAL
PERFORMED ON: ABNORMAL
PERFORMED ON: NORMAL
PH UA: 5.5
PH UA: 5.5 (ref 5–8)
PH VENOUS: 7.43 (ref 7.35–7.45)
PHENCYCLIDINE SCREEN URINE: NORMAL
PLATELET # BLD: 325 K/UL (ref 135–450)
PMV BLD AUTO: 7.3 FL (ref 5–10.5)
PO2, VEN: 63.4 MMHG (ref 25–40)
POTASSIUM REFLEX MAGNESIUM: 4.7 MMOL/L (ref 3.5–5.1)
PROPOXYPHENE SCREEN: NORMAL
PROTEIN UA: NEGATIVE MG/DL
RBC # BLD: 1.66 M/UL (ref 4–5.2)
RBC UA: ABNORMAL /HPF (ref 0–4)
SODIUM BLD-SCNC: 137 MMOL/L (ref 136–145)
SPECIFIC GRAVITY UA: 1.02 (ref 1–1.03)
SPECIMEN STATUS: NORMAL
TCO2 CALC VENOUS: 32 MMOL/L
TOTAL PROTEIN: 5.3 G/DL (ref 6.4–8.2)
URINE TYPE: ABNORMAL
UROBILINOGEN, URINE: 0.2 E.U./DL
WBC # BLD: 6.2 K/UL (ref 4–11)
WBC UA: ABNORMAL /HPF (ref 0–5)

## 2021-12-15 PROCEDURE — 86901 BLOOD TYPING SEROLOGIC RH(D): CPT

## 2021-12-15 PROCEDURE — 94761 N-INVAS EAR/PLS OXIMETRY MLT: CPT

## 2021-12-15 PROCEDURE — 80307 DRUG TEST PRSMV CHEM ANLYZR: CPT

## 2021-12-15 PROCEDURE — 96365 THER/PROPH/DIAG IV INF INIT: CPT

## 2021-12-15 PROCEDURE — 6370000000 HC RX 637 (ALT 250 FOR IP): Performed by: REGISTERED NURSE

## 2021-12-15 PROCEDURE — 82270 OCCULT BLOOD FECES: CPT

## 2021-12-15 PROCEDURE — 82803 BLOOD GASES ANY COMBINATION: CPT

## 2021-12-15 PROCEDURE — 6360000002 HC RX W HCPCS: Performed by: EMERGENCY MEDICINE

## 2021-12-15 PROCEDURE — 70450 CT HEAD/BRAIN W/O DYE: CPT

## 2021-12-15 PROCEDURE — 80053 COMPREHEN METABOLIC PANEL: CPT

## 2021-12-15 PROCEDURE — 85025 COMPLETE CBC W/AUTO DIFF WBC: CPT

## 2021-12-15 PROCEDURE — 85018 HEMOGLOBIN: CPT

## 2021-12-15 PROCEDURE — 86850 RBC ANTIBODY SCREEN: CPT

## 2021-12-15 PROCEDURE — 71045 X-RAY EXAM CHEST 1 VIEW: CPT

## 2021-12-15 PROCEDURE — 81001 URINALYSIS AUTO W/SCOPE: CPT

## 2021-12-15 PROCEDURE — C9113 INJ PANTOPRAZOLE SODIUM, VIA: HCPCS | Performed by: EMERGENCY MEDICINE

## 2021-12-15 PROCEDURE — 2000000000 HC ICU R&B

## 2021-12-15 PROCEDURE — 2580000003 HC RX 258: Performed by: REGISTERED NURSE

## 2021-12-15 PROCEDURE — 83605 ASSAY OF LACTIC ACID: CPT

## 2021-12-15 PROCEDURE — 2700000000 HC OXYGEN THERAPY PER DAY

## 2021-12-15 PROCEDURE — 99285 EMERGENCY DEPT VISIT HI MDM: CPT

## 2021-12-15 PROCEDURE — 86923 COMPATIBILITY TEST ELECTRIC: CPT

## 2021-12-15 PROCEDURE — P9016 RBC LEUKOCYTES REDUCED: HCPCS

## 2021-12-15 PROCEDURE — 36430 TRANSFUSION BLD/BLD COMPNT: CPT

## 2021-12-15 PROCEDURE — 86900 BLOOD TYPING SEROLOGIC ABO: CPT

## 2021-12-15 PROCEDURE — 36415 COLL VENOUS BLD VENIPUNCTURE: CPT

## 2021-12-15 PROCEDURE — 94640 AIRWAY INHALATION TREATMENT: CPT

## 2021-12-15 PROCEDURE — 2580000003 HC RX 258: Performed by: EMERGENCY MEDICINE

## 2021-12-15 PROCEDURE — 85014 HEMATOCRIT: CPT

## 2021-12-15 RX ORDER — TRAMADOL HYDROCHLORIDE 50 MG/1
50 TABLET ORAL EVERY 6 HOURS PRN
COMMUNITY

## 2021-12-15 RX ORDER — ONDANSETRON 4 MG/1
4 TABLET, ORALLY DISINTEGRATING ORAL EVERY 8 HOURS PRN
Status: DISCONTINUED | OUTPATIENT
Start: 2021-12-15 | End: 2021-12-19 | Stop reason: HOSPADM

## 2021-12-15 RX ORDER — SODIUM CHLORIDE 9 MG/ML
INJECTION, SOLUTION INTRAVENOUS PRN
Status: DISCONTINUED | OUTPATIENT
Start: 2021-12-15 | End: 2021-12-16

## 2021-12-15 RX ORDER — FOLIC ACID 1 MG/1
1 TABLET ORAL DAILY
Status: DISCONTINUED | OUTPATIENT
Start: 2021-12-16 | End: 2021-12-19 | Stop reason: HOSPADM

## 2021-12-15 RX ORDER — SODIUM CHLORIDE 9 MG/ML
INJECTION, SOLUTION INTRAVENOUS CONTINUOUS
Status: DISCONTINUED | OUTPATIENT
Start: 2021-12-15 | End: 2021-12-17

## 2021-12-15 RX ORDER — SODIUM CHLORIDE 0.9 % (FLUSH) 0.9 %
5-40 SYRINGE (ML) INJECTION EVERY 12 HOURS SCHEDULED
Status: DISCONTINUED | OUTPATIENT
Start: 2021-12-15 | End: 2021-12-19 | Stop reason: HOSPADM

## 2021-12-15 RX ORDER — FERROUS SULFATE 325(65) MG
325 TABLET ORAL
COMMUNITY

## 2021-12-15 RX ORDER — ONDANSETRON 2 MG/ML
4 INJECTION INTRAMUSCULAR; INTRAVENOUS EVERY 6 HOURS PRN
Status: DISCONTINUED | OUTPATIENT
Start: 2021-12-15 | End: 2021-12-19 | Stop reason: HOSPADM

## 2021-12-15 RX ORDER — LOSARTAN POTASSIUM 100 MG/1
100 TABLET ORAL DAILY
COMMUNITY

## 2021-12-15 RX ORDER — ALBUTEROL SULFATE 2.5 MG/3ML
2.5 SOLUTION RESPIRATORY (INHALATION) EVERY 4 HOURS PRN
Status: DISCONTINUED | OUTPATIENT
Start: 2021-12-15 | End: 2021-12-19 | Stop reason: HOSPADM

## 2021-12-15 RX ORDER — ACETAMINOPHEN 650 MG/1
650 SUPPOSITORY RECTAL EVERY 6 HOURS PRN
Status: DISCONTINUED | OUTPATIENT
Start: 2021-12-15 | End: 2021-12-19 | Stop reason: HOSPADM

## 2021-12-15 RX ORDER — ALLOPURINOL 100 MG/1
100 TABLET ORAL 2 TIMES DAILY
Status: DISCONTINUED | OUTPATIENT
Start: 2021-12-16 | End: 2021-12-19 | Stop reason: HOSPADM

## 2021-12-15 RX ORDER — ACETAMINOPHEN 325 MG/1
650 TABLET ORAL EVERY 6 HOURS PRN
Status: DISCONTINUED | OUTPATIENT
Start: 2021-12-15 | End: 2021-12-19 | Stop reason: HOSPADM

## 2021-12-15 RX ORDER — SODIUM CHLORIDE 0.9 % (FLUSH) 0.9 %
5-40 SYRINGE (ML) INJECTION PRN
Status: DISCONTINUED | OUTPATIENT
Start: 2021-12-15 | End: 2021-12-19 | Stop reason: HOSPADM

## 2021-12-15 RX ORDER — BUDESONIDE AND FORMOTEROL FUMARATE DIHYDRATE 160; 4.5 UG/1; UG/1
2 AEROSOL RESPIRATORY (INHALATION) 2 TIMES DAILY
Status: DISCONTINUED | OUTPATIENT
Start: 2021-12-15 | End: 2021-12-19 | Stop reason: HOSPADM

## 2021-12-15 RX ORDER — 0.9 % SODIUM CHLORIDE 0.9 %
1000 INTRAVENOUS SOLUTION INTRAVENOUS ONCE
Status: COMPLETED | OUTPATIENT
Start: 2021-12-15 | End: 2021-12-15

## 2021-12-15 RX ORDER — DULOXETIN HYDROCHLORIDE 60 MG/1
60 CAPSULE, DELAYED RELEASE ORAL DAILY
Status: DISCONTINUED | OUTPATIENT
Start: 2021-12-16 | End: 2021-12-19 | Stop reason: HOSPADM

## 2021-12-15 RX ORDER — OXYBUTYNIN CHLORIDE 5 MG/1
5 TABLET ORAL 3 TIMES DAILY
Status: DISCONTINUED | OUTPATIENT
Start: 2021-12-16 | End: 2021-12-19 | Stop reason: HOSPADM

## 2021-12-15 RX ORDER — ARIPIPRAZOLE 10 MG/1
5 TABLET ORAL DAILY
Status: DISCONTINUED | OUTPATIENT
Start: 2021-12-16 | End: 2021-12-19 | Stop reason: HOSPADM

## 2021-12-15 RX ORDER — SODIUM CHLORIDE 9 MG/ML
25 INJECTION, SOLUTION INTRAVENOUS PRN
Status: DISCONTINUED | OUTPATIENT
Start: 2021-12-15 | End: 2021-12-19 | Stop reason: HOSPADM

## 2021-12-15 RX ADMIN — SODIUM CHLORIDE 1000 ML: 9 INJECTION, SOLUTION INTRAVENOUS at 11:36

## 2021-12-15 RX ADMIN — Medication 10 ML: at 20:09

## 2021-12-15 RX ADMIN — SODIUM CHLORIDE: 9 INJECTION, SOLUTION INTRAVENOUS at 20:31

## 2021-12-15 RX ADMIN — Medication 2 PUFF: at 19:59

## 2021-12-15 RX ADMIN — SODIUM CHLORIDE 80 MG: 9 INJECTION, SOLUTION INTRAVENOUS at 12:38

## 2021-12-15 RX ADMIN — SODIUM CHLORIDE 8 MG/HR: 9 INJECTION, SOLUTION INTRAVENOUS at 13:19

## 2021-12-15 ASSESSMENT — PAIN SCALES - GENERAL
PAINLEVEL_OUTOF10: 0
PAINLEVEL_OUTOF10: 0

## 2021-12-15 NOTE — ED NOTES
Bed: 18  Expected date:   Expected time:   Means of arrival: [facility]  Comments:  [facility]  12/15/21 4861

## 2021-12-15 NOTE — ED NOTES
Consult called to GI @8440 12/15/21, per Dr. Grzegorz Valenzuela   RE: GI bleed  Dr. Sade Connolly returned call @ [facility]  12/15/21 1257

## 2021-12-15 NOTE — PLAN OF CARE
Problem: Falls - Risk of:  Goal: Will remain free from falls  Description: Will remain free from falls  Outcome: Ongoing  Goal: Absence of physical injury  Description: Absence of physical injury  Outcome: Ongoing     Problem: Discharge Planning:  Goal: Discharged to appropriate level of care  Description: Discharged to appropriate level of care  Outcome: Ongoing     Problem:  Bowel Function - Altered:  Goal: Bowel elimination is within specified parameters  Description: Bowel elimination is within specified parameters  Outcome: Ongoing     Problem: Fluid Volume - Imbalance:  Goal: Absence of imbalanced fluid volume signs and symptoms  Description: Absence of imbalanced fluid volume signs and symptoms  Outcome: Ongoing  Goal: Will show no signs and symptoms of excessive bleeding  Description: Will show no signs and symptoms of excessive bleeding  Outcome: Ongoing

## 2021-12-15 NOTE — CONSULTS
Consult called to Vascular surgery @5674 12/15/21, per LANDEN-Sheela  RE: pt on eliquis for recent PE, now presents with profound anemia, holding anticoagulation, pt candidate for IVC filter?   Dr. Keyur Lunsford returned call and spoke to [facility] (nurse) @[phone]

## 2021-12-15 NOTE — CARE COORDINATION
CASE MANAGEMENT INITIAL ASSESSMENT      Reviewed chart and completed assessment with patient and   Explained Case Management role/services. yes    Primary contact information:daughter, [facility] Decision Maker :   Primary Decision Maker: Suzette Garcia Child - [phone]          Can this person be reached and be able to respond quickly, such as within a few minutes or hours? Yes      Admit date/status:12/15/21  Diagnosis:GI bleed  Is this a Readmission?:  Yes      Insurance:[facility] medicare   Precert required for SNF: waived due to COVID       3 night stay required: No    Living arrangements, Adls, care needs, prior to admission:from home with , currently at [facility]    [address] at home:  Walker_x_Cane__RTS__ BSC__Shower Chair__  02_x_ HHN__ CPAP__  BiPap__  Hospital Bed__ W/C___ Other__________    Services in the home and/or outpatient, prior to [address] (if applicable)   · Name:  · Address:  · Dialysis Schedule:  · Phone:  · Fax:    PT/OT recs:    Hospital Exemption Notification (HEN):    Barriers to discharge:medical complications    Plan/comments:Referred to patient for d/c planning. Spoke to patient and . Patient is a 64year old female admitted for GI bleed. Patient usually lives at home with . Patient currently at [facility]. Per family plan is to return to [facility] on d/c. Facility updated. No other needs noted at this time.   Electronically signed by CATARINA Urban LISW-S on 12/15/2021 at 1:53 PM      [facility] on chart for MD signature

## 2021-12-15 NOTE — DISCHARGE INSTR - COC
Continuity of Care Form    Patient Name: Kishore Gonzalez   :  1945  MRN:  [id number]    Admit date:  12/15/2021  Discharge date:  21     Code Status Order: Prior   Advance Directives:      Admitting Physician:  Josué Guthrie MD  PCP: Zakia Mcmullen MD    Discharging Nurse: Ally sAhley 23 Unit/Room#:   Discharging Unit Phone Number:  [phone]    Emergency Contact:   Extended Emergency Contact Information  Primary Emergency Contact: Hildaqueta Abebe  Address: [place] [address] [address] Phone: [phone]  Work Phone: [phone]  Mobile Phone: [phone]  Relation: Spouse  Secondary Emergency Contact: Dora Hernandezbie   [address] Phone: [phone]  Mobile Phone: [phone]  Relation: Child    Past Surgical History:  Past Surgical History:   Procedure Laterality Date    BRONCHOSCOPY N/A 2021    BRONCHOSCOPY ALVEOLAR LAVAGE performed by Irene Lin MD at [address]  2021    BRONCHOSCOPY THERAPUTIC ASPIRATION INITIAL performed by Irene Lin MD at [address] [place] [address]  3/9/2011    CATARACT REMOVAL WITH IMPLANT  3/23/2011    CHOLECYSTECTOMY      COLON SURGERY      11\" removed    COLONOSCOPY  13    Diverticulosis    HERNIA REPAIR      HYSTERECTOMY      LAP BAND  10/5/10    ADJUSTABLE GASTRIC RESTRICTIVE DEVICE       Immunization History:   Immunization History   Administered Date(s) Administered    COVID-19, Myles Gallus, Primary or Immunocompromised, PF, 100mcg/0.5mL 2021, 2021    FLUZONE 3 YEARS AND OVER 10/01/2015    Influenza 2011, 10/29/2013, 10/23/2014, 10/15/2015, 10/06/2016    Influenza Vaccine, unspecified formulation 10/29/2013, 10/23/2014, 10/01/2015, 10/06/2016, 10/31/2017, 10/04/2018, 10/15/2019    Influenza Virus Vaccine 2009, 10/15/2013, 10/29/2013, 10/23/2014, 10/01/2015, 10/06/2016, Weight:   Wt Readings from Last 1 Encounters:   12/15/21 230 lb (104.3 kg)     Mental Status:  alert and oriented to person, place and year. Needs frequently reminded of situation. IV Access:  - None    Nursing Mobility/ADLs:  Walking   Assisted  Transfer  Assisted  Bathing  Assisted  Dressing  Assisted  Toileting  Assisted  Feeding  Independent  Med Admin  Assisted  Med Delivery   whole    Wound Care Documentation and Therapy:        Elimination:  Continence: Bowel: No  Bladder: Yes  Urinary Catheter: None   Colostomy/Ileostomy/Ileal Conduit: No       Date of Last BM: 12/18/21   No intake or output data in the 24 hours ending 12/15/21 1350  No intake/output data recorded. Safety Concerns:     Sund[employer] [place]    Impairments/Disabilities:      None    Nutrition Therapy:  Current Nutrition Therapy:   - Oral Diet:  General    Routes of Feeding: None  Liquids: Thin Liquids  Daily Fluid Restriction: no  Last Modified Barium Swallow with Video (Video Swallowing Test): not done    Treatments at the Time of Hospital Discharge:   Respiratory Treatments: N/A  Oxygen Therapy:  is on oxygen at 4 L/min per nasal cannula.   Ventilator:    - No ventilator support    Rehab Therapies: Physical Therapy and Occupational Therapy  Weight Bearing Status/Restrictions: No weight bearing restirctions  Other Medical Equipment (for information only, NOT a DME order):  walker  Other Treatments: N/A    Patient's personal belongings (please select all that are sent with patient):  Dentures upper and lower    RN SIGNATURE:  Electronically signed by Maryann Gracia RN on 12/18/21 at 6:30 PM EST    CASE MANAGEMENT/SOCIAL WORK SECTION    Inpatient Status Date: 12/15/2021    Readmission Risk Assessment Score:  Readmission Risk              Risk of Unplanned Readmission:  0           Discharging to Facility/ Agency   Name: Jessi Hernandez [address] [place] [address]         Phone: [phone]       Fax: [phone] / signature: Electronically signed by Romulo Bray RN on 12/18/21 at 4:45 PM EST    PHYSICIAN SECTION    Prognosis: Fair    Condition at Discharge: Stable    Rehab Potential (if transferring to Rehab): Fair    Recommended Follow-up, Labs or Other Treatments After Discharge:    Continue to hold pt's eliquis for PEs due to anemia. Pt now has IVC filter in place. CBC on 12/21/21. PT/OT. Physician Certification: I certify the above information and transfer of Harmeet Wood  is necessary for the continuing treatment of the diagnosis listed and that she requires [facility] for less 30 days.      Update Admission H&P: No change in H&P    PHYSICIAN SIGNATURE:  Electronically signed by Delores Favre, APRN - CNP on 12/18/21 at 4:41 PM EST

## 2021-12-15 NOTE — H&P
Hospital Medicine History & Physical      PCP: Brook Weiss MD    Date of Admission: 12/15/2021    Date of Service: Pt seen/examined on 12/15/2021 and Admitted to Inpatient with expected LOS greater than two midnights due to medical therapy. Chief Complaint:  AMS    History Of Present Illness:      64 y.o. female who presented to [facility] with altered mental status. Pt is a poor historian therefore history is limited. Pt was hospitalized from 11/26-12/7 at [facility]. She was treated for pneumonia, COPDAE and acute PE noted on V/Q scan. Pt was initiated on Eliquis. She was discharged to [facility] for rehab. At the rehab today she was noted to have altered mental status and black tarry stools. She was therefore sent to the ED for further evaluation. In the ED she was noted to have Hgb of 5.0, previous 8.1 on 12/7. She was also noted to have soft blood pressures with SBP in the ~90s. Her Cr was slightly elevated above baseline at 1.6, she was 1.2 on 12/7. GI was consulted in the ED. Pt was started on protonix infusion and PRBC transfusion. Pt denies chest pain or SOB. No abdominal pain. No N/V/D. She is stable on her baseline O2 requirement of 4 LPM. She was admitted for further evaluation and management.      Past Medical History:          Diagnosis Date    Arthritis     Asthma     Bronchitis chronic     Chronic respiratory failure (HCC)     Depression     Diabetes mellitus (HCC)     BORDERLINE NO MEDS    Emphysema of lung ([address]     spontaneous pneumo 5/07/2020    Glaucoma     Hypertension     Morbid obesity ([address]     Oxygen dependent     Uses O2 NC at 3L/min continuously    Pneumonia of left upper lobe due to Pseudomonas species ([address] 12/1/2021    Rash     due to nasal canula    Shingles     recently no problems now    Sleep apnea     C pap       Past Surgical History:          Procedure Laterality Date    BRONCHOSCOPY N/A 11/30/2021    BRONCHOSCOPY ALVEOLAR LAVAGE performed by Micheal Ritchie Timothy Calderon MD at [facility]  11/30/2021    BRONCHOSCOPY THERAPUTIC ASPIRATION INITIAL performed by Edil Sifuentes MD at [facility]  3/9/2011    CATARACT REMOVAL WITH IMPLANT  3/23/2011    CHOLECYSTECTOMY      COLON SURGERY      11\" removed    COLONOSCOPY  11/18/13    Diverticulosis    HERNIA REPAIR      HYSTERECTOMY      LAP BAND  10/5/10    ADJUSTABLE GASTRIC RESTRICTIVE DEVICE       Medications Prior to Admission:      Prior to Admission medications    Medication Sig Start Date End Date Taking? Authorizing Provider   bumetanide (BUMEX) 2 MG tablet Take 1 tablet by mouth daily 12/7/21 1/6/22  Paulette Cuevas, MD   amLODIPine (NORVASC) 5 MG tablet Take 1 tablet by mouth daily 12/7/21 1/6/22  Paulette Began, MD   allopurinol (ZYLOPRIM) 100 MG tablet Take 1 tablet by mouth 2 times daily 12/6/21 1/5/22  Paulette Began, MD   apixaban [facility]) 5 MG TABS tablet Take 1 tablet by mouth 2 times daily 12/3/21 1/2/22  Paulette Cuevas, MD   [facility] HAND[facility] 18 MCG inhalation capsule INHALE 1 CAPSULE INTO THE LUNGS DAILY 11/3/21   Eagle Aguirre PA-C   fluticasone-salmeterol (ADVAIR) 500-50 MCG/DOSE diskus inhaler INHALE 1 PUFF INTO THE LUNGS EVERY 12 HOURS 7/15/21   Brian Stone MD   cyanocobalamin 1000 MCG tablet Take 1,000 mcg by mouth daily 1/14/21   Historical Provider, MD   folic acid (FOLVITE) 1 MG tablet TAKE 1 TABLET (1 MG TOTAL) BY MOUTH DAILY. 3/19/21   Historical Provider, MD   tiZANidine (ZANAFLEX) 4 MG tablet TAKE ONE TABLET (4 MG.  TOTAL) BY MOUTH THREE TIMES DAILY 4/2/21   Historical Provider, MD   albuterol sulfate  (90 Base) MCG/ACT inhaler INHALE 2 PUFFS INTO THE LUNGS EVERY 6 HOURS AS NEEDED FOR WHEEZING OR SHORTNESS OF BREATH 2/22/21 2/22/22  Brian Stone MD   albuterol (PROVENTIL) (2.5 MG/3ML) 0.083% nebulizer solution Take 3 mLs by nebulization every 4 hours as needed for Wheezing 9/24/20 9/24/21  Brian Stone MD pantoprazole (PROTONIX) 40 MG tablet Take 40 mg by mouth daily    Historical Provider, MD   oxybutynin (DITROPAN) 5 MG tablet Take 5 mg by mouth 3 times daily    Historical Provider, MD   albuterol sulfate HFA (PROAIR HFA) 108 (90 Base) MCG/ACT inhaler Inhale 2 puffs into the lungs every 6 hours as needed for Wheezing or Shortness of Breath 2/21/20   Gilberto Belle MD   ARIPiprazole (ABILIFY) 5 MG tablet Take 5 mg by mouth 12/19/19   Historical Provider, MD   IRON PO Take 325 mg by mouth 9/19/19   Historical Provider, MD   albuterol (PROVENTIL) (2.5 MG/3ML) 0.083% nebulizer solution Take 3 mLs by nebulization every 4 hours as needed for Wheezing or Shortness of Breath DX COPD J44.9 12/17/19   Destiny Mcgrath MD   DULoxetine (CYMBALTA) 60 MG extended release capsule Take 60 mg by mouth daily    Historical Provider, MD   losartan (COZAAR) 100 MG tablet Take 100 mg by mouth 9/6/18 5/15/20  Historical Provider, MD   OXYGEN Inhale 3 L/min into the lungs continuous. Historical Provider, MD   meloxicam (MOBIC) 15 MG tablet Take 15 mg by mouth daily. Historical Provider, MD   bumetanide (BUMEX) 1 MG tablet Take 1 mg by mouth daily     Historical Provider, MD       Allergies:  Patient has no known allergies. Social History:      The patient currently lives at nursing home. TOBACCO:   reports that she quit smoking about 16 years ago. Her smoking use included cigarettes. She has a 15.00 pack-year smoking history. She has never used smokeless tobacco.  ETOH:   reports no history of alcohol use. E-Cigarettes/Vaping Use     Questions Responses    E-Cigarette/Vaping Use Never User    Start Date     Passive Exposure     Quit Date     Counseling Given     Comments             Family History:      Reviewed in detail.  Positive as follows:        Problem Relation Age of Onset    Diabetes Mother    [facility] Arthritis Mother     Vision Loss Mother     High Blood Pressure Sister     Stroke Sister     Diabetes Sister    [facility] Depression Sister     Arthritis Sister     Diabetes Daughter     Substance Abuse Daughter     Depression Daughter     Asthma Daughter     Emphysema Father     Cancer Neg Hx     Heart Failure Neg Hx     Hypertension Neg Hx        REVIEW OF SYSTEMS COMPLETED:   Pertinent positives as noted in the HPI. All other systems reviewed and negative. PHYSICAL EXAM PERFORMED:    BP (!) 85/45   Pulse 76   Temp 98.3 °F (36.8 °C) (Oral)   Resp 25   Ht 5' 3\" (1.6 m)   Wt 230 lb (104.3 kg)   SpO2 95%   BMI 40.74 kg/m²     General appearance:  No apparent distress, appears stated age and cooperative. HEENT:  Normal cephalic, atraumatic without obvious deformity. Pupils equal, round, and reactive to light. Extra ocular muscles intact. Conjunctivae/corneas clear. Neck: Supple, with full range of motion. No jugular venous distention. Trachea midline. Respiratory:  Normal respiratory effort. Clear to auscultation, bilaterally without Rales/Wheezes/Rhonchi. Cardiovascular:  Regular rate and rhythm with normal S1/S2 without murmurs, rubs or gallops. Abdomen: Soft, non-tender, non-distended with normal bowel sounds. Musculoskeletal:  No clubbing, cyanosis or edema bilaterally. Full range of motion without deformity. Skin: Skin color, texture, turgor normal.  No rashes or lesions. Neurologic:  Neurovascularly intact without any focal sensory/motor deficits.  Cranial nerves: II-XII intact, grossly non-focal.  Psychiatric:  Alert and oriented  Capillary Refill: Brisk,3 seconds, normal  Peripheral Pulses: +2 palpable, equal bilaterally       Labs:     Recent Labs     12/15/21  1100   WBC 6.2   HGB 5.0*   HCT 15.5*        Recent Labs     12/15/21  1100      K 4.7   CL 99   CO2 30   BUN 32*   CREATININE 1.6*   CALCIUM 8.9     Recent Labs     12/15/21  1100   AST 17   ALT 11   BILITOT <0.2   ALKPHOS 86     Urinalysis:      Lab Results   Component Value Date    NITRU Negative 12/15/2021    WBCUA 3-5 12/15/2021    BACTERIA Rare 12/15/2021    RBCUA 0-2 12/15/2021    BLOODU Negative 12/15/2021    SPECGRAV 1.020 12/15/2021    GLUCOSEU Negative 12/15/2021       Radiology:     XR CHEST PORTABLE   Final Result   No acute abnormality. CT Head WO Contrast   Final Result   Atrophy and small-vessel ischemic change. No hemorrhage or mass      RECOMMENDATIONS:   Unavailable             ASSESSMENT/PLAN:    Active Hospital Problems    Diagnosis Date Noted    GI bleed [K92.2] 12/15/2021       Acute blood loss anemia due to acute upper GI bleed   - Pt with reported melena and positive FOBT. - Hgb was 5.0 in the ED. Baseline Hgb 8-9.  - Pt recently started on Eliquis for PE, will hold Eliquis for now. Consult Vascular surgery for consideration of IVC filter -- appreciate.   - GI consulted from the ED.   - Pt initiated on protonix drip in the ED -- continue. - Keep NPO with gentle IV fluids.   - Transfuse 2 units PRBC for now. Monitor serial H&Hs. Transfuse for Hgb less than 7 and/or symptomatic.   - Mobic is list on home med list, unclear if pt is still taking this. Avoid NSAIDs. Acute kidney injury on CKD stage 3:  - Cr currently 1.6. Baseline serum Cr ~1.2.   - Etiology: likely due to anemia / hypotension.   - Avoid nephrotoxic agents. - Monitor kidney fx closely. Recent PE:  - Noted on V/Q scan on 11/26. BLE venous dopplers on 11/29 showed superficial clot, no evidence of DVT. - Hold anticoagulation for now in above setting.   - Vascular surgery consulted. Chronic hypoxic respiratory failure due to COPD:  - Stable, no evidence of exacerbation.   - She is stable on her baseline O2 requirement of 4 LPM.   - Continue home inhaler regimen. Hypertension:  - Currently hypotensive. Hold her home antihypertensives. Should improved with transfusion. Monitor BP closely. Depression:  - Continue her home medications. Morbid obesity:  - With Body mass index is 40.74 kg/m².  Complicating assessment

## 2021-12-15 NOTE — PROGRESS NOTES
4 Eyes Skin Assessment     The patient is being assess for   Admission    I agree that 2 RN's have performed a thorough Head to Toe Skin Assessment on the patient. ALL assessment sites listed below have been assessed. Areas assessed for pressure by both nurses:   [x]   Head, Face, and Ears   [x]   Shoulders, Back, and Chest, Abdomen  [x]   Arms, Elbows, and Hands   [x]   Coccyx, Sacrum, and Ischium  [x]   Legs, Feet, and Heels        Skin Assessed Under all Medical Devices by both nurses:  O2 device tubing and barnes              All Mepilex Borders were peeled back and area peeked at by both nurses:  No: NA  Please list where Mepilex Borders are located:  NA             **SHARE this note so that the co-signing nurse is able to place an eSignature**    Co-signer eSignature: {[email or web address]:[id number]}    Does the Patient have Skin Breakdown related to pressure?   No     Anoop Prevention initiated:  Yes   Wound Care Orders initiated:  No      WOC nurse consulted for Pressure Injury (Stage 3,4, Unstageable, DTI, NWPT, Complex wounds)and New or Established Ostomies:  No      Primary Nurse eSignature: Electronically signed by Beckie Ormond, RN on 12/15/21 at 4:00 PM EST

## 2021-12-15 NOTE — ED PROVIDER NOTES
Emergency Department Provider Note  Location: [facility]  ED  12/15/2021     Patient Identification  Darinel Shannon is a 64 y.o. female    Chief Complaint  Other (pt to er after being hard to arrouse at [employer]. Jazmyne Cosme pt was normal at breakfast and 30 minute ago became hard to arrouse, only to Edmundo Aria and loud verbal stimuli. . pt is rehabing at [facility] for pe and pneum)      Mode of Arrival  EMS    HPI  (History provided by EMS/NH report)  This is a 64 y.o. female with a PMH significant for DM, HTN presented today for AMS. Patient was noted to be normal upon awakening and had breakfast without problems. After breakfast, and about 30 minutes PTA, patient was found very difficult to arouse. Here, patient is sleepy but will open her eyes to loud stimuli. She cannot provide a history as she is too altered. ROS  Review of Systems   Unable to perform ROS: Mental status change       I have reviewed the following nursing documentation:  Allergies: No Known Allergies    Past medical history:  has a past medical history of Arthritis, Asthma, Bronchitis chronic, Chronic respiratory failure ([address] Depression, Diabetes mellitus ([address] Emphysema of lung ([address] Glaucoma, Hypertension, Morbid obesity ([address] Oxygen dependent, Pneumonia of left upper lobe due to Pseudomonas species ([address] (12/1/2021), Rash, Shingles, and Sleep apnea. Past surgical history:  has a past surgical history that includes hernia repair; Cholecystectomy; Hysterectomy; Colon surgery; Lap Band (10/5/10); Cataract removal with implant (3/9/2011); Cataract removal with implant (3/23/2011); Colonoscopy (11/18/13); bronchoscopy (N/A, 11/30/2021); and bronchoscopy (11/30/2021). Home medications:   Prior to Admission medications    Medication Sig Start Date End Date Taking?  Authorizing Provider   bumetanide (BUMEX) 2 MG tablet Take 1 tablet by mouth daily 12/7/21 1/6/22  Jonny Saldivar MD   amLODIPine (NORVASC) 5 MG tablet Take 1 tablet by mouth daily 12/7/21 1/6/22  Tyra Drake MD   allopurinol (ZYLOPRIM) 100 MG tablet Take 1 tablet by mouth 2 times daily 12/6/21 1/5/22  Tyra Drake MD   apixaban [facility]) 5 MG TABS tablet Take 1 tablet by mouth 2 times daily 12/3/21 1/2/22  Tyra Drake MD   [facility] 18 MCG inhalation capsule INHALE 1 CAPSULE INTO THE LUNGS DAILY 11/3/21   Marci Aguirre PA-C   fluticasone-salmeterol (ADVAIR) 500-50 MCG/DOSE diskus inhaler INHALE 1 PUFF INTO THE LUNGS EVERY 12 HOURS 7/15/21   Ilya Rivera MD   cyanocobalamin 1000 MCG tablet Take 1,000 mcg by mouth daily 1/14/21   Historical Provider, MD   folic acid (FOLVITE) 1 MG tablet TAKE 1 TABLET (1 MG TOTAL) BY MOUTH DAILY. 3/19/21   Historical Provider, MD   tiZANidine (ZANAFLEX) 4 MG tablet TAKE ONE TABLET (4 MG.  TOTAL) BY MOUTH THREE TIMES DAILY 4/2/21   Historical Provider, MD   albuterol sulfate  (90 Base) MCG/ACT inhaler INHALE 2 PUFFS INTO THE LUNGS EVERY 6 HOURS AS NEEDED FOR WHEEZING OR SHORTNESS OF BREATH 2/22/21 2/22/22  Ilya Rivera MD   albuterol (PROVENTIL) (2.5 MG/3ML) 0.083% nebulizer solution Take 3 mLs by nebulization every 4 hours as needed for Wheezing 9/24/20 9/24/21  Ilya Rivera MD   pantoprazole (PROTONIX) 40 MG tablet Take 40 mg by mouth daily    Historical Provider, MD   oxybutynin (DITROPAN) 5 MG tablet Take 5 mg by mouth 3 times daily    Historical Provider, MD   albuterol sulfate HFA (PROAIR HFA) 108 (90 Base) MCG/ACT inhaler Inhale 2 puffs into the lungs every 6 hours as needed for Wheezing or Shortness of Breath 2/21/20   Steven Allen MD   ARIPiprazole (ABILIFY) 5 MG tablet Take 5 mg by mouth 12/19/19   Historical Provider, MD   IRON PO Take 325 mg by mouth 9/19/19   Historical Provider, MD   albuterol (PROVENTIL) (2.5 MG/3ML) 0.083% nebulizer solution Take 3 mLs by nebulization every 4 hours as needed for Wheezing or Shortness of Breath DX COPD J44.9 12/17/19   Ilya Rivera MD   DULoxetine (CYMBALTA) 60 MG rate and regular rhythm. Heart sounds: Normal heart sounds. No murmur heard. Pulmonary:      Effort: Pulmonary effort is normal. No respiratory distress. Breath sounds: Normal breath sounds. No stridor. No wheezing. Abdominal:      General: There is no distension. Palpations: Abdomen is soft. Tenderness: There is no abdominal tenderness. Genitourinary:     Rectum: No anal fissure or external hemorrhoid. Comments: Dark, melena stool noted around the anus  Musculoskeletal:         General: No deformity. Right lower leg: No edema. Left lower leg: No edema. Skin:     General: Skin is warm and dry. Coloration: Skin is not pale. Findings: No rash. Neurological:      Mental Status: She is lethargic and confused. Motor: No seizure activity.       Comments: Patient will open her eyes to very loud voice and when her name was called           MDM/ED Course  ED Medication Orders (From admission, onward)    Start Ordered     Status Ordering Provider    12/15/21 1230 12/15/21 1211  pantoprazole (PROTONIX) 80 mg in sodium chloride 0.9 % 50 mL bolus  ONCE         Last MAR action: New Bag - by Phillip Cueto on 12/15/21 at 1517 [facility]    12/15/21 1230 12/15/21 1211  pantoprazole (PROTONIX) 80 mg in sodium chloride 0.9 % 100 mL infusion  CONTINUOUS         Acknowledged [facility]    12/15/21 1209 12/15/21 1209  0.9 % sodium chloride infusion  PRN         Acknowledged [facility]    12/15/21 1115 12/15/21 1109  0.9 % sodium chloride bolus  ONCE         Last MAR action: New Bag - by Phillip Cueto on 12/15/21 at 520 [address]           Radiology  CT Head WO Contrast    Result Date: 12/15/2021  EXAMINATION: CT OF THE HEAD WITHOUT CONTRAST  12/15/2021 11:19 am TECHNIQUE: CT of the head was performed without the administration of intravenous contrast. Dose modulation, iterative reconstruction, and/or weight based adjustment of the mA/kV was utilized to reduce the radiation dose to as low as reasonably achievable. COMPARISON: None. HISTORY: ORDERING SYSTEM PROVIDED HISTORY: AMS; on Eliquis TECHNOLOGIST PROVIDED HISTORY: Reason for exam:->AMS; on Eliquis Has a \"code stroke\" or \"stroke alert\" been called? ->No Decision Support Exception - unselect if not a suspected or confirmed emergency medical condition->Emergency Medical Condition (MA) Reason for Exam: ams, hard ro rouse FINDINGS: BRAIN/VENTRICLES: Ventricles are midline in position. No intracerebral masses are identified. No mass effect. No midline shift. No acute intracranial hemorrhage is seen. There is prominence of the sulci, compatible with atrophy. Mild to moderate periventricular hypodensity is seen. ORBITS: The visualized portion of the orbits demonstrate no acute abnormality. SINUSES: No significant mastoid opacification noted. Small air-fluid level seen in the sphenoid. Polypoid mucosal thickening seen in the right maxillary sinus. Spurring is seen in the cervical spine SOFT TISSUES/SKULL:  No acute abnormality of the visualized skull or soft tissues. Atrophy and small-vessel ischemic change. No hemorrhage or mass RECOMMENDATIONS: Unavailable     XR CHEST PORTABLE    Result Date: 12/15/2021  EXAMINATION: ONE XRAY VIEW OF THE CHEST 12/15/2021 10:57 am COMPARISON: None. HISTORY: ORDERING SYSTEM PROVIDED HISTORY: AMS TECHNOLOGIST PROVIDED HISTORY: Reason for exam:->AMS Reason for Exam: altered mental status FINDINGS: The lungs are clear. The mediastinum and cardiac silhouette are unremarkable. No acute abnormality.          Labs  Results for orders placed or performed during the hospital encounter of 12/15/21   CBC Auto Differential   Result Value Ref Range    WBC 6.2 4.0 - 11.0 K/uL    RBC 1.66 (L) 4.00 - 5.20 M/uL    Hemoglobin 5.0 (LL) 12.0 - 16.0 g/dL    Hematocrit 15.5 (LL) 36.0 - 48.0 %    MCV 92.9 80.0 - 100.0 fL    MCH 30.0 26.0 - 34.0 pg    MCHC 32.3 31.0 - 36.0 g/dL    RDW 16.4 (H) 12.4 - 15.4 %    Platelets 885 624 - 974 K/uL    MPV 7.3 5.0 - 10.5 fL    Neutrophils % 66.6 %    Lymphocytes % 23.1 %    Monocytes % 6.1 %    Eosinophils % 3.2 %    Basophils % 1.0 %    Neutrophils Absolute 4.1 1.7 - 7.7 K/uL    Lymphocytes Absolute 1.4 1.0 - 5.1 K/uL    Monocytes Absolute 0.4 0.0 - 1.3 K/uL    Eosinophils Absolute 0.2 0.0 - 0.6 K/uL    Basophils Absolute 0.1 0.0 - 0.2 K/uL   Comprehensive Metabolic Panel w/ Reflex to MG   Result Value Ref Range    Sodium 137 136 - 145 mmol/L    Potassium reflex Magnesium 4.7 3.5 - 5.1 mmol/L    Chloride 99 99 - 110 mmol/L    CO2 30 21 - 32 mmol/L    Anion Gap 8 3 - 16    Glucose 134 (H) 70 - 99 mg/dL    BUN 32 (H) 7 - 20 mg/dL    CREATININE 1.6 (H) 0.6 - 1.2 mg/dL    GFR Non- 31 (A) >60    GFR  38 (A) >60    Calcium 8.9 8.3 - 10.6 mg/dL    Total Protein 5.3 (L) 6.4 - 8.2 g/dL    Albumin 2.7 (L) 3.4 - 5.0 g/dL    Albumin/Globulin Ratio 1.0 (L) 1.1 - 2.2    Total Bilirubin <0.2 0.0 - 1.0 mg/dL    Alkaline Phosphatase 86 40 - 129 U/L    ALT 11 10 - 40 U/L    AST 17 15 - 37 U/L   Urinalysis, reflex to microscopic   Result Value Ref Range    Color, UA Yellow Straw/Yellow    Clarity, UA Clear Clear    Glucose, Ur Negative Negative mg/dL    Bilirubin Urine Negative Negative    Ketones, Urine Negative Negative mg/dL    Specific Gravity, UA 1.020 1.005 - 1.030    Blood, Urine Negative Negative    pH, UA 5.5 5.0 - 8.0    Protein, UA Negative Negative mg/dL    Urobilinogen, Urine 0.2 <2.0 E.U./dL    Nitrite, Urine Negative Negative    Leukocyte Esterase, Urine SMALL (A) Negative    Microscopic Examination YES     Urine Type NotGiven    Blood Gas, Venous   Result Value Ref Range    pH, [place] 7.430 7.350 - 7.450    pCO2, [place] 47.6 40.0 - 50.0 mmHg    pO2, [place] 63.4 (H) 25.0 - 40.0 mmHg    HCO3, Venous 30.9 (H) 23.0 - 29.0 mmol/L    Base Excess, [place] 6.1 (H) -3.0 - 3.0 mmol/L    O2 Sat, [place] 91 Not Established %    Carboxyhemoglobin 3.3 (H) 0.0 - 1.5 %    MetHgb, [place] 0.6 <1.5 %    TC02 (Calc), [place] 32 Not Established mmol/L    O2 Therapy Unknown    Urine Drug Screen   Result Value Ref Range    Amphetamine Screen, Urine Neg Negative <1000ng/mL    Barbiturate Screen, Ur Neg Negative <200 ng/mL    Benzodiazepine Screen, Urine Neg Negative <200 ng/mL    Cannabinoid Scrn, Ur Neg Negative <50 ng/mL    Cocaine Metabolite Screen, Urine Neg Negative <300 ng/mL    Opiate Scrn, Ur Neg Negative <300 ng/mL    PCP Screen, Urine Neg Negative <25 ng/mL    Methadone Screen, Urine Neg Negative <300 ng/mL    Propoxyphene Scrn, Ur Neg Negative <300 ng/mL    Oxycodone Urine Neg Negative <100 ng/ml    pH, UA 5.5     Drug Screen Comment: see below    Lactic Acid, Plasma   Result Value Ref Range    Lactic Acid 2.0 0.4 - 2.0 mmol/L   Sample possible blood bank testing   Result Value Ref Range    Specimen Status SUSHMA    Blood Occult Stool Screen #1   Result Value Ref Range    Occult Blood Screening Result: POSITIVE  Normal range: Negative   (A)    Microscopic Urinalysis   Result Value Ref Range    Hyaline Casts, UA 0-2 0 - 2 /LPF    WBC, UA 3-5 0 - 5 /HPF    RBC, UA 0-2 0 - 4 /HPF    Epithelial Cells, UA 6-10 (A) 0 - 5 /HPF    Bacteria, UA Rare (A) None Seen /HPF   POCT Glucose   Result Value Ref Range    POC Glucose 148 (H) 70 - 99 mg/dl    Performed on ACCU-CHEK          - Patient seen and evaluated in room 25.  64 y.o. female presented for AMS. Patient gradually became more awake while waiting on her test result. Her Cr 1.6 is slightly worse than her baseline of 1.2. GFR went from 40's to 31 today. Her AMS started after she got her morning medicine and now she is more awake so likely related to medication and worsening renal function.   - Patient was placed on telemetry during his/her ED stay and no malignant dysrhythmia observed. - Pertinent old records reviewed. - Diagnostic studies reviewed. H/H much lower than baseline and stool guaiac positive.  GI consulted and protonix bolus/drip ordered. Patient is on Eliquis. BP has been in the 90's. Patient got 1L fluid bolus and will also get pRBC. - I spoke with Dr. Kemi Harris, GI. We discussed the pertinent history, exam, and workup results. Based on that discussion, GI agrees with protonix and will evaluate the patient. I spoke with Dr. Татьяна Peterson, hospitalist. We thoroughly discussed the history, physical exam, laboratory and imaging studies, as well as, emergency department course. Based upon that discussion, we've decided to admit Gerardo Grimm for further observation and evaluation of Petra Ortiz mental status change. As I have deemed necessary from their history, physical and studies, I have considered and evaluated Gerardo Grimm for the following diagnoses:  UTI, PNEUMONIA, DIABETES, INTRACRANIAL HEMORRHAGE, SEPSIS SUBARACHNOID HEMORRHAGE, SUBDURAL HEMATOMA, & STROKE. Clinical Impression:  1. Gastrointestinal hemorrhage with melena    2. Somnolence          Disposition:  Admit to telemetry in stable condition. Blood pressure (!) 92/49, pulse 69, temperature 98.3 °F (36.8 °C), temperature source Oral, resp. rate 16, height 5' 3\" (1.6 m), weight 230 lb (104.3 kg), SpO2 97 %. Total critical care time is 35-40 minutes, which excludes separately billable procedures and updating family. Time spent is specifically for management of the presenting complaint and symptoms initially, direct bedside care, reevaluation, review of records, and consultation. There was a high probability of clinically significant life-threatening deterioration in the patient's condition, which required my urgent intervention. This chart was generated in part by using Dragon Dictation system and may contain errors related to that system including errors in grammar, punctuation, and spelling, as well as words and phrases that may be inappropriate.  If there are any questions or concerns please feel free to contact the dictating provider for clarification.      Livan German MD  [facility] Seth Lombardo MD  12/15/21 3500

## 2021-12-16 ENCOUNTER — APPOINTMENT (OUTPATIENT)
Dept: CARDIAC CATH/INVASIVE PROCEDURES | Age: 76
DRG: 377 | End: 2021-12-16
Payer: MEDICARE

## 2021-12-16 ENCOUNTER — ANESTHESIA EVENT (OUTPATIENT)
Dept: ENDOSCOPY | Age: 76
DRG: 377 | End: 2021-12-16
Payer: MEDICARE

## 2021-12-16 ENCOUNTER — ANESTHESIA (OUTPATIENT)
Dept: ENDOSCOPY | Age: 76
DRG: 377 | End: 2021-12-16
Payer: MEDICARE

## 2021-12-16 VITALS — SYSTOLIC BLOOD PRESSURE: 141 MMHG | OXYGEN SATURATION: 88 % | DIASTOLIC BLOOD PRESSURE: 105 MMHG

## 2021-12-16 LAB
A/G RATIO: 1 (ref 1.1–2.2)
ALBUMIN SERPL-MCNC: 2.5 G/DL (ref 3.4–5)
ALP BLD-CCNC: 83 U/L (ref 40–129)
ALT SERPL-CCNC: 10 U/L (ref 10–40)
ANION GAP SERPL CALCULATED.3IONS-SCNC: 7 MMOL/L (ref 3–16)
AST SERPL-CCNC: 20 U/L (ref 15–37)
BILIRUB SERPL-MCNC: <0.2 MG/DL (ref 0–1)
BLOOD BANK DISPENSE STATUS: NORMAL
BLOOD BANK PRODUCT CODE: NORMAL
BPU ID: NORMAL
BUN BLDV-MCNC: 30 MG/DL (ref 7–20)
CALCIUM SERPL-MCNC: 8.6 MG/DL (ref 8.3–10.6)
CHLORIDE BLD-SCNC: 107 MMOL/L (ref 99–110)
CO2: 28 MMOL/L (ref 21–32)
CREAT SERPL-MCNC: 1.5 MG/DL (ref 0.6–1.2)
DESCRIPTION BLOOD BANK: NORMAL
GFR AFRICAN AMERICAN: 41
GFR NON-AFRICAN AMERICAN: 34
GLUCOSE BLD-MCNC: 74 MG/DL (ref 70–99)
GLUCOSE BLD-MCNC: 77 MG/DL (ref 70–99)
GLUCOSE BLD-MCNC: 89 MG/DL (ref 70–99)
GLUCOSE BLD-MCNC: 90 MG/DL (ref 70–99)
GLUCOSE BLD-MCNC: 93 MG/DL (ref 70–99)
GLUCOSE BLD-MCNC: 98 MG/DL (ref 70–99)
GLUCOSE BLD-MCNC: 98 MG/DL (ref 70–99)
HCT VFR BLD CALC: 22.3 % (ref 36–48)
HCT VFR BLD CALC: 22.4 % (ref 36–48)
HCT VFR BLD CALC: 22.7 % (ref 36–48)
HCT VFR BLD CALC: 23.4 % (ref 36–48)
HCT VFR BLD CALC: 24.7 % (ref 36–48)
HEMOGLOBIN: 7.3 G/DL (ref 12–16)
HEMOGLOBIN: 7.4 G/DL (ref 12–16)
HEMOGLOBIN: 7.5 G/DL (ref 12–16)
HEMOGLOBIN: 7.7 G/DL (ref 12–16)
HEMOGLOBIN: 8.2 G/DL (ref 12–16)
IRON SATURATION: 16 % (ref 15–50)
IRON: 34 UG/DL (ref 37–145)
MCH RBC QN AUTO: 30.1 PG (ref 26–34)
MCHC RBC AUTO-ENTMCNC: 33.1 G/DL (ref 31–36)
MCV RBC AUTO: 91.2 FL (ref 80–100)
PDW BLD-RTO: 16.6 % (ref 12.4–15.4)
PERFORMED ON: NORMAL
PLATELET # BLD: 272 K/UL (ref 135–450)
PMV BLD AUTO: 7.2 FL (ref 5–10.5)
POTASSIUM REFLEX MAGNESIUM: 4.4 MMOL/L (ref 3.5–5.1)
RBC # BLD: 2.45 M/UL (ref 4–5.2)
SARS-COV-2, NAAT: NOT DETECTED
SODIUM BLD-SCNC: 142 MMOL/L (ref 136–145)
TOTAL IRON BINDING CAPACITY: 207 UG/DL (ref 260–445)
TOTAL PROTEIN: 5.1 G/DL (ref 6.4–8.2)
WBC # BLD: 5.2 K/UL (ref 4–11)

## 2021-12-16 PROCEDURE — 2580000003 HC RX 258: Performed by: REGISTERED NURSE

## 2021-12-16 PROCEDURE — 7100000011 HC PHASE II RECOVERY - ADDTL 15 MIN: Performed by: INTERNAL MEDICINE

## 2021-12-16 PROCEDURE — 3700000001 HC ADD 15 MINUTES (ANESTHESIA): Performed by: INTERNAL MEDICINE

## 2021-12-16 PROCEDURE — C1769 GUIDE WIRE: HCPCS

## 2021-12-16 PROCEDURE — 6360000002 HC RX W HCPCS: Performed by: REGISTERED NURSE

## 2021-12-16 PROCEDURE — 37191 INS ENDOVAS VENA CAVA FILTR: CPT

## 2021-12-16 PROCEDURE — 2060000000 HC ICU INTERMEDIATE R&B

## 2021-12-16 PROCEDURE — 06H03DZ INSERTION OF INTRALUMINAL DEVICE INTO INFERIOR VENA CAVA, PERCUTANEOUS APPROACH: ICD-10-PCS | Performed by: SURGERY

## 2021-12-16 PROCEDURE — 7100000010 HC PHASE II RECOVERY - FIRST 15 MIN: Performed by: INTERNAL MEDICINE

## 2021-12-16 PROCEDURE — 3609017100 HC EGD: Performed by: INTERNAL MEDICINE

## 2021-12-16 PROCEDURE — 6370000000 HC RX 637 (ALT 250 FOR IP): Performed by: NURSE PRACTITIONER

## 2021-12-16 PROCEDURE — 6370000000 HC RX 637 (ALT 250 FOR IP)

## 2021-12-16 PROCEDURE — C9113 INJ PANTOPRAZOLE SODIUM, VIA: HCPCS | Performed by: REGISTERED NURSE

## 2021-12-16 PROCEDURE — 2700000000 HC OXYGEN THERAPY PER DAY

## 2021-12-16 PROCEDURE — 6360000002 HC RX W HCPCS

## 2021-12-16 PROCEDURE — 80053 COMPREHEN METABOLIC PANEL: CPT

## 2021-12-16 PROCEDURE — 3700000000 HC ANESTHESIA ATTENDED CARE: Performed by: INTERNAL MEDICINE

## 2021-12-16 PROCEDURE — 83540 ASSAY OF IRON: CPT

## 2021-12-16 PROCEDURE — C1894 INTRO/SHEATH, NON-LASER: HCPCS

## 2021-12-16 PROCEDURE — 85014 HEMATOCRIT: CPT

## 2021-12-16 PROCEDURE — 85027 COMPLETE CBC AUTOMATED: CPT

## 2021-12-16 PROCEDURE — 2500000003 HC RX 250 WO HCPCS

## 2021-12-16 PROCEDURE — 99152 MOD SED SAME PHYS/QHP 5/>YRS: CPT | Performed by: SURGERY

## 2021-12-16 PROCEDURE — 2580000003 HC RX 258

## 2021-12-16 PROCEDURE — 0DJ08ZZ INSPECTION OF UPPER INTESTINAL TRACT, VIA NATURAL OR ARTIFICIAL OPENING ENDOSCOPIC: ICD-10-PCS | Performed by: INTERNAL MEDICINE

## 2021-12-16 PROCEDURE — 94761 N-INVAS EAR/PLS OXIMETRY MLT: CPT

## 2021-12-16 PROCEDURE — 99221 1ST HOSP IP/OBS SF/LOW 40: CPT | Performed by: SURGERY

## 2021-12-16 PROCEDURE — 6370000000 HC RX 637 (ALT 250 FOR IP): Performed by: REGISTERED NURSE

## 2021-12-16 PROCEDURE — 87635 SARS-COV-2 COVID-19 AMP PRB: CPT

## 2021-12-16 PROCEDURE — 85018 HEMOGLOBIN: CPT

## 2021-12-16 PROCEDURE — 36415 COLL VENOUS BLD VENIPUNCTURE: CPT

## 2021-12-16 PROCEDURE — 6360000002 HC RX W HCPCS: Performed by: NURSE PRACTITIONER

## 2021-12-16 PROCEDURE — 2709999900 HC NON-CHARGEABLE SUPPLY: Performed by: INTERNAL MEDICINE

## 2021-12-16 PROCEDURE — 83550 IRON BINDING TEST: CPT

## 2021-12-16 PROCEDURE — B5191ZZ FLUOROSCOPY OF INFERIOR VENA CAVA USING LOW OSMOLAR CONTRAST: ICD-10-PCS | Performed by: SURGERY

## 2021-12-16 PROCEDURE — 94640 AIRWAY INHALATION TREATMENT: CPT

## 2021-12-16 PROCEDURE — 37191 INS ENDOVAS VENA CAVA FILTR: CPT | Performed by: SURGERY

## 2021-12-16 RX ORDER — ONDANSETRON 2 MG/ML
4 INJECTION INTRAMUSCULAR; INTRAVENOUS PRN
Status: DISCONTINUED | OUTPATIENT
Start: 2021-12-16 | End: 2021-12-16 | Stop reason: HOSPADM

## 2021-12-16 RX ORDER — MORPHINE SULFATE 2 MG/ML
1 INJECTION, SOLUTION INTRAMUSCULAR; INTRAVENOUS EVERY 5 MIN PRN
Status: DISCONTINUED | OUTPATIENT
Start: 2021-12-16 | End: 2021-12-16 | Stop reason: HOSPADM

## 2021-12-16 RX ORDER — MORPHINE SULFATE 2 MG/ML
2 INJECTION, SOLUTION INTRAMUSCULAR; INTRAVENOUS EVERY 5 MIN PRN
Status: DISCONTINUED | OUTPATIENT
Start: 2021-12-16 | End: 2021-12-16 | Stop reason: HOSPADM

## 2021-12-16 RX ORDER — LIDOCAINE HYDROCHLORIDE 20 MG/ML
INJECTION, SOLUTION INFILTRATION; PERINEURAL PRN
Status: DISCONTINUED | OUTPATIENT
Start: 2021-12-16 | End: 2021-12-16 | Stop reason: SDUPTHER

## 2021-12-16 RX ORDER — HYDRALAZINE HYDROCHLORIDE 20 MG/ML
20 INJECTION INTRAMUSCULAR; INTRAVENOUS EVERY 6 HOURS PRN
Status: DISCONTINUED | OUTPATIENT
Start: 2021-12-16 | End: 2021-12-17

## 2021-12-16 RX ORDER — PROMETHAZINE HYDROCHLORIDE 25 MG/ML
6.25 INJECTION, SOLUTION INTRAMUSCULAR; INTRAVENOUS
Status: DISCONTINUED | OUTPATIENT
Start: 2021-12-16 | End: 2021-12-16 | Stop reason: HOSPADM

## 2021-12-16 RX ORDER — HYDRALAZINE HYDROCHLORIDE 20 MG/ML
5 INJECTION INTRAMUSCULAR; INTRAVENOUS EVERY 10 MIN PRN
Status: DISCONTINUED | OUTPATIENT
Start: 2021-12-16 | End: 2021-12-16 | Stop reason: HOSPADM

## 2021-12-16 RX ORDER — LABETALOL HYDROCHLORIDE 5 MG/ML
5 INJECTION, SOLUTION INTRAVENOUS EVERY 10 MIN PRN
Status: DISCONTINUED | OUTPATIENT
Start: 2021-12-16 | End: 2021-12-16 | Stop reason: HOSPADM

## 2021-12-16 RX ORDER — MIDAZOLAM HYDROCHLORIDE 5 MG/ML
INJECTION INTRAMUSCULAR; INTRAVENOUS
Status: COMPLETED | OUTPATIENT
Start: 2021-12-16 | End: 2021-12-16

## 2021-12-16 RX ORDER — OXYCODONE HYDROCHLORIDE AND ACETAMINOPHEN 5; 325 MG/1; MG/1
1 TABLET ORAL PRN
Status: DISCONTINUED | OUTPATIENT
Start: 2021-12-16 | End: 2021-12-16 | Stop reason: HOSPADM

## 2021-12-16 RX ORDER — MECOBALAMIN 5000 MCG
5 TABLET,DISINTEGRATING ORAL NIGHTLY
Status: DISCONTINUED | OUTPATIENT
Start: 2021-12-16 | End: 2021-12-19 | Stop reason: HOSPADM

## 2021-12-16 RX ORDER — SODIUM CHLORIDE, SODIUM LACTATE, POTASSIUM CHLORIDE, CALCIUM CHLORIDE 600; 310; 30; 20 MG/100ML; MG/100ML; MG/100ML; MG/100ML
INJECTION, SOLUTION INTRAVENOUS CONTINUOUS PRN
Status: DISCONTINUED | OUTPATIENT
Start: 2021-12-16 | End: 2021-12-16 | Stop reason: SDUPTHER

## 2021-12-16 RX ORDER — PROPOFOL 10 MG/ML
INJECTION, EMULSION INTRAVENOUS PRN
Status: DISCONTINUED | OUTPATIENT
Start: 2021-12-16 | End: 2021-12-16 | Stop reason: SDUPTHER

## 2021-12-16 RX ORDER — DIPHENHYDRAMINE HYDROCHLORIDE 50 MG/ML
12.5 INJECTION INTRAMUSCULAR; INTRAVENOUS
Status: DISCONTINUED | OUTPATIENT
Start: 2021-12-16 | End: 2021-12-16 | Stop reason: HOSPADM

## 2021-12-16 RX ORDER — PANTOPRAZOLE SODIUM 40 MG/10ML
40 INJECTION, POWDER, LYOPHILIZED, FOR SOLUTION INTRAVENOUS 2 TIMES DAILY
Status: DISCONTINUED | OUTPATIENT
Start: 2021-12-16 | End: 2021-12-17

## 2021-12-16 RX ORDER — OXYCODONE HYDROCHLORIDE AND ACETAMINOPHEN 5; 325 MG/1; MG/1
2 TABLET ORAL PRN
Status: DISCONTINUED | OUTPATIENT
Start: 2021-12-16 | End: 2021-12-16 | Stop reason: HOSPADM

## 2021-12-16 RX ORDER — FENTANYL CITRATE 50 UG/ML
INJECTION, SOLUTION INTRAMUSCULAR; INTRAVENOUS
Status: COMPLETED | OUTPATIENT
Start: 2021-12-16 | End: 2021-12-16

## 2021-12-16 RX ORDER — MEPERIDINE HYDROCHLORIDE 50 MG/ML
12.5 INJECTION INTRAMUSCULAR; INTRAVENOUS; SUBCUTANEOUS EVERY 5 MIN PRN
Status: DISCONTINUED | OUTPATIENT
Start: 2021-12-16 | End: 2021-12-16 | Stop reason: HOSPADM

## 2021-12-16 RX ADMIN — SODIUM CHLORIDE 8 MG/HR: 9 INJECTION, SOLUTION INTRAVENOUS at 00:48

## 2021-12-16 RX ADMIN — PROPOFOL 70 MG: 10 INJECTION, EMULSION INTRAVENOUS at 11:23

## 2021-12-16 RX ADMIN — SODIUM CHLORIDE: 9 INJECTION, SOLUTION INTRAVENOUS at 16:26

## 2021-12-16 RX ADMIN — HYDRALAZINE HYDROCHLORIDE 20 MG: 20 INJECTION INTRAMUSCULAR; INTRAVENOUS at 23:50

## 2021-12-16 RX ADMIN — Medication 10 ML: at 21:15

## 2021-12-16 RX ADMIN — TIOTROPIUM BROMIDE INHALATION SPRAY 2 PUFF: 3.12 SPRAY, METERED RESPIRATORY (INHALATION) at 07:58

## 2021-12-16 RX ADMIN — PANTOPRAZOLE SODIUM 40 MG: 40 INJECTION, POWDER, FOR SOLUTION INTRAVENOUS at 21:15

## 2021-12-16 RX ADMIN — PROPOFOL 10 MG: 10 INJECTION, EMULSION INTRAVENOUS at 11:29

## 2021-12-16 RX ADMIN — Medication: at 21:00

## 2021-12-16 RX ADMIN — SODIUM CHLORIDE, SODIUM LACTATE, POTASSIUM CHLORIDE, AND CALCIUM CHLORIDE: .6; .31; .03; .02 INJECTION, SOLUTION INTRAVENOUS at 10:58

## 2021-12-16 RX ADMIN — ALLOPURINOL 100 MG: 100 TABLET ORAL at 21:15

## 2021-12-16 RX ADMIN — ACETAMINOPHEN 650 MG: 325 TABLET ORAL at 22:17

## 2021-12-16 RX ADMIN — SODIUM CHLORIDE 8 MG/HR: 9 INJECTION, SOLUTION INTRAVENOUS at 13:22

## 2021-12-16 RX ADMIN — Medication 2 PUFF: at 20:15

## 2021-12-16 RX ADMIN — Medication 2 PUFF: at 07:58

## 2021-12-16 RX ADMIN — LIDOCAINE HYDROCHLORIDE 100 MG: 20 INJECTION, SOLUTION INFILTRATION; PERINEURAL at 11:23

## 2021-12-16 RX ADMIN — Medication 5 MG: at 23:49

## 2021-12-16 RX ADMIN — FENTANYL CITRATE 25 MCG: 50 INJECTION, SOLUTION INTRAMUSCULAR; INTRAVENOUS at 15:48

## 2021-12-16 RX ADMIN — OXYBUTYNIN CHLORIDE 5 MG: 5 TABLET ORAL at 21:15

## 2021-12-16 RX ADMIN — MIDAZOLAM HYDROCHLORIDE 0.5 MG: 5 INJECTION INTRAMUSCULAR; INTRAVENOUS at 15:48

## 2021-12-16 ASSESSMENT — PAIN SCALES - GENERAL
PAINLEVEL_OUTOF10: 0
PAINLEVEL_OUTOF10: 7
PAINLEVEL_OUTOF10: 0

## 2021-12-16 ASSESSMENT — PAIN SCALES - WONG BAKER: WONGBAKER_NUMERICALRESPONSE: 0

## 2021-12-16 NOTE — PROGRESS NOTES
Pt repositioned in bed. Awake, HOB up encouraging cough and deep breathing. Coarse cough, non productive. No pain. Preparing for discharge.

## 2021-12-16 NOTE — PROGRESS NOTES
Paged Hospitalist regarding diet order. 12/16/21 4:47 PM     [phone] Hospital or Facility: [facility] From: Amanda Augustin RE: Rigoberto Rodriguez 1945 RM: [id number] Pt completed EGD and IVC filter placement this afternoon. Orders remain as NPO, Dr. Richard Trejo instructions were to contact hospitalist for diet instructions. Okay to advance diet?  Need Callback: NO CALLBACK REQ C4 PROGRESSIVE CARE  Unread

## 2021-12-16 NOTE — CONSULTS
Vascular Surgery Consultation    Date of Admission:  12/15/2021 10:45 AM  Date of Consultation:  12/16/2021    PCP:  Galen Reis MD       Chief Complaint: MS changes, anemia    History of Present Illness: We are asked to see this patient in consultation by Zara Lopez regarding IVC Filter placement. Harmeet Wood is a 64 y.o. female who has a history of PE treated recently with Eliquis. She presents at this time with melena and a Hgb of 5. Eliquis has been held and IVC filter requested to prevent further emboli. Past Medical History:  Past Medical History:   Diagnosis Date    Arthritis     Asthma     Bronchitis chronic     Chronic respiratory failure (HCC)     Depression     Diabetes mellitus ([address]     BORDERLINE NO MEDS    Emphysema of lung ([address]     spontaneous pneumo 5/07/2020    Glaucoma     Hypertension     Morbid obesity ([address]     Oxygen dependent     Uses O2 NC at 3L/min continuously    Pneumonia of left upper lobe due to Pseudomonas species ([address] 12/1/2021    Rash     due to nasal canula    Shingles     recently no problems now    Sleep apnea     C pap       Past Surgical History:  Past Surgical History:   Procedure Laterality Date    BRONCHOSCOPY N/A 11/30/2021    BRONCHOSCOPY ALVEOLAR LAVAGE performed by Franchesca Tello MD at [facility]  11/30/2021    BRONCHOSCOPY THERAPUTIC ASPIRATION INITIAL performed by Franchesca Tello MD at [facility]  3/9/2011    CATARACT REMOVAL WITH IMPLANT  3/23/2011    CHOLECYSTECTOMY      COLON SURGERY      11\" removed    COLONOSCOPY  11/18/13    Diverticulosis    HERNIA REPAIR      HYSTERECTOMY      LAP BAND  10/5/10    ADJUSTABLE GASTRIC RESTRICTIVE DEVICE       Home Medications:   Prior to Admission medications    Medication Sig Start Date End Date Taking?  Authorizing Provider   ferrous sulfate (IRON 325) 325 (65 Fe) MG tablet Take 325 mg by mouth daily (with breakfast)   Yes Historical Provider, MD   losartan (COZAAR) 100 MG tablet Take 100 mg by mouth daily   Yes Historical Provider, MD   bumetanide (BUMEX) 2 MG tablet Take 1 tablet by mouth daily 12/7/21 1/6/22 Yes Acquanetta Burkitt, MD   amLODIPine (NORVASC) 5 MG tablet Take 1 tablet by mouth daily 12/7/21 1/6/22 Yes Acquanetta Burkitt, MD   allopurinol (ZYLOPRIM) 100 MG tablet Take 1 tablet by mouth 2 times daily 12/6/21 1/5/22 Yes Acquanetta Burkitt, MD   apixaban Veryl [place]) 5 MG TABS tablet Take 1 tablet by mouth 2 times daily 12/3/21 1/2/22 Yes Acquanetta Burkitt, MD   SPIRIVA HANDIHALER 18 MCG inhalation capsule INHALE 1 CAPSULE INTO THE LUNGS DAILY 11/3/21  Yes Nadiya Aguirre PA-C   fluticasone-salmeterol (ADVAIR) 500-50 MCG/DOSE diskus inhaler INHALE 1 PUFF INTO THE LUNGS EVERY 12 HOURS 7/15/21  Yes Soren Hinton MD   cyanocobalamin 1000 MCG tablet Take 1,000 mcg by mouth daily 1/14/21  Yes Historical Provider, MD   folic acid (FOLVITE) 1 MG tablet TAKE 1 TABLET (1 MG TOTAL) BY MOUTH DAILY. 3/19/21  Yes Historical Provider, MD   albuterol sulfate  (90 Base) MCG/ACT inhaler INHALE 2 PUFFS INTO THE LUNGS EVERY 6 HOURS AS NEEDED FOR WHEEZING OR SHORTNESS OF BREATH 2/22/21 2/22/22 Yes Soren Hinton MD   pantoprazole (PROTONIX) 40 MG tablet Take 40 mg by mouth daily   Yes Historical Provider, MD   oxybutynin (DITROPAN) 5 MG tablet Take 5 mg by mouth 3 times daily   Yes Historical Provider, MD   ARIPiprazole (ABILIFY) 5 MG tablet Take 5 mg by mouth 12/19/19  Yes Historical Provider, MD   albuterol (PROVENTIL) (2.5 MG/3ML) 0.083% nebulizer solution Take 3 mLs by nebulization every 4 hours as needed for Wheezing or Shortness of Breath DX COPD J44.9 12/17/19  Yes Soren Hinton MD   DULoxetine (CYMBALTA) 60 MG extended release capsule Take 60 mg by mouth daily   Yes Historical Provider, MD   meloxicam (MOBIC) 15 MG tablet Take 15 mg by mouth daily.    Yes Historical Provider, MD   traMADol (ULTRAM) 50 MG tablet Take 50 mg by mouth every 6 hours as needed for Pain. Historical Provider, MD   tiZANidine (ZANAFLEX) 4 MG tablet every 8 hours as needed  21   Historical Provider, MD   albuterol (PROVENTIL) (2.5 MG/3ML) 0.083% nebulizer solution Take 3 mLs by nebulization every 4 hours as needed for Wheezing 20  Nadege Bautista MD   albuterol sulfate HFA (PROAIR HFA) 108 (90 Base) MCG/ACT inhaler Inhale 2 puffs into the lungs every 6 hours as needed for Wheezing or Shortness of Breath 20   Lev Alfonso MD   losartan (COZAAR) 100 MG tablet Take 100 mg by mouth 9/6/18 5/15/20  Historical Provider, MD   OXYGEN Inhale 3 L/min into the lungs continuous. Historical Provider, MD        Facility Administered Medications:    allopurinol  100 mg Oral BID    ARIPiprazole  5 mg Oral Daily    DULoxetine  60 mg Oral Daily    budesonide-formoterol  2 puff Inhalation BID    folic acid  1 mg Oral Daily    oxybutynin  5 mg Oral TID    tiotropium  2 puff Inhalation Daily    sodium chloride flush  5-40 mL IntraVENous 2 times per day       Allergies:  Patient has no known allergies.      Social History:      Social History     Socioeconomic History    Marital status:      Spouse name: Not on file    Number of children: Not on file    Years of education: Not on file    Highest education level: Not on file   Occupational History    Not on file   Tobacco Use    Smoking status: Former Smoker     Packs/day: 1.50     Years: 10.00     Pack years: 15.00     Types: Cigarettes     Quit date: 2005     Years since quittin.8    Smokeless tobacco: Never Used   Vaping Use    Vaping Use: Never used   Substance and Sexual Activity    Alcohol use: No     Alcohol/week: 0.0 standard drinks    Drug use: No    Sexual activity: Not Currently   Other Topics Concern    Not on file   Social History Narrative    Not on file     Social Determinants of Health     Financial Resource Strain:     Difficulty of Paying Living Expenses: Not on file   Food Insecurity:     Worried About Running Out of Food in the Last Year: Not on file    Herlnida of Food in the Last Year: Not on file   Transportation Needs:     Lack of Transportation (Medical): Not on file    Lack of Transportation (Non-Medical): Not on file   Physical Activity:     Days of Exercise per Week: Not on file    Minutes of Exercise per Session: Not on file   Stress:     Feeling of Stress : Not on file   Social Connections:     Frequency of Communication with Friends and Family: Not on file    Frequency of Social Gatherings with Friends and Family: Not on file    Attends Confucianist Services: Not on file    Active Member of Clubs or Organizations: Not on file    Attends Club or Organization Meetings: Not on file    Marital Status: Not on file   Intimate Partner Violence:     Fear of Current or Ex-Partner: Not on file    Emotionally Abused: Not on file    Physically Abused: Not on file    Sexually Abused: Not on file   Housing Stability:     Unable to Pay for Housing in the Last Year: Not on file    Number of [place] in the Last Year: Not on file    Unstable Housing in the Last Year: Not on file       Family History:        Problem Relation Age of Onset    Diabetes Mother     Arthritis Mother     Vision Loss Mother     High Blood Pressure Sister     Stroke Sister     Diabetes Sister     Depression Sister     Arthritis Sister     Diabetes Daughter     Substance Abuse Daughter     Depression Daughter     Asthma Daughter     Emphysema Father     Cancer Neg Hx     Heart Failure Neg Hx     Hypertension Neg Hx        Review of Systems:  A 14 point review of systems was completed. Pertinent positives identified in the HPI, all other review of systems negative.       Physical Examination:    BP (!) 151/63   Pulse 87   Temp 98.2 °F (36.8 °C) (Oral)   Resp 18   Ht 5' 3\" (1.6 m)   Wt 230 lb (104.3 kg)   SpO2 94%   BMI 40.74 kg/m²        Admission Weight: 230 lb (104.3 kg)       General appearance: NAD  Eyes: PERRLA  Neck: no JVD, no lymphadenopathy. Respiratory: effort is unlabored, no crackles, wheezes or rubs. Cardiovascular: regular, no murmur. No carotid bruits. Pulses:    femoral   RIGHT 2   LEFT 2   GI: abdomen soft, nondistended, no organomegaly. Musculoskeletal: strength and tone normal.  Extremities: warm and pink. Skin: no dermatitis or ulceration. Neuro/psychiatric: grossly intact. ASA 3 - Patient with moderate systemic disease with functional limitations    Mallampati Airway Assessment:  Mallampati Class III - (soft palate & base of uvula are visible)    Labs:   CBC:   Recent Labs     12/15/21  1100 12/15/21  2045 12/16/21  0554   WBC 6.2  --  5.2   HGB 5.0* 6.5* 7.3*  7.4*   HCT 15.5* 20.1* 22.4*  22.3*   MCV 92.9  --  91.2     --  272     BMP:   Recent Labs     12/15/21  1100 12/16/21  0554    142   K 4.7 4.4   CL 99 107   CO2 30 28   BUN 32* 30*   CREATININE 1.6* 1.5*   CALCIUM 8.9 8.6     Cardiac Enzymes: No results for input(s): CKTOTAL, CKMB, CKMBINDEX, TROPONINI in the last 72 hours. PT/INR: No results for input(s): PROTIME, INR in the last 72 hours. APTT: No results for input(s): APTT in the last 72 hours.   Liver Profile:  Lab Results   Component Value Date    AST 20 12/16/2021    ALT 10 12/16/2021    BILITOT <0.2 12/16/2021    ALKPHOS 83 12/16/2021     Lab Results   Component Value Date    CHOL 191 09/20/2011    HDL 58 09/20/2011    TRIG 187 09/20/2011     TSH:  Lab Results   Component Value Date    TSH 1.70 05/17/2013     UA:   Lab Results   Component Value Date    NITRITE NEG 02/10/2012    COLORU Yellow 12/15/2021    PHUR 5.5 12/15/2021    PHUR 5.5 12/15/2021    WBCUA 3-5 12/15/2021    RBCUA 0-2 12/15/2021    YEAST Present 12/06/2021    BACTERIA Rare 12/15/2021    CLARITYU Clear 12/15/2021    SPECGRAV 1.020 12/15/2021    LEUKOCYTESUR SMALL 12/15/2021    UROBILINOGEN 0.2 12/15/2021    BILIRUBINUR Negative 12/15/2021    BILIRUBINUR NEG 02/10/2012    BLOODU Negative 12/15/2021    GLUCOSEU Negative 12/15/2021           Assessment/Plan: History of PE now with contraindication to anticoagulation due to GI bleeding. IVC filter will be place today. Procedure, risks, benefits, and alternatives explained and understood.

## 2021-12-16 NOTE — H&P
Pre-sedation Assessment    History and Physical / Pre-Sedation Assessment  Patient: Calos Salvador   :   1945     Intended Procedure: EGD      HPI: 63 yo w HTN, COPD/PARK/obesity admitted for melena and anemia H/H . Pt was hospitalized from - at [facility]. She was treated for pneumonia, COPD and acute PE noted on V/Q scan. Pt was initiated on Eliquis. She was discharged to [facility] for rehab. At the rehab, she was noted to have altered mental status and black tarry stools and sent back here.     Plan:   1. IV PPI  2. F/U H/H and transfuse as needed  3.  EGD     Current Facility-Administered Medications   Medication Dose Route Frequency Provider Last Rate Last Admin    0.9 % sodium chloride infusion   IntraVENous PRN Dawn Seena Hatchet, APRN - CNP        pantoprazole (PROTONIX) 80 mg in sodium chloride 0.9 % 100 mL infusion  8 mg/hr IntraVENous Continuous Dawn Seena Hatchet, APRN - CNP 10 mL/hr at 21 0048 8 mg/hr at 21 0048    albuterol (PROVENTIL) nebulizer solution 2.5 mg  2.5 mg Nebulization Q4H PRN JUANY Cheema CNP        allopurinol (ZYLOPRIM) tablet 100 mg  100 mg Oral BID JUANY Cheema CNP        ARIPiprazole (ABILIFY) tablet 5 mg  5 mg Oral Daily Dawn Seena Hatchet, APRN - CNP        DULoxetine (CYMBALTA) extended release capsule 60 mg  60 mg Oral Daily JUANY Cheema CNP        budesonide-formoterol (SYMBICORT) 160-4.5 MCG/ACT inhaler 2 puff  2 puff Inhalation BID JUANY Cheema CNP   2 puff at 45/25/15 1394    folic acid (FOLVITE) tablet 1 mg  1 mg Oral Daily Dawn Seena Hatchet, APRN - CNP        oxybutynin ) tablet 5 mg  5 mg Oral TID JUANY Cheema CNP        tiotropium (SPIRIVA RESPIMAT) 2.5 MCG/ACT inhaler 2 puff  2 puff Inhalation Daily Isabell Pierre, APRN - CNP   2 puff at 21 0028    sodium chloride flush 0.9 % injection 5-40 mL  5-40 mL IntraVENous 2 times per day JUANY Hoyos - CNP   10 mL at 12/15/21 2009    sodium chloride flush 0.9 % injection 5-40 mL  5-40 mL IntraVENous PRN Ale Kaufman, APRN - CNP        0.9 % sodium chloride infusion  25 mL IntraVENous PRN Ale Kaufman, APRN - CNP        ondansetron (ZOFRAN-ODT) disintegrating tablet 4 mg  4 mg Oral Q8H PRN Ale Kaufman, APRN - CNP        Or    ondansetron [facility]) injection 4 mg  4 mg IntraVENous Q6H PRN Ale Kaufman, APRN - CNP        acetaminophen (TYLENOL) tablet 650 mg  650 mg Oral Q6H PRN Ale Kaufman, APRN - CNP        Or    acetaminophen (TYLENOL) suppository 650 mg  650 mg Rectal Q6H PRN Ale Kaufman, APRN - CNP        0.9 % sodium chloride infusion   IntraVENous Continuous Ale Kaufman APRN - CNP   Paused at 12/16/21 0045    0.9 % sodium chloride infusion   IntraVENous PRN Amarilys Weems DO         Past Medical History:   Diagnosis Date    Arthritis     Asthma     Bronchitis chronic     Chronic respiratory failure ([address]     Depression     Diabetes mellitus ([address]     BORDERLINE NO MEDS    Emphysema of lung ([address]     spontaneous pneumo 5/07/2020    Glaucoma     Hypertension     Morbid obesity ([address]     Oxygen dependent     Uses O2 NC at 3L/min continuously    Pneumonia of left upper lobe due to Pseudomonas species ([address] 12/1/2021    Rash     due to nasal canula    Shingles     recently no problems now    Sleep apnea     C pap     Past Surgical History:   Procedure Laterality Date    BRONCHOSCOPY N/A 11/30/2021    BRONCHOSCOPY ALVEOLAR LAVAGE performed by Feliberto Whitehead MD at [address] [facility]  11/30/2021    BRONCHOSCOPY THERAPUTIC ASPIRATION INITIAL performed by Feliberto Whitehead MD at [facility] [facility]  3/9/2011    CATARACT REMOVAL WITH IMPLANT  3/23/2011    CHOLECYSTECTOMY      COLON SURGERY      11\" removed    COLONOSCOPY  11/18/13    Diverticulosis  HERNIA REPAIR      HYSTERECTOMY      LAP BAND  10/5/10    ADJUSTABLE GASTRIC RESTRICTIVE DEVICE       Nurses notes reviewed and agreed. Medications reviewed  Allergies: No Known Allergies        Physical Exam:  Vital Signs: BP (!) 150/64   Pulse 88   Temp 98.2 °F (36.8 °C) (Oral)   Resp 20   Ht 5' 3\" (1.6 m)   Wt 230 lb (104.3 kg)   SpO2 96%   BMI 40.74 kg/m²  Body mass index is 40.74 kg/m². Airway:Normal  Cardiac:Normal  Pulmonary:Normal  Abdomen:Normal  Specific to procedure: none      Pre-Procedure Assessment/Plan:  ASA 3 - Patient with moderate systemic disease with functional limitations  Mallampati II  Level of Sedation Plan:Deep sedation    Post Procedure plan: Return to same level of care    I assessed the patient and find that the patient is in satisfactory condition to proceed with the planned procedure and sedation plan. I have explained the risk, benefits, and alternatives to the procedure. The patient understands and agrees to proceed.   Yes    Hayde Christianson MD       [phone]) [phone]          Hayde Christianson MD  9:53 AM 12/16/2021

## 2021-12-16 NOTE — ANESTHESIA PRE PROCEDURE
Department of Anesthesiology  Preprocedure Note       Name:  Everton Lamar   Age:  64 y.o.  :  1945                                          MRN:  [id number]         Date:  2021      Surgeon: Julissa Crane):  True Forbes MD    Procedure: Procedure(s):  EGD DIAGNOSTIC ONLY    Medications prior to admission:   Prior to Admission medications    Medication Sig Start Date End Date Taking? Authorizing Provider   ferrous sulfate (IRON 325) 325 (65 Fe) MG tablet Take 325 mg by mouth daily (with breakfast)   Yes Historical Provider, MD   losartan (COZAAR) 100 MG tablet Take 100 mg by mouth daily   Yes Historical Provider, MD   bumetanide (BUMEX) 2 MG tablet Take 1 tablet by mouth daily 21 Yes Mone Castaneda MD   amLODIPine (NORVASC) 5 MG tablet Take 1 tablet by mouth daily 21 Yes Mone Castaneda MD   allopurinol (ZYLOPRIM) 100 MG tablet Take 1 tablet by mouth 2 times daily 21 Yes Mone Castaneda MD   apixaban (ELIQUIS) 5 MG TABS tablet Take 1 tablet by mouth 2 times daily 12/3/21 1/2/22 Yes Mone Castaneda MD   SPIRIVA HANDIHALER 18 MCG inhalation capsule INHALE 1 CAPSULE INTO THE LUNGS DAILY 11/3/21  Yes Nadiya Aguirre PA-C   fluticasone-salmeterol (ADVAIR) 500-50 MCG/DOSE diskus inhaler INHALE 1 PUFF INTO THE LUNGS EVERY 12 HOURS 7/15/21  Yes Charley Stern MD   cyanocobalamin 1000 MCG tablet Take 1,000 mcg by mouth daily 21  Yes Historical Provider, MD   folic acid (FOLVITE) 1 MG tablet TAKE 1 TABLET (1 MG TOTAL) BY MOUTH DAILY.  3/19/21  Yes Historical Provider, MD   albuterol sulfate  (90 Base) MCG/ACT inhaler INHALE 2 PUFFS INTO THE LUNGS EVERY 6 HOURS AS NEEDED FOR WHEEZING OR SHORTNESS OF BREATH 21 Yes Charley Stern MD   pantoprazole (PROTONIX) 40 MG tablet Take 40 mg by mouth daily   Yes Historical Provider, MD   oxybutynin (DITROPAN) 5 MG tablet Take 5 mg by mouth 3 times daily   Yes Historical Provider, MD   ARIPiprazole (ABILIFY) 5 MG tablet Take 5 mg by mouth 12/19/19  Yes Historical Provider, MD   albuterol (PROVENTIL) (2.5 MG/3ML) 0.083% nebulizer solution Take 3 mLs by nebulization every 4 hours as needed for Wheezing or Shortness of Breath DX COPD J44.9 12/17/19  Yes Willard Fu MD   DULoxetine (CYMBALTA) 60 MG extended release capsule Take 60 mg by mouth daily   Yes Historical Provider, MD   meloxicam (MOBIC) 15 MG tablet Take 15 mg by mouth daily. Yes Historical Provider, MD   traMADol (ULTRAM) 50 MG tablet Take 50 mg by mouth every 6 hours as needed for Pain. Historical Provider, MD   tiZANidine (ZANAFLEX) 4 MG tablet every 8 hours as needed  4/2/21   Historical Provider, MD   albuterol (PROVENTIL) (2.5 MG/3ML) 0.083% nebulizer solution Take 3 mLs by nebulization every 4 hours as needed for Wheezing 9/24/20 9/24/21  Willard Fu MD   albuterol sulfate HFA (PROAIR HFA) 108 (90 Base) MCG/ACT inhaler Inhale 2 puffs into the lungs every 6 hours as needed for Wheezing or Shortness of Breath 2/21/20   Matilda Glez MD   losartan (COZAAR) 100 MG tablet Take 100 mg by mouth 9/6/18 5/15/20  Historical Provider, MD   OXYGEN Inhale 3 L/min into the lungs continuous.     Historical Provider, MD       Current medications:    Current Facility-Administered Medications   Medication Dose Route Frequency Provider Last Rate Last Admin    0.9 % sodium chloride infusion   IntraVENous PRN Dawn Concepcion Mcardle, APRN - CNP        pantoprazole (PROTONIX) 80 mg in sodium chloride 0.9 % 100 mL infusion  8 mg/hr IntraVENous Continuous Dawn Concepcion Mcardle, APRN - CNP 10 mL/hr at 12/16/21 0048 8 mg/hr at 12/16/21 0048    albuterol (PROVENTIL) nebulizer solution 2.5 mg  2.5 mg Nebulization Q4H PRN JUANY Hong CNP        allopurinol (ZYLOPRIM) tablet 100 mg  100 mg Oral BID JUANY Hong CNP        ARIPiprazole (ABILIFY) tablet 5 mg  5 mg Oral Daily Dawn Concepcion Mcardle, APRN - GIOVANI        DULoxetine (CYMBALTA) extended release capsule 60 mg  60 mg Oral Daily Shaynemaximilian Wilson APRN - CNP        budesonide-formoterol (SYMBICORT) 160-4.5 MCG/ACT inhaler 2 puff  2 puff Inhalation BID Shayne Steve, APRN - CNP   2 puff at 68/73/58 8207    folic acid (FOLVITE) tablet 1 mg  1 mg Oral Daily Ale Vázquez APRN - CNP        oxybutynin (DITROPAN) tablet 5 mg  5 mg Oral TID Shaynemaximilian Wilson APRN - GIOVANI        tiotropium (SPIRIVA RESPIMAT) 2.5 MCG/ACT inhaler 2 puff  2 puff Inhalation Daily [address] Joseph, APRN - CNP   2 puff at 12/16/21 0758    sodium chloride flush 0.9 % injection 5-40 mL  5-40 mL IntraVENous 2 times per day Shayne Steve, APRN - CNP   10 mL at 12/15/21 2009    sodium chloride flush 0.9 % injection 5-40 mL  5-40 mL IntraVENous PRN Ale Haynesa, APRN - CNP        0.9 % sodium chloride infusion  25 mL IntraVENous PRN Shayne Wilson APRN - CNP        ondansetron (ZOFRAN-ODT) disintegrating tablet 4 mg  4 mg Oral Q8H PRN Ale Vázquez, APRN - CNP        Or    ondansetron [facility]) injection 4 mg  4 mg IntraVENous Q6H PRN Ale Colby, APRN - CNP        acetaminophen (TYLENOL) tablet 650 mg  650 mg Oral Q6H PRN Ale Colby, APRN - CNP        Or    acetaminophen (TYLENOL) suppository 650 mg  650 mg Rectal Q6H PRN Ale Colby, APRN - CNP        0.9 % sodium chloride infusion   IntraVENous Continuous Ale Colby, APRN - CNP   Paused at 12/16/21 0045    0.9 % sodium chloride infusion   IntraVENous PRN Blu Sanchez DO           Allergies:  No Known Allergies    Problem List:    Patient Active Problem List   Diagnosis Code    Hypertension I10    Asthma J45.909    Pulmonary emphysema ([facility] J43.9    Arthritis M19.90    Depression F32. A    Morbid obesity with BMI of 40.0-44.9, adult ([facility]) E66.01, Z68.41    Oxygen dependent Z99.81    Status post gastric banding Z98.84    COPD (chronic obstructive pulmonary disease) [facility] J44.9    PARK (obstructive sleep apnea) G47.33    Hypoxemia R09.02    SBO (small bowel obstruction) ([facility]) K56.609    Rash R21    Chronic respiratory failure with hypoxia, on home O2 therapy ([facility]) J96.11, Z99.81    Periodic limb movement disorder (PLMD) G47.61    Knee osteoarthritis M17.10    Spontaneous tension pneumothorax J93.0    Pulmonary nodule R91.1    COPD exacerbation ([facility]) J44.1    Pulmonary infiltrate R91.8    Atelectasis J98.11    Chest congestion R09.89    Ineffective airway clearance R06.89    Pneumonia of left upper lobe due to Pseudomonas species ([facility]) J15.1    Hyperuricemia E79.0    GI bleed K92.2       Past Medical History:        Diagnosis Date    Arthritis     Asthma     Bronchitis chronic     Chronic respiratory failure ([facility])     Depression     Diabetes mellitus ([facility])     BORDERLINE NO MEDS    Emphysema of lung ([address]     spontaneous pneumo 5/07/2020    Glaucoma     Hypertension     Morbid obesity ([address]     Oxygen dependent     Uses O2 NC at 3L/min continuously    Pneumonia of left upper lobe due to Pseudomonas species ([address] 12/1/2021    Rash     due to nasal canula    Shingles     recently no problems now    Sleep apnea     C pap       Past Surgical History:        Procedure Laterality Date    BRONCHOSCOPY N/A 11/30/2021    BRONCHOSCOPY ALVEOLAR LAVAGE performed by Bonifacio Clark MD at [address]  11/30/2021    BRONCHOSCOPY THERAPUTIC ASPIRATION INITIAL performed by Bonifacio Clark MD at [facility]  3/9/2011    CATARACT REMOVAL WITH IMPLANT  3/23/2011    CHOLECYSTECTOMY      COLON SURGERY      11\" removed    COLONOSCOPY  11/18/13    Diverticulosis    HERNIA REPAIR      HYSTERECTOMY      LAP BAND  10/5/10    ADJUSTABLE GASTRIC RESTRICTIVE DEVICE       Social History:    Social History     Tobacco Use    Smoking status: Former Smoker     Packs/day: 1.50     Years: 10.00     Pack years: 15.00 Types: Cigarettes     Quit date: 2005     Years since quittin.8    Smokeless tobacco: Never Used   Substance Use Topics    Alcohol use: No     Alcohol/week: 0.0 standard drinks                                Counseling given: Not Answered      Vital Signs (Current):   Vitals:    21 0210 21 0436 21 0758 21 0829   BP: (!) 153/75 (!) 151/63  (!) 150/64   Pulse: 78 87  88   Resp:    Temp: 98.4 °F (36.9 °C) 98.2 °F (36.8 °C)  98.2 °F (36.8 °C)   TempSrc: Oral Oral  Oral   SpO2: 98% 94% 95% 96%   Weight:       Height:                                                  BP Readings from Last 3 Encounters:   21 (!) 150/64   21 (!) 150/66   21 (!) 140/72       NPO Status: Time of last liquid consumption: 1800                        Time of last solid consumption: 1800                        Date of last liquid consumption: 12/15/21                        Date of last solid food consumption: 12/15/21    BMI:   Wt Readings from Last 3 Encounters:   12/15/21 230 lb (104.3 kg)   21 230 lb (104.3 kg)   21 241 lb (109.3 kg)     Body mass index is 40.74 kg/m².     CBC:   Lab Results   Component Value Date    WBC 5.2 2021    RBC 2.45 2021    HGB 7.7 2021    HCT 23.4 2021    MCV 91.2 2021    RDW 16.6 2021     2021       CMP:   Lab Results   Component Value Date     2021    K 4.4 2021     2021    CO2 28 2021    BUN 30 2021    CREATININE 1.5 2021    GFRAA 41 2021    GFRAA >60 2013    AGRATIO 1.0 2021    LABGLOM 34 2021    GLUCOSE 90 2021    PROT 5.1 2021    PROT 6.9 2013    CALCIUM 8.6 2021    BILITOT <0.2 2021    ALKPHOS 83 2021    AST 20 2021    ALT 10 2021       POC Tests:   Recent Labs     21  0823   POCGLU 98       Coags: No results found for: PROTIME, INR, APTT    HCG (If Applicable): No results found for: Blondie Karst, HCG, HCGQUANT     ABGs:   Lab Results   Component Value Date    PHART 7.401 12/06/2021    PO2ART 73.8 12/06/2021    OMR6BZR 54.1 12/06/2021    HKX6CBB 32.8 12/06/2021    BEART 6.7 12/06/2021    M5KIAGDQ 93.3 12/06/2021        Type & Screen (If Applicable):  No results found for: LABABO, LABRH    Drug/Infectious Status (If Applicable):  No results found for: HIV, HEPCAB    COVID-19 Screening (If Applicable):   Lab Results   Component Value Date    COVID19 Not Detected 12/16/2021           Anesthesia Evaluation  Patient summary reviewed no history of anesthetic complications:   Airway: Mallampati: II  TM distance: >3 FB   Neck ROM: full  Mouth opening: > = 3 FB Dental: normal exam         Pulmonary:Negative Pulmonary ROS and normal exam  breath sounds clear to auscultation  (+) COPD:  sleep apnea:  asthma:                           ROS comment: Uses O2   Cardiovascular:Negative CV ROS  Exercise tolerance: good (>4 METS),   (+) hypertension:,         Rhythm: regular  Rate: normal                    Neuro/Psych:   Negative Neuro/Psych ROS  (+) psychiatric history:            GI/Hepatic/Renal: Neg GI/Hepatic/Renal ROS  (+) morbid obesity          Endo/Other: Negative Endo/Other ROS   (+) Diabetes, . Abdominal:             Vascular: negative vascular ROS. Other Findings:          Pre-Operative Diagnosis: Somnolence [R40.0];GI bleed [K92.2]; Gastrointestinal hemorrhage with melena [K92.1]    64 y.o.   BMI:  Body mass index is 40.74 kg/m².      Vitals:    12/16/21 0210 12/16/21 0436 12/16/21 0758 12/16/21 0829   BP: (!) 153/75 (!) 151/63  (!) 150/64   Pulse: 78 87  88   Resp: 20 18 18 20   Temp: 98.4 °F (36.9 °C) 98.2 °F (36.8 °C)  98.2 °F (36.8 °C)   TempSrc: Oral Oral  Oral   SpO2: 98% 94% 95% 96%   Weight:       Height:           No Known Allergies    Social History     Tobacco Use    Smoking status: Former Smoker     Packs/day: 1.50     Years: 10.00 Pack years: 15.00     Types: Cigarettes     Quit date: 2005     Years since quittin.8    Smokeless tobacco: Never Used   Substance Use Topics    Alcohol use: No     Alcohol/week: 0.0 standard drinks       LABS:    CBC  Lab Results   Component Value Date/Time    WBC 5.2 2021 05:54 AM    HGB 7.7 (L) 2021 09:14 AM    HCT 23.4 (L) 2021 09:14 AM     2021 05:54 AM     RENAL  Lab Results   Component Value Date/Time     2021 05:54 AM    K 4.4 2021 05:54 AM     2021 05:54 AM    CO2 28 2021 05:54 AM    BUN 30 (H) 2021 05:54 AM    CREATININE 1.5 (H) 2021 05:54 AM    GLUCOSE 90 2021 05:54 AM     COAGS  No results found for: PROTIME, INR, APTT          Anesthesia Plan      MAC     ASA 4     (I discussed with the patient the risks and benefits of PIV, anesthesia, IV Narcotics, PACU. All questions were answered the patient agrees with the plan and wishes to proceed)  Induction: intravenous.                           Nicholas Lloyd MD   2021

## 2021-12-16 NOTE — OP NOTE
Esophagogastroduodenoscopy Note    Patient: Manuel Thibodeaux    YOB: 1945    Facility:   [facility] [Inpatient]   Referring/PCP: Germain Coffey MD    Procedure:   Esophagogastroduodenoscopy --diagnostic  Date:     12/16/2021   Endoscopist:  Herson Garrido MD     Preoperative Diagnosis:   Melena and anemia  Postoperative Diagnosis: Slight mucosal oozing in duodenum upon any light touch, as well as small amount off blood in her oropharynx (?upper airway oozing)    Anesthesia:  MAC  Estimated blood loss: None  Complications: None    Description of Procedure:  Informed consent was obtained from the patient after explanation of the procedure including indications, description of the procedure,  benefits and possible risks and complications of the procedure, and alternatives. Questions were answered. The patient's history was reviewed and a directed physical examination was performed prior to the procedure. Patient was monitored throughout the procedure with pulse oximetry and periodic assessment of vital signs. Patient was sedated as noted above. The Nursing staff and I performed a time out. With the patient in the left lateral decubitus position, the Olympus videoendoscope was placed in the patient's mouth and under direct visualization passed into the esophagus. The scope was ultimately passed to the third portion of the duodenum. Visualization was performed during both introduction and withdrawal of the endoscope and retroflexed view of the proximal stomach was obtained. Findings[de-identified]   Esophagus: normal. The findings do not support a diagnosis of Mae's Esophagus. Stomach: normal w small amount of black liquid  Duodenum: Slight mucosal oozing in duodenum upon any light touch, as well as small amount off blood in her oropharynx (?upper airway oozing)    Recommendations: -Continue acid suppression. , -Continue to hold Kaveh Snyder MD       ([phone]) [phone]          Geremias Lentz MD, MD

## 2021-12-16 NOTE — PROGRESS NOTES
0045: RBC transfusion initiated and at the hub. Nurse remains at bedside per protocol    0100: RBC transfusion continues with no signs or symptoms of reaction. 0200: Patient transferred to C4 room 448. Bedside report given. Blood infusion continues with no signs or symptoms of reaction.

## 2021-12-16 NOTE — PROGRESS NOTES
Hospitalist Progress Note      PCP: Leslie Cain MD    Date of Admission: 12/15/2021    Chief Complaint: [address] Course:   64 y.o. female who presented to [facility] with altered mental status. Pt is a poor historian therefore history is limited. Pt was hospitalized from 11/26-12/7 at [facility]. She was treated for pneumonia, COPDAE and acute PE noted on V/Q scan. Pt was initiated on Eliquis. She was discharged to [facility] for rehab. At the rehab today she was noted to have altered mental status and black tarry stools. She was therefore sent to the ED for further evaluation. In the ED she was noted to have Hgb of 5.0, previous 8.1 on 12/7. She was also noted to have soft blood pressures with SBP in the ~90s. Her Cr was slightly elevated above baseline at 1.6, she was 1.2 on 12/7. GI was consulted in the ED. Pt was started on protonix infusion and PRBC transfusion. Pt denies chest pain or SOB. No abdominal pain. No N/V/D. She is stable on her baseline O2 requirement of 4 LPM. She was admitted for further evaluation and management. Subjective:   Pt is on 4 LPM. Afebrile. VSS. No dyspnea or chest pain. No N/V/D. Pt's  is at bedside.        Medications:  Reviewed    Infusion Medications    sodium chloride      pantoprazole 8 mg/hr (12/16/21 1322)    sodium chloride      sodium chloride Stopped (12/16/21 0045)    sodium chloride       Scheduled Medications    allopurinol  100 mg Oral BID    ARIPiprazole  5 mg Oral Daily    DULoxetine  60 mg Oral Daily    budesonide-formoterol  2 puff Inhalation BID    folic acid  1 mg Oral Daily    oxybutynin  5 mg Oral TID    tiotropium  2 puff Inhalation Daily    sodium chloride flush  5-40 mL IntraVENous 2 times per day     PRN Meds: sodium chloride, albuterol, sodium chloride flush, sodium chloride, ondansetron **OR** ondansetron, acetaminophen **OR** acetaminophen, sodium chloride      Intake/Output Summary (Last 24 hours) at 12/16/2021 [address] filed at 12/16/2021 1215  Gross per 24 hour   Intake 2169.63 ml   Output 975 ml   Net 1194.63 ml       Physical Exam Performed:    BP (!) 163/65   Pulse 89   Temp 98.1 °F (36.7 °C) (Oral)   Resp 20   Ht 5' 3\" (1.6 m)   Wt 230 lb (104.3 kg)   SpO2 94%   BMI 40.74 kg/m²     General appearance: No apparent distress, appears stated age and cooperative. HEENT: Pupils equal, round, and reactive to light. Conjunctivae/corneas clear. Neck: Supple, with full range of motion. No jugular venous distention. Trachea midline. Respiratory:  Normal respiratory effort. Clear to auscultation, bilaterally without Rales/Wheezes/Rhonchi. Cardiovascular: Regular rate and rhythm with normal S1/S2 without murmurs, rubs or gallops. Abdomen: Soft, non-tender, non-distended with normal bowel sounds. Musculoskeletal: No clubbing, cyanosis or edema bilaterally. Full range of motion without deformity. Skin: Skin color, texture, turgor normal.  No rashes or lesions. Neurologic:  Neurovascularly intact without any focal sensory/motor deficits. Cranial nerves: II-XII intact, grossly non-focal.  Psychiatric: Alert and oriented, thought content appropriate, normal insight  Capillary Refill: Brisk,3 seconds, normal   Peripheral Pulses: +2 palpable, equal bilaterally       Labs:   Recent Labs     12/15/21  1100 12/15/21  2045 12/16/21  0554 12/16/21  0914 12/16/21  1403   WBC 6.2  --  5.2  --   --    HGB 5.0*   < > 7.3*  7.4* 7.7* 8.2*   HCT 15.5*   < > 22.4*  22.3* 23.4* 24.7*     --  272  --   --     < > = values in this interval not displayed.      Recent Labs     12/15/21  1100 12/16/21  0554    142   K 4.7 4.4   CL 99 107   CO2 30 28   BUN 32* 30*   CREATININE 1.6* 1.5*   CALCIUM 8.9 8.6     Recent Labs     12/15/21  1100 12/16/21  0554   AST 17 20   ALT 11 10   BILITOT <0.2 <0.2   ALKPHOS 86 83     Urinalysis:      Lab Results   Component Value Date    NITRU Negative 12/15/2021    WBCUA 3-5 12/15/2021 BACTERIA Rare 12/15/2021    RBCUA 0-2 12/15/2021    BLOODU Negative 12/15/2021    SPECGRAV 1.020 12/15/2021    GLUCOSEU Negative 12/15/2021       Radiology:  XR CHEST PORTABLE   Final Result   No acute abnormality. CT Head WO Contrast   Final Result   Atrophy and small-vessel ischemic change. No hemorrhage or mass      RECOMMENDATIONS:   Unavailable             Assessment/Plan:    Active Hospital Problems    Diagnosis     GI bleed [K92.2]        Acute blood loss anemia due to acute upper GI bleed   - Pt with reported melena and positive FOBT. - Hgb was 5.0 in the ED. Baseline Hgb 8-9.  - Pt recently started on Eliquis for PE, will continue to hold. - GI consulted/following. S/p EGD 12/16 showed slight mucosal oozing in duodenum, as well as small amount of bleed in her oropharynx. - Pt initiated on protonix drip in the ED -- will change to IVP BID.   - S/p 2 units PRBCs. Monitor serial H&Hs. Transfuse for Hgb less than 7 and/or symptomatic. Hgb is currently stable at 8.2.  - Defer diet advancement to GI.      Acute kidney injury on CKD stage 3:  - Cr 1.6 in he ED. Baseline serum Cr ~1.2. Cr is currently 1.5.  - Etiology: likely due to anemia / hypotension.   - Avoid nephrotoxic agents. - Monitor kidney fx closely.      Recent PE:  - Noted on V/Q scan on 11/26. BLE venous dopplers on 11/29 showed superficial clot, no evidence of DVT. - Hold anticoagulation in above setting.   - Vascular surgery consulted. Plan for IVC filter today.      Chronic hypoxic respiratory failure due to COPD:  - Stable, no evidence of exacerbation.   - She is stable on her baseline O2 requirement of 4 LPM.   - Continue home inhaler regimen.      Hypertension:  - Hypotensive on admission, held her home BP meds. BP improved with transfusion. Monitor BP closely.      Depression:  - Continue her home medications.      Morbid obesity:  - With Body mass index is 40.74 kg/m². Complicating assessment and treatment.  Placing patient at risk for multiple co-morbidities as well as early death and contributing to the patient's presentation. Counseled on weight loss.        DVT Prophylaxis: SCDs  Diet: Diet NPO Exceptions are: Ice Chips  Code Status: Full Code    PT/OT Eval Status: not yet ordered    Dispo - ~2 days     Debbie Elder, APRN - CNP

## 2021-12-16 NOTE — PROGRESS NOTES
Patient arrived to [facility] in room 11 awake and oriented.  at bedside. IV infusing.    Lee Styles RN

## 2021-12-16 NOTE — CONSULTS
Gastroenterology Consult Note    Patient: Tahmina Buckley   YOB: 1945   Facility:   [facility]   Referring/PCP: Brook Weiss MD  Date:     12/16/2021  Consultant:   Collin June MD, MD    Subjective: This 64 y.o. female was admitted 12/15/2021 with a diagnosis of \"Somnolence [R40.0]  GI bleed [K92.2]  Gastrointestinal hemorrhage with melena [K92.1]\" and is seen in consultation regarding \"melena\". Information was obtained from interview of  the patient, examination of the patient, and review of records. I did  update the past medical, surgical, social and / or family history. Melena moderate in GI tract for days assoc w anemia    Current status  Present  Diet Order: Diet NPO Exceptions are: Ice Chips and she is tolerating diet. Recently, she has experienced no abdominal  Pain and she has not required Intravenous narcotic analgesics. The patient has also experienced no constipation, diarrhea, fever, hematochezia, nausea and vomiting      Prior to Admission medications    Medication Sig Start Date End Date Taking?  Authorizing Provider   ferrous sulfate (IRON 325) 325 (65 Fe) MG tablet Take 325 mg by mouth daily (with breakfast)   Yes Historical Provider, MD   losartan (COZAAR) 100 MG tablet Take 100 mg by mouth daily   Yes Historical Provider, MD   bumetanide (BUMEX) 2 MG tablet Take 1 tablet by mouth daily 12/7/21 1/6/22 Yes Erika Newton MD   amLODIPine (NORVASC) 5 MG tablet Take 1 tablet by mouth daily 12/7/21 1/6/22 Yes Erika Newton MD   allopurinol (ZYLOPRIM) 100 MG tablet Take 1 tablet by mouth 2 times daily 12/6/21 1/5/22 Yes Erika Newton MD   apixaban (ELIQUIS) 5 MG TABS tablet Take 1 tablet by mouth 2 times daily 12/3/21 1/2/22 Yes Erika Newton MD   [facility] 18 MCG inhalation capsule INHALE 1 CAPSULE INTO THE LUNGS DAILY 11/3/21  Yes Nadiya Aguirre PA-C   fluticasone-salmeterol (ADVAIR) 500-50 MCG/DOSE diskus inhaler INHALE 1 PUFF INTO THE LUNGS EVERY 12 HOURS 7/15/21  Yes Dragan Hyde MD   cyanocobalamin 1000 MCG tablet Take 1,000 mcg by mouth daily 1/14/21  Yes Historical Provider, MD   folic acid (FOLVITE) 1 MG tablet TAKE 1 TABLET (1 MG TOTAL) BY MOUTH DAILY. 3/19/21  Yes Historical Provider, MD   albuterol sulfate  (90 Base) MCG/ACT inhaler INHALE 2 PUFFS INTO THE LUNGS EVERY 6 HOURS AS NEEDED FOR WHEEZING OR SHORTNESS OF BREATH 2/22/21 2/22/22 Yes Dragan Hyde MD   pantoprazole (PROTONIX) 40 MG tablet Take 40 mg by mouth daily   Yes Historical Provider, MD   oxybutynin (DITROPAN) 5 MG tablet Take 5 mg by mouth 3 times daily   Yes Historical Provider, MD   ARIPiprazole (ABILIFY) 5 MG tablet Take 5 mg by mouth 12/19/19  Yes Historical Provider, MD   albuterol (PROVENTIL) (2.5 MG/3ML) 0.083% nebulizer solution Take 3 mLs by nebulization every 4 hours as needed for Wheezing or Shortness of Breath DX COPD J44.9 12/17/19  Yes Dragan Hyde MD   DULoxetine (CYMBALTA) 60 MG extended release capsule Take 60 mg by mouth daily   Yes Historical Provider, MD   meloxicam (MOBIC) 15 MG tablet Take 15 mg by mouth daily. Yes Historical Provider, MD   traMADol (ULTRAM) 50 MG tablet Take 50 mg by mouth every 6 hours as needed for Pain. Historical Provider, MD   tiZANidine (ZANAFLEX) 4 MG tablet every 8 hours as needed  4/2/21   Historical Provider, MD   albuterol (PROVENTIL) (2.5 MG/3ML) 0.083% nebulizer solution Take 3 mLs by nebulization every 4 hours as needed for Wheezing 9/24/20 9/24/21  Dragan Hyde MD   albuterol sulfate HFA (PROAIR HFA) 108 (90 Base) MCG/ACT inhaler Inhale 2 puffs into the lungs every 6 hours as needed for Wheezing or Shortness of Breath 2/21/20   Tiffanie Gomez MD   losartan (COZAAR) 100 MG tablet Take 100 mg by mouth 9/6/18 5/15/20  Historical Provider, MD   OXYGEN Inhale 3 L/min into the lungs continuous.     Historical Provider, MD      Scheduled Medications:    allopurinol  100 mg Oral BID    ARIPiprazole date: 2005     Years since quittin.8    Smokeless tobacco: Never Used   Substance Use Topics    Alcohol use: No     Alcohol/week: 0.0 standard drinks     Family:   Family History   Problem Relation Age of Onset    Diabetes Mother     Arthritis Mother    Prince Hernandez Vision Loss Mother     High Blood Pressure Sister     Stroke Sister     Diabetes Sister     Depression Sister     Arthritis Sister     Diabetes Daughter     Substance Abuse Daughter     Depression Daughter     Asthma Daughter     Emphysema Father     Cancer Neg Hx     Heart Failure Neg Hx     Hypertension Neg Hx        ROS: Pertinent items are noted in HPI.     Objective:   Vital Signs:  Temp (24hrs), Av.6 °F (37 °C), Min:98.2 °F (36.8 °C), Max:98.8 °F (68.2 °C)     Systolic (47NZA), IOP:048 , Min:79 , WZJ:418      Diastolic (66NXV), KARLA:82, Min:24, Max:75     Pulse  Av.7  Min: 63  Max: 87  BP (!) 151/63   Pulse 87   Temp 98.2 °F (36.8 °C) (Oral)   Resp 18   Ht 5' 3\" (1.6 m)   Wt 230 lb (104.3 kg)   SpO2 94%   BMI 40.74 kg/m²      Physical Exam:   BP (!) 150/64   Pulse 88   Temp 98.2 °F (36.8 °C) (Oral)   Resp 20   Ht 5' 3\" (1.6 m)   Wt 230 lb (104.3 kg)   SpO2 96%   BMI 40.74 kg/m²   General appearance: alert, appears stated age and cooperative  Lungs: clear to auscultation bilaterally  Chest wall: no tenderness  Heart: regular rate and rhythm, S1, S2 normal, no murmur, click, rub or gallop  Abdomen: soft, non-tender; bowel sounds normal; no masses,  no organomegaly  Extremities: extremities normal, atraumatic, no cyanosis or edema  Skin: Skin color, texture, turgor normal. No rashes or lesions  Neurologic: Grossly normal    Lab and Imaging Review   Recent Labs     12/15/21  1100 12/15/21  2045   WBC 6.2  --    HGB 5.0* 6.5*   MCV 92.9  --      --      --    K 4.7  --    CL 99  --    CO2 30  --    BUN 32*  --    CREATININE 1.6*  --    GLUCOSE 134*  --    CALCIUM 8.9  --    PROT 5.3*  --    LABALBU 2.7* --    AST 17  --    ALT 11  --    ALKPHOS 86  --    BILITOT <0.2  --        Assessment:     Patient Active Problem List    Diagnosis Date Noted    SBO (small bowel obstruction) ([facility] 02/10/2012    GI bleed 12/15/2021    Hyperuricemia 12/04/2021    Pneumonia of left upper lobe due to Pseudomonas species ([address] 12/01/2021    Pulmonary infiltrate 11/30/2021    Atelectasis 11/30/2021    Chest congestion 11/30/2021    Ineffective airway clearance 11/30/2021    COPD exacerbation ([address] 11/26/2021    Spontaneous tension pneumothorax 05/01/2020    Pulmonary nodule     Knee osteoarthritis 05/14/2015    Periodic limb movement disorder (PLMD) 03/13/2015    Chronic respiratory failure with hypoxia, on home O2 therapy ([address]     Rash     COPD (chronic obstructive pulmonary disease) ([facility] [address] 04/15/2011    PARK (obstructive sleep apnea) 04/15/2011    Hypoxemia 04/15/2011    Status post gastric banding 01/20/2011    Oxygen dependent 09/01/2010    Hypertension 02/04/2010    Asthma 02/04/2010    Pulmonary emphysema ([address] 02/04/2010    Arthritis 02/04/2010    Depression 02/04/2010    Morbid obesity with BMI of 40.0-44.9, adult ([facility] [address] 02/04/2010     63 yo w HTN, COPD/PARK/obesity admitted for melena and anemia H/H 5/16. Pt was hospitalized from 11/26-12/7 at [facility]. She was treated for pneumonia, COPD and acute PE noted on V/Q scan. Pt was initiated on Eliquis. She was discharged to [facility] for rehab. At the rehab, she was noted to have altered mental status and black tarry stools and sent back here. Plan:   1. IV PPI  2. F/U H/H and transfuse as needed  3. EGD in am  4. Plans for [facility] Arciniega noted  5.  Will follow    Maru Cantor MD       (O) [phone]

## 2021-12-16 NOTE — ANESTHESIA POSTPROCEDURE EVALUATION
Department of Anesthesiology  Postprocedure Note    Patient: Adriana Friday  MRN: [id number]  YOB: 1945  Date of evaluation: 12/16/2021  Time:  1:44 PM     Procedure Summary     Date: 12/16/21 Room / Location: [address] [place] [address]    Anesthesia Start: 1118 Anesthesia Stop: 1132    Procedure: EGD DIAGNOSTIC ONLY (N/A ) Diagnosis: (Gastrointestinal hemorrhage with melena)    Surgeons: Chicho Celaya MD Responsible Provider: Lincoln Bell MD    Anesthesia Type: MAC ASA Status: 4          Anesthesia Type: MAC    Garett Phase I: Garett Score: 9    Garett Phase II: Garett Score: 8    Last vitals: Reviewed and per EMR flowsheets.        Anesthesia Post Evaluation    Comments: Postoperative Anesthesia Note    Name:    Adriana Friday  MRN:      [id number]    Patient Vitals in the past 12 hrs:  12/16/21 1236, BP:(!) 163/65, Temp:98.1 °F (36.7 °C), Temp src:Oral, Pulse:89, Resp:20, SpO2:94 %  12/16/21 1215, BP:(!) 144/60, Pulse:88, Resp:22, SpO2:92 %  12/16/21 1200, BP:(!) 151/68, Pulse:86, Resp:20, SpO2:92 %  12/16/21 1145, BP:(!) 140/56, Pulse:93, Resp:20, SpO2:92 %  12/16/21 1136, BP:(!) 159/52, Pulse:88, Resp:20, SpO2:91 %  12/16/21 0829, BP:(!) 150/64, Temp:98.2 °F (36.8 °C), Temp src:Oral, Pulse:88, Resp:20, SpO2:96 %  12/16/21 0758, Resp:18, SpO2:95 %  12/16/21 0436, BP:(!) 151/63, Temp:98.2 °F (36.8 °C), Temp src:Oral, Pulse:87, Resp:18, SpO2:94 %  12/16/21 0210, BP:(!) 153/75, Temp:98.4 °F (36.9 °C), Temp src:Oral, Pulse:78, Resp:20, SpO2:98 %  12/16/21 0200, BP:(!) 161/53, Temp:98.8 °F (37.1 °C), Temp src:Bladder, Pulse:75, Resp:19  12/16/21 0145, BP:(!) 161/53, Pulse:82, Resp:18     LABS:    CBC  Lab Results       Component                Value               Date/Time                  WBC                      5.2                 12/16/2021 05:54 AM        HGB                      7.7 (L)             12/16/2021 09:14 AM        HCT                      23.4 (L)            12/16/2021 09:14 AM        PLT                      272                 12/16/2021 05:54 AM   RENAL  Lab Results       Component                Value               Date/Time                  NA                       142                 12/16/2021 05:54 AM        K                        4.4                 12/16/2021 05:54 AM        CL                       107                 12/16/2021 05:54 AM        CO2                      28                  12/16/2021 05:54 AM        BUN                      30 (H)              12/16/2021 05:54 AM        CREATININE               1.5 (H)             12/16/2021 05:54 AM        GLUCOSE                  90                  12/16/2021 05:54 AM   COAGS  No results found for: PROTIME, INR, APTT    Intake & Output:  @44UQBO@    Nausea & Vomiting:  No    Level of Consciousness:  Awake    Pain Assessment:  Adequate analgesia    Anesthesia Complications:  No apparent anesthetic complications    SUMMARY      Vital signs stable  OK to discharge from Stage I post anesthesia care.   Care transferred from Anesthesiology department on discharge from perioperative area

## 2021-12-17 ENCOUNTER — APPOINTMENT (OUTPATIENT)
Dept: GENERAL RADIOLOGY | Age: 76
DRG: 377 | End: 2021-12-17
Payer: MEDICARE

## 2021-12-17 LAB
ANION GAP SERPL CALCULATED.3IONS-SCNC: 11 MMOL/L (ref 3–16)
ANION GAP SERPL CALCULATED.3IONS-SCNC: 12 MMOL/L (ref 3–16)
BUN BLDV-MCNC: 21 MG/DL (ref 7–20)
BUN BLDV-MCNC: 24 MG/DL (ref 7–20)
CALCIUM SERPL-MCNC: 9.1 MG/DL (ref 8.3–10.6)
CALCIUM SERPL-MCNC: 9.3 MG/DL (ref 8.3–10.6)
CHLORIDE BLD-SCNC: 104 MMOL/L (ref 99–110)
CHLORIDE BLD-SCNC: 106 MMOL/L (ref 99–110)
CO2: 24 MMOL/L (ref 21–32)
CO2: 25 MMOL/L (ref 21–32)
CREAT SERPL-MCNC: 1.1 MG/DL (ref 0.6–1.2)
CREAT SERPL-MCNC: 1.3 MG/DL (ref 0.6–1.2)
D DIMER: 402 NG/ML DDU (ref 0–229)
GFR AFRICAN AMERICAN: 48
GFR AFRICAN AMERICAN: 58
GFR NON-AFRICAN AMERICAN: 40
GFR NON-AFRICAN AMERICAN: 48
GLUCOSE BLD-MCNC: 100 MG/DL (ref 70–99)
GLUCOSE BLD-MCNC: 108 MG/DL (ref 70–99)
GLUCOSE BLD-MCNC: 86 MG/DL (ref 70–99)
GLUCOSE BLD-MCNC: 91 MG/DL (ref 70–99)
GLUCOSE BLD-MCNC: 91 MG/DL (ref 70–99)
GLUCOSE BLD-MCNC: 98 MG/DL (ref 70–99)
HCT VFR BLD CALC: 23.2 % (ref 36–48)
HCT VFR BLD CALC: 23.4 % (ref 36–48)
HCT VFR BLD CALC: 24.8 % (ref 36–48)
HCT VFR BLD CALC: 25 % (ref 36–48)
HCT VFR BLD CALC: 25 % (ref 36–48)
HEMOGLOBIN: 7.7 G/DL (ref 12–16)
HEMOGLOBIN: 7.8 G/DL (ref 12–16)
HEMOGLOBIN: 8.2 G/DL (ref 12–16)
HEMOGLOBIN: 8.3 G/DL (ref 12–16)
HEMOGLOBIN: 8.3 G/DL (ref 12–16)
LACTIC ACID: 0.9 MMOL/L (ref 0.4–2)
MCH RBC QN AUTO: 30 PG (ref 26–34)
MCH RBC QN AUTO: 30.1 PG (ref 26–34)
MCHC RBC AUTO-ENTMCNC: 33 G/DL (ref 31–36)
MCHC RBC AUTO-ENTMCNC: 33.1 G/DL (ref 31–36)
MCV RBC AUTO: 90.8 FL (ref 80–100)
MCV RBC AUTO: 91.1 FL (ref 80–100)
PDW BLD-RTO: 16.2 % (ref 12.4–15.4)
PDW BLD-RTO: 16.4 % (ref 12.4–15.4)
PERFORMED ON: ABNORMAL
PERFORMED ON: NORMAL
PLATELET # BLD: 301 K/UL (ref 135–450)
PLATELET # BLD: 308 K/UL (ref 135–450)
PMV BLD AUTO: 7.3 FL (ref 5–10.5)
PMV BLD AUTO: 7.6 FL (ref 5–10.5)
POTASSIUM REFLEX MAGNESIUM: 4.6 MMOL/L (ref 3.5–5.1)
POTASSIUM REFLEX MAGNESIUM: 4.6 MMOL/L (ref 3.5–5.1)
RBC # BLD: 2.74 M/UL (ref 4–5.2)
RBC # BLD: 2.75 M/UL (ref 4–5.2)
SODIUM BLD-SCNC: 141 MMOL/L (ref 136–145)
SODIUM BLD-SCNC: 141 MMOL/L (ref 136–145)
WBC # BLD: 4.9 K/UL (ref 4–11)
WBC # BLD: 5.4 K/UL (ref 4–11)

## 2021-12-17 PROCEDURE — 94640 AIRWAY INHALATION TREATMENT: CPT

## 2021-12-17 PROCEDURE — 6360000002 HC RX W HCPCS: Performed by: NURSE PRACTITIONER

## 2021-12-17 PROCEDURE — 85379 FIBRIN DEGRADATION QUANT: CPT

## 2021-12-17 PROCEDURE — 85018 HEMOGLOBIN: CPT

## 2021-12-17 PROCEDURE — 97166 OT EVAL MOD COMPLEX 45 MIN: CPT

## 2021-12-17 PROCEDURE — 97110 THERAPEUTIC EXERCISES: CPT

## 2021-12-17 PROCEDURE — 97162 PT EVAL MOD COMPLEX 30 MIN: CPT

## 2021-12-17 PROCEDURE — 6370000000 HC RX 637 (ALT 250 FOR IP): Performed by: REGISTERED NURSE

## 2021-12-17 PROCEDURE — 6370000000 HC RX 637 (ALT 250 FOR IP): Performed by: NURSE PRACTITIONER

## 2021-12-17 PROCEDURE — 94761 N-INVAS EAR/PLS OXIMETRY MLT: CPT

## 2021-12-17 PROCEDURE — 2700000000 HC OXYGEN THERAPY PER DAY

## 2021-12-17 PROCEDURE — 36415 COLL VENOUS BLD VENIPUNCTURE: CPT

## 2021-12-17 PROCEDURE — 97535 SELF CARE MNGMENT TRAINING: CPT

## 2021-12-17 PROCEDURE — 71045 X-RAY EXAM CHEST 1 VIEW: CPT

## 2021-12-17 PROCEDURE — 2580000003 HC RX 258: Performed by: REGISTERED NURSE

## 2021-12-17 PROCEDURE — 2060000000 HC ICU INTERMEDIATE R&B

## 2021-12-17 PROCEDURE — 97116 GAIT TRAINING THERAPY: CPT

## 2021-12-17 PROCEDURE — 83605 ASSAY OF LACTIC ACID: CPT

## 2021-12-17 PROCEDURE — 74018 RADEX ABDOMEN 1 VIEW: CPT

## 2021-12-17 PROCEDURE — 85014 HEMATOCRIT: CPT

## 2021-12-17 PROCEDURE — 85027 COMPLETE CBC AUTOMATED: CPT

## 2021-12-17 PROCEDURE — 80048 BASIC METABOLIC PNL TOTAL CA: CPT

## 2021-12-17 RX ORDER — OLANZAPINE 10 MG/1
5 INJECTION, POWDER, LYOPHILIZED, FOR SOLUTION INTRAMUSCULAR ONCE
Status: DISCONTINUED | OUTPATIENT
Start: 2021-12-17 | End: 2021-12-17

## 2021-12-17 RX ORDER — HYDROXYZINE HYDROCHLORIDE 50 MG/ML
25 INJECTION, SOLUTION INTRAMUSCULAR EVERY 6 HOURS PRN
Status: DISCONTINUED | OUTPATIENT
Start: 2021-12-17 | End: 2021-12-19 | Stop reason: HOSPADM

## 2021-12-17 RX ORDER — AMLODIPINE BESYLATE 5 MG/1
5 TABLET ORAL DAILY
Status: DISCONTINUED | OUTPATIENT
Start: 2021-12-17 | End: 2021-12-19 | Stop reason: HOSPADM

## 2021-12-17 RX ORDER — HYDRALAZINE HYDROCHLORIDE 20 MG/ML
20 INJECTION INTRAMUSCULAR; INTRAVENOUS EVERY 6 HOURS PRN
Status: DISCONTINUED | OUTPATIENT
Start: 2021-12-17 | End: 2021-12-19 | Stop reason: HOSPADM

## 2021-12-17 RX ORDER — PANTOPRAZOLE SODIUM 40 MG/1
40 TABLET, DELAYED RELEASE ORAL
Status: DISCONTINUED | OUTPATIENT
Start: 2021-12-17 | End: 2021-12-19 | Stop reason: HOSPADM

## 2021-12-17 RX ORDER — HALOPERIDOL 5 MG/ML
5 INJECTION INTRAMUSCULAR ONCE
Status: COMPLETED | OUTPATIENT
Start: 2021-12-17 | End: 2021-12-17

## 2021-12-17 RX ORDER — LOSARTAN POTASSIUM 25 MG/1
50 TABLET ORAL DAILY
Status: DISCONTINUED | OUTPATIENT
Start: 2021-12-17 | End: 2021-12-18

## 2021-12-17 RX ORDER — BUMETANIDE 1 MG/1
1 TABLET ORAL DAILY
Status: DISCONTINUED | OUTPATIENT
Start: 2021-12-17 | End: 2021-12-19 | Stop reason: HOSPADM

## 2021-12-17 RX ADMIN — PANTOPRAZOLE SODIUM 40 MG: 40 TABLET, DELAYED RELEASE ORAL at 10:14

## 2021-12-17 RX ADMIN — LOSARTAN POTASSIUM 50 MG: 25 TABLET, FILM COATED ORAL at 10:14

## 2021-12-17 RX ADMIN — ARIPIPRAZOLE 5 MG: 10 TABLET ORAL at 10:14

## 2021-12-17 RX ADMIN — PANTOPRAZOLE SODIUM 40 MG: 40 TABLET, DELAYED RELEASE ORAL at 18:32

## 2021-12-17 RX ADMIN — OXYBUTYNIN CHLORIDE 5 MG: 5 TABLET ORAL at 10:14

## 2021-12-17 RX ADMIN — ACETAMINOPHEN 650 MG: 325 TABLET ORAL at 18:32

## 2021-12-17 RX ADMIN — Medication 10 ML: at 20:32

## 2021-12-17 RX ADMIN — FOLIC ACID 1 MG: 1 TABLET ORAL at 10:14

## 2021-12-17 RX ADMIN — DULOXETINE HYDROCHLORIDE 60 MG: 60 CAPSULE, DELAYED RELEASE ORAL at 10:13

## 2021-12-17 RX ADMIN — HALOPERIDOL LACTATE 5 MG: 5 INJECTION, SOLUTION INTRAMUSCULAR at 01:28

## 2021-12-17 RX ADMIN — ALLOPURINOL 100 MG: 100 TABLET ORAL at 10:14

## 2021-12-17 RX ADMIN — Medication 5 MG: at 20:31

## 2021-12-17 RX ADMIN — ALLOPURINOL 100 MG: 100 TABLET ORAL at 20:32

## 2021-12-17 RX ADMIN — HYDRALAZINE HYDROCHLORIDE 20 MG: 20 INJECTION INTRAMUSCULAR; INTRAVENOUS at 08:40

## 2021-12-17 RX ADMIN — AMLODIPINE BESYLATE 5 MG: 5 TABLET ORAL at 10:14

## 2021-12-17 RX ADMIN — OXYBUTYNIN CHLORIDE 5 MG: 5 TABLET ORAL at 20:31

## 2021-12-17 RX ADMIN — TIOTROPIUM BROMIDE INHALATION SPRAY 2 PUFF: 3.12 SPRAY, METERED RESPIRATORY (INHALATION) at 09:39

## 2021-12-17 RX ADMIN — Medication 2 PUFF: at 09:39

## 2021-12-17 RX ADMIN — Medication 2 PUFF: at 20:29

## 2021-12-17 RX ADMIN — OXYBUTYNIN CHLORIDE 5 MG: 5 TABLET ORAL at 15:26

## 2021-12-17 RX ADMIN — ACETAMINOPHEN 650 MG: 325 TABLET ORAL at 10:14

## 2021-12-17 RX ADMIN — BUMETANIDE 1 MG: 1 TABLET ORAL at 10:14

## 2021-12-17 ASSESSMENT — PAIN SCALES - GENERAL
PAINLEVEL_OUTOF10: 0
PAINLEVEL_OUTOF10: 7
PAINLEVEL_OUTOF10: 8
PAINLEVEL_OUTOF10: 0

## 2021-12-17 NOTE — PROGRESS NOTES
Occupational Therapy   Occupational Therapy Initial Assessment/Treatment  Date: 2021   Patient Name: Diana Fuentes  MRN: [id number]     : 1945    Date of Service: 2021    Discharge Recommendations:  Subacute/Skilled Nursing Facility  OT Equipment Recommendations  Equipment Needed: Yes  Mobility Devices: ADL Assistive Devices  ADL Assistive Devices: Sock-Aid Hard  Other: defer    Assessment   Performance deficits / Impairments: Decreased functional mobility ; Decreased ADL status; Decreased endurance; Decreased safe awareness; Decreased cognition; Decreased sensation; Decreased balance; Decreased fine motor control; Decreased coordination; Decreased high-level IADLs    Assessment: Pt is a 66yo female with deficits in the areas listed above after a GI bleed and found with AMS. Pt has recently been at a SNF receiving therapy services. Pt currently LB dressed with mod A and toilets with total A. Pt encouraged to get up to use BSC or toilet with assistance while in hospital. Pt performed functional t/fs with CGA and functional mobility with min A and RW. Vc' throughout session for safety and PLB. Pt required ~1min rest after activity d/t decreasedO2 stats and increased HR with cues for PLB. Pt would continue to benefit from skilled OT services to increase endurance, balance, safety and independence for functional activities. Prognosis: Good  Decision Making: Medium Complexity  OT Education: OT Role; Plan of Care; Home Exercise Program; ADL Adaptive Strategies; Transfer Training; Energy Conservation; Family Education  Patient Education: disease specific: importance of OOB mobility, purpose of removing purwick, general safety during hospitalization, prevention of complications of bedrest, use of call light, PLB, safe t/fs, energy conservation. Pt verbalized understanding but will need reinforcement. Barriers to Learning: cognition.   REQUIRES OT FOLLOW UP: Yes  Activity Tolerance  Activity rehab today she was noted to have altered mental status and black tarry stools. She was therefore sent to the ED for further evaluation. In the ED she was noted to have Hgb of 5.0, previous 8.1 on 12/7. She was also noted to have soft blood pressures with SBP in the ~90s. Her Cr was slightly elevated above baseline at 1.6, she was 1.2 on 12/7. GI was consulted in the ED. Pt was started on protonix infusion and PRBC transfusion. Pt denies chest pain or SOB. No abdominal pain. No N/V/D. She is stable on her baseline O2 requirement of 4 LPM. She was admitted for further evaluation and management\"  Response to previous treatment: Patient with no complaints from previous session  Family / Caregiver Present: Yes ( present at end of session.)  Referring Practitioner: JUANY Paulino CNP  Diagnosis: Acute blood loss anemia due to acute upper GI bleed, s/p: Esophagogastroduodenoscopy 12/16, Ultrasound-guided right venous access, CO2 inferior vena cavogram, Placement of Celect inferior vena caval filter (12/16)  Subjective  Subjective: Pt agreeable to therapy. General Comment  Comments: RN approved therapy.   Pain Assessment  Pain Level: 7 (generalized pain)  Social/Functional History  Social/Functional History  Lives With: Spouse, Son  Home Layout: Two level, Performs ADL's on one level  Home Access: Stairs to enter with rails  Entrance Stairs - Number of Steps: 3-4 ( assists with going up steps.)  Entrance Stairs - Rails: Right  Bathroom Shower/Tub: Shower chair with back, Walk-in shower  Bathroom Toilet: Handicap height  Bathroom Equipment: Grab bars in shower  Home Equipment: [employer], Electric scooter, 4 wheeled walker, Quad cane, Oxygen (4L of O2 at baseline.)  ADL Assistance: Independent  Homemaking Assistance: Independent  Homemaking Responsibilities: Yes  Meal Prep Responsibility: Primary  Laundry Responsibility: Primary  Cleaning Responsibility: Secondary (every 2 weeks she has a woman come in to do heavy cleaning, pt can do light cleaning.)  Shopping Responsibility: Secondary (rides in the cart.)  Ambulation Assistance: Independent (Pt uses 4WW in home. Pt takes breaks PRN at home.)  Transfer Assistance: Independent  Active : Yes (shares with  and kids.)  Occupation: Retired  Type of occupation: cook  Leisure & Hobbies: car shows, movies  Additional Comments: Pt reports no falls in the past 6 months. Objective   Vision: Impaired  Vision Exceptions: Wears glasses for reading  Hearing: Within functional limits    Orientation  Overall Orientation Status: Impaired  Orientation Level: Disoriented to place; Disoriented to situation; Oriented to time; Oriented to person (Pt could recall that she was in the hospital but not which one.)  Observation/Palpation  Posture: Fair  Balance  Sitting Balance: Stand by assistance  Standing Balance: Minimal assistance  Standing Balance  Time: ~30s, ~1min  Activity: in room functional mobility to prepare for household distances. Comment: RW used. Assistance with RW. Functional Mobility  Functional - Mobility Device: Rolling Walker  Activity: Other  Assist Level: Minimal assistance  ADL  LE Dressing: Moderate assistance (to don socks.)  Toileting: Dependent/Total (purwick)  Tone RUE  RUE Tone: Normotonic  Tone LUE  LUE Tone: Normotonic  Coordination  Movements Are Fluid And Coordinated: No  Coordination and Movement description: Left UE; Right UE; Tremors     Bed mobility  Supine to Sit: Stand by assistance (HOB elevated.)  Sit to Supine: Unable to assess  Scooting: Unable to assess  Transfers  Sit to stand: Contact guard assistance  Stand to sit: Contact guard assistance  Transfer Comments: vc's for safe t/fs     Cognition  Overall Cognitive Status: Exceptions  Arousal/Alertness: Appropriate responses to stimuli  Following Commands: Follows multistep commands with increased time;  Follows multistep commands with repitition  Attention Span: Attends with cues to redirect  Memory: Decreased recall of recent events  Safety Judgement: Decreased awareness of need for assistance; Decreased awareness of need for safety  Problem Solving: Decreased awareness of errors  Insights: Decreased awareness of deficits  Initiation: Requires cues for some  Sequencing: Requires cues for some  Cognition Comment: cueing for safety        Sensation  Overall Sensation Status: Impaired (numbness in bilateral feet/legs.)  Type of ROM/Therapeutic Exercise  Type of ROM/Therapeutic Exercise: AROM  Comment: BUE seated in chair. Exercises  Shoulder Flexion: x10  Shoulder Extension: x10  Elbow Flexion: x10  Elbow Extension: x10  Wrist Flexion: x10  Wrist Extension: x10  Grasp/Release: x10  Other: Chest press x10     LUE AROM (degrees)  LUE AROM : WFL  LUE General AROM: Shoulder flexion ~85*  Left Hand AROM (degrees)  Left Hand AROM: WFL  RUE AROM (degrees)  RUE AROM : WFL  RUE General AROM: shoulder flexion ~95*  Right Hand AROM (degrees)  Right Hand AROM: WFL  LUE Strength  Gross LUE Strength: WFL  L Hand General: 4+/5  LUE Strength Comment: 4+/5 grossly  RUE Strength  Gross RUE Strength: WFL  R Hand General: 4+/5  RUE Strength Comment: 4+/5 grossly                   Plan   Plan  Times per week: 3-5x/week  Current Treatment Recommendations: Endurance Training, Balance Training, Strengthening, Functional Mobility Training, Safety Education & Training, Patient/Caregiver Education & Training, Equipment Evaluation, Education, & procurement, Self-Care / ADL    AM-[facility] Score        [facility] Inpatient Daily Activity Raw Score: 15 (12/17/21 1851)  AM-[facility] Inpatient ADL T-Scale Score : 34.69 (12/17/21 1851)  ADL Inpatient CMS 0-100% Score: 56.46 (12/17/21 1851)  ADL Inpatient CMS G-Code Modifier : CK (12/17/21 1851)    Goals  Short term goals  Time Frame for Short term goals: 1 week (12/24) unless stated otherwise. Short term goal 1: Pt will toilet with min A and AD PRN.   Short term goal 2: Pt will perform at least 2 min of standing functional activities. Short term goal 3: Pt will LB dress with min A and AD/AE PRN. Short term goal 4: Pt will perform BUE ther ex [id number] increase endurance for functional activities (12/22). Short term goal 5: Pt will perform at least 2 ADLS in stance with Ty. Patient Goals   Patient goals : \"to be able to walk without getting SOB\". Therapy Time   Individual Concurrent Group Co-treatment   Time In 6842         Time Out 9119         Minutes 39         Timed Code Treatment Minutes: 29 Minutes (10min eval)       Pete Pichardo OTR/L  If pt discharges prior to next session, this note will serve as discharge summary. See case management note for discharge disposition.

## 2021-12-17 NOTE — PROGRESS NOTES
Vascular    No issues regarding IVC filter placement. R groin access site without bleeding or hematoma  Filter can be removed when anticoagulation can be restarted or no longer needed.

## 2021-12-17 NOTE — PROGRESS NOTES
Pt with /63. Asymptomatic at this time. No PRN's on board for HTN. Do you want anything given or just monitor for now?     Pg sent to Dimple Duggan CNP

## 2021-12-17 NOTE — PROGRESS NOTES
Physical Therapy    Facility/Department: [facility] C4 PCU  Initial Assessment and Treatment    NAME: Robert Branham  : 1945  MRN: [id number]    Date of Service: 2021    Discharge Recommendations:  Subacute/Skilled Nursing Facility   PT Equipment Recommendations  Equipment Needed: No  Other: defer to patient's facility. If pt is unable to be seen after this session, please let this note serve as discharge summary. Please see case management note for discharge disposition. Thank you. Barriers to home discharge:   [x] Steps to access home entry or bed/bath:   [x] Reported available assist at home upon discharge limited   [x] Patient or family requests DC to other than home    [x] Patient reports expectation of post acute Discharge disposition to other than home    Assessment   Body structures, Functions, Activity limitations: Decreased functional mobility ; Decreased ADL status; Decreased strength; Decreased safe awareness; Decreased endurance; Decreased cognition; Decreased balance; Decreased high-level IADLs; Decreased posture  Assessment: Patient is a 64year old female who was admitted to [facility] on 12/15/21 with GI bleed. Patient is normally independent with functional mobility. Today she needed minimum assistance to ambulate 10 feet with a rolling walker. Patient presented with the therapy deficits listed above and will benefit from skilled PT plan of care to address these deficits. PT recommends that this patient receive skilled PT in the SNF setting, when medically stable, in order to address these deficits and to help her maximize her safety and independence with all functional mobility. PT to continue to follow. Treatment Diagnosis: decreased independence with functional mobility. Specific instructions for Next Treatment: progress mobility as tolerated  Prognosis: Good  Decision Making: Medium Complexity  PT Education: Goals; PT Role; Plan of Care; Home Exercise Program; Precautions;  Energy Conservation; Transfer Training; General Safety; Family Education; Equipment; Gait Training; Disease Specific Education; Pressure Relief; Injury Prevention  Patient Education: Patient educated on the benefits of increased mobility, performing her exercises, use of rolling walker, safety with mobility. Patient verbalized understanding. Patient also provided with handout for her exercise program.  Barriers to Learning: none  REQUIRES PT FOLLOW UP: Yes  Activity Tolerance  Activity Tolerance: Patient limited by fatigue; Patient limited by endurance; Patient limited by cognitive status  Activity Tolerance: vitals: 162/82 89 BPM 96% on 4L. decrease to 88% on 4L on exertion. educated patient on pursed lip breathing. O2 increased back to 92% with rest break and PLB. Patient Diagnosis(es): The primary encounter diagnosis was Gastrointestinal hemorrhage with melena. A diagnosis of Somnolence was also pertinent to this visit. has a past medical history of Arthritis, Asthma, Bronchitis chronic, Chronic respiratory failure ([address] Depression, Diabetes mellitus ([address] Emphysema of lung ([address] Glaucoma, Hypertension, Morbid obesity ([address] Oxygen dependent, Pneumonia of left upper lobe due to Pseudomonas species ([address] Rash, Shingles, and Sleep apnea. has a past surgical history that includes hernia repair; Cholecystectomy; Hysterectomy; Colon surgery; Lap Band (10/5/10); Cataract removal with implant (3/9/2011); Cataract removal with implant (3/23/2011); Colonoscopy (11/18/13); bronchoscopy (N/A, 11/30/2021); bronchoscopy (11/30/2021); and Upper gastrointestinal endoscopy (N/A, 12/16/2021). Restrictions  Restrictions/Precautions  Restrictions/Precautions: General Precautions, Fall Risk  Position Activity Restriction  Other position/activity restrictions: purwick, 4L of O2 via NC, telemetry.   Vision/Hearing  Vision: Impaired  Vision Exceptions: Wears glasses for reading  Hearing: Within functional limits Subjective  General  Chart Reviewed: Yes  Patient assessed for rehabilitation services?: Yes  Additional Pertinent Hx: HPI per chart, \"68 y.o. female who presented to [facility] with altered mental status. Pt is a poor historian therefore history is limited. Pt was hospitalized from 11/26-12/7 at [facility]. She was treated for pneumonia, COPDAE and acute PE noted on V/Q scan. Pt was initiated on Eliquis. She was discharged to [facility] for rehab. At the rehab today she was noted to have altered mental status and black tarry stools. She was therefore sent to the ED for further evaluation. In the ED she was noted to have Hgb of 5.0, previous 8.1 on 12/7. She was also noted to have soft blood pressures with SBP in the ~90s. Her Cr was slightly elevated above baseline at 1.6, she was 1.2 on 12/7. GI was consulted in the ED. Pt was started on protonix infusion and PRBC transfusion. Pt denies chest pain or SOB. No abdominal pain. No N/V/D. She is stable on her baseline O2 requirement of 4 LPM.\"  Response To Previous Treatment: Not applicable  Family / Caregiver Present: Yes (spouse)  Referring Practitioner: JUANY Doran CNP  Referral Date : 12/17/21  Follows Commands: Within Functional Limits  General Comment  Comments: Supine in bed upon entry of therapy staff. Cleared for therapy by RN. Subjective  Subjective: Patient agreed to participate.   Pain Screening  Patient Currently in Pain: Denies  Vital Signs  Patient Currently in Pain: Denies       Orientation  Orientation  Overall Orientation Status: Within Normal Limits  Social/Functional History  Social/Functional History  Lives With: Spouse, Son  Home Layout: Two level, Performs ADL's on one level  Home Access: Stairs to enter with rails  Entrance Stairs - Number of Steps: 3-4 ( assists with going up steps.)  Entrance Stairs - Rails: Right  Bathroom Shower/Tub: Shower chair with back, Walk-in shower  Bathroom Toilet: Handicap height  Bathroom Equipment: Grab bars in shower  Home Equipment: [employer], Electric scooter, 4 wheeled walker, Quad cane, Oxygen (4L of O2 at baseline.)  ADL Assistance: [address]: Independent  Homemaking Responsibilities: Yes  Meal Prep Responsibility: Primary  Laundry Responsibility: Primary  Cleaning Responsibility: Secondary (every 2 weeks she has a woman come in to do heavy cleaning, pt can do light cleaning.)  Shopping Responsibility: Secondary (rides in the cart.)  Ambulation Assistance: Independent (Pt uses 4WW in home. Pt takes breaks PRN at home.)  Transfer Assistance: Independent  Active : Yes (shares with  and kids.)  Occupation: Retired  Type of occupation: cook  Leisure & Hobbies: car shows, movies  Additional Comments: Pt reports no falls in the past 6 months. Cognition   Cognition  Overall Cognitive Status: Exceptions  Arousal/Alertness: Appropriate responses to stimuli  Following Commands: Follows multistep commands with increased time;  Follows multistep commands with repitition  Attention Span: Attends with cues to redirect  Memory: Decreased recall of recent events  Safety Judgement: Decreased awareness of need for assistance; Decreased awareness of need for safety  Problem Solving: Decreased awareness of errors  Insights: Decreased awareness of deficits  Initiation: Requires cues for some  Sequencing: Requires cues for some  Cognition Comment: cueing for safety    Objective     Observation/Palpation  Posture: Fair    PROM RLE (degrees)  RLE PROM: WFL  AROM RLE (degrees)  RLE AROM: WFL  PROM LLE (degrees)  LLE PROM: WFL  AROM LLE (degrees)  LLE AROM : WFL  Strength RLE  Comment: grossly 4-/5 bilateral  Strength LLE  Comment: grossly 4-/5 bilateral     Sensation  Overall Sensation Status: Impaired (numbness in bilateral feet/legs.)  Bed mobility  Supine to Sit: Stand by assistance  Sit to Supine: Unable to assess (patient in chair upon entry of therapy without Stair Climbing Raw Score : 15 (12/17/21 1708)  AM-PAC Inpatient without Stair Climbing T-Scale Score : 43.03 (12/17/21 1708)  Mobility Inpatient CMS 0-100% Score: 47.43 (12/17/21 1708)  Mobility Inpatient without Stair CMS G-Code Modifier : CK (12/17/21 1708)       Goals  Short term goals  Time Frame for Short term goals: 1 week 12/24/21  Short term goal 1: Supine <> sit with modified independence. Short term goal 2: Sit <> stand with modified independence. Short term goal 3: Bed <> chair with least restrictive device and modified independence. Short term goal 4: Ambulate 50 feet with LRAD and mod I  Short term goal 5: By 12/20/21 tolerate 12-15 reps of her exercises to maximize her strength and endurance. Patient Goals   Patient goals : To become stronger. To improve her endurance and ambulate independently.        Therapy Time   Individual Concurrent Group Co-treatment   Time In 0872         Time Out 1645         Minutes 37         Timed Code Treatment Minutes: 27 Minutes (10 minute evaluation)       Woodford Spatz, PT

## 2021-12-17 NOTE — OP NOTE
[facility]                 DeepakMer todd                                OPERATIVE REPORT    PATIENT NAME: Rnadal Calixto                     :        1945  MED REC NO:   [id number]                          ROOM:       0448  ACCOUNT NO:   [de-identified]                           ADMIT DATE: 12/15/2021  PROVIDER:     Yoselin Calle MD    DATE OF PROCEDURE:  2021    PREOPERATIVE DIAGNOSES:  1. History of pulmonary emboli. 2.  GI bleeding. POSTOPERATIVE DIAGNOSES:  1. History of pulmonary emboli. 2.  GI bleeding. PROCEDURES PERFORMED:  1. Ultrasound-guided right venous access. 2.  CO2 inferior vena cavogram.  3.  Placement of Celect inferior vena caval filter. SURGEON:  Yoselin Calle MD    ANESTHESIA:  Local with moderate monitored sedation. INDICATIONS:  The patient is a 78-year-old female who was previously  diagnosed several weeks ago with bilateral PE. She was started on  anticoagulation. She presents at this time with significant GI bleed  with a hemoglobin of 5. Anticoagulation has been held. Inferior vena  cava filter is being placed as the patient now has significant  contraindication to anticoagulation. OPERATIVE PROCEDURE:  The patient was brought to the angio suite, placed  in the supine position. _____. Under my direct supervision, Versed and  fentanyl were administered for moderate sedation. The patient was  monitored by an independent trained nurse observer using continuous  blood pressure, EKG and pulse oximetry. I spent 15 minutes face-to-face  with the patient providing and directing sedation. The right femoral  region was prepped and draped in sterile fashion. Ultrasound was used  to identify the common femoral vein. This was patent and compressible,  free of thrombus. Under direct visualization this was accessed and a  5-Nauruan micropuncture sheath was placed.   A copy of the ultrasound  image was saved and placed in the patient's record. Through the  micropuncture sheath, a Bentson wire was advanced into the inferior vena  cava. The micropuncture sheath was removed and the dilator and  introducer sheath for the Celect inferior vena caval filter was placed  in the inferior vena cava. Through the multihole dilator using carbon  dioxide, an inferior vena cavogram was performed. This identified the  level of the renal veins. The inferior vena cava was measured just  below the renal veins and it measured approximately 27 mm in diameter. The dilator was removed and through the introducer sheath, the Celect  inferior vena caval filter was placed. It was unsheathed and deployed  in the inferior vena cava. The delivery catheter and sheath were then  removed. Pressure was held in the right groin for approximately 15  minutes achieving excellent hemostasis. The patient was then  transferred to her hospital bed in stable condition.   Estimated blood  loss throughout the procedure was minimal.        Katie Parra MD    D: 12/16/2021 17:32:21       T: 12/16/2021 17:34:34     [id number]  Job#: [id number]     Doc#: [id number]    CC:

## 2021-12-17 NOTE — PROGRESS NOTES
Hospitalist Progress Note      PCP: Bruce Castaneda MD    Date of Admission: 12/15/2021    Chief Complaint: [address] Course:   64 y.o. female who presented to [facility] with altered mental status. Pt is a poor historian therefore history is limited. Pt was hospitalized from 11/26-12/7 at [facility]. She was treated for pneumonia, COPDAE and acute PE noted on V/Q scan. Pt was initiated on Eliquis. She was discharged to [facility] for rehab. At the rehab today she was noted to have altered mental status and black tarry stools. She was therefore sent to the ED for further evaluation. In the ED she was noted to have Hgb of 5.0, previous 8.1 on 12/7. She was also noted to have soft blood pressures with SBP in the ~90s. Her Cr was slightly elevated above baseline at 1.6, she was 1.2 on 12/7. GI was consulted in the ED. Pt was started on protonix infusion and PRBC transfusion. Pt denies chest pain or SOB. No abdominal pain. No N/V/D. She is stable on her baseline O2 requirement of 4 LPM. She was admitted for further evaluation and management. Subjective:   Pt is on 4 LPM. Afebrile. VSS. No dyspnea or chest pain. No N/V/D. Pt's  is at bedside.        Medications:  Reviewed    Infusion Medications    sodium chloride       Scheduled Medications    amLODIPine  5 mg Oral Daily    bumetanide  1 mg Oral Daily    losartan  50 mg Oral Daily    pantoprazole  40 mg Oral BID AC    melatonin  5 mg Oral Nightly    allopurinol  100 mg Oral BID    ARIPiprazole  5 mg Oral Daily    DULoxetine  60 mg Oral Daily    budesonide-formoterol  2 puff Inhalation BID    folic acid  1 mg Oral Daily    oxybutynin  5 mg Oral TID    tiotropium  2 puff Inhalation Daily    sodium chloride flush  5-40 mL IntraVENous 2 times per day     PRN Meds: hydrALAZINE, albuterol, sodium chloride flush, sodium chloride, ondansetron **OR** ondansetron, acetaminophen **OR** acetaminophen      Intake/Output Summary (Last 24 hours) at 12/17/2021 1256  Last data filed at 12/16/2021 2157  Gross per 24 hour   Intake 370.22 ml   Output 350 ml   Net 20.22 ml       Physical Exam Performed:    BP (!) 182/68   Pulse 104   Temp 98.2 °F (36.8 °C) (Oral)   Resp 20   Ht 5' 3\" (1.6 m)   Wt 230 lb (104.3 kg)   SpO2 94%   BMI 40.74 kg/m²     General appearance: No apparent distress, appears stated age and cooperative. HEENT: Pupils equal, round, and reactive to light. Conjunctivae/corneas clear. Neck: Supple, with full range of motion. No jugular venous distention. Trachea midline. Respiratory:  Normal respiratory effort. Clear to auscultation, bilaterally without Rales/Wheezes/Rhonchi. Cardiovascular: Regular rate and rhythm with normal S1/S2 without murmurs, rubs or gallops. Abdomen: Soft, non-tender, non-distended with normal bowel sounds. Musculoskeletal: No clubbing, cyanosis or edema bilaterally. Full range of motion without deformity. Skin: Skin color, texture, turgor normal.  No rashes or lesions. Neurologic:  Neurovascularly intact without any focal sensory/motor deficits. Cranial nerves: II-XII intact, grossly non-focal.  Psychiatric: Alert and oriented, thought content appropriate, normal insight  Capillary Refill: Brisk,3 seconds, normal   Peripheral Pulses: +2 palpable, equal bilaterally       Labs:   Recent Labs     12/16/21  0554 12/16/21  0914 12/16/21  2104 12/17/21  0234 12/17/21  0901   WBC 5.2  --   --  5.4 4.9   HGB 7.3*  7.4*   < > 7.5* 8.3* 8.2*  8.3*   HCT 22.4*  22.3*   < > 22.7* 25.0* 24.8*  25.0*     --   --  308 301    < > = values in this interval not displayed.      Recent Labs     12/16/21  0554 12/17/21  0233 12/17/21  0901    141 141   K 4.4 4.6 4.6    104 106   CO2 28 25 24   BUN 30* 24* 21*   CREATININE 1.5* 1.3* 1.1   CALCIUM 8.6 9.1 9.3     Recent Labs     12/15/21  1100 12/16/21  0554   AST 17 20   ALT 11 10   BILITOT <0.2 <0.2   ALKPHOS 86 83     Urinalysis:      Lab Results Component Value Date    NITRU Negative 12/15/2021    WBCUA 3-5 12/15/2021    BACTERIA Rare 12/15/2021    RBCUA 0-2 12/15/2021    BLOODU Negative 12/15/2021    SPECGRAV 1.020 12/15/2021    GLUCOSEU Negative 12/15/2021       Radiology:  XR ABDOMEN (KUB) (SINGLE AP VIEW)   Final Result   Gas-filled loops of small large bowel are identified with borderline small   bowel distention. This may represent a mild ileus. Obstruction felt less   likely. IVC filter, hernia repair changes, as well as a gastric band are noted. XR CHEST PORTABLE   Final Result   Central vascular congestion with prominent interstitial changes at the lung   bases which may represent pneumonitis or basilar edema. Cannot exclude small   posterior layering effusions. XR CHEST PORTABLE   Final Result   No acute abnormality. CT Head WO Contrast   Final Result   Atrophy and small-vessel ischemic change. No hemorrhage or mass      RECOMMENDATIONS:   Unavailable             Assessment/Plan:    Active Hospital Problems    Diagnosis     History of pulmonary embolism [Z86.711]     GI bleed [K92.2]        Acute blood loss anemia due to acute upper GI bleed   - Pt with reported melena and positive FOBT. - Hgb was 5.0 in the ED. Baseline Hgb 8-9.  - Pt recently started on Eliquis for PE, will continue to hold. - GI consulted/following. S/p EGD 12/16 showed slight mucosal oozing in duodenum, as well as small amount of bleed in her oropharynx. - Pt initiated on protonix drip in the ED -- changed to IVP BID.   - S/p 2 units PRBCs. Monitor serial H&Hs. Transfuse for Hgb less than 7 and/or symptomatic. Hgb is currently stable at 8.2.  - Defer diet to GI.      Acute kidney injury on CKD stage 3:  - Cr 1.6 in he ED. Baseline serum Cr ~1.2. Cr is currently 1.1.  - Etiology: likely due to anemia / hypotension.   - Avoid nephrotoxic agents. - Monitor kidney fx closely.      Recent PE:  - Noted on V/Q scan on 11/26.  BLE venous dopplers on 11/29 showed superficial clot, no evidence of DVT. - Hold anticoagulation in above setting.   - Vascular surgery consulted. S/p IVC filter on 12/16.     Chronic hypoxic respiratory failure due to COPD:  - Stable, no evidence of exacerbation.   - She is stable on her baseline O2 requirement of 4 LPM.   - Continue home inhaler regimen.      Hypertension:  - Hypotensive on admission, held her home BP meds. BP improved with transfusion. Now hypertensive, will resume her home regimen. Monitor BP closely.      Depression:  - Continue her home medications.      Morbid obesity:  - With Body mass index is 40.74 kg/m². Complicating assessment and treatment. Placing patient at risk for multiple co-morbidities as well as early death and contributing to the patient's presentation. Counseled on weight loss. DVT Prophylaxis: SCDs  Diet: ADULT DIET;  Regular  Code Status: Full Code    PT/OT Eval Status: ordered     Dispo - ~1-2 days     Ale Mora, APRN - CNP

## 2021-12-17 NOTE — PROGRESS NOTES
Pt is extremely anxious yelling at staff stating we are not helping her and she feels like she is going to die because we are not staying in the room with her at all times. Pt states she is the only patient we should be worried about. When I ask her what is wrong she is unable to explain what she is feeling just keeps saying \"I need help and you're not helping me. \" Her BP is up to 181/64, , SpO2 93% on her baseline of 4L Resp of 26-30. Would we be able to get her something for anxiety?     Pg sent to Janene Gunter CNP

## 2021-12-17 NOTE — PROGRESS NOTES
[phone] Hospital or Facility: [facility] From: Juliet Machuca RE: Pavithra Deepa 1945 RM: [phone] her afternoon H/H is resulted in epic. thank you Need Callback: NO CALLBACK REQ C4 PROGRESSIVE CARERead 3:36 PM       Sent to Guanakito Levin NP via perfect serve.

## 2021-12-18 VITALS
HEIGHT: 63 IN | HEART RATE: 94 BPM | BODY MASS INDEX: 40.75 KG/M2 | TEMPERATURE: 98.1 F | SYSTOLIC BLOOD PRESSURE: 147 MMHG | DIASTOLIC BLOOD PRESSURE: 65 MMHG | WEIGHT: 230 LBS | OXYGEN SATURATION: 94 % | RESPIRATION RATE: 22 BRPM

## 2021-12-18 LAB
ANION GAP SERPL CALCULATED.3IONS-SCNC: 10 MMOL/L (ref 3–16)
BUN BLDV-MCNC: 18 MG/DL (ref 7–20)
CALCIUM SERPL-MCNC: 9.3 MG/DL (ref 8.3–10.6)
CHLORIDE BLD-SCNC: 105 MMOL/L (ref 99–110)
CO2: 27 MMOL/L (ref 21–32)
CREAT SERPL-MCNC: 1.3 MG/DL (ref 0.6–1.2)
GFR AFRICAN AMERICAN: 48
GFR NON-AFRICAN AMERICAN: 40
GLUCOSE BLD-MCNC: 101 MG/DL (ref 70–99)
GLUCOSE BLD-MCNC: 106 MG/DL (ref 70–99)
GLUCOSE BLD-MCNC: 113 MG/DL (ref 70–99)
GLUCOSE BLD-MCNC: 92 MG/DL (ref 70–99)
HCT VFR BLD CALC: 24.2 % (ref 36–48)
HCT VFR BLD CALC: 27.3 % (ref 36–48)
HEMOGLOBIN: 8 G/DL (ref 12–16)
HEMOGLOBIN: 8.9 G/DL (ref 12–16)
MCH RBC QN AUTO: 30.4 PG (ref 26–34)
MCHC RBC AUTO-ENTMCNC: 32.9 G/DL (ref 31–36)
MCV RBC AUTO: 92.3 FL (ref 80–100)
PDW BLD-RTO: 16.5 % (ref 12.4–15.4)
PERFORMED ON: ABNORMAL
PERFORMED ON: ABNORMAL
PERFORMED ON: NORMAL
PLATELET # BLD: 302 K/UL (ref 135–450)
PMV BLD AUTO: 6.9 FL (ref 5–10.5)
POTASSIUM REFLEX MAGNESIUM: 4.1 MMOL/L (ref 3.5–5.1)
RBC # BLD: 2.62 M/UL (ref 4–5.2)
SARS-COV-2, NAAT: NOT DETECTED
SODIUM BLD-SCNC: 142 MMOL/L (ref 136–145)
WBC # BLD: 4.6 K/UL (ref 4–11)

## 2021-12-18 PROCEDURE — 2700000000 HC OXYGEN THERAPY PER DAY

## 2021-12-18 PROCEDURE — 97530 THERAPEUTIC ACTIVITIES: CPT

## 2021-12-18 PROCEDURE — 6360000002 HC RX W HCPCS: Performed by: NURSE PRACTITIONER

## 2021-12-18 PROCEDURE — 94761 N-INVAS EAR/PLS OXIMETRY MLT: CPT

## 2021-12-18 PROCEDURE — 87635 SARS-COV-2 COVID-19 AMP PRB: CPT

## 2021-12-18 PROCEDURE — 36415 COLL VENOUS BLD VENIPUNCTURE: CPT

## 2021-12-18 PROCEDURE — 6370000000 HC RX 637 (ALT 250 FOR IP): Performed by: REGISTERED NURSE

## 2021-12-18 PROCEDURE — 85018 HEMOGLOBIN: CPT

## 2021-12-18 PROCEDURE — 85027 COMPLETE CBC AUTOMATED: CPT

## 2021-12-18 PROCEDURE — 97116 GAIT TRAINING THERAPY: CPT

## 2021-12-18 PROCEDURE — 2580000003 HC RX 258: Performed by: REGISTERED NURSE

## 2021-12-18 PROCEDURE — 80048 BASIC METABOLIC PNL TOTAL CA: CPT

## 2021-12-18 PROCEDURE — 6370000000 HC RX 637 (ALT 250 FOR IP): Performed by: NURSE PRACTITIONER

## 2021-12-18 PROCEDURE — 85014 HEMATOCRIT: CPT

## 2021-12-18 PROCEDURE — 94640 AIRWAY INHALATION TREATMENT: CPT

## 2021-12-18 RX ORDER — LOSARTAN POTASSIUM 100 MG/1
100 TABLET ORAL DAILY
Status: DISCONTINUED | OUTPATIENT
Start: 2021-12-18 | End: 2021-12-19 | Stop reason: HOSPADM

## 2021-12-18 RX ORDER — PANTOPRAZOLE SODIUM 40 MG/1
40 TABLET, DELAYED RELEASE ORAL
Qty: 30 TABLET | Refills: 3 | Status: SHIPPED | OUTPATIENT
Start: 2021-12-18

## 2021-12-18 RX ADMIN — ALLOPURINOL 100 MG: 100 TABLET ORAL at 11:16

## 2021-12-18 RX ADMIN — Medication 10 ML: at 11:15

## 2021-12-18 RX ADMIN — AMLODIPINE BESYLATE 5 MG: 5 TABLET ORAL at 11:17

## 2021-12-18 RX ADMIN — Medication 5 MG: at 19:38

## 2021-12-18 RX ADMIN — ARIPIPRAZOLE 5 MG: 10 TABLET ORAL at 11:17

## 2021-12-18 RX ADMIN — OXYBUTYNIN CHLORIDE 5 MG: 5 TABLET ORAL at 11:16

## 2021-12-18 RX ADMIN — LOSARTAN POTASSIUM 100 MG: 100 TABLET, FILM COATED ORAL at 11:25

## 2021-12-18 RX ADMIN — OXYBUTYNIN CHLORIDE 5 MG: 5 TABLET ORAL at 16:53

## 2021-12-18 RX ADMIN — Medication 10 ML: at 17:02

## 2021-12-18 RX ADMIN — Medication 2 PUFF: at 08:10

## 2021-12-18 RX ADMIN — HYDRALAZINE HYDROCHLORIDE 20 MG: 20 INJECTION INTRAMUSCULAR; INTRAVENOUS at 17:02

## 2021-12-18 RX ADMIN — DULOXETINE HYDROCHLORIDE 60 MG: 60 CAPSULE, DELAYED RELEASE ORAL at 11:17

## 2021-12-18 RX ADMIN — PANTOPRAZOLE SODIUM 40 MG: 40 TABLET, DELAYED RELEASE ORAL at 16:53

## 2021-12-18 RX ADMIN — TIOTROPIUM BROMIDE INHALATION SPRAY 2 PUFF: 3.12 SPRAY, METERED RESPIRATORY (INHALATION) at 08:09

## 2021-12-18 RX ADMIN — FOLIC ACID 1 MG: 1 TABLET ORAL at 11:17

## 2021-12-18 RX ADMIN — BUMETANIDE 1 MG: 1 TABLET ORAL at 11:16

## 2021-12-18 ASSESSMENT — PAIN SCALES - WONG BAKER
WONGBAKER_NUMERICALRESPONSE: 0
WONGBAKER_NUMERICALRESPONSE: 0

## 2021-12-18 ASSESSMENT — PAIN SCALES - GENERAL
PAINLEVEL_OUTOF10: 0

## 2021-12-18 NOTE — DISCHARGE INSTR - DIET

## 2021-12-18 NOTE — PROGRESS NOTES
Hospitalist Progress Note      PCP: Shelia Hardy MD    Date of Admission: 12/15/2021    Chief Complaint: [address] Course:   64 y.o. female who presented to [facility] with altered mental status. Pt is a poor historian therefore history is limited. Pt was hospitalized from 11/26-12/7 at [facility]. She was treated for pneumonia, COPDAE and acute PE noted on V/Q scan. Pt was initiated on Eliquis. She was discharged to [facility] for rehab. At the rehab today she was noted to have altered mental status and black tarry stools. She was therefore sent to the ED for further evaluation. In the ED she was noted to have Hgb of 5.0, previous 8.1 on 12/7. She was also noted to have soft blood pressures with SBP in the ~90s. Her Cr was slightly elevated above baseline at 1.6, she was 1.2 on 12/7. GI was consulted in the ED. Pt was started on protonix infusion and PRBC transfusion. Pt denies chest pain or SOB. No abdominal pain. No N/V/D. She is stable on her baseline O2 requirement of 4 LPM. She was admitted for further evaluation and management. Subjective:   Pt is on 4 LPM. Afebrile. VSS. No dyspnea or chest pain. No N/V/D. Pt's  is at bedside.        Medications:  Reviewed    Infusion Medications    sodium chloride       Scheduled Medications    losartan  100 mg Oral Daily    amLODIPine  5 mg Oral Daily    bumetanide  1 mg Oral Daily    pantoprazole  40 mg Oral BID AC    melatonin  5 mg Oral Nightly    allopurinol  100 mg Oral BID    ARIPiprazole  5 mg Oral Daily    DULoxetine  60 mg Oral Daily    budesonide-formoterol  2 puff Inhalation BID    folic acid  1 mg Oral Daily    oxybutynin  5 mg Oral TID    tiotropium  2 puff Inhalation Daily    sodium chloride flush  5-40 mL IntraVENous 2 times per day     PRN Meds: hydrALAZINE, hydrOXYzine, albuterol, sodium chloride flush, sodium chloride, ondansetron **OR** ondansetron, acetaminophen **OR** acetaminophen      Intake/Output Summary (Last 24 hours) at 12/18/2021 1351  Last data filed at 12/18/2021 0350  Gross per 24 hour   Intake --   Output 450 ml   Net -450 ml       Physical Exam Performed:    BP (!) 150/71   Pulse 85   Temp 97.8 °F (36.6 °C) (Oral)   Resp 20   Ht 5' 3\" (1.6 m)   Wt 230 lb (104.3 kg)   SpO2 98%   BMI 40.74 kg/m²     General appearance: No apparent distress, appears stated age and cooperative. HEENT: Pupils equal, round, and reactive to light. Conjunctivae/corneas clear. Neck: Supple, with full range of motion. No jugular venous distention. Trachea midline. Respiratory:  Normal respiratory effort. Clear to auscultation, bilaterally without Rales/Wheezes/Rhonchi. Cardiovascular: Regular rate and rhythm with normal S1/S2 without murmurs, rubs or gallops. Abdomen: Soft, non-tender, non-distended with normal bowel sounds. Musculoskeletal: No clubbing, cyanosis or edema bilaterally. Full range of motion without deformity. Skin: Skin color, texture, turgor normal.  No rashes or lesions. Neurologic:  Neurovascularly intact without any focal sensory/motor deficits. Cranial nerves: II-XII intact, grossly non-focal.  Psychiatric: Alert and oriented,   Capillary Refill: Brisk,3 seconds, normal   Peripheral Pulses: +2 palpable, equal bilaterally       Labs:   Recent Labs     12/17/21  0234 12/17/21  0234 12/17/21  0901 12/17/21  1451 12/17/21 2023 12/18/21  0250 12/18/21  0857   WBC 5.4  --  4.9  --   --  4.6  --    HGB 8.3*   < > 8.2*  8.3*   < > 7.8* 8.0* 8.9*   HCT 25.0*   < > 24.8*  25.0*   < > 23.4* 24.2* 27.3*     --  301  --   --  302  --     < > = values in this interval not displayed.      Recent Labs     12/17/21  0233 12/17/21  0901 12/18/21  0250    141 142   K 4.6 4.6 4.1    106 105   CO2 25 24 27   BUN 24* 21* 18   CREATININE 1.3* 1.1 1.3*   CALCIUM 9.1 9.3 9.3     Recent Labs     12/16/21  0554   AST 20   ALT 10   BILITOT <0.2   ALKPHOS 83     Urinalysis:      Lab Results   Component Value Date NITRU Negative 12/15/2021    WBCUA 3-5 12/15/2021    BACTERIA Rare 12/15/2021    RBCUA 0-2 12/15/2021    BLOODU Negative 12/15/2021    SPECGRAV 1.020 12/15/2021    GLUCOSEU Negative 12/15/2021       Radiology:  XR ABDOMEN (KUB) (SINGLE AP VIEW)   Final Result   Gas-filled loops of small large bowel are identified with borderline small   bowel distention. This may represent a mild ileus. Obstruction felt less   likely. IVC filter, hernia repair changes, as well as a gastric band are noted. XR CHEST PORTABLE   Final Result   Central vascular congestion with prominent interstitial changes at the lung   bases which may represent pneumonitis or basilar edema. Cannot exclude small   posterior layering effusions. XR CHEST PORTABLE   Final Result   No acute abnormality. CT Head WO Contrast   Final Result   Atrophy and small-vessel ischemic change. No hemorrhage or mass      RECOMMENDATIONS:   Unavailable             Assessment/Plan:    Active Hospital Problems    Diagnosis     History of pulmonary embolism [Z86.711]     GI bleed [K92.2]        Acute blood loss anemia due to acute upper GI bleed   - Pt with reported melena and positive FOBT. - Hgb was 5.0 in the ED. Baseline Hgb 8-9.  - Pt recently started on Eliquis for PE, will continue to hold. - GI consulted/following. S/p EGD 12/16 showed slight mucosal oozing in duodenum, as well as small amount of bleed in her oropharynx. - Pt initiated on protonix drip in the ED -- changed to PO BID.   - S/p 2 units PRBCs. Monitor serial H&Hs. Transfuse for Hgb less than 7 and/or symptomatic. Hgb is currently stable at 8.9.     Acute kidney injury on CKD stage 3:  - Cr 1.6 in he ED. Baseline serum Cr ~1.2. Cr is currently 1.3.  - Etiology: likely due to anemia / hypotension.   - Avoid nephrotoxic agents. - Monitor kidney fx closely.      Recent PE:  - Noted on V/Q scan on 11/26.  BLE venous dopplers on 11/29 showed superficial clot, no evidence of DVT. - Hold anticoagulation in above setting.   - Vascular surgery consulted. S/p IVC filter on 12/16.     Chronic hypoxic respiratory failure due to COPD:  - Stable, no evidence of exacerbation.   - She is stable on her baseline O2 requirement of 4 LPM.   - Continue home inhaler regimen.      Hypertension:  - Hypotensive on admission, held her home BP meds. BP improved with transfusion. Now hypertensive, resumed her home regimen. Monitor BP closely.      Depression:  - Continue her home medications.      Morbid obesity:  - With Body mass index is 40.74 kg/m². Complicating assessment and treatment. Placing patient at risk for multiple co-morbidities as well as early death and contributing to the patient's presentation. Counseled on weight loss. DVT Prophylaxis: SCDs  Diet: ADULT DIET; Regular  Code Status: Full Code    PT/OT Eval Status: ordered with recs for SNF    Dispo - If okay from GI standpoint, possibly today or tomorrow. Plan to return to the [facility] for rehab.      [address], APRN - CNP

## 2021-12-18 NOTE — PROGRESS NOTES
Left message for [facility] requesting a call back to discuss signing off for discharge of pt back to [facility], per Internal Medicine NP.     Sofi Peters RN  12/18/2021 @ 1211 PM

## 2021-12-18 NOTE — PROGRESS NOTES
PROGRESS NOTE  S:76 yrs Patient  admitted on 12/15/2021 with Somnolence [R40.0]  GI bleed [K92.2]  Gastrointestinal hemorrhage with melena [K92.1] . Today she denies complaints. Stools are still dark but hgb is stable    Exam:   Vitals:    12/18/21 1252   BP: (!) 150/71   Pulse: 85   Resp:    Temp:    SpO2: 98%      General appearance: alert, appears stated age and cooperative  HEENT: Neck supple with midline trachea  Neck: no adenopathy and supple, symmetrical, trachea midline  Lungs: clear to auscultation bilaterally  Heart: regular rate and rhythm, S1, S2 normal, no murmur, click, rub or gallop  Abdomen: soft, non-tender; bowel sounds normal; no masses,  no organomegaly  Extremities: extremities normal, atraumatic, no cyanosis or edema     Medications: Reviewed    Labs:  CBC:   Recent Labs     12/17/21  0234 12/17/21  0234 12/17/21  0901 12/17/21  1451 12/17/21 2023 12/18/21  0250 12/18/21  0857   WBC 5.4  --  4.9  --   --  4.6  --    HGB 8.3*   < > 8.2*  8.3*   < > 7.8* 8.0* 8.9*   HCT 25.0*   < > 24.8*  25.0*   < > 23.4* 24.2* 27.3*   MCV 90.8  --  91.1  --   --  92.3  --      --  301  --   --  302  --     < > = values in this interval not displayed. BMP:   Recent Labs     12/17/21  0233 12/17/21  0901 12/18/21  0250    141 142   K 4.6 4.6 4.1    106 105   CO2 25 24 27   BUN 24* 21* 18   CREATININE 1.3* 1.1 1.3*     LIVER PROFILE:   Recent Labs     12/16/21  0554   AST 20   ALT 10   PROT 5.1*   BILITOT <0.2   ALKPHOS 80         Impression:64year old female with history of HTN, HLD, DM, asthma, arthritis, depression, COPD, PARK and acute PE admitted with GI bleeding in the setting of anticoagulation. EGD showed duodenal bleed s/p IVC filter placement    Recommendation:  1. Continue supportive care  2. Monitor Hgb  3. Observe for signs of bleeding  4. PPI BID  5. Hold anticoagulation  6.  Advance diet as tolerated; ok to d/c if Hgb remains stable      Steven Rocha MD, MD  3:17 PM 12/18/2021

## 2021-12-18 NOTE — PROGRESS NOTES
Paged Internal Medicine NP regarding pending discharge. 12/18/21 3:48 PM   [address] or Facility: [facility] From: Cheyenne Felipe RE: Kavon London 1945 RM: [phone] GI cleared pt for discharge. Need Rapid COVID swab, SHAHID signed and Case management is already looking in to transport. Please advise, thanks! Need Callback: NO CALLBACK REQ C4 PROGRESSIVE CARE  Read 3:48 PM       12/18/21 3:49 PM   I will put in her dc      12/18/21 3:49 PM   Thanks!   Read 3:50 PM

## 2021-12-18 NOTE — DISCHARGE SUMMARY
Hospital Medicine Discharge Summary    Patient ID: Montana Palma      Patient's PCP: Karla Fajardo MD    Admit Date: 12/15/2021     Discharge Date:   12/18/2021    Admitting Physician: Sangita Coto MD     Discharge Physician: JUANY Alexander - CNP     Discharge Diagnoses: Active Hospital Problems    Diagnosis     History of pulmonary embolism [Z86.711]     GI bleed [K92.2]        The patient was seen and examined on day of discharge and this discharge summary is in conjunction with any daily progress note from day of discharge. Hospital Course:   64 y. o. female who presented to [facility] with altered mental status. Pt is a poor historian therefore history is limited. Pt was hospitalized from 11/26-12/7 at [facility]. She was treated for pneumonia, COPDAE and acute PE noted on V/Q scan. Pt was initiated on Eliquis. She was discharged to [facility] for rehab. At the rehab today she was noted to have altered mental status and black tarry stools. She was therefore sent to the ED for further evaluation. In the ED she was noted to have Hgb of 5.0, previous 8.1 on 12/7. She was also noted to have soft blood pressures with SBP in the ~90s. Her Cr was slightly elevated above baseline at 1.6, she was 1.2 on 12/7. GI was consulted in the ED. Pt was started on protonix infusion and PRBC transfusion. Pt denies chest pain or SOB. No abdominal pain. No N/V/D. She is stable on her baseline O2 requirement of 4 LPM. She was admitted for further evaluation and management.     Acute blood loss anemia due to acute upper GI bleed (resolved):  - Pt with reported melena and positive FOBT. - Hgb was 5.0 in the ED. Baseline Hgb 8-9.  - Pt recently started on Eliquis for PE, will continue to hold. - GI consulted. S/p EGD 12/16 showed slight mucosal oozing in duodenum, as well as small amount of blood in her oropharynx.    - Pt initiated on protonix drip in the ED -- changed to PO BID.   - S/p 2 units PRBCs. Hgb is currently stable at 8.9. Repeat CBC on 12/21.     Acute kidney injury (resolved) on CKD stage 3:  - Cr 1.6 in the ED. Baseline serum Cr ~1.2. Cr is currently 1.3.  - Etiology: likely due to anemia / hypotension.   - Avoid nephrotoxic agents.       Recent PE:  - Noted on V/Q scan on 11/26. BLE venous dopplers on 11/29 showed superficial clot, no evidence of DVT. - Holding anticoagulation in above setting.   - Vascular surgery consulted. S/p IVC filter on 12/16.     Chronic hypoxic respiratory failure due to COPD:  - Stable, no evidence of exacerbation.   - She is stable on her baseline O2 requirement of 4 LPM.   - Continue home inhaler regimen.      Hypertension:  - Hypotensive on admission, held her home BP meds. BP improved with transfusion. Now hypertensive, resumed her home regimen.      Depression:  - Continue her home medications.      Morbid obesity:  - With Body mass index is 16.26 kg/m². Complicating assessment and treatment. Placing patient at risk for multiple co-morbidities as well as early death and contributing to the patient's presentation. Counseled on weight loss.          Physical Exam Performed:     BP (!) 175/70   Pulse 85   Temp 97.8 °F (36.6 °C) (Oral)   Resp 20   Ht 5' 3\" (1.6 m)   Wt 230 lb (104.3 kg)   SpO2 98%   BMI 40.74 kg/m²       Labs:  For convenience and continuity at follow-up the following most recent labs are provided:      CBC:    Lab Results   Component Value Date    WBC 4.6 12/18/2021    HGB 8.9 12/18/2021    HCT 27.3 12/18/2021     12/18/2021       Renal:    Lab Results   Component Value Date     12/18/2021    K 4.1 12/18/2021     12/18/2021    CO2 27 12/18/2021    BUN 18 12/18/2021    CREATININE 1.3 12/18/2021    CALCIUM 9.3 12/18/2021    PHOS 3.1 12/07/2021         Significant Diagnostic Studies    Radiology:   XR ABDOMEN (KUB) (SINGLE AP VIEW)   Final Result   Gas-filled loops of small large bowel are identified with borderline small bowel distention. This may represent a mild ileus. Obstruction felt less   likely. IVC filter, hernia repair changes, as well as a gastric band are noted. XR CHEST PORTABLE   Final Result   Central vascular congestion with prominent interstitial changes at the lung   bases which may represent pneumonitis or basilar edema. Cannot exclude small   posterior layering effusions. XR CHEST PORTABLE   Final Result   No acute abnormality. CT Head WO Contrast   Final Result   Atrophy and small-vessel ischemic change.   No hemorrhage or mass      RECOMMENDATIONS:   Unavailable                Consults:     IP CONSULT TO GI  IP CONSULT TO HOSPITALIST  IP CONSULT TO VASCULAR SURGERY    Disposition:  [facility]    Condition at Discharge: Stable    Discharge Instructions/Follow-up:  Follow-up with PCP and GI     Code Status:  Full Code     Activity: activity as tolerated    Diet: regular diet      Discharge Medications:     Current Discharge Medication List           Details   pantoprazole (PROTONIX) 40 MG tablet Take 1 tablet by mouth 2 times daily (before meals)  Qty: 30 tablet, Refills: 3              Details   ferrous sulfate (IRON 325) 325 (65 Fe) MG tablet Take 325 mg by mouth daily (with breakfast)      losartan (COZAAR) 100 MG tablet Take 100 mg by mouth daily      bumetanide (BUMEX) 2 MG tablet Take 1 tablet by mouth daily  Qty: 30 tablet, Refills: 0      amLODIPine (NORVASC) 5 MG tablet Take 1 tablet by mouth daily  Qty: 30 tablet, Refills: 0      allopurinol (ZYLOPRIM) 100 MG tablet Take 1 tablet by mouth 2 times daily  Qty: 60 tablet, Refills: 0      SPIRIVA HANDIHALER 18 MCG inhalation capsule INHALE 1 CAPSULE INTO THE LUNGS DAILY  Qty: 30 capsule, Refills: 5      fluticasone-salmeterol (ADVAIR) 500-50 MCG/DOSE diskus inhaler INHALE 1 PUFF INTO THE LUNGS EVERY 12 HOURS  Qty: 60 each, Refills: 5      cyanocobalamin 1000 MCG tablet Take 1,000 mcg by mouth daily      folic acid (FOLVITE) 1 MG tablet TAKE 1 TABLET (1 MG TOTAL) BY MOUTH DAILY. albuterol sulfate  (90 Base) MCG/ACT inhaler INHALE 2 PUFFS INTO THE LUNGS EVERY 6 HOURS AS NEEDED FOR WHEEZING OR SHORTNESS OF BREATH  Qty: 8.5 g, Refills: 5    Comments: RX IS . THANK YOU      oxybutynin (DITROPAN) 5 MG tablet Take 5 mg by mouth 3 times daily      ARIPiprazole (ABILIFY) 5 MG tablet Take 5 mg by mouth      albuterol (PROVENTIL) (2.5 MG/3ML) 0.083% nebulizer solution Take 3 mLs by nebulization every 4 hours as needed for Wheezing or Shortness of Breath DX COPD J44.9  Qty: 120 vial, Refills: 6      DULoxetine (CYMBALTA) 60 MG extended release capsule Take 60 mg by mouth daily      traMADol (ULTRAM) 50 MG tablet Take 50 mg by mouth every 6 hours as needed for Pain. tiZANidine (ZANAFLEX) 4 MG tablet every 8 hours as needed       OXYGEN Inhale 3 L/min into the lungs continuous. Time Spent on discharge is more than 45 minutes in the examination, evaluation, counseling and review of medications and discharge plan. Signed:    JUANY Danielle - CNP   2021      Thank you Oswaldo Dsouza MD for the opportunity to be involved in this patient's care. If you have any questions or concerns please feel free to contact me at [phone].

## 2021-12-18 NOTE — PROGRESS NOTES
Physical Therapy  Facility/Department: [facility] C4 PCU  Daily Treatment Note  NAME: Ana Stiles  : 1945  MRN: [id number]    Date of Service: 2021    Discharge Recommendations:  Subacute/Skilled Nursing Facility   PT Equipment Recommendations  Equipment Needed: No  Other: defer to patient's facility. Assessment   Body structures, Functions, Activity limitations: Decreased functional mobility ; Decreased ADL status; Decreased strength;Decreased safe awareness;Decreased endurance;Decreased cognition;Decreased balance;Decreased high-level IADLs;Decreased posture  Assessment: PT tx session focused on functional transfers, gait training and overall activity tolerance. Pt is functioning grossly at [facility] with intermittent min A to stand from lower surfaces. Pt is able to tolerate ambulation [id number] at a time, limited by fatigue with HR up to 137bpm and SPO2 down to 84% on 4L. Pt would benefit from continued skilled PT while admitted and at SNF setting when medically stable, in order to address deficits and help her maximie her safety and independence. Will continue to follow. Treatment Diagnosis: decreased independence with functional mobility. Specific instructions for Next Treatment: progress mobility as tolerated  Prognosis: Good  Decision Making: Medium Complexity  PT Education: Goals;PT Role;Plan of Care;Home Exercise Program;Precautions; Energy Conservation;Transfer Training;General Safety; Family Education;Equipment;Gait Training;Disease Specific Education;Pressure Relief; Injury Prevention  Patient Education: Pt educated on importance of OOB mobility and progressive ambulation to progress endurance. Barriers to Learning: none  REQUIRES PT FOLLOW UP: Yes  Activity Tolerance  Activity Tolerance: Patient limited by fatigue;Patient limited by endurance  Activity Tolerance: /70, HR 85bpm, SpO2 98% on 4L.  HR up to 137bpm with activity, SPO2 down to 84% on 4L with improvement to 90% after 1 minute seated rest break with cues for PLB. Limited by fatigue, c/o feeling weak. Patient Diagnosis(es): The primary encounter diagnosis was Gastrointestinal hemorrhage with melena. A diagnosis of Somnolence was also pertinent to this visit. has a past medical history of Arthritis, Asthma, Bronchitis chronic, Chronic respiratory failure ([address] Depression, Diabetes mellitus ([address] Emphysema of lung ([address] Glaucoma, Hypertension, Morbid obesity ([address] Oxygen dependent, Pneumonia of left upper lobe due to Pseudomonas species ([address] Rash, Shingles, and Sleep apnea. has a past surgical history that includes hernia repair; Cholecystectomy; Hysterectomy; Colon surgery; Lap Band (10/5/10); Cataract removal with implant (3/9/2011); Cataract removal with implant (3/23/2011); Colonoscopy (11/18/13); bronchoscopy (N/A, 11/30/2021); bronchoscopy (11/30/2021); and Upper gastrointestinal endoscopy (N/A, 12/16/2021). Restrictions  Restrictions/Precautions  Restrictions/Precautions: General Precautions,Fall Risk  Position Activity Restriction  Other position/activity restrictions: purwick, 4L of O2 via NC, telemetry. Subjective   General  Chart Reviewed: Yes  Additional Pertinent Hx: HPI per chart, \"68 y.o. female who presented to [facility] with altered mental status. Pt is a poor historian therefore history is limited. Pt was hospitalized from 11/26-12/7 at [facility]. She was treated for pneumonia, COPDAE and acute PE noted on V/Q scan. Pt was initiated on Eliquis. She was discharged to [facility] for rehab. At the rehab today she was noted to have altered mental status and black tarry stools. She was therefore sent to the ED for further evaluation. In the ED she was noted to have Hgb of 5.0, previous 8.1 on 12/7. She was also noted to have soft blood pressures with SBP in the ~90s. Her Cr was slightly elevated above baseline at 1.6, she was 1.2 on 12/7. GI was consulted in the ED.  Pt was started on protonix infusion and PRBC transfusion. Pt denies chest pain or SOB. No abdominal pain. No N/V/D. She is stable on her baseline O2 requirement of 4 LPM.\"  Response To Previous Treatment: Patient with no complaints from previous session. Family / Caregiver Present: No  Referring Practitioner: JUANY Hughes CNP  Pain Screening  Patient Currently in Pain: Denies  Vital Signs  Pulse: 85  BP: (!) 150/71  BP Location: Right lower arm  Patient Currently in Pain: Denies  Oxygen Therapy  SpO2: 98 %  Pulse Oximeter Device Mode: Intermittent  O2 Device: Nasal cannula  O2 Flow Rate (L/min): 4 L/min       Orientation  Orientation  Overall Orientation Status: Within Normal Limits  Cognition   Cognition  Overall Cognitive Status: Exceptions  Arousal/Alertness: Appropriate responses to stimuli  Following Commands: Follows multistep commands with increased time; Follows multistep commands with repitition  Attention Span: Attends with cues to redirect  Memory: Decreased recall of recent events  Safety Judgement: Decreased awareness of need for assistance;Decreased awareness of need for safety  Problem Solving: Decreased awareness of errors  Insights: Decreased awareness of deficits  Initiation: Requires cues for some  Sequencing: Requires cues for some  Cognition Comment: cueing for safety  Objective   Bed mobility  Supine to Sit: Stand by assistance  Sit to Supine: Unable to assess (Pt seated in recliner at end of session)  Transfers  Sit to Stand: Contact guard assistance;Minimal Assistance  Stand to sit: Contact guard assistance  Comment: Performed from EOB, recliner and toilet. Required min A from lower toilet surface with cues for hand placement. Ambulation  Ambulation?: Yes  Ambulation 1  Surface: level tile  Device: Rolling Walker  Assistance: Contact guard assistance;Minimal assistance  Quality of Gait: Pt demonstrates forward flexed posture, decreased step length and clearance with mild instability.  Pt requires cues for O2 line management during mobility, attempting to lift RW over the cord. Distance: 10ft + 15ft x 2  Comments: HR up to 137bpm and SPO2 down to 84% with mobility on 4L  Stairs/Curb  Stairs?: No     Balance  Posture: Fair  Sitting - Static: Good  Sitting - Dynamic: Good;-  Standing - Static: Fair  Standing - Dynamic: Fair;-            Comment: Pt ambulated to/from toilet with RW and CGA + VCs for line management. Pt continent/incontinent of bladder, continent of bowel. Pt required assist for pericare and brief management in standing requiring pt to maintain static balance with BUE support on RW x 2 minutes. Pt fatigued following and unable to tolerate hand hygiene standing at sink. AM-PAC Score     AM-PAC Inpatient Mobility without Stair Climbing Raw Score : 15 (12/18/21 1325)  AM-PAC Inpatient without Stair Climbing T-Scale Score : 43.03 (12/18/21 1325)  Mobility Inpatient CMS 0-100% Score: 47.43 (12/18/21 1325)  Mobility Inpatient without Stair CMS G-Code Modifier : CK (12/18/21 1325)       Goals  Short term goals  Time Frame for Short term goals: 1 week 12/24/21  Short term goal 1: Supine <> sit with modified independence. - 12/18: SBA  Short term goal 2: Sit <> stand with modified independence. - 12/18: CGA-min A  Short term goal 3: Bed <> chair with least restrictive device and modified independence. - 12/18: CGA  Short term goal 4: Ambulate 50 feet with LRAD and mod I - 12/18: 15ft with RW and CGA  Short term goal 5: By 12/20/21 tolerate 12-15 reps of her exercises to maximize her strength and endurance. Patient Goals   Patient goals : To become stronger. To improve her endurance and ambulate independently.     Plan    Plan  Times per week: 3-5/week  Plan weeks: 1 week 12/24/21  Specific instructions for Next Treatment: progress mobility as tolerated  Current Treatment Recommendations: Strengthening,Home Exercise Program,Safety Education & Training,ROM,Balance Training,Endurance Training,Patient/Caregiver Education & Training,Equipment Evaluation, Education, & procurement,Functional Mobility Training,Transfer Training,Gait Training,ADL/Self-care Training,Pain Management,Positioning  Safety Devices  Type of devices: All fall risk precautions in place,Call light within reach,Chair alarm in place,Gait belt,Patient at risk for falls,Left in chair,Nurse notified  Restraints  Initially in place: No     Therapy Time   Individual Concurrent Group Co-treatment   Time In 1240         Time Out 1309         Minutes 29         Timed Code Treatment Minutes: Natalee Canales, PT     If pt is unable to be seen after this session, please let this note serve as discharge summary. Please see case management note for discharge disposition. Thank you.

## 2021-12-18 NOTE — CARE COORDINATION
CASE MANAGEMENT DISCHARGE SUMMARY      Discharge to: [facility]     Precertification completed: [facility] Exemption Notification (HENS) completed: na    IMM given: (date)     New Durable Medical Equipment ordered/agency: na    Transportation:    Medical Transport explained to [employer]. Pt/family voice no agency preference. Agency used:[facility]   time:1815   Ambulance form completed: Yes    Confirmed discharge plan with:RN [facility]     Patient: yes     Family:  yes  Name: Lucio Christopher number:     Facility/Agency, name:  [facility]   Phone number for report to facility: [phone]     RN, name: Bruce Barnard    Note: Discharging nurse to complete SHAHID, reconcile AVS, and place final copy with patient's discharge packet. RN to ensure that written prescriptions for  Level II medications are sent with patient to the facility as per protocol.

## 2021-12-18 NOTE — PROGRESS NOTES
Progress Note  Date:2021       Room:0448/0448-01  Patient Name:Tova Mendoza     YOB: 1945     Age:76 y.o. Subjective    Subjective:  Symptoms:  Stable. Pain:  She reports no pain. Review of Systems  Objective         Vitals Last 24 Hours:  TEMPERATURE:  Temp  Av.3 °F (36.8 °C)  Min: 97.7 °F (36.5 °C)  Max: 98.6 °F (37 °C)  RESPIRATIONS RANGE: Resp  Av.9  Min: 18  Max: 20  PULSE OXIMETRY RANGE: SpO2  Av.4 %  Min: 93 %  Max: 97 %  PULSE RANGE: Pulse  Av  Min: 84  Max: 111  BLOOD PRESSURE RANGE: Systolic (34MLN), TFO:013 , Min:129 , LB   ; Diastolic (05COE), QGT:55, Min:53, Max:86    I/O (24Hr): Intake/Output Summary (Last 24 hours) at 2021 0709  Last data filed at 2021 0350  Gross per 24 hour   Intake --   Output 1600 ml   Net -1600 ml     Objective:  General Appearance:  Not in pain and in no acute distress. Vital signs: (most recent): Blood pressure (!) 148/67, pulse 93, temperature 98.4 °F (36.9 °C), temperature source Oral, resp. rate 18, height 5' 3\" (1.6 m), weight 230 lb (104.3 kg), SpO2 97 %. Abdomen: Abdomen is soft. Bowel sounds are normal.   There is no abdominal tenderness. Labs/Imaging/Diagnostics    Labs:  CBC:  Recent Labs     21  0234 21  0234 21  0901 21  0901 21  1451 21  0250   WBC 5.4  --  4.9  --   --   --  4.6   RBC 2.75*  --  2.74*  --   --   --  2.62*   HGB 8.3*   < > 8.2*  8.3*   < > 7.7* 7.8* 8.0*   HCT 25.0*   < > 24.8*  25.0*   < > 23.2* 23.4* 24.2*   MCV 90.8  --  91.1  --   --   --  92.3   RDW 16.4*  --  16.2*  --   --   --  16.5*     --  301  --   --   --  302    < > = values in this interval not displayed.      CHEMISTRIES:  Recent Labs     21  0233 21  0901 21  0250    141 142   K 4.6 4.6 4.1    106 105   CO2 25 24 27   BUN 24* 21* 18   CREATININE 1.3* 1.1 1.3*   GLUCOSE 91 108* 106*     PT/INR:No results for input(s): PROTIME, INR in the last 72 hours. APTT:No results for input(s): APTT in the last 72 hours. LIVER PROFILE:  Recent Labs     12/15/21  1100 12/16/21  0554   AST 17 20   ALT 11 10   BILITOT <0.2 <0.2   ALKPHOS 86 83       Imaging Last 24 Hours:  XR ABDOMEN (KUB) (SINGLE AP VIEW)    Result Date: 12/17/2021  EXAMINATION: ONE SUPINE XRAY VIEW(S) OF THE ABDOMEN 12/17/2021 2:41 am COMPARISON: 02/26/2013 HISTORY: ORDERING SYSTEM PROVIDED HISTORY: Belly pain TECHNOLOGIST PROVIDED HISTORY: Reason for exam:->Belly pain Reason for Exam: belly pain FINDINGS: An IVC filter is noted. A gastric band is noted. No acute osseous abnormality is identified. Borderline small bowel loops are seen measuring approximately 3 cm in size. Gas-filled loops of colon are noted as well. No acute osseous abnormality. No free air is identified. Gas-filled loops of small large bowel are identified with borderline small bowel distention. This may represent a mild ileus. Obstruction felt less likely. IVC filter, hernia repair changes, as well as a gastric band are noted. XR CHEST PORTABLE    Result Date: 12/17/2021  EXAMINATION: ONE XRAY VIEW OF THE CHEST 12/17/2021 2:41 am COMPARISON: 12/15/2021 HISTORY: ORDERING SYSTEM PROVIDED HISTORY: shortness of breath, hx of PE TECHNOLOGIST PROVIDED HISTORY: Reason for exam:->shortness of breath, hx of PE Reason for Exam: shortness of breath, hx of PE FINDINGS: Cardiac size and mediastinal structures appear stable. Vascular congestion with prominent interstitial markings noted at the lung bases. No pneumothorax is identified. No acute osseous abnormality. Degenerative changes are seen in the shoulders and spine. Central vascular congestion with prominent interstitial changes at the lung bases which may represent pneumonitis or basilar edema. Cannot exclude small posterior layering effusions.      Assessment//Plan           Hospital Problems           Last Modified POA    GI bleed 12/15/2021 Yes    History of pulmonary embolism 12/16/2021 Yes        Assessment & Plan  65 yo w HTN, COPD/PARK/obesity admitted for melena and anemia H/H 5/16. Pt was hospitalized from 11/26-12/7 at [facility]. She was treated for pneumonia, COPD and acute PE noted on V/Q scan. Pt was initiated on Eliquis. She was discharged to [facility] for rehab. At the rehab, she was noted to have altered mental status and black tarry stools and sent back here. EGD revealed slight mucosal oozing in the duodenum upon light touch, likely due to Eliquis. Is now s/p Green filter.     Plan:   1. Continue PPI and off Eliquis  2. F/U H/H and transfuse as needed  3.  Will follow     Bernardo Kaufman MD       (O) [phone]       Electronically signed by Bernardo Kaufman MD on 12/18/21 at 7:09 AM EST

## 2021-12-27 ENCOUNTER — HOSPITAL ENCOUNTER (EMERGENCY)
Age: 76
Discharge: HOME | End: 2021-12-27
Attending: EMERGENCY MEDICINE
Payer: MEDICARE

## 2021-12-27 ENCOUNTER — APPOINTMENT (OUTPATIENT)
Dept: GENERAL RADIOLOGY | Age: 76
End: 2021-12-27
Payer: MEDICARE

## 2021-12-27 ENCOUNTER — APPOINTMENT (OUTPATIENT)
Dept: CT IMAGING | Age: 76
End: 2021-12-27
Payer: MEDICARE

## 2021-12-27 VITALS
OXYGEN SATURATION: 95 % | BODY MASS INDEX: 40.75 KG/M2 | RESPIRATION RATE: 18 BRPM | TEMPERATURE: 98.4 F | HEART RATE: 89 BPM | DIASTOLIC BLOOD PRESSURE: 68 MMHG | WEIGHT: 230 LBS | SYSTOLIC BLOOD PRESSURE: 116 MMHG | HEIGHT: 63 IN

## 2021-12-27 DIAGNOSIS — F03.90 DEMENTIA WITHOUT BEHAVIORAL DISTURBANCE, UNSPECIFIED DEMENTIA TYPE: ICD-10-CM
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
## 2021-12-27 DIAGNOSIS — N39.0 URINARY TRACT INFECTION WITHOUT HEMATURIA, SITE UNSPECIFIED: ICD-10-CM

## 2021-12-27 LAB
A/G RATIO: 0.8 (ref 1.1–2.2)
ALBUMIN SERPL-MCNC: 2.7 G/DL (ref 3.4–5)
ALP BLD-CCNC: 111 U/L (ref 40–129)
ALT SERPL-CCNC: 9 U/L (ref 10–40)
ANION GAP SERPL CALCULATED.3IONS-SCNC: 10 MMOL/L (ref 3–16)
AST SERPL-CCNC: 17 U/L (ref 15–37)
BACTERIA: ABNORMAL /HPF
BASOPHILS ABSOLUTE: 0.1 K/UL (ref 0–0.2)
BASOPHILS RELATIVE PERCENT: 1.1 %
BILIRUB SERPL-MCNC: 0.3 MG/DL (ref 0–1)
BILIRUBIN URINE: NEGATIVE
BLOOD, URINE: NEGATIVE
BUN BLDV-MCNC: 20 MG/DL (ref 7–20)
CALCIUM SERPL-MCNC: 9.2 MG/DL (ref 8.3–10.6)
CHLORIDE BLD-SCNC: 98 MMOL/L (ref 99–110)
CLARITY: CLEAR
CO2: 27 MMOL/L (ref 21–32)
COLOR: YELLOW
CREAT SERPL-MCNC: 1.5 MG/DL (ref 0.6–1.2)
EKG ATRIAL RATE: 81 BPM
EKG DIAGNOSIS: NORMAL
EKG P AXIS: 73 DEGREES
EKG P-R INTERVAL: 184 MS
EKG Q-T INTERVAL: 340 MS
EKG QRS DURATION: 90 MS
EKG QTC CALCULATION (BAZETT): 394 MS
EKG R AXIS: 5 DEGREES
EKG T AXIS: 70 DEGREES
EKG VENTRICULAR RATE: 81 BPM
EOSINOPHILS ABSOLUTE: 0.1 K/UL (ref 0–0.6)
EOSINOPHILS RELATIVE PERCENT: 2 %
EPITHELIAL CELLS, UA: ABNORMAL /HPF (ref 0–5)
GFR AFRICAN AMERICAN: 41
GFR NON-AFRICAN AMERICAN: 34
GLUCOSE BLD-MCNC: 101 MG/DL (ref 70–99)
GLUCOSE URINE: NEGATIVE MG/DL
HCT VFR BLD CALC: 25.7 % (ref 36–48)
HEMOGLOBIN: 8.5 G/DL (ref 12–16)
HYALINE CASTS: ABNORMAL /LPF (ref 0–2)
KETONES, URINE: NEGATIVE MG/DL
LEUKOCYTE ESTERASE, URINE: ABNORMAL
LYMPHOCYTES ABSOLUTE: 1.5 K/UL (ref 1–5.1)
LYMPHOCYTES RELATIVE PERCENT: 29.9 %
MCH RBC QN AUTO: 29.9 PG (ref 26–34)
MCHC RBC AUTO-ENTMCNC: 33 G/DL (ref 31–36)
MCV RBC AUTO: 90.8 FL (ref 80–100)
MICROSCOPIC EXAMINATION: YES
MONOCYTES ABSOLUTE: 0.6 K/UL (ref 0–1.3)
MONOCYTES RELATIVE PERCENT: 11 %
MUCUS: ABNORMAL /LPF
NEUTROPHILS ABSOLUTE: 2.8 K/UL (ref 1.7–7.7)
NEUTROPHILS RELATIVE PERCENT: 56 %
NITRITE, URINE: POSITIVE
PDW BLD-RTO: 16.5 % (ref 12.4–15.4)
PH UA: 5.5 (ref 5–8)
PLATELET # BLD: 293 K/UL (ref 135–450)
PMV BLD AUTO: 7.6 FL (ref 5–10.5)
POTASSIUM REFLEX MAGNESIUM: 4.3 MMOL/L (ref 3.5–5.1)
PROTEIN UA: NEGATIVE MG/DL
RBC # BLD: 2.83 M/UL (ref 4–5.2)
RBC UA: ABNORMAL /HPF (ref 0–4)
SODIUM BLD-SCNC: 135 MMOL/L (ref 136–145)
SPECIFIC GRAVITY UA: 1.01 (ref 1–1.03)
TOTAL PROTEIN: 6 G/DL (ref 6.4–8.2)
URINE REFLEX TO CULTURE: YES
URINE TYPE: ABNORMAL
UROBILINOGEN, URINE: 0.2 E.U./DL
WBC # BLD: 5 K/UL (ref 4–11)
WBC UA: ABNORMAL /HPF (ref 0–5)

## 2021-12-27 PROCEDURE — 87186 SC STD MICRODIL/AGAR DIL: CPT

## 2021-12-27 PROCEDURE — 81001 URINALYSIS AUTO W/SCOPE: CPT

## 2021-12-27 PROCEDURE — 99284 EMERGENCY DEPT VISIT MOD MDM: CPT

## 2021-12-27 PROCEDURE — 80053 COMPREHEN METABOLIC PANEL: CPT

## 2021-12-27 PROCEDURE — 87077 CULTURE AEROBIC IDENTIFY: CPT

## 2021-12-27 PROCEDURE — 73502 X-RAY EXAM HIP UNI 2-3 VIEWS: CPT

## 2021-12-27 PROCEDURE — 93005 ELECTROCARDIOGRAM TRACING: CPT | Performed by: EMERGENCY MEDICINE

## 2021-12-27 PROCEDURE — 87086 URINE CULTURE/COLONY COUNT: CPT

## 2021-12-27 PROCEDURE — 85025 COMPLETE CBC W/AUTO DIFF WBC: CPT

## 2021-12-27 PROCEDURE — 70450 CT HEAD/BRAIN W/O DYE: CPT

## 2021-12-27 PROCEDURE — 93010 ELECTROCARDIOGRAM REPORT: CPT | Performed by: INTERNAL MEDICINE

## 2021-12-27 PROCEDURE — 6370000000 HC RX 637 (ALT 250 FOR IP): Performed by: EMERGENCY MEDICINE

## 2021-12-27 RX ORDER — CEFUROXIME AXETIL 250 MG/1
250 TABLET ORAL 2 TIMES DAILY
Qty: 20 TABLET | Refills: 0 | Status: SHIPPED | OUTPATIENT
Start: 2021-12-27 | End: 2022-01-06

## 2021-12-27 RX ORDER — CEFUROXIME AXETIL 250 MG/1
500 TABLET ORAL EVERY 12 HOURS SCHEDULED
Status: DISCONTINUED | OUTPATIENT
Start: 2021-12-27 | End: 2021-12-27 | Stop reason: HOSPADM

## 2021-12-27 RX ADMIN — CEFUROXIME AXETIL 500 MG: 250 TABLET ORAL at 18:18

## 2021-12-27 NOTE — ED NOTES
This RN attempted to draw blood x3 at this time unsuccessfully. Lab called to attempt.       Low Aviles RN  12/27/21 9595

## 2021-12-27 NOTE — ED NOTES
Bed: 17  Expected date:   Expected time:   Means of arrival:   Comments:  [address], RN  12/27/21 5808

## 2021-12-27 NOTE — ED PROVIDER NOTES
MD   folic acid (FOLVITE) 1 MG tablet TAKE 1 TABLET (1 MG TOTAL) BY MOUTH DAILY. 3/19/21   Historical Provider, MD   tiZANidine (ZANAFLEX) 4 MG tablet every 8 hours as needed  4/2/21   Historical Provider, MD   albuterol sulfate  (90 Base) MCG/ACT inhaler INHALE 2 PUFFS INTO THE LUNGS EVERY 6 HOURS AS NEEDED FOR WHEEZING OR SHORTNESS OF BREATH 2/22/21 2/22/22  Peng Hernandez MD   albuterol (PROVENTIL) (2.5 MG/3ML) 0.083% nebulizer solution Take 3 mLs by nebulization every 4 hours as needed for Wheezing 9/24/20 9/24/21  Peng Hernandez MD   oxybutynin (DITROPAN) 5 MG tablet Take 5 mg by mouth 3 times daily    Historical Provider, MD   albuterol sulfate HFA (PROAIR HFA) 108 (90 Base) MCG/ACT inhaler Inhale 2 puffs into the lungs every 6 hours as needed for Wheezing or Shortness of Breath 2/21/20   Garrett Wiseman MD   ARIPiprazole (ABILIFY) 5 MG tablet Take 5 mg by mouth 12/19/19   Historical Provider, MD   albuterol (PROVENTIL) (2.5 MG/3ML) 0.083% nebulizer solution Take 3 mLs by nebulization every 4 hours as needed for Wheezing or Shortness of Breath DX COPD J44.9 12/17/19   Peng Hernandez MD   DULoxetine (CYMBALTA) 60 MG extended release capsule Take 60 mg by mouth daily    Historical Provider, MD   OXYGEN Inhale 3 L/min into the lungs continuous.     Historical Provider, MD       Allergies as of 12/27/2021    (No Known Allergies)       Past Medical History:   Diagnosis Date    Arthritis     Asthma     Bronchitis chronic     Chronic respiratory failure ([address]     Depression     Diabetes mellitus ([address]     BORDERLINE NO MEDS    Emphysema of lung ([address]     spontaneous pneumo 5/07/2020    Glaucoma     Hypertension     Morbid obesity ([address]     Oxygen dependent     Uses O2 NC at 3L/min continuously    Pneumonia of left upper lobe due to Pseudomonas species ([address] 12/1/2021    Rash     due to nasal canula    Shingles     recently no problems now    Sleep apnea     C pap        Surgical History:   Past Surgical History:   Procedure Laterality Date    BRONCHOSCOPY N/A 2021    BRONCHOSCOPY ALVEOLAR LAVAGE performed by Archana Phoenix MD at [address]  2021    BRONCHOSCOPY THERAPUTIC ASPIRATION INITIAL performed by Archana Phoenix MD at [address] [facility]  3/9/2011    CATARACT REMOVAL WITH IMPLANT  3/23/2011    CHOLECYSTECTOMY      COLON SURGERY      11\" removed    COLONOSCOPY  13    Diverticulosis    HERNIA REPAIR      HYSTERECTOMY      LAP BAND  10/5/10    ADJUSTABLE GASTRIC RESTRICTIVE DEVICE    UPPER GASTROINTESTINAL ENDOSCOPY N/A 2021    EGD DIAGNOSTIC ONLY performed by Kavin Lo MD at [facility] ENDOSCOPY        Family History:    Family History   Problem Relation Age of Onset    Diabetes Mother    [employer] Arthritis Mother    [employer] Vision Loss Mother     High Blood Pressure Sister     Stroke Sister     Diabetes Sister     Depression Sister     Arthritis Sister     Diabetes Daughter     Substance Abuse Daughter     Depression Daughter     Asthma Daughter     Emphysema Father     Cancer Neg Hx     Heart Failure Neg Hx     Hypertension Neg Hx        Social History     Socioeconomic History    Marital status:      Spouse name: Not on file    Number of children: Not on file    Years of education: Not on file    Highest education level: Not on file   Occupational History    Not on file   Tobacco Use    Smoking status: Former Smoker     Packs/day: 1.50     Years: 10.00     Pack years: 15.00     Types: Cigarettes     Quit date: 2005     Years since quittin.9    Smokeless tobacco: Never Used   Vaping Use    Vaping Use: Never used   Substance and Sexual Activity    Alcohol use: No     Alcohol/week: 0.0 standard drinks    Drug use: No    Sexual activity: Not Currently   Other Topics Concern    Not on file   Social History Narrative    Not on file     Social Determinants of Health     Financial Resource Strain:     Difficulty of Paying Living Expenses: Not on file   Food Insecurity:     Worried About Running Out of Food in the Last Year: Not on file    Herlinda of Food in the Last Year: Not on file   Transportation Needs:     Lack of Transportation (Medical): Not on file    Lack of Transportation (Non-Medical): Not on file   Physical Activity:     Days of Exercise per Week: Not on file    Minutes of Exercise per Session: Not on file   Stress:     Feeling of Stress : Not on file   Social Connections:     Frequency of Communication with Friends and Family: Not on file    Frequency of Social Gatherings with Friends and Family: Not on file    Attends Scientologist Services: Not on file    Active Member of [address] [place] [address] or Organizations: Not on file    Attends Club or Organization Meetings: Not on file    Marital Status: Not on file   Intimate Partner Violence:     Fear of Current or Ex-Partner: Not on file    Emotionally Abused: Not on file    Physically Abused: Not on file    Sexually Abused: Not on file   Housing Stability:     Unable to Pay for Housing in the Last Year: Not on file    Number of [place] in the Last Year: Not on file    Unstable Housing in the Last Year: Not on file       Nursing notes reviewed. ED Triage Vitals [12/27/21 1330]   Enc Vitals Group      BP (!) 114/50      Pulse 82      Resp 16      Temp 98.4 °F (36.9 °C)      Temp Source Oral      SpO2 96 %      Weight 230 lb (104.3 kg)      Height 5' 3\" (1.6 m)      Head Circumference       Peak Flow       Pain Score       Pain Loc       Pain Edu? Excl. in [address]? GENERAL:  Awake, alert. Well developed, well nourished with no apparent distress. HENT:  Normocephalic, Atraumatic, moist mucous membranes. EYES:  Pupils equal round and reactive to light, Conjunctiva normal, extraocular movements normal.  NECK:  No meningeal signs, Supple. No tenderness. CHEST:  Regular rate and rhythm, chest wall non-tender.    LUNGS: Clear to auscultation bilaterally. ABDOMEN:  Soft, non-tender, no rebound, rigidity or guarding, non-distended, normal bowel sounds. No costovertebral angle tenderness to palpation. BACK:  No tenderness. No step-offs, contusions or abrasions throughout the cervical, thoracic or lumbar spine. EXTREMITIES:  Normal range of motion, no edema, no bony tenderness, no deformity, distal pulses present. Remainder of axial and appendicular skeleton NT exc as noted. Full active ROM as well at all jts exc as noted. SKIN: Warm, dry and intact. NEUROLOGIC: Oriented to person and place but not time. Moving all extremities to command. LABS and DIAGNOSTIC RESULTS  EKG  The Ekg interpreted by me shows  normal sinus rhythm with a rate of 81  Axis is   Normal  QTc is  normal  Intervals and Durations are unremarkable. ST Segments: no acute change  Delta waves, Brugada Syndrome, and Short WA are not present. No significant change from prior EKG dated 1 dec 2021    RADIOLOGY  X-RAYS:  I have reviewed radiologic plain film image(s). ALL OTHER NON-PLAIN FILM IMAGES SUCH AS CT, ULTRASOUND AND MRI HAVE BEEN READ BY THE RADIOLOGIST. CT Head WO Contrast   Final Result   No acute intracranial abnormality. Stable CT head since 12/15/2021. RECOMMENDATIONS:   Unavailable         XR HIP 2-3 VW W PELVIS LEFT   Final Result   No visualized acute left hip fracture. If the patient is acutely   nonweightbearing, recommend CT or MRI for further evaluation.               LABS  Labs Reviewed   CBC WITH AUTO DIFFERENTIAL - Abnormal; Notable for the following components:       Result Value    RBC 2.83 (*)     Hemoglobin 8.5 (*)     Hematocrit 25.7 (*)     RDW 16.5 (*)     All other components within normal limits    Narrative:     Performed at:  [facility]) - [address], [address] [place] [address]   Phone [phone]   COMPREHENSIVE METABOLIC PANEL W/ REFLEX TO MG FOR LOW K - Abnormal; Notable for the following components:    Sodium 135 (*)     Chloride 98 (*)     Glucose 101 (*)     CREATININE 1.5 (*)     GFR Non- 34 (*)     GFR  41 (*)     Total Protein 6.0 (*)     Albumin 2.7 (*)     Albumin/Globulin Ratio 0.8 (*)     ALT 9 (*)     All other components within normal limits    Narrative:     Performed at:  [address], [facility]   Phone [phone]   URINE RT REFLEX TO CULTURE - Abnormal; Notable for the following components:    Nitrite, Urine POSITIVE (*)     Leukocyte Esterase, Urine MODERATE (*)     All other components within normal limits    Narrative:     Performed at:  [address], [facility]   Phone [phone]   MICROSCOPIC URINALYSIS - Abnormal; Notable for the following components:    Hyaline Casts, UA 3-5 (*)     Mucus, UA Rare (*)     WBC, UA  (*)     Bacteria, UA 1+ (*)     All other components within normal limits    Narrative:     Performed at:  [address], [facility]   Phone [phone]   CULTURE, [facility]        I advised the patient to return to the emergency department immediately for any new or worsening symptoms, such as vomiting or increased pain. The patient voiced agreement and understanding of the treatment plan. I estimate there is LOW risk for ACUTE CORONARY SYNDROME, INTRACRANIAL HEMORRHAGE, MALIGNANT DYSRHYTHMIA, MENINGITIS, PNEUMONIA, PULMONARY EMBOLISM, SEPSIS, SUBARACHNOID HEMORRHAGE, SUBDURAL HEMATOMA, STROKE, or URINARY TRACT INFECTION, thus I consider the discharge disposition reasonable. Katie Galloway and I have discussed the diagnosis and risks, and we agree with discharging home to follow-up with their primary doctor.  We also discussed returning to the Emergency Department immediately if new or worsening symptoms occur. We have discussed the symptoms which are most concerning (e.g., changing or worsening pain, weakness, vomiting, fever) that necessitate immediate return. Final Impression    1. Fall, initial encounter    2. Dementia without behavioral disturbance, unspecified dementia type ([facility]    3. Urinary tract infection without hematuria, site unspecified        Blood pressure 112/69, pulse 91, temperature 98.4 °F (36.9 °C), temperature source Oral, resp. rate 16, height 5' 3\" (1.6 m), weight 230 lb (104.3 kg), SpO2 94 %. Patient was given scripts for the following medications. I counseled patient how to take these medications. New Prescriptions    CEFUROXIME (CEFTIN) 250 MG TABLET    Take 1 tablet by mouth 2 times daily for 10 days       Disposition  Pt is in good condition upon Discharge to nursing home. This chart was generated using the [address] [place] [address] [address] dictation system. I created this record but it may contain dictation errors.           Alejandro Darby MD  12/27/21 1507

## 2021-12-28 NOTE — ED NOTES
Bed: 05  Expected date:   Expected time:   Means of arrival:   Comments:     Kurt Ng RN  12/27/21 1955

## 2021-12-29 LAB
ORGANISM: ABNORMAL
URINE CULTURE, ROUTINE: ABNORMAL

## 2022-01-03 LAB
FUNGUS (MYCOLOGY) CULTURE: ABNORMAL
FUNGUS STAIN: ABNORMAL
ORGANISM: ABNORMAL

## 2022-01-10 NOTE — PROGRESS NOTES
Physician Progress Note      PATIENT:               Teagan Chacon  [facility] #:                  [id number]  :                       1945  ADMIT DATE:       12/15/2021 10:45 AM  DISCH DATE:        2021 11:23 PM  RESPONDING  PROVIDER #:        Perlita Garcia CNP          QUERY TEXT:    Pt admitted with  \"Acute blood loss anemia due to acute upper GI bleed   (resolved\". PN- recent and hx of pulmonary embolism documented. If possible,   please document in progress notes and discharge summary if you are evaluating   and/or treating any of the following: The medical record reflects the following:  Risk Factors: recent dx of PE     Clinical Indicators: PN- \"Pt was hospitalized from - at [facility]. She was   treated for pneumonia, COPDAE and acute PE noted on V/Q scan. Pt was initiated   on Eliquis. \"  Pt recently started on Eliquis for PE, will continue to hold\"  Treatment: Vascular consult, hold anticoags, IVC filter-    Thank-You, Luzma Greene RN, BSN, CCDS  Options provided:  -- Acute/subacute pulmonary embolism -dx   -- Chronic pulmonary embolism  -- Other - I will add my own diagnosis  -- Disagree - Not applicable / Not valid  -- Disagree - Clinically unable to determine / Unknown  -- Refer to Clinical Documentation Reviewer    PROVIDER RESPONSE TEXT:    This patient has acute/subacute pulmonary embolism -dx .     Query created by: Cheri Gallardo on 2022 10:16 AM      Electronically signed by:  Perlita Garcia CNP 1/10/2022 9:18 AM

## 2022-01-18 LAB
AFB CULTURE (MYCOBACTERIA): NORMAL
AFB SMEAR: NORMAL

## 2022-03-09 ENCOUNTER — TELEPHONE (OUTPATIENT)
Dept: PULMONOLOGY | Age: 77
End: 2022-03-09

## 2022-03-09 NOTE — TELEPHONE ENCOUNTER
Patient did not show for 6mo COPD fu appointment  with Dr. Jethro Martinez on 3/9/22    Same Day Cancellation: No    Patient rescheduled:  No    New appointment:     Patient was also no show on: na    LOV    Assessment:08/25/21     · CARIDAD Pulmonary Nodule: clinical dx [facility] Cancer s/p SBRT Feb 2020 with Dr. 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 Carreno Session, s/p SBRT  · F/U with Dr. Jason Cortés re: tremor   · See me in 6 months

## 2022-06-14 ENCOUNTER — OFFICE VISIT (OUTPATIENT)
Dept: PULMONOLOGY | Age: 77
End: 2022-06-14
Payer: MEDICARE

## 2022-06-14 VITALS
HEART RATE: 105 BPM | HEIGHT: 63 IN | DIASTOLIC BLOOD PRESSURE: 72 MMHG | WEIGHT: 212 LBS | OXYGEN SATURATION: 92 % | BODY MASS INDEX: 37.56 KG/M2 | SYSTOLIC BLOOD PRESSURE: 126 MMHG

## 2022-06-14 DIAGNOSIS — J44.9 CHRONIC OBSTRUCTIVE PULMONARY DISEASE, UNSPECIFIED COPD TYPE (HCC): Primary | ICD-10-CM

## 2022-06-14 PROCEDURE — 99214 OFFICE O/P EST MOD 30 MIN: CPT | Performed by: INTERNAL MEDICINE

## 2022-06-14 PROCEDURE — 1090F PRES/ABSN URINE INCON ASSESS: CPT | Performed by: INTERNAL MEDICINE

## 2022-06-14 PROCEDURE — 1036F TOBACCO NON-USER: CPT | Performed by: INTERNAL MEDICINE

## 2022-06-14 PROCEDURE — 3023F SPIROM DOC REV: CPT | Performed by: INTERNAL MEDICINE

## 2022-06-14 PROCEDURE — G8400 PT W/DXA NO RESULTS DOC: HCPCS | Performed by: INTERNAL MEDICINE

## 2022-06-14 PROCEDURE — 1123F ACP DISCUSS/DSCN MKR DOCD: CPT | Performed by: INTERNAL MEDICINE

## 2022-06-14 PROCEDURE — G8427 DOCREV CUR MEDS BY ELIG CLIN: HCPCS | Performed by: INTERNAL MEDICINE

## 2022-06-14 PROCEDURE — G8417 CALC BMI ABV UP PARAM F/U: HCPCS | Performed by: INTERNAL MEDICINE

## 2022-06-14 NOTE — PATIENT INSTRUCTIONS
Try stopping advair for 3 days and see if leg cramps/pains are better. If you notice that they are better, let Dr. Connor Crow know. If no difference, restart advair.

## 2022-06-14 NOTE — PROGRESS NOTES
CC: COPD  HPI   COPD: on 4 to 4.5 L continuous. Feeling pretty good. Has lost weight. Was hospitalized after aspirating popcorn in Nov.  Sees Dr. Carolina Brunson. Leg pain/cramps, all through day, not better with movement. Review of Systems:        Objective:   Physical Exam  Blood pressure 126/72, pulse (!) 105, height 5' 3\" (1.6 m), weight 212 lb (96.2 kg), SpO2 92 %. 'on NC  241 # on 5-17-13; 258 on 5-20-14; 260 on 2-6-15, 246 on 9-15-15; 239 on 11-17-15, 255 on 3-15-16, 243 on 9-19-16, 257 on 3/21/17, 249# on 1/31/18, 249# on 7/31/18, 246# on 9/5/18, 256 # on 12/7/18; 252# on 9/25/19; 260#12/17/2019; 263# on 9/24/20 255# on 12/15/20; 241 on 8/25/21; 212# on 6/14/22  Constitutional:  No acute distress. Looks good today   HENT:  Oropharynx is clear and moist.   Neck: No tracheal deviation present. Cardiovascular: Normal heart sounds. No lower extremity edema. Pulmonary/Chest: No wheezes. No rhonchi. No rales. + decreased breath sounds. No accessory muscle usage or stridor. Musculoskeletal: No cyanosis. No clubbing. Skin: Skin is warm and dry. Psychiatric: Normal mood and affect. Neurologic: speech fluent, alert and oriented, strength symmetric.   Has tremor, minimally on left at rest, significant tremor with intention         PFT Nov 2009 FEV1 1.07 L  54%  DLCO 7.16  PFT Feb 2013 FEV1 1.0 L  46% DLCO 10.44  PFT 4-21-15 FEV1 1.1 L 51% DLCO 9.72 45% %    PSG at [facility] with re-titration 2010 post surgery  PSG @ [facility] 6-11-14 AHI 0.6    CT CHEST 11/26/21     Impression   Previously seen defect in the right lower lobe persist and appears somewhat   larger and has not resolved.  As a result, recommend bronchoscopy to rule out   endobronchial lesion.       Increased volume loss in the right lower lobe anteriorly with improved   aeration of the right lower lobe posteriorly.       Dominant curvilinear opacity in left upper lobe is seen, similar to prior           Assessment:   · CARIDAD Pulmonary Nodule: clinical dx [facility] Cancer s/p SBRT Feb 2020 with Dr. 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 Carly Marie, s/p SBRT  · See me in 5-6 months

## 2022-06-20 ENCOUNTER — TELEPHONE (OUTPATIENT)
Dept: PULMONOLOGY | Age: 77
End: 2022-06-20

## 2022-06-20 PROBLEM — K92.2 GI BLEED: Status: RESOLVED | Noted: 2021-12-15 | Resolved: 2022-06-20

## 2022-06-20 PROBLEM — J98.11 ATELECTASIS: Status: RESOLVED | Noted: 2021-11-30 | Resolved: 2022-06-20

## 2022-06-20 PROBLEM — R09.89 CHEST CONGESTION: Status: RESOLVED | Noted: 2021-11-30 | Resolved: 2022-06-20

## 2022-06-20 PROBLEM — J15.1 PNEUMONIA OF LEFT UPPER LOBE DUE TO PSEUDOMONAS SPECIES (HCC): Status: RESOLVED | Noted: 2021-12-01 | Resolved: 2022-06-20

## 2022-06-20 PROBLEM — J44.1 COPD EXACERBATION (HCC): Status: RESOLVED | Noted: 2021-11-26 | Resolved: 2022-06-20

## 2022-06-20 PROBLEM — J93.0 SPONTANEOUS TENSION PNEUMOTHORAX: Status: RESOLVED | Noted: 2020-05-01 | Resolved: 2022-06-20

## 2022-06-20 PROBLEM — R91.8 PULMONARY INFILTRATE: Status: RESOLVED | Noted: 2021-11-30 | Resolved: 2022-06-20

## 2022-06-20 RX ORDER — TIOTROPIUM BROMIDE 18 UG/1
CAPSULE ORAL; RESPIRATORY (INHALATION)
Qty: 30 CAPSULE | Refills: 5 | Status: SHIPPED | OUTPATIENT
Start: 2022-06-20

## 2022-06-20 RX ORDER — FLUTICASONE FUROATE 200 UG/1
1 POWDER RESPIRATORY (INHALATION) DAILY
Qty: 1 EACH | Refills: 3 | Status: SHIPPED | OUTPATIENT
Start: 2022-06-20 | End: 2022-10-17

## 2022-06-20 NOTE — TELEPHONE ENCOUNTER
Next Appt:  11/30/2022    Last filled:  11/3/2021    LOV:  6/14/2022      Assessment:   · CARIDAD Pulmonary Nodule: clinical dx [facility] Cancer s/p SBRT Feb 2020 with Dr. Zully Gonzalez    · Severe COPD FEV1 1  L - very poor exercise capacity but has gotten back to exercise again  · Chronic Respiratory Failure with hypoxemia on continuous home oxygen (4.5 L pulse)   · Abnormal CT CHEST with RLL airway foreign body/food and mucus plugging   · Morbid Obesity with hypoventilation syndrome    · 75 pack year tobacco in remission since 2005                Plan:   · Tessalon & mucinex DM PRN for cough  · Spiriva q day; Advair 500/50 (has failed symbicort - is not effective)   ?  Trial off advair to make sure leg cramps/pain not related to salmeterol   · Continue Albuterol prn  · Remain off lisinopril   · 4 L supplemental oxygen, 5 L with exertion   · Continue diet and exercise, has bike  · F/U with Dr. Zully Gonzalez, s/p SBRT  · See me in 5-6 months

## 2022-06-20 NOTE — TELEPHONE ENCOUNTER
Pt called in stating her legs are not cramping since she has not been taking her Advair. Pt confirmed her pharmacy as [facility] if another medication is recommended. 06/14/2022      Assessment:   · CARIDAD Pulmonary Nodule: clinical dx [facility] Cancer s/p SBRT Feb 2020 with Dr. Abdi Schmitt    · Severe COPD FEV1 1  L - very poor exercise capacity but has gotten back to exercise again  · Chronic Respiratory Failure with hypoxemia on continuous home oxygen (4.5 L pulse)   · Abnormal CT CHEST with RLL airway foreign body/food and mucus plugging   · Morbid Obesity with hypoventilation syndrome    · 75 pack year tobacco in remission since 2005                Plan:   · Tessalon & mucinex DM PRN for cough  · Spiriva q day; Advair 500/50 (has failed symbicort - is not effective)   ?  Trial off advair to make sure leg cramps/pain not related to salmeterol   · Continue Albuterol prn  · Remain off lisinopril   · 4 L supplemental oxygen, 5 L with exertion   · Continue diet and exercise, has bike  · F/U with Dr. Abdi Schmitt, s/p SBRT  · See me in 5-6 months

## 2022-10-17 RX ORDER — FLUTICASONE FUROATE 200 UG/1
1 POWDER RESPIRATORY (INHALATION) DAILY
Qty: 30 EACH | Refills: 5 | Status: SHIPPED | OUTPATIENT
Start: 2022-10-17 | End: 2022-11-16

## 2022-10-26 RX ORDER — ALBUTEROL SULFATE 90 UG/1
2 AEROSOL, METERED RESPIRATORY (INHALATION) EVERY 6 HOURS PRN
Qty: 8.5 G | Refills: 5 | Status: SHIPPED | OUTPATIENT
Start: 2022-10-26 | End: 2023-10-26

## 2022-11-30 ENCOUNTER — TELEPHONE (OUTPATIENT)
Dept: PULMONOLOGY | Age: 77
End: 2022-11-30

## 2022-11-30 ENCOUNTER — OFFICE VISIT (OUTPATIENT)
Dept: PULMONOLOGY | Age: 77
End: 2022-11-30
Payer: MEDICARE

## 2022-11-30 VITALS
RESPIRATION RATE: 18 BRPM | SYSTOLIC BLOOD PRESSURE: 125 MMHG | OXYGEN SATURATION: 90 % | WEIGHT: 229 LBS | BODY MASS INDEX: 40.57 KG/M2 | DIASTOLIC BLOOD PRESSURE: 65 MMHG | HEIGHT: 63 IN | HEART RATE: 74 BPM

## 2022-11-30 DIAGNOSIS — C34.12 MALIGNANT NEOPLASM OF UPPER LOBE OF LEFT LUNG (HCC): Primary | ICD-10-CM

## 2022-11-30 DIAGNOSIS — J44.9 CHRONIC OBSTRUCTIVE PULMONARY DISEASE, UNSPECIFIED COPD TYPE (HCC): ICD-10-CM

## 2022-11-30 PROCEDURE — 99214 OFFICE O/P EST MOD 30 MIN: CPT | Performed by: INTERNAL MEDICINE

## 2022-11-30 PROCEDURE — G8417 CALC BMI ABV UP PARAM F/U: HCPCS | Performed by: INTERNAL MEDICINE

## 2022-11-30 PROCEDURE — G8484 FLU IMMUNIZE NO ADMIN: HCPCS | Performed by: INTERNAL MEDICINE

## 2022-11-30 PROCEDURE — G8427 DOCREV CUR MEDS BY ELIG CLIN: HCPCS | Performed by: INTERNAL MEDICINE

## 2022-11-30 PROCEDURE — 3078F DIAST BP <80 MM HG: CPT | Performed by: INTERNAL MEDICINE

## 2022-11-30 PROCEDURE — G8400 PT W/DXA NO RESULTS DOC: HCPCS | Performed by: INTERNAL MEDICINE

## 2022-11-30 PROCEDURE — 1036F TOBACCO NON-USER: CPT | Performed by: INTERNAL MEDICINE

## 2022-11-30 PROCEDURE — 3074F SYST BP LT 130 MM HG: CPT | Performed by: INTERNAL MEDICINE

## 2022-11-30 PROCEDURE — 1090F PRES/ABSN URINE INCON ASSESS: CPT | Performed by: INTERNAL MEDICINE

## 2022-11-30 PROCEDURE — 3023F SPIROM DOC REV: CPT | Performed by: INTERNAL MEDICINE

## 2022-11-30 PROCEDURE — 1123F ACP DISCUSS/DSCN MKR DOCD: CPT | Performed by: INTERNAL MEDICINE

## 2022-11-30 NOTE — PROGRESS NOTES
CC: COPD  HPI   COPD: on 4 to 4.5 L continuous. Feeling pretty good. Dyspnea on exertion persists  Leg cramps better off advair. Objective:   Physical Exam  Blood pressure 125/65, pulse 74, resp. rate 18, height 5' 3\" (1.6 m), weight 229 lb (103.9 kg), SpO2 90 %. 'on NC  241 # on 5-17-13; 258 on 5-20-14; 260 on 2-6-15, 246 on 9-15-15; 239 on 11-17-15, 255 on 3-15-16, 243 on 9-19-16, 257 on 3/21/17, 249# on 1/31/18, 249# on 7/31/18, 246# on 9/5/18, 256 # on 12/7/18; 252# on 9/25/19; 260#12/17/2019; 263# on 9/24/20 255# on 12/15/20; 241 on 8/25/21; 212# on 6/14/22; 229# on 11/30/22  Constitutional:  No acute distress. Looks good today   HENT:  Oropharynx is clear and moist.   Neck: No tracheal deviation present. Cardiovascular: Normal heart sounds. No lower extremity edema. Pulmonary/Chest: No wheezes. No rhonchi. No rales. + decreased breath sounds. No accessory muscle usage or stridor. Musculoskeletal: No cyanosis. No clubbing. Skin: Skin is warm and dry. Psychiatric: Normal mood and affect. Neurologic: speech fluent, alert and oriented, strength symmetric. Has tremor, minimally on left at rest, significant tremor with intention         PFT Nov 2009 FEV1 1.07 L  54%  DLCO 7.16  PFT Feb 2013 FEV1 1.0 L  46% DLCO 10.44  PFT 4-21-15 FEV1 1.1 L 51% DLCO 9.72 45% %    PSG at [facility] with re-titration 2010 post surgery  PSG @ [facility] 6-11-14 AHI 0.6    CT CHEST 11/26/21     Impression   Previously seen defect in the right lower lobe persist and appears somewhat   larger and has not resolved. As a result, recommend bronchoscopy to rule out   endobronchial lesion. Increased volume loss in the right lower lobe anteriorly with improved   aeration of the right lower lobe posteriorly.        Dominant curvilinear opacity in left upper lobe is seen, similar to prior           Assessment:   CARIDAD Pulmonary Nodule: clinical dx [facility] Cancer s/p SBRT Feb 2020 with Dr. 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

## 2022-12-07 ENCOUNTER — HOSPITAL ENCOUNTER (OUTPATIENT)
Dept: CT IMAGING | Age: 77
Discharge: HOME | End: 2022-12-07
Payer: MEDICARE

## 2022-12-07 DIAGNOSIS — C34.12 MALIGNANT NEOPLASM OF UPPER LOBE OF LEFT LUNG (HCC): ICD-10-CM

## 2022-12-07 PROCEDURE — 71250 CT THORAX DX C-: CPT

## 2022-12-12 ENCOUNTER — TELEPHONE (OUTPATIENT)
Dept: PULMONOLOGY | Age: 77
End: 2022-12-12

## 2022-12-12 ENCOUNTER — SCHEDULED TELEPHONE ENCOUNTER (OUTPATIENT)
Dept: PULMONOLOGY | Age: 77
End: 2022-12-12
Payer: MEDICARE

## 2022-12-12 DIAGNOSIS — C34.92 NON-SMALL CELL LUNG CANCER, LEFT (HCC): Primary | ICD-10-CM

## 2022-12-12 PROCEDURE — 99442 PR PHYS/QHP TELEPHONE EVALUATION 11-20 MIN: CPT | Performed by: INTERNAL MEDICINE

## 2022-12-12 NOTE — PROGRESS NOTES
CC: COPD  HPI   COPD: on 4 to 4.5 L continuous. Had CT CHEST and to discuss results       Objective:   Physical Exam  There were no vitals taken for this visit. 'on NC  241 # on 5-17-13; 258 on 5-20-14; 260 on 2-6-15, 246 on 9-15-15; 239 on 11-17-15, 255 on 3-15-16, 243 on 9-19-16, 257 on 3/21/17, 249# on 1/31/18, 249# on 7/31/18, 246# on 9/5/18, 256 # on 12/7/18; 252# on 9/25/19; 260#12/17/2019; 263# on 9/24/20 255# on 12/15/20; 241 on 8/25/21; 212# on 6/14/22; 229# on 11/30/22  phone      PFT Nov 2009 FEV1 1.07 L  54%  DLCO 7.16  PFT Feb 2013 FEV1 1.0 L  46% DLCO 10.44  PFT 4-21-15 FEV1 1.1 L 51% DLCO 9.72 45% %    PSG at [facility] with re-titration 2010 post surgery  PSG @ [facility] 6-11-14 AHI 0.6    CT CHEST 12/9/22  Mediastinum:  The central airways are clear. No evidence of mediastinal,   hilar or axillary lymphadenopathy. Atherosclerotic disease of the thoracic   aorta and coronary arteries. Cardiac silhouette is unremarkable. The   pulmonary artery is mildly enlarged, measuring 3.0 cm. Lungs/pleura: Interval increase in size of left upper mass now measuring 3.4   x 1.5 cm, previously measuring 1.2 x 1.8 cm. Centrilobular emphysema. No   pleural effusion or pneumothorax. Upper Abdomen: Small hiatal hernia. Lap band is partially visualized. Rest   of visualized upper abdomen is unremarkable. Soft Tissues/Bones:  Visualized soft tissues are unremarkable. Age related   degenerative changes of the visualized osseous structures without focal   destructive lesion. .           Impression   Interval increase in size of left upper lobe mass as measured above. Findings represent malignancy.      Assessment:   CARIDAD Pulmonary Nodule: clinical dx NSCL Cancer s/p SBRT Feb 2020 with Dr. 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 female evaluated via telephone on 12/12/2022. Consent: She and/or health care decision maker is aware that that she may receive a bill for this telephone service, which includes applicable co-pays, depending on her insurance coverage, and has provided verbal consent to proceed: YES. I communicated with the patient and/or health care decision maker about: see interval history above. Details of this discussion including any medical advice provided: see plan above. Total Time:15 min. I affirm this is a Patient Initiated Episode with a Patient who has not had a related appointment within my department in the past 7 days or scheduled within the next 24 hours. Patient identification was verified at the start of the visit: YES. The visit was conducted pursuant to the emergency declaration under the [facility], [address] [place] [address] authority and the [employer] Act. This Telephone Visit was conducted with patient's (and/or legal guardian's) consent, to reduce the patient's risk of exposure to COVID-19 and provide necessary medical care. The patient was located in a state where the provider was licensed to provide care.  The patient (and/or legal guardian) his advised to contact this office for worsening conditions or problems, and seek emergency medical treatment and/or call 911 if urgent care needed.

## 2022-12-12 NOTE — RESULT ENCOUNTER NOTE
Can I have a telephone or video visit set up with Alpa Butcher this week? She can come in to see me if she prefers. .. to discuss CT findings.

## 2022-12-17 DIAGNOSIS — J44.9 CHRONIC OBSTRUCTIVE PULMONARY DISEASE, UNSPECIFIED COPD TYPE (HCC): Primary | ICD-10-CM

## 2022-12-19 RX ORDER — TIOTROPIUM BROMIDE 18 UG/1
CAPSULE ORAL; RESPIRATORY (INHALATION)
Qty: 30 CAPSULE | Refills: 5 | Status: SHIPPED | OUTPATIENT
Start: 2022-12-19

## 2022-12-30 ENCOUNTER — HOSPITAL ENCOUNTER (OUTPATIENT)
Age: 77
Discharge: HOME | End: 2022-12-30
Payer: MEDICARE

## 2022-12-30 ENCOUNTER — HOSPITAL ENCOUNTER (OUTPATIENT)
Dept: PET IMAGING | Age: 77
Discharge: HOME | End: 2022-12-30
Payer: MEDICARE

## 2022-12-30 DIAGNOSIS — C34.92 NON-SMALL CELL LUNG CANCER, LEFT (HCC): ICD-10-CM

## 2022-12-30 PROCEDURE — 3430000000 HC RX DIAGNOSTIC RADIOPHARMACEUTICAL: Performed by: INTERNAL MEDICINE

## 2022-12-30 PROCEDURE — 78815 PET IMAGE W/CT SKULL-THIGH: CPT

## 2022-12-30 PROCEDURE — A9552 F18 FDG: HCPCS | Performed by: INTERNAL MEDICINE

## 2022-12-30 RX ORDER — FLUDEOXYGLUCOSE F 18 200 MCI/ML
13.01 INJECTION, SOLUTION INTRAVENOUS
Status: COMPLETED | OUTPATIENT
Start: 2022-12-30 | End: 2022-12-30

## 2022-12-30 RX ADMIN — FLUDEOXYGLUCOSE F 18 13.01 MILLICURIE: 200 INJECTION, SOLUTION INTRAVENOUS at 09:00

## 2023-01-03 ENCOUNTER — OFFICE VISIT (OUTPATIENT)
Dept: PULMONOLOGY | Age: 78
End: 2023-01-03
Payer: MEDICARE

## 2023-01-03 VITALS
HEART RATE: 108 BPM | SYSTOLIC BLOOD PRESSURE: 138 MMHG | OXYGEN SATURATION: 96 % | RESPIRATION RATE: 16 BRPM | WEIGHT: 233 LBS | HEIGHT: 63 IN | BODY MASS INDEX: 41.29 KG/M2 | DIASTOLIC BLOOD PRESSURE: 72 MMHG

## 2023-01-03 DIAGNOSIS — J44.9 COPD, SEVERE (HCC): ICD-10-CM

## 2023-01-03 DIAGNOSIS — C34.92 NON-SMALL CELL LUNG CANCER, LEFT (HCC): ICD-10-CM

## 2023-01-03 DIAGNOSIS — R93.89 ABNORMAL CT OF THE CHEST: ICD-10-CM

## 2023-01-03 DIAGNOSIS — R91.1 LEFT UPPER LOBE PULMONARY NODULE: Primary | ICD-10-CM

## 2023-01-03 PROCEDURE — G8400 PT W/DXA NO RESULTS DOC: HCPCS | Performed by: INTERNAL MEDICINE

## 2023-01-03 PROCEDURE — 3078F DIAST BP <80 MM HG: CPT | Performed by: INTERNAL MEDICINE

## 2023-01-03 PROCEDURE — 1123F ACP DISCUSS/DSCN MKR DOCD: CPT | Performed by: INTERNAL MEDICINE

## 2023-01-03 PROCEDURE — G8417 CALC BMI ABV UP PARAM F/U: HCPCS | Performed by: INTERNAL MEDICINE

## 2023-01-03 PROCEDURE — 99204 OFFICE O/P NEW MOD 45 MIN: CPT | Performed by: INTERNAL MEDICINE

## 2023-01-03 PROCEDURE — 1090F PRES/ABSN URINE INCON ASSESS: CPT | Performed by: INTERNAL MEDICINE

## 2023-01-03 PROCEDURE — 3023F SPIROM DOC REV: CPT | Performed by: INTERNAL MEDICINE

## 2023-01-03 PROCEDURE — G8484 FLU IMMUNIZE NO ADMIN: HCPCS | Performed by: INTERNAL MEDICINE

## 2023-01-03 PROCEDURE — 3074F SYST BP LT 130 MM HG: CPT | Performed by: INTERNAL MEDICINE

## 2023-01-03 PROCEDURE — G8427 DOCREV CUR MEDS BY ELIG CLIN: HCPCS | Performed by: INTERNAL MEDICINE

## 2023-01-03 PROCEDURE — 1036F TOBACCO NON-USER: CPT | Performed by: INTERNAL MEDICINE

## 2023-01-03 NOTE — PROGRESS NOTES
PULMONARY, CRITICAL CARE AND SLEEP MEDICINE    Outpatient Pulmonary Progress Note   CC: COPD    Interval History 1/3/23:   - Had PET CT, here for results. Interval History 12/12/22:  COPD on 4 to 4.5 L continuous. Had CT CHEST and here to discuss results. reports that she quit smoking about 17 years ago. Her smoking use included cigarettes. She has a 15.00 pack-year smoking history. She has never used smokeless tobacco.     PHYSICAL EXAM:   There were no vitals taken for this visit. 'on NC  241# May 2013; 258# May 2014; 260# Feb 2015, 246# Sep 2015; 239# Nov 2015, 255# Mar 2016, 243# Sep 2016, 257# Mar 2017, 249# Jan 2018, 249# Jul 2018, 246# Sep 2018, 256# Dec 2018; 252# Sep 2019; 260# Dec 2019; 263# Sep 2020, 255# Dec 2020; 241# Aug 2021; 212# Jul 2022; 229# Nov 2022;   Constitutional:  No acute distress. HENT:  Oropharynx is clear and moist.   Neck: No tracheal deviation present. Cardiovascular: Normal heart sounds. No lower extremity edema. Pulmonary/Chest: No wheezes. No rhonchi. No rales. No decreased breath sounds. No accessory muscle usage or stridor. Musculoskeletal: No cyanosis. No clubbing. Skin: Skin is warm and dry. Psychiatric: Normal mood and affect. Neurologic: speech fluent, alert and oriented, strength symmetric      DATA:   PFT Nov 2009 FEV1 1.07 L  54%  DLCO 7.16  PFT Feb 2013 FEV1 1.0 L  46% DLCO 10.44  PFT 4-21-15 FEV1 1.1 L 51% DLCO 9.72 45% %    PSG at [facility] with re-titration 2010 post surgery  PSG @ [facility] 6-11-14 AHI 0.6    CT CHEST 12/9/22  Mediastinum:  The central airways are clear. No evidence of mediastinal,   hilar or axillary lymphadenopathy. Atherosclerotic disease of the thoracic   aorta and coronary arteries. Cardiac silhouette is unremarkable. The   pulmonary artery is mildly enlarged, measuring 3.0 cm. Lungs/pleura: Interval increase in size of left upper mass now measuring 3.4   x 1.5 cm, previously measuring 1.2 x 1.8 cm.   Centrilobular emphysema. No   pleural effusion or pneumothorax. Upper Abdomen: Small hiatal hernia. Lap band is partially visualized. Rest   of visualized upper abdomen is unremarkable. Soft Tissues/Bones:  Visualized soft tissues are unremarkable. Age related   degenerative changes of the visualized osseous structures without focal   destructive lesion. .           Impression   Interval increase in size of left upper lobe mass as measured above. Findings represent malignancy. PET CT 12/30/22  HEAD/NECK: In the craniocervical soft tissues, physiologic activity is   favored. CHEST: Small hiatal hernia with associated activity, SUV maximum 3.8. [place] Hidden Calcification of the thoracic aorta. Coronary artery calcification. Calcified bilateral hilar lymph nodes, in keeping with granulomatous disease. Within the mediastinum, no gabi activity markedly greater than mediastinal   background. No hypermetabolic axillary adenopathy. Muscular activity about   the shoulders bilaterally, likely inflammatory. Emphysema is present. Masslike parenchymal band in the left suprahilar   region is again demonstrated, measuring approximately 3.4 x 1.4 cm which   again appears slightly larger as compared to exam dated 11/26/2021 and is   associated with diffuse intermediate level metabolic activity, SUV maximum   2.3. Respiratory motion artifact is seen. Scattered atelectasis. Several   new clustered pulmonary nodules are seen within the peripheral right lower   lobe measuring up to approximately 7 mm, too small to characterize by PET. No pneumothorax or pleural effusion. Injection site is seen at the left   antecubital fossa. ABDOMEN/PELVIS: Excretion of activity is seen from bilateral kidneys and   activity is seen within the urinary bladder. Bowel activity seen which can   be physiologically variable. Gastric lap band is demonstrated.   There is diffuse activity within the   stomach just distal to the lap band, SUV maximum 5.3. Status post cholecystectomy. Catheter extends to the anterior abdomen. Postoperative changes seen of the anterior abdominal wall. Inferior vena   cava filter. Left renal cyst.  Aortoiliac calcification. Diverticulosis. Impression   The enlarging left upper lobe left suprahilar masslike consolidation does   demonstrate diffuse intermediate level activity. If there has been   intervening radiation therapy, this could reflect post radiation change. Residual or recurrent malignancy cannot be excluded. Small clustered subcentimeter nodules within the peripheral right lower lobe,   favoring an infectious/inflammatory etiology or aspiration given the   short-term development. Follow-up to resolution suggested. Diffuse activity within the proximal stomach and at hiatal hernia, in the   presence of gastric lap band; correlate for gastritis/esophagitis. ASSESSMENT:   CARIDAD Pulmonary Nodule: clinical dx NSCL Cancer s/p SBRT Feb 2020 with Dr. 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 She strongly prefers watchful waiting.   Therefore, CT CHEST no IV dye in 5-6 months for abnormal Chest CT  Handicap placard today please       I spent a total of 30 minutes on this encounter personally obtaining the patient history, reviewing labs, imaging and other data. I independently performed the above physical exam and updated this encounter note, assessment and plan as needed. Jenni Angel MD on 1/10/23 at 5:12 PM EST    I spent a total of 9 minutes on this encounter on chart review, history taking and review of data. I updated this encounter note as needed.    Thomas Lopez PA-C on 1/3/23 at 1:54 PM EST

## 2023-01-03 NOTE — LETTER
[facility] Pulmonary, Critical Care, and Sleep  [address] [place] [address] [id number]  Phone: [phone]  Fax: [phone]    Autumn Danielson MD      January 3, 2023     Patient: Elida Mc   YOB: 1945   Date of Visit: 1/3/2023       To Whom It May Concern: It is my medical opinion that Jalyn Medrano requires a disability parking placard for the following reasons:  She uses portable oxygen. Duration of need: 5 years    If you have any questions or concerns, please don't hesitate to call.     Sincerely,        Autumn Danielson MD

## 2023-01-04 NOTE — TELEPHONE ENCOUNTER
Pt had office visit yesterday and results of PET CT were discussed at that visit. Next chest CT sched 5/31 with f/u OV on 6/6 to review results.

## 2023-04-10 ENCOUNTER — HOSPITAL ENCOUNTER (INPATIENT)
Age: 78
LOS: 3 days | Discharge: HOME | DRG: 193 | End: 2023-04-13
Attending: STUDENT IN AN ORGANIZED HEALTH CARE EDUCATION/TRAINING PROGRAM | Admitting: INTERNAL MEDICINE
Payer: MEDICARE

## 2023-04-10 ENCOUNTER — APPOINTMENT (OUTPATIENT)
Dept: GENERAL RADIOLOGY | Age: 78
DRG: 193 | End: 2023-04-10
Payer: MEDICARE

## 2023-04-10 ENCOUNTER — APPOINTMENT (OUTPATIENT)
Dept: INTERVENTIONAL RADIOLOGY/VASCULAR | Age: 78
DRG: 193 | End: 2023-04-10
Payer: MEDICARE

## 2023-04-10 DIAGNOSIS — J96.21 ACUTE ON CHRONIC RESPIRATORY FAILURE WITH HYPOXIA (HCC): Primary | ICD-10-CM

## 2023-04-10 DIAGNOSIS — J18.9 PNEUMONIA OF RIGHT LUNG DUE TO INFECTIOUS ORGANISM, UNSPECIFIED PART OF LUNG: ICD-10-CM

## 2023-04-10 DIAGNOSIS — J96.01 SEPSIS WITH ACUTE HYPOXIC RESPIRATORY FAILURE WITHOUT SEPTIC SHOCK, DUE TO UNSPECIFIED ORGANISM (HCC): ICD-10-CM

## 2023-04-10 DIAGNOSIS — R65.20 SEPSIS WITH ACUTE HYPOXIC RESPIRATORY FAILURE WITHOUT SEPTIC SHOCK, DUE TO UNSPECIFIED ORGANISM (HCC): ICD-10-CM

## 2023-04-10 DIAGNOSIS — A41.9 SEPSIS WITH ACUTE HYPOXIC RESPIRATORY FAILURE WITHOUT SEPTIC SHOCK, DUE TO UNSPECIFIED ORGANISM (HCC): ICD-10-CM

## 2023-04-10 PROBLEM — Z85.118 HX OF CANCER OF LUNG: Status: ACTIVE | Noted: 2023-04-10

## 2023-04-10 PROBLEM — N18.30 CRI (CHRONIC RENAL INSUFFICIENCY), STAGE 3 (MODERATE) (HCC): Status: ACTIVE | Noted: 2017-06-19

## 2023-04-10 PROBLEM — N18.9 ANEMIA OF CHRONIC RENAL FAILURE: Status: ACTIVE | Noted: 2023-04-10

## 2023-04-10 PROBLEM — Z95.828 PRESENCE OF IVC FILTER: Status: ACTIVE | Noted: 2022-01-12

## 2023-04-10 PROBLEM — N18.32 STAGE 3B CHRONIC KIDNEY DISEASE (HCC): Status: ACTIVE | Noted: 2023-04-10

## 2023-04-10 PROBLEM — R60.9 EDEMA: Status: ACTIVE | Noted: 2023-04-10

## 2023-04-10 PROBLEM — N39.3 STRESS INCONTINENCE: Status: ACTIVE | Noted: 2018-11-02

## 2023-04-10 PROBLEM — R25.1 TREMOR OF LEFT HAND: Status: ACTIVE | Noted: 2023-04-10

## 2023-04-10 PROBLEM — D63.1 ANEMIA OF CHRONIC RENAL FAILURE: Status: ACTIVE | Noted: 2023-04-10

## 2023-04-10 PROBLEM — J15.9 COMMUNITY ACQUIRED BACTERIAL PNEUMONIA: Status: ACTIVE | Noted: 2023-04-10

## 2023-04-10 LAB
ALBUMIN SERPL-MCNC: 3.8 G/DL (ref 3.4–5)
ALBUMIN/GLOB SERPL: 1.3 {RATIO} (ref 1.1–2.2)
ALP SERPL-CCNC: 137 U/L (ref 40–129)
ALT SERPL-CCNC: 11 U/L (ref 10–40)
ANION GAP SERPL CALCULATED.3IONS-SCNC: 8 MMOL/L (ref 3–16)
AST SERPL-CCNC: 17 U/L (ref 15–37)
BASE EXCESS BLDV CALC-SCNC: 6.4 MMOL/L (ref -3–3)
BASOPHILS # BLD: 0.1 K/UL (ref 0–0.2)
BASOPHILS NFR BLD: 0.7 %
BILIRUB SERPL-MCNC: 0.4 MG/DL (ref 0–1)
BUN SERPL-MCNC: 18 MG/DL (ref 7–20)
CALCIUM SERPL-MCNC: 10.3 MG/DL (ref 8.3–10.6)
CHLORIDE SERPL-SCNC: 99 MMOL/L (ref 99–110)
CO2 BLDV-SCNC: 35 MMOL/L
CO2 SERPL-SCNC: 34 MMOL/L (ref 21–32)
COHGB MFR BLDV: 1.3 % (ref 0–1.5)
CREAT SERPL-MCNC: 1.5 MG/DL (ref 0.6–1.2)
DEPRECATED RDW RBC AUTO: 14.1 % (ref 12.4–15.4)
EOSINOPHIL # BLD: 0.2 K/UL (ref 0–0.6)
EOSINOPHIL NFR BLD: 2.7 %
FLUAV RNA RESP QL NAA+PROBE: NOT DETECTED
FLUBV RNA RESP QL NAA+PROBE: NOT DETECTED
GFR SERPLBLD CREATININE-BSD FMLA CKD-EPI: 35 ML/MIN/{1.73_M2}
GLUCOSE BLD-MCNC: 161 MG/DL (ref 70–99)
GLUCOSE BLD-MCNC: 247 MG/DL (ref 70–99)
GLUCOSE SERPL-MCNC: 116 MG/DL (ref 70–99)
HCO3 BLDV-SCNC: 33.2 MMOL/L (ref 23–29)
HCT VFR BLD AUTO: 30.4 % (ref 36–48)
HGB BLD-MCNC: 9.7 G/DL (ref 12–16)
LACTATE BLDV-SCNC: 1 MMOL/L (ref 0.4–1.9)
LYMPHOCYTES # BLD: 1.6 K/UL (ref 1–5.1)
LYMPHOCYTES NFR BLD: 18.9 %
MCH RBC QN AUTO: 31.8 PG (ref 26–34)
MCHC RBC AUTO-ENTMCNC: 32 G/DL (ref 31–36)
MCV RBC AUTO: 99.5 FL (ref 80–100)
METHGB MFR BLDV: 0.3 %
MONOCYTES # BLD: 0.7 K/UL (ref 0–1.3)
MONOCYTES NFR BLD: 8.7 %
NEUTROPHILS # BLD: 5.8 K/UL (ref 1.7–7.7)
NEUTROPHILS NFR BLD: 69 %
NT-PROBNP SERPL-MCNC: 1040 PG/ML (ref 0–449)
O2 THERAPY: ABNORMAL
PCO2 BLDV: 59.4 MMHG (ref 40–50)
PERFORMED ON: ABNORMAL
PERFORMED ON: ABNORMAL
PH BLDV: 7.37 [PH] (ref 7.35–7.45)
PLATELET # BLD AUTO: 235 K/UL (ref 135–450)
PMV BLD AUTO: 7.7 FL (ref 5–10.5)
PO2 BLDV: 32.7 MMHG (ref 25–40)
POTASSIUM SERPL-SCNC: 4.5 MMOL/L (ref 3.5–5.1)
PROCALCITONIN SERPL IA-MCNC: 0.31 NG/ML (ref 0–0.15)
PROT SERPL-MCNC: 6.8 G/DL (ref 6.4–8.2)
RBC # BLD AUTO: 3.06 M/UL (ref 4–5.2)
SAO2 % BLDV: 59 %
SARS-COV-2 RNA RESP QL NAA+PROBE: NOT DETECTED
SODIUM SERPL-SCNC: 141 MMOL/L (ref 136–145)
TROPONIN T SERPL-MCNC: <0.01 NG/ML
TROPONIN T SERPL-MCNC: <0.01 NG/ML
WBC # BLD AUTO: 8.3 K/UL (ref 4–11)

## 2023-04-10 PROCEDURE — 99223 1ST HOSP IP/OBS HIGH 75: CPT | Performed by: NURSE PRACTITIONER

## 2023-04-10 PROCEDURE — 82803 BLOOD GASES ANY COMBINATION: CPT

## 2023-04-10 PROCEDURE — 6360000002 HC RX W HCPCS: Performed by: NURSE PRACTITIONER

## 2023-04-10 PROCEDURE — 6360000002 HC RX W HCPCS: Performed by: STUDENT IN AN ORGANIZED HEALTH CARE EDUCATION/TRAINING PROGRAM

## 2023-04-10 PROCEDURE — 71045 X-RAY EXAM CHEST 1 VIEW: CPT

## 2023-04-10 PROCEDURE — 87040 BLOOD CULTURE FOR BACTERIA: CPT

## 2023-04-10 PROCEDURE — 2580000003 HC RX 258: Performed by: STUDENT IN AN ORGANIZED HEALTH CARE EDUCATION/TRAINING PROGRAM

## 2023-04-10 PROCEDURE — 76937 US GUIDE VASCULAR ACCESS: CPT

## 2023-04-10 PROCEDURE — 85025 COMPLETE CBC W/AUTO DIFF WBC: CPT

## 2023-04-10 PROCEDURE — 80053 COMPREHEN METABOLIC PANEL: CPT

## 2023-04-10 PROCEDURE — 94761 N-INVAS EAR/PLS OXIMETRY MLT: CPT

## 2023-04-10 PROCEDURE — 96375 TX/PRO/DX INJ NEW DRUG ADDON: CPT

## 2023-04-10 PROCEDURE — 05HB33Z INSERTION OF INFUSION DEVICE INTO RIGHT BASILIC VEIN, PERCUTANEOUS APPROACH: ICD-10-PCS | Performed by: INTERNAL MEDICINE

## 2023-04-10 PROCEDURE — 36415 COLL VENOUS BLD VENIPUNCTURE: CPT

## 2023-04-10 PROCEDURE — 6370000000 HC RX 637 (ALT 250 FOR IP): Performed by: NURSE PRACTITIONER

## 2023-04-10 PROCEDURE — 36410 VNPNXR 3YR/> PHY/QHP DX/THER: CPT

## 2023-04-10 PROCEDURE — 87636 SARSCOV2 & INF A&B AMP PRB: CPT

## 2023-04-10 PROCEDURE — 2580000003 HC RX 258: Performed by: NURSE PRACTITIONER

## 2023-04-10 PROCEDURE — 87449 NOS EACH ORGANISM AG IA: CPT

## 2023-04-10 PROCEDURE — 84145 PROCALCITONIN (PCT): CPT

## 2023-04-10 PROCEDURE — 94640 AIRWAY INHALATION TREATMENT: CPT

## 2023-04-10 PROCEDURE — 6370000000 HC RX 637 (ALT 250 FOR IP): Performed by: STUDENT IN AN ORGANIZED HEALTH CARE EDUCATION/TRAINING PROGRAM

## 2023-04-10 PROCEDURE — 99223 1ST HOSP IP/OBS HIGH 75: CPT | Performed by: INTERNAL MEDICINE

## 2023-04-10 PROCEDURE — 1200000000 HC SEMI PRIVATE

## 2023-04-10 PROCEDURE — 93005 ELECTROCARDIOGRAM TRACING: CPT | Performed by: STUDENT IN AN ORGANIZED HEALTH CARE EDUCATION/TRAINING PROGRAM

## 2023-04-10 PROCEDURE — C1751 CATH, INF, PER/CENT/MIDLINE: HCPCS

## 2023-04-10 PROCEDURE — 2700000000 HC OXYGEN THERAPY PER DAY

## 2023-04-10 PROCEDURE — 83880 ASSAY OF NATRIURETIC PEPTIDE: CPT

## 2023-04-10 PROCEDURE — 84484 ASSAY OF TROPONIN QUANT: CPT

## 2023-04-10 PROCEDURE — 83605 ASSAY OF LACTIC ACID: CPT

## 2023-04-10 PROCEDURE — 99285 EMERGENCY DEPT VISIT HI MDM: CPT

## 2023-04-10 PROCEDURE — 96365 THER/PROPH/DIAG IV INF INIT: CPT

## 2023-04-10 PROCEDURE — 94669 MECHANICAL CHEST WALL OSCILL: CPT

## 2023-04-10 PROCEDURE — 83036 HEMOGLOBIN GLYCOSYLATED A1C: CPT

## 2023-04-10 PROCEDURE — 6370000000 HC RX 637 (ALT 250 FOR IP): Performed by: INTERNAL MEDICINE

## 2023-04-10 RX ORDER — TRAMADOL HYDROCHLORIDE 50 MG/1
50 TABLET ORAL EVERY 6 HOURS PRN
Status: DISCONTINUED | OUTPATIENT
Start: 2023-04-10 | End: 2023-04-10

## 2023-04-10 RX ORDER — CHOLECALCIFEROL (VITAMIN D3) 125 MCG
1000 CAPSULE ORAL DAILY
Status: DISCONTINUED | OUTPATIENT
Start: 2023-04-10 | End: 2023-04-13 | Stop reason: HOSPADM

## 2023-04-10 RX ORDER — ENOXAPARIN SODIUM 100 MG/ML
40 INJECTION SUBCUTANEOUS DAILY
Status: DISCONTINUED | OUTPATIENT
Start: 2023-04-10 | End: 2023-04-11

## 2023-04-10 RX ORDER — AZITHROMYCIN 250 MG/1
500 TABLET, FILM COATED ORAL EVERY 24 HOURS
Status: COMPLETED | OUTPATIENT
Start: 2023-04-10 | End: 2023-04-12

## 2023-04-10 RX ORDER — LEVOFLOXACIN 5 MG/ML
750 INJECTION, SOLUTION INTRAVENOUS ONCE
Status: DISCONTINUED | OUTPATIENT
Start: 2023-04-10 | End: 2023-04-10 | Stop reason: HOSPADM

## 2023-04-10 RX ORDER — OXYBUTYNIN CHLORIDE 5 MG/1
5 TABLET ORAL 3 TIMES DAILY
Status: DISCONTINUED | OUTPATIENT
Start: 2023-04-10 | End: 2023-04-13 | Stop reason: HOSPADM

## 2023-04-10 RX ORDER — SODIUM CHLORIDE 9 MG/ML
INJECTION, SOLUTION INTRAVENOUS PRN
Status: DISCONTINUED | OUTPATIENT
Start: 2023-04-10 | End: 2023-04-13 | Stop reason: HOSPADM

## 2023-04-10 RX ORDER — SODIUM CHLORIDE 0.9 % (FLUSH) 0.9 %
5-40 SYRINGE (ML) INJECTION EVERY 12 HOURS SCHEDULED
Status: DISCONTINUED | OUTPATIENT
Start: 2023-04-10 | End: 2023-04-13 | Stop reason: HOSPADM

## 2023-04-10 RX ORDER — IPRATROPIUM BROMIDE AND ALBUTEROL SULFATE 2.5; .5 MG/3ML; MG/3ML
1 SOLUTION RESPIRATORY (INHALATION) ONCE
Status: COMPLETED | OUTPATIENT
Start: 2023-04-10 | End: 2023-04-10

## 2023-04-10 RX ORDER — PANTOPRAZOLE SODIUM 40 MG/1
40 TABLET, DELAYED RELEASE ORAL
Status: DISCONTINUED | OUTPATIENT
Start: 2023-04-10 | End: 2023-04-13 | Stop reason: HOSPADM

## 2023-04-10 RX ORDER — GUAIFENESIN 600 MG/1
600 TABLET, EXTENDED RELEASE ORAL 2 TIMES DAILY
Status: DISCONTINUED | OUTPATIENT
Start: 2023-04-10 | End: 2023-04-13 | Stop reason: HOSPADM

## 2023-04-10 RX ORDER — IPRATROPIUM BROMIDE AND ALBUTEROL SULFATE 2.5; .5 MG/3ML; MG/3ML
1 SOLUTION RESPIRATORY (INHALATION)
Status: DISCONTINUED | OUTPATIENT
Start: 2023-04-10 | End: 2023-04-10

## 2023-04-10 RX ORDER — ACETAMINOPHEN 325 MG/1
650 TABLET ORAL EVERY 6 HOURS PRN
Status: DISCONTINUED | OUTPATIENT
Start: 2023-04-10 | End: 2023-04-13 | Stop reason: HOSPADM

## 2023-04-10 RX ORDER — BUMETANIDE 1 MG/1
2 TABLET ORAL DAILY
Status: DISCONTINUED | OUTPATIENT
Start: 2023-04-11 | End: 2023-04-10

## 2023-04-10 RX ORDER — AMLODIPINE BESYLATE 5 MG/1
5 TABLET ORAL DAILY
Status: DISCONTINUED | OUTPATIENT
Start: 2023-04-10 | End: 2023-04-13 | Stop reason: HOSPADM

## 2023-04-10 RX ORDER — AZITHROMYCIN 250 MG/1
500 TABLET, FILM COATED ORAL EVERY 24 HOURS
Status: DISCONTINUED | OUTPATIENT
Start: 2023-04-10 | End: 2023-04-10

## 2023-04-10 RX ORDER — METHYLPREDNISOLONE SODIUM SUCCINATE 125 MG/2ML
125 INJECTION, POWDER, LYOPHILIZED, FOR SOLUTION INTRAMUSCULAR; INTRAVENOUS ONCE
Status: COMPLETED | OUTPATIENT
Start: 2023-04-10 | End: 2023-04-10

## 2023-04-10 RX ORDER — IPRATROPIUM BROMIDE AND ALBUTEROL SULFATE 2.5; .5 MG/3ML; MG/3ML
1 SOLUTION RESPIRATORY (INHALATION) 2 TIMES DAILY
Status: DISCONTINUED | OUTPATIENT
Start: 2023-04-10 | End: 2023-04-13 | Stop reason: HOSPADM

## 2023-04-10 RX ORDER — DULOXETIN HYDROCHLORIDE 60 MG/1
60 CAPSULE, DELAYED RELEASE ORAL DAILY
Status: DISCONTINUED | OUTPATIENT
Start: 2023-04-10 | End: 2023-04-13 | Stop reason: HOSPADM

## 2023-04-10 RX ORDER — ONDANSETRON 4 MG/1
4 TABLET, ORALLY DISINTEGRATING ORAL EVERY 8 HOURS PRN
Status: DISCONTINUED | OUTPATIENT
Start: 2023-04-10 | End: 2023-04-13 | Stop reason: HOSPADM

## 2023-04-10 RX ORDER — ARIPIPRAZOLE 10 MG/1
5 TABLET ORAL DAILY
Status: DISCONTINUED | OUTPATIENT
Start: 2023-04-10 | End: 2023-04-13 | Stop reason: HOSPADM

## 2023-04-10 RX ORDER — ALLOPURINOL 100 MG/1
100 TABLET ORAL 2 TIMES DAILY
Status: DISCONTINUED | OUTPATIENT
Start: 2023-04-10 | End: 2023-04-13 | Stop reason: HOSPADM

## 2023-04-10 RX ORDER — DEXTROSE MONOHYDRATE 100 MG/ML
INJECTION, SOLUTION INTRAVENOUS CONTINUOUS PRN
Status: DISCONTINUED | OUTPATIENT
Start: 2023-04-10 | End: 2023-04-13 | Stop reason: HOSPADM

## 2023-04-10 RX ORDER — FOLIC ACID 1 MG/1
1 TABLET ORAL DAILY
Status: DISCONTINUED | OUTPATIENT
Start: 2023-04-10 | End: 2023-04-13 | Stop reason: HOSPADM

## 2023-04-10 RX ORDER — ONDANSETRON 2 MG/ML
4 INJECTION INTRAMUSCULAR; INTRAVENOUS EVERY 6 HOURS PRN
Status: DISCONTINUED | OUTPATIENT
Start: 2023-04-10 | End: 2023-04-13 | Stop reason: HOSPADM

## 2023-04-10 RX ORDER — ACETAMINOPHEN 650 MG/1
650 SUPPOSITORY RECTAL EVERY 6 HOURS PRN
Status: DISCONTINUED | OUTPATIENT
Start: 2023-04-10 | End: 2023-04-13 | Stop reason: HOSPADM

## 2023-04-10 RX ORDER — POLYETHYLENE GLYCOL 3350 17 G/17G
17 POWDER, FOR SOLUTION ORAL DAILY PRN
Status: DISCONTINUED | OUTPATIENT
Start: 2023-04-10 | End: 2023-04-13 | Stop reason: HOSPADM

## 2023-04-10 RX ORDER — SODIUM CHLORIDE 0.9 % (FLUSH) 0.9 %
5-40 SYRINGE (ML) INJECTION PRN
Status: DISCONTINUED | OUTPATIENT
Start: 2023-04-10 | End: 2023-04-13 | Stop reason: HOSPADM

## 2023-04-10 RX ORDER — BUMETANIDE 1 MG/1
1 TABLET ORAL 2 TIMES DAILY
Status: DISCONTINUED | OUTPATIENT
Start: 2023-04-11 | End: 2023-04-13 | Stop reason: HOSPADM

## 2023-04-10 RX ORDER — AZITHROMYCIN 250 MG/1
500 TABLET, FILM COATED ORAL EVERY 24 HOURS
Status: DISCONTINUED | OUTPATIENT
Start: 2023-04-11 | End: 2023-04-10 | Stop reason: SDUPTHER

## 2023-04-10 RX ORDER — INSULIN LISPRO 100 [IU]/ML
0-4 INJECTION, SOLUTION INTRAVENOUS; SUBCUTANEOUS
Status: DISCONTINUED | OUTPATIENT
Start: 2023-04-11 | End: 2023-04-13 | Stop reason: HOSPADM

## 2023-04-10 RX ORDER — 0.9 % SODIUM CHLORIDE 0.9 %
1000 INTRAVENOUS SOLUTION INTRAVENOUS ONCE
Status: COMPLETED | OUTPATIENT
Start: 2023-04-10 | End: 2023-04-10

## 2023-04-10 RX ORDER — LOSARTAN POTASSIUM 100 MG/1
100 TABLET ORAL DAILY
Status: DISCONTINUED | OUTPATIENT
Start: 2023-04-11 | End: 2023-04-12

## 2023-04-10 RX ORDER — METHYLPREDNISOLONE SODIUM SUCCINATE 40 MG/ML
40 INJECTION, POWDER, LYOPHILIZED, FOR SOLUTION INTRAMUSCULAR; INTRAVENOUS EVERY 12 HOURS
Status: DISCONTINUED | OUTPATIENT
Start: 2023-04-11 | End: 2023-04-12

## 2023-04-10 RX ORDER — ALBUTEROL SULFATE 2.5 MG/3ML
2.5 SOLUTION RESPIRATORY (INHALATION) EVERY 4 HOURS PRN
Status: DISCONTINUED | OUTPATIENT
Start: 2023-04-10 | End: 2023-04-13 | Stop reason: HOSPADM

## 2023-04-10 RX ORDER — FERROUS SULFATE 325(65) MG
325 TABLET ORAL
Status: DISCONTINUED | OUTPATIENT
Start: 2023-04-11 | End: 2023-04-13 | Stop reason: HOSPADM

## 2023-04-10 RX ORDER — INSULIN LISPRO 100 [IU]/ML
0-4 INJECTION, SOLUTION INTRAVENOUS; SUBCUTANEOUS NIGHTLY
Status: DISCONTINUED | OUTPATIENT
Start: 2023-04-10 | End: 2023-04-13 | Stop reason: HOSPADM

## 2023-04-10 RX ADMIN — ONDANSETRON 4 MG: 4 TABLET, ORALLY DISINTEGRATING ORAL at 17:39

## 2023-04-10 RX ADMIN — CEFTRIAXONE SODIUM 1000 MG: 1 INJECTION, POWDER, FOR SOLUTION INTRAMUSCULAR; INTRAVENOUS at 13:04

## 2023-04-10 RX ADMIN — AZITHROMYCIN 500 MG: 250 TABLET, FILM COATED ORAL at 17:39

## 2023-04-10 RX ADMIN — GUAIFENESIN 600 MG: 600 TABLET, EXTENDED RELEASE ORAL at 17:39

## 2023-04-10 RX ADMIN — METHYLPREDNISOLONE SODIUM SUCCINATE 125 MG: 125 INJECTION, POWDER, FOR SOLUTION INTRAMUSCULAR; INTRAVENOUS at 12:11

## 2023-04-10 RX ADMIN — IPRATROPIUM BROMIDE AND ALBUTEROL SULFATE 1 AMPULE: 2.5; .5 SOLUTION RESPIRATORY (INHALATION) at 19:25

## 2023-04-10 RX ADMIN — IPRATROPIUM BROMIDE AND ALBUTEROL SULFATE 1 AMPULE: .5; 2.5 SOLUTION RESPIRATORY (INHALATION) at 11:45

## 2023-04-10 RX ADMIN — CEFTRIAXONE SODIUM 1000 MG: 1 INJECTION, POWDER, FOR SOLUTION INTRAMUSCULAR; INTRAVENOUS at 17:45

## 2023-04-10 RX ADMIN — PANTOPRAZOLE SODIUM 40 MG: 40 TABLET, DELAYED RELEASE ORAL at 17:39

## 2023-04-10 RX ADMIN — OXYBUTYNIN CHLORIDE 5 MG: 5 TABLET ORAL at 20:39

## 2023-04-10 RX ADMIN — ENOXAPARIN SODIUM 40 MG: 100 INJECTION SUBCUTANEOUS at 17:38

## 2023-04-10 RX ADMIN — ALLOPURINOL 100 MG: 100 TABLET ORAL at 20:39

## 2023-04-10 RX ADMIN — SODIUM CHLORIDE 1000 ML: 9 INJECTION, SOLUTION INTRAVENOUS at 13:03

## 2023-04-10 ASSESSMENT — PAIN - FUNCTIONAL ASSESSMENT: PAIN_FUNCTIONAL_ASSESSMENT: 0-10

## 2023-04-10 ASSESSMENT — PAIN SCALES - GENERAL: PAINLEVEL_OUTOF10: 4

## 2023-04-10 NOTE — H&P
Hospital Medicine History & Physical      PCP: Karine Shell MD    Date of Admission: 4/10/2023    Date of Service: Pt seen/examined on 04/10/23      Chief Complaint:    Chief Complaint   Patient presents with    Shortness of Breath     SOB x1 week, wears 5L at baseline          History Of Present Illness: The patient is a 76 y.o. female with PMH of COPD, chronic hypoxic respiratory failure on 4-5 liters, hx of NSCL cancer, depression, borderline DM, HTN, who presents to [facility] with shortness of breath. History obtained from the patient and review of EMR. Pt with shortness of breath for over 1 week, she was treated with Augmentin by PCP rinku 4 days ago. Pt presented to the ED with worsening shortness of breath and right-sided dull chest pain. CXR in ED showed right-sided lower lobe airspace infiltrates compatible with pneumonia. Pt usually on 4 liters of oxygen, turned up to 5 liters in the ED. Pt resting in bed awake and alert. Asked for her oxygen to be turned back down to 4 liters. Pt stated she has been coughing a lot over the last couple days now has some right-sided chest pain, especially when coughing and pushing on her right side. She has some left hand tremors that has been ongoing for over a year per patient.      Past Medical History:        Diagnosis Date    Arthritis     Asthma     Bronchitis chronic     Chronic respiratory failure (HCC)     Depression     Diabetes mellitus ([address]     BORDERLINE NO MEDS    Emphysema of lung ([address]     spontaneous pneumo 5/07/2020    GI bleed 12/15/2021    Glaucoma     Hypertension     Hypoxemia 4/15/2011    Morbid obesity ([address]     Oxygen dependent     Uses O2 NC at 3L/min continuously    Pneumonia of left upper lobe due to Pseudomonas species ([address] 12/1/2021    Rash     due to nasal canula    SBO (small bowel obstruction) ([address] 2/10/2012    Shingles     recently no problems now    Sleep apnea     C pap    Spontaneous tension pneumothorax 5/1/2020

## 2023-04-10 NOTE — ED NOTES
5- Perfect Served the Hospitalist for a consult.  pneumonia, sepsis     Bety Salmeron  04/10/23 3182

## 2023-04-10 NOTE — ED PROVIDER NOTES
Alkaline Phosphatase 137 (*)     All other components within normal limits   BLOOD GAS, VENOUS - Abnormal; Notable for the following components:    pCO2, [place] 59.4 (*)     HCO3, Venous 33.2 (*)     Base Excess, [place] 6.4 (*)     All other components within normal limits   BRAIN NATRIURETIC PEPTIDE - Abnormal; Notable for the following components:    Pro-BNP 1,040 (*)     All other components within normal limits   COVID-19 & INFLUENZA COMBO   CULTURE, BLOOD 1   CULTURE, BLOOD 2   TROPONIN   LACTATE, SEPSIS   LACTATE, SEPSIS      When ordered only abnormal lab results are displayed. All other labs were within normal range or not returned as of this dictation. RADIOLOGY:      Non-plain film images such as CT, Ultrasound and MRI are read by the radiologist. Plain radiographic images are visualized and preliminarily interpreted by the ED Provider with the below findings:   Interpretation per the Radiologist below, if available at the time of this note:     XR CHEST PORTABLE   Final Result   Right lower lobe airspace infiltrates compatible with pneumonia. No results found. EKG: The Ekg interpreted by me shows  normal sinus rhythm with a rate of 91, frequent PVCs  Axis is   Left axis deviation  QTc is  normal  Intervals and Durations are unremarkable. ST Segments: nonspecific changes  Left axis deviation is new, PVCs are new compared to previous performed 12/27/2021    PROCEDURES   Unless otherwise noted below, none     CRITICAL CARE TIME   The total Critical Care time is 45 minutes which excludes separately billable procedures. Vitals:    Vitals:    04/10/23 1118 04/10/23 1121 04/10/23 1122 04/10/23 1220   BP:  (!) 162/61 (!) 162/61 (!) 155/129   Pulse: 91  90 96   Resp: 26  26 22   Temp: 97.7 °F (36.5 °C)      TempSrc: Oral      SpO2: 96%  96% 93%   Weight: 222 lb (100.7 kg)                Is this patient to be included in the SEP-1 Core Measure due to severe sepsis or septic shock?    Yes

## 2023-04-10 NOTE — ED NOTES
Multiple attempts made to gain IV access by US unsuccessful, Charge Hyun RN notified.       Nettie Dawson RN  04/10/23 1400

## 2023-04-10 NOTE — ED NOTES
ED SBAR report provider to [place], Gerhard Riedel. Patient to be transported to Room 0210 via stretcher by RN. Patient transported with bedside cardiac monitor and with IV medications infusing. IV site clean, dry, and intact. MEWS score and pain assessed as 0/10 and documented. Updated patient and family on plan of care.       Marixa Asencio RN  04/10/23 1003

## 2023-04-10 NOTE — CONSULTS
PULMONARY CONSULT NOTE  Anton Dinero is being seen at the request of Shelby Jacobson CNP for a consultation for COPD and pneumonia    HISTORY OF PRESENT ILLNESS: Anton Dinero is a 76 y.o. female with a PMHx of severe COPD f/b me in clinic, who presented to the ED on 4/10/2023 with a 1-week history of severe SOB, not improved with outpatient Augmentin from PCP prescribed 4/7/23, and associated with nonproductive cough, sweats & dull right-sided chest pain. She has increased her home O2 from 4 to 5 LPM and SpO2 remained in low 90s%. She called our office but proceeded to the ER prior to hearing back. CXR demonstrated RLL ASD.      PAST MEDICAL HISTORY:  Past Medical History:   Diagnosis Date    Arthritis     Asthma     Bronchitis chronic     Chronic respiratory failure ([address]     Depression     Diabetes mellitus ([address]     BORDERLINE NO MEDS    Emphysema of lung ([address]     spontaneous pneumo 5/07/2020    GI bleed 12/15/2021    Glaucoma     Hypertension     Hypoxemia 4/15/2011    Morbid obesity ([address]     Oxygen dependent     Uses O2 NC at 3L/min continuously    Pneumonia of left upper lobe due to Pseudomonas species ([address] 12/1/2021    Rash     due to nasal canula    SBO (small bowel obstruction) ([address] 2/10/2012    Shingles     recently no problems now    Sleep apnea     C pap    Spontaneous tension pneumothorax 5/1/2020     PAST SURGICAL HISTORY:  Past Surgical History:   Procedure Laterality Date    BRONCHOSCOPY N/A 11/30/2021    BRONCHOSCOPY ALVEOLAR LAVAGE performed by Korin Best MD at [facility]  11/30/2021    BRONCHOSCOPY THERAPUTIC ASPIRATION INITIAL performed by Korin Best MD at [facility]  3/9/2011    CATARACT REMOVAL WITH IMPLANT  3/23/2011    CHOLECYSTECTOMY      COLON SURGERY      11\" removed    COLONOSCOPY  11/18/13    Diverticulosis    HERNIA REPAIR      HYSTERECTOMY (CERVIX STATUS UNKNOWN)      LAP BAND  10/5/10    ADJUSTABLE GASTRIC

## 2023-04-11 ENCOUNTER — APPOINTMENT (OUTPATIENT)
Dept: CT IMAGING | Age: 78
DRG: 193 | End: 2023-04-11
Payer: MEDICARE

## 2023-04-11 LAB
ANION GAP SERPL CALCULATED.3IONS-SCNC: 6 MMOL/L (ref 3–16)
BASOPHILS # BLD: 0.1 K/UL (ref 0–0.2)
BASOPHILS NFR BLD: 0.7 %
BUN SERPL-MCNC: 24 MG/DL (ref 7–20)
CALCIUM SERPL-MCNC: 10.2 MG/DL (ref 8.3–10.6)
CHLORIDE SERPL-SCNC: 100 MMOL/L (ref 99–110)
CO2 SERPL-SCNC: 34 MMOL/L (ref 21–32)
CREAT SERPL-MCNC: 1.4 MG/DL (ref 0.6–1.2)
DEPRECATED RDW RBC AUTO: 14.2 % (ref 12.4–15.4)
EKG ATRIAL RATE: 91 BPM
EKG DIAGNOSIS: NORMAL
EKG P AXIS: 77 DEGREES
EKG P-R INTERVAL: 176 MS
EKG Q-T INTERVAL: 334 MS
EKG QRS DURATION: 86 MS
EKG QTC CALCULATION (BAZETT): 410 MS
EKG R AXIS: -57 DEGREES
EKG T AXIS: 71 DEGREES
EKG VENTRICULAR RATE: 91 BPM
EOSINOPHIL # BLD: 0 K/UL (ref 0–0.6)
EOSINOPHIL NFR BLD: 0 %
EST. AVERAGE GLUCOSE BLD GHB EST-MCNC: 99.7 MG/DL
GFR SERPLBLD CREATININE-BSD FMLA CKD-EPI: 38 ML/MIN/{1.73_M2}
GLUCOSE BLD-MCNC: 131 MG/DL (ref 70–99)
GLUCOSE BLD-MCNC: 157 MG/DL (ref 70–99)
GLUCOSE BLD-MCNC: 209 MG/DL (ref 70–99)
GLUCOSE BLD-MCNC: 210 MG/DL (ref 70–99)
GLUCOSE SERPL-MCNC: 148 MG/DL (ref 70–99)
HBA1C MFR BLD: 5.1 %
HCT VFR BLD AUTO: 26.6 % (ref 36–48)
HGB BLD-MCNC: 8.7 G/DL (ref 12–16)
LEGIONELLA AG UR QL: NORMAL
LYMPHOCYTES # BLD: 0.8 K/UL (ref 1–5.1)
LYMPHOCYTES NFR BLD: 10.7 %
MCH RBC QN AUTO: 32.5 PG (ref 26–34)
MCHC RBC AUTO-ENTMCNC: 32.8 G/DL (ref 31–36)
MCV RBC AUTO: 99 FL (ref 80–100)
MONOCYTES # BLD: 0.2 K/UL (ref 0–1.3)
MONOCYTES NFR BLD: 3.1 %
NEUTROPHILS # BLD: 6.1 K/UL (ref 1.7–7.7)
NEUTROPHILS NFR BLD: 85.5 %
PERFORMED ON: ABNORMAL
PLATELET # BLD AUTO: 235 K/UL (ref 135–450)
PMV BLD AUTO: 8.4 FL (ref 5–10.5)
POTASSIUM SERPL-SCNC: 5.1 MMOL/L (ref 3.5–5.1)
RBC # BLD AUTO: 2.69 M/UL (ref 4–5.2)
S PNEUM AG UR QL: NORMAL
SODIUM SERPL-SCNC: 140 MMOL/L (ref 136–145)
WBC # BLD AUTO: 7.1 K/UL (ref 4–11)

## 2023-04-11 PROCEDURE — 71250 CT THORAX DX C-: CPT

## 2023-04-11 PROCEDURE — 93010 ELECTROCARDIOGRAM REPORT: CPT | Performed by: INTERNAL MEDICINE

## 2023-04-11 PROCEDURE — 6370000000 HC RX 637 (ALT 250 FOR IP): Performed by: NURSE PRACTITIONER

## 2023-04-11 PROCEDURE — 6360000002 HC RX W HCPCS: Performed by: INTERNAL MEDICINE

## 2023-04-11 PROCEDURE — 80048 BASIC METABOLIC PNL TOTAL CA: CPT

## 2023-04-11 PROCEDURE — 85025 COMPLETE CBC W/AUTO DIFF WBC: CPT

## 2023-04-11 PROCEDURE — 2580000003 HC RX 258: Performed by: NURSE PRACTITIONER

## 2023-04-11 PROCEDURE — 6370000000 HC RX 637 (ALT 250 FOR IP): Performed by: INTERNAL MEDICINE

## 2023-04-11 PROCEDURE — 94761 N-INVAS EAR/PLS OXIMETRY MLT: CPT

## 2023-04-11 PROCEDURE — 94640 AIRWAY INHALATION TREATMENT: CPT

## 2023-04-11 PROCEDURE — 99232 SBSQ HOSP IP/OBS MODERATE 35: CPT | Performed by: INTERNAL MEDICINE

## 2023-04-11 PROCEDURE — 99233 SBSQ HOSP IP/OBS HIGH 50: CPT | Performed by: INTERNAL MEDICINE

## 2023-04-11 PROCEDURE — 6360000002 HC RX W HCPCS: Performed by: NURSE PRACTITIONER

## 2023-04-11 PROCEDURE — 94669 MECHANICAL CHEST WALL OSCILL: CPT

## 2023-04-11 PROCEDURE — 92610 EVALUATE SWALLOWING FUNCTION: CPT

## 2023-04-11 PROCEDURE — 2700000000 HC OXYGEN THERAPY PER DAY

## 2023-04-11 PROCEDURE — 1200000000 HC SEMI PRIVATE

## 2023-04-11 RX ORDER — ENOXAPARIN SODIUM 100 MG/ML
30 INJECTION SUBCUTANEOUS 2 TIMES DAILY
Status: DISCONTINUED | OUTPATIENT
Start: 2023-04-11 | End: 2023-04-13 | Stop reason: HOSPADM

## 2023-04-11 RX ADMIN — ENOXAPARIN SODIUM 30 MG: 100 INJECTION SUBCUTANEOUS at 20:49

## 2023-04-11 RX ADMIN — BUMETANIDE 1 MG: 1 TABLET ORAL at 20:49

## 2023-04-11 RX ADMIN — CAMPHOR (SYNTHETIC), MENTHOL, UNSPECIFIED FORM, AND METHYL SALICYLATE: 40; 100; 300 CREAM TOPICAL at 20:52

## 2023-04-11 RX ADMIN — OXYBUTYNIN CHLORIDE 5 MG: 5 TABLET ORAL at 10:55

## 2023-04-11 RX ADMIN — CYANOCOBALAMIN TAB 500 MCG 1000 MCG: 500 TAB at 10:55

## 2023-04-11 RX ADMIN — METHYLPREDNISOLONE SODIUM SUCCINATE 40 MG: 40 INJECTION, POWDER, FOR SOLUTION INTRAMUSCULAR; INTRAVENOUS at 20:49

## 2023-04-11 RX ADMIN — Medication 10 ML: at 20:50

## 2023-04-11 RX ADMIN — BUMETANIDE 1 MG: 1 TABLET ORAL at 11:25

## 2023-04-11 RX ADMIN — PANTOPRAZOLE SODIUM 40 MG: 40 TABLET, DELAYED RELEASE ORAL at 05:46

## 2023-04-11 RX ADMIN — AMLODIPINE BESYLATE 5 MG: 5 TABLET ORAL at 11:26

## 2023-04-11 RX ADMIN — LOSARTAN POTASSIUM 100 MG: 100 TABLET, FILM COATED ORAL at 10:54

## 2023-04-11 RX ADMIN — ALLOPURINOL 100 MG: 100 TABLET ORAL at 20:49

## 2023-04-11 RX ADMIN — OXYBUTYNIN CHLORIDE 5 MG: 5 TABLET ORAL at 12:14

## 2023-04-11 RX ADMIN — ALLOPURINOL 100 MG: 100 TABLET ORAL at 10:55

## 2023-04-11 RX ADMIN — DULOXETINE HYDROCHLORIDE 60 MG: 60 CAPSULE, DELAYED RELEASE ORAL at 11:25

## 2023-04-11 RX ADMIN — FOLIC ACID 1 MG: 1 TABLET ORAL at 10:55

## 2023-04-11 RX ADMIN — OXYBUTYNIN CHLORIDE 5 MG: 5 TABLET ORAL at 20:49

## 2023-04-11 RX ADMIN — INSULIN LISPRO 1 UNITS: 100 INJECTION, SOLUTION INTRAVENOUS; SUBCUTANEOUS at 12:15

## 2023-04-11 RX ADMIN — Medication 10 ML: at 10:57

## 2023-04-11 RX ADMIN — PANTOPRAZOLE SODIUM 40 MG: 40 TABLET, DELAYED RELEASE ORAL at 17:03

## 2023-04-11 RX ADMIN — AZITHROMYCIN 500 MG: 250 TABLET, FILM COATED ORAL at 17:03

## 2023-04-11 RX ADMIN — GUAIFENESIN 600 MG: 600 TABLET, EXTENDED RELEASE ORAL at 10:54

## 2023-04-11 RX ADMIN — IPRATROPIUM BROMIDE AND ALBUTEROL SULFATE 1 AMPULE: 2.5; .5 SOLUTION RESPIRATORY (INHALATION) at 08:04

## 2023-04-11 RX ADMIN — FERROUS SULFATE TAB 325 MG (65 MG ELEMENTAL FE) 325 MG: 325 (65 FE) TAB at 10:55

## 2023-04-11 RX ADMIN — CEFTRIAXONE SODIUM 1000 MG: 1 INJECTION, POWDER, FOR SOLUTION INTRAMUSCULAR; INTRAVENOUS at 17:03

## 2023-04-11 RX ADMIN — GUAIFENESIN 600 MG: 600 TABLET, EXTENDED RELEASE ORAL at 20:49

## 2023-04-11 RX ADMIN — IPRATROPIUM BROMIDE AND ALBUTEROL SULFATE 1 AMPULE: 2.5; .5 SOLUTION RESPIRATORY (INHALATION) at 19:34

## 2023-04-11 RX ADMIN — ARIPIPRAZOLE 5 MG: 10 TABLET ORAL at 10:55

## 2023-04-11 RX ADMIN — METHYLPREDNISOLONE SODIUM SUCCINATE 40 MG: 40 INJECTION, POWDER, FOR SOLUTION INTRAMUSCULAR; INTRAVENOUS at 10:54

## 2023-04-11 NOTE — PLAN OF CARE
SLP completed evaluation. Please refer to notes in EMR.     Sade Pradhan MA. CF- SLP  Speech Language Pathologist  [facility].[id number]

## 2023-04-11 NOTE — ACP (ADVANCE CARE PLANNING)
Advance Care Planning     General Advance Care Planning (ACP) Conversation    Date of Conversation: 4/10/2023  Conducted with: Patient with Decision Making Capacity    Healthcare Decision Maker:    Primary Decision Maker: Mateo Singh - [phone]    Secondary Decision Maker: Romi Lobo - [phone]  Click here to complete Healthcare Decision Makers including selection of the Healthcare Decision Maker Relationship (ie \"Primary\"). Today we documented Decision Maker(s) consistent with Legal Next of Kin hierarchy.     Content/Action Overview:    Reviewed DNR/DNI and patient elects Full Code (Attempt Resuscitation)    Length of Voluntary ACP Conversation in minutes:  <16 minutes (Non-Billable)    CHRISTOPHER Ramirez  Case Management Department  Phone: [phone] Fax: [phone]

## 2023-04-11 NOTE — PLAN OF CARE
Problem: Discharge Planning  Goal: Discharge to home or other facility with appropriate resources  4/10/2023 2205 by Eryn Fernandez RN  Outcome: Progressing  4/10/2023 1644 by Dirk Gray RN  Outcome: Progressing     Problem: Pain  Goal: Verbalizes/displays adequate comfort level or baseline comfort level  4/10/2023 2205 by Eryn Fernandez RN  Outcome: Progressing  4/10/2023 1644 by Dirk Gray RN  Outcome: Progressing     Problem: ABCDS Injury Assessment  Goal: Absence of physical injury  4/10/2023 2205 by Eryn Fernandez RN  Outcome: Progressing  4/10/2023 1644 by Dirk Gray RN  Outcome: Progressing     Problem: Safety - Adult  Goal: Free from fall injury  4/10/2023 2205 by Eryn Fernandez RN  Outcome: Progressing  4/10/2023 1644 by Dirk Gray RN  Outcome: Progressing

## 2023-04-11 NOTE — FLOWSHEET NOTE
04/10/23 2015   Vital Signs   Temp 98.3 °F (36.8 °C)   Temp Source Oral   Heart Rate 95   Heart Rate Source Monitor   Resp 20   BP (!) 142/84   MAP (Calculated) 103   BP Location Left Arm   BP Method Automatic   Patient Position Semi fowlers   Level of Consciousness 0   MEWS Score 1   Pain Assessment   Pain Assessment None - Denies Pain   Oxygen Therapy   SpO2 96 %   Pulse Oximetry Type Continuous   O2 Device Nasal cannula   O2 Flow Rate (L/min) 5 L/min     Pt assessment completed, Medications given per MAR, Pt is awake and alert sitting comfortably in bed. Pt denies any needs at this time.  Call light within reach 167.64

## 2023-04-12 LAB
ANION GAP SERPL CALCULATED.3IONS-SCNC: 7 MMOL/L (ref 3–16)
BASOPHILS # BLD: 0 K/UL (ref 0–0.2)
BASOPHILS NFR BLD: 0.4 %
BUN SERPL-MCNC: 33 MG/DL (ref 7–20)
CALCIUM SERPL-MCNC: 9.7 MG/DL (ref 8.3–10.6)
CHLORIDE SERPL-SCNC: 98 MMOL/L (ref 99–110)
CO2 SERPL-SCNC: 33 MMOL/L (ref 21–32)
CREAT SERPL-MCNC: 1.5 MG/DL (ref 0.6–1.2)
DEPRECATED RDW RBC AUTO: 14.1 % (ref 12.4–15.4)
EOSINOPHIL # BLD: 0 K/UL (ref 0–0.6)
EOSINOPHIL NFR BLD: 0.1 %
GFR SERPLBLD CREATININE-BSD FMLA CKD-EPI: 35 ML/MIN/{1.73_M2}
GLUCOSE BLD-MCNC: 122 MG/DL (ref 70–99)
GLUCOSE BLD-MCNC: 150 MG/DL (ref 70–99)
GLUCOSE BLD-MCNC: 169 MG/DL (ref 70–99)
GLUCOSE BLD-MCNC: 204 MG/DL (ref 70–99)
GLUCOSE SERPL-MCNC: 154 MG/DL (ref 70–99)
HCT VFR BLD AUTO: 25.7 % (ref 36–48)
HGB BLD-MCNC: 8.6 G/DL (ref 12–16)
LYMPHOCYTES # BLD: 0.8 K/UL (ref 1–5.1)
LYMPHOCYTES NFR BLD: 9.4 %
MCH RBC QN AUTO: 32.6 PG (ref 26–34)
MCHC RBC AUTO-ENTMCNC: 33.3 G/DL (ref 31–36)
MCV RBC AUTO: 97.9 FL (ref 80–100)
MONOCYTES # BLD: 0.4 K/UL (ref 0–1.3)
MONOCYTES NFR BLD: 4.3 %
NEUTROPHILS # BLD: 7.5 K/UL (ref 1.7–7.7)
NEUTROPHILS NFR BLD: 85.8 %
PERFORMED ON: ABNORMAL
PLATELET # BLD AUTO: 273 K/UL (ref 135–450)
PMV BLD AUTO: 7.9 FL (ref 5–10.5)
POTASSIUM SERPL-SCNC: 5 MMOL/L (ref 3.5–5.1)
RBC # BLD AUTO: 2.63 M/UL (ref 4–5.2)
SODIUM SERPL-SCNC: 138 MMOL/L (ref 136–145)
WBC # BLD AUTO: 8.7 K/UL (ref 4–11)

## 2023-04-12 PROCEDURE — 1200000000 HC SEMI PRIVATE

## 2023-04-12 PROCEDURE — 2580000003 HC RX 258: Performed by: NURSE PRACTITIONER

## 2023-04-12 PROCEDURE — 6360000002 HC RX W HCPCS: Performed by: NURSE PRACTITIONER

## 2023-04-12 PROCEDURE — 6370000000 HC RX 637 (ALT 250 FOR IP): Performed by: NURSE PRACTITIONER

## 2023-04-12 PROCEDURE — 97162 PT EVAL MOD COMPLEX 30 MIN: CPT

## 2023-04-12 PROCEDURE — 85025 COMPLETE CBC W/AUTO DIFF WBC: CPT

## 2023-04-12 PROCEDURE — 97165 OT EVAL LOW COMPLEX 30 MIN: CPT

## 2023-04-12 PROCEDURE — 97530 THERAPEUTIC ACTIVITIES: CPT

## 2023-04-12 PROCEDURE — 94761 N-INVAS EAR/PLS OXIMETRY MLT: CPT

## 2023-04-12 PROCEDURE — 97535 SELF CARE MNGMENT TRAINING: CPT

## 2023-04-12 PROCEDURE — 99232 SBSQ HOSP IP/OBS MODERATE 35: CPT | Performed by: INTERNAL MEDICINE

## 2023-04-12 PROCEDURE — 6370000000 HC RX 637 (ALT 250 FOR IP): Performed by: INTERNAL MEDICINE

## 2023-04-12 PROCEDURE — 94640 AIRWAY INHALATION TREATMENT: CPT

## 2023-04-12 PROCEDURE — 94669 MECHANICAL CHEST WALL OSCILL: CPT

## 2023-04-12 PROCEDURE — 97116 GAIT TRAINING THERAPY: CPT

## 2023-04-12 PROCEDURE — 80048 BASIC METABOLIC PNL TOTAL CA: CPT

## 2023-04-12 PROCEDURE — 6360000002 HC RX W HCPCS: Performed by: INTERNAL MEDICINE

## 2023-04-12 PROCEDURE — 2700000000 HC OXYGEN THERAPY PER DAY

## 2023-04-12 RX ORDER — LOSARTAN POTASSIUM 25 MG/1
50 TABLET ORAL DAILY
Status: DISCONTINUED | OUTPATIENT
Start: 2023-04-13 | End: 2023-04-13 | Stop reason: HOSPADM

## 2023-04-12 RX ORDER — PREDNISONE 20 MG/1
40 TABLET ORAL DAILY
Status: DISCONTINUED | OUTPATIENT
Start: 2023-04-13 | End: 2023-04-13 | Stop reason: HOSPADM

## 2023-04-12 RX ADMIN — FERROUS SULFATE TAB 325 MG (65 MG ELEMENTAL FE) 325 MG: 325 (65 FE) TAB at 09:08

## 2023-04-12 RX ADMIN — CEFTRIAXONE SODIUM 1000 MG: 1 INJECTION, POWDER, FOR SOLUTION INTRAMUSCULAR; INTRAVENOUS at 18:45

## 2023-04-12 RX ADMIN — OXYBUTYNIN CHLORIDE 5 MG: 5 TABLET ORAL at 20:27

## 2023-04-12 RX ADMIN — PANTOPRAZOLE SODIUM 40 MG: 40 TABLET, DELAYED RELEASE ORAL at 04:59

## 2023-04-12 RX ADMIN — Medication 10 ML: at 09:08

## 2023-04-12 RX ADMIN — PANTOPRAZOLE SODIUM 40 MG: 40 TABLET, DELAYED RELEASE ORAL at 18:44

## 2023-04-12 RX ADMIN — GUAIFENESIN 600 MG: 600 TABLET, EXTENDED RELEASE ORAL at 20:27

## 2023-04-12 RX ADMIN — ENOXAPARIN SODIUM 30 MG: 100 INJECTION SUBCUTANEOUS at 20:27

## 2023-04-12 RX ADMIN — TIOTROPIUM BROMIDE INHALATION SPRAY 2 PUFF: 3.12 SPRAY, METERED RESPIRATORY (INHALATION) at 07:26

## 2023-04-12 RX ADMIN — Medication 10 ML: at 20:27

## 2023-04-12 RX ADMIN — OXYBUTYNIN CHLORIDE 5 MG: 5 TABLET ORAL at 09:08

## 2023-04-12 RX ADMIN — IPRATROPIUM BROMIDE AND ALBUTEROL SULFATE 1 AMPULE: 2.5; .5 SOLUTION RESPIRATORY (INHALATION) at 19:18

## 2023-04-12 RX ADMIN — CYANOCOBALAMIN TAB 500 MCG 1000 MCG: 500 TAB at 09:07

## 2023-04-12 RX ADMIN — IPRATROPIUM BROMIDE AND ALBUTEROL SULFATE 1 AMPULE: 2.5; .5 SOLUTION RESPIRATORY (INHALATION) at 07:26

## 2023-04-12 RX ADMIN — ALLOPURINOL 100 MG: 100 TABLET ORAL at 09:07

## 2023-04-12 RX ADMIN — OXYBUTYNIN CHLORIDE 5 MG: 5 TABLET ORAL at 13:15

## 2023-04-12 RX ADMIN — AZITHROMYCIN 500 MG: 250 TABLET, FILM COATED ORAL at 18:44

## 2023-04-12 RX ADMIN — GUAIFENESIN 600 MG: 600 TABLET, EXTENDED RELEASE ORAL at 09:07

## 2023-04-12 RX ADMIN — BUMETANIDE 1 MG: 1 TABLET ORAL at 09:57

## 2023-04-12 RX ADMIN — BUMETANIDE 1 MG: 1 TABLET ORAL at 20:30

## 2023-04-12 RX ADMIN — AMLODIPINE BESYLATE 5 MG: 5 TABLET ORAL at 09:57

## 2023-04-12 RX ADMIN — FOLIC ACID 1 MG: 1 TABLET ORAL at 09:07

## 2023-04-12 RX ADMIN — ARIPIPRAZOLE 5 MG: 10 TABLET ORAL at 09:06

## 2023-04-12 RX ADMIN — ALLOPURINOL 100 MG: 100 TABLET ORAL at 20:27

## 2023-04-12 RX ADMIN — ENOXAPARIN SODIUM 30 MG: 100 INJECTION SUBCUTANEOUS at 09:07

## 2023-04-12 RX ADMIN — DULOXETINE HYDROCHLORIDE 60 MG: 60 CAPSULE, DELAYED RELEASE ORAL at 09:07

## 2023-04-12 RX ADMIN — METHYLPREDNISOLONE SODIUM SUCCINATE 40 MG: 40 INJECTION, POWDER, FOR SOLUTION INTRAMUSCULAR; INTRAVENOUS at 09:07

## 2023-04-12 RX ADMIN — ACETAMINOPHEN 650 MG: 325 TABLET ORAL at 01:20

## 2023-04-12 ASSESSMENT — PAIN SCALES - GENERAL
PAINLEVEL_OUTOF10: 4
PAINLEVEL_OUTOF10: 7

## 2023-04-12 ASSESSMENT — PAIN DESCRIPTION - ORIENTATION: ORIENTATION: LEFT

## 2023-04-12 ASSESSMENT — PAIN DESCRIPTION - LOCATION: LOCATION: KNEE

## 2023-04-12 ASSESSMENT — PAIN DESCRIPTION - PAIN TYPE: TYPE: CHRONIC PAIN

## 2023-04-12 ASSESSMENT — PAIN DESCRIPTION - DESCRIPTORS: DESCRIPTORS: ACHING

## 2023-04-12 NOTE — PLAN OF CARE
Problem: Discharge Planning  Goal: Discharge to home or other facility with appropriate resources  4/11/2023 2300 by Aracelis Linn RN  Outcome: Progressing  4/11/2023 1516 by Sarah Alvarez RN  Outcome: Progressing     Problem: Safety - Adult  Goal: Free from fall injury  4/11/2023 1516 by Saarh Alvarez RN  Outcome: Progressing

## 2023-04-12 NOTE — FLOWSHEET NOTE
04/11/23 2041   Vital Signs   Temp 98.3 °F (36.8 °C)   Temp Source Oral   Heart Rate (!) 101   Heart Rate Source Monitor   Resp 18   BP (!) 152/53   MAP (Calculated) 86   BP Location Left upper arm   BP Method Automatic   Patient Position Semi fowlers   Level of Consciousness 0   MEWS Score 2   Pain Assessment   Pain Assessment None - Denies Pain   Oxygen Therapy   SpO2 92 %   O2 Device High flow nasal cannula   O2 Flow Rate (L/min) 4 L/min     Pt A/O assessment completed. Pt SOB with talking at this time. SpO2 72% pt deep breathing. Assessment completed 97% on 4 liters after deep breathing. Meds given per MAR. Pt denies any needs.  Call light within reach and bed alarm on

## 2023-04-13 VITALS
DIASTOLIC BLOOD PRESSURE: 74 MMHG | BODY MASS INDEX: 41.18 KG/M2 | OXYGEN SATURATION: 90 % | HEART RATE: 82 BPM | RESPIRATION RATE: 18 BRPM | WEIGHT: 232.4 LBS | TEMPERATURE: 97.4 F | HEIGHT: 63 IN | SYSTOLIC BLOOD PRESSURE: 150 MMHG

## 2023-04-13 LAB
ANION GAP SERPL CALCULATED.3IONS-SCNC: 6 MMOL/L (ref 3–16)
BASOPHILS # BLD: 0 K/UL (ref 0–0.2)
BASOPHILS NFR BLD: 0.2 %
BUN SERPL-MCNC: 38 MG/DL (ref 7–20)
CALCIUM SERPL-MCNC: 9.8 MG/DL (ref 8.3–10.6)
CHLORIDE SERPL-SCNC: 100 MMOL/L (ref 99–110)
CO2 SERPL-SCNC: 37 MMOL/L (ref 21–32)
CREAT SERPL-MCNC: 1.3 MG/DL (ref 0.6–1.2)
DEPRECATED RDW RBC AUTO: 14.2 % (ref 12.4–15.4)
EOSINOPHIL # BLD: 0 K/UL (ref 0–0.6)
EOSINOPHIL NFR BLD: 0 %
GFR SERPLBLD CREATININE-BSD FMLA CKD-EPI: 42 ML/MIN/{1.73_M2}
GLUCOSE BLD-MCNC: 109 MG/DL (ref 70–99)
GLUCOSE BLD-MCNC: 99 MG/DL (ref 70–99)
GLUCOSE SERPL-MCNC: 109 MG/DL (ref 70–99)
HCT VFR BLD AUTO: 25.8 % (ref 36–48)
HGB BLD-MCNC: 8.5 G/DL (ref 12–16)
LYMPHOCYTES # BLD: 1.3 K/UL (ref 1–5.1)
LYMPHOCYTES NFR BLD: 19.3 %
MCH RBC QN AUTO: 32.3 PG (ref 26–34)
MCHC RBC AUTO-ENTMCNC: 33.2 G/DL (ref 31–36)
MCV RBC AUTO: 97.3 FL (ref 80–100)
MONOCYTES # BLD: 0.5 K/UL (ref 0–1.3)
MONOCYTES NFR BLD: 8.2 %
NEUTROPHILS # BLD: 4.8 K/UL (ref 1.7–7.7)
NEUTROPHILS NFR BLD: 72.3 %
PERFORMED ON: ABNORMAL
PERFORMED ON: NORMAL
PLATELET # BLD AUTO: 292 K/UL (ref 135–450)
PMV BLD AUTO: 8 FL (ref 5–10.5)
POTASSIUM SERPL-SCNC: 4 MMOL/L (ref 3.5–5.1)
RBC # BLD AUTO: 2.65 M/UL (ref 4–5.2)
SODIUM SERPL-SCNC: 143 MMOL/L (ref 136–145)
WBC # BLD AUTO: 6.7 K/UL (ref 4–11)

## 2023-04-13 PROCEDURE — 6370000000 HC RX 637 (ALT 250 FOR IP): Performed by: NURSE PRACTITIONER

## 2023-04-13 PROCEDURE — 94761 N-INVAS EAR/PLS OXIMETRY MLT: CPT

## 2023-04-13 PROCEDURE — 6370000000 HC RX 637 (ALT 250 FOR IP): Performed by: INTERNAL MEDICINE

## 2023-04-13 PROCEDURE — 99232 SBSQ HOSP IP/OBS MODERATE 35: CPT

## 2023-04-13 PROCEDURE — 94640 AIRWAY INHALATION TREATMENT: CPT

## 2023-04-13 PROCEDURE — 6360000002 HC RX W HCPCS: Performed by: INTERNAL MEDICINE

## 2023-04-13 PROCEDURE — 2700000000 HC OXYGEN THERAPY PER DAY

## 2023-04-13 PROCEDURE — 2580000003 HC RX 258: Performed by: NURSE PRACTITIONER

## 2023-04-13 PROCEDURE — 85025 COMPLETE CBC W/AUTO DIFF WBC: CPT

## 2023-04-13 PROCEDURE — 94669 MECHANICAL CHEST WALL OSCILL: CPT

## 2023-04-13 PROCEDURE — 80048 BASIC METABOLIC PNL TOTAL CA: CPT

## 2023-04-13 RX ORDER — PREDNISONE 10 MG/1
TABLET ORAL
Qty: 30 TABLET | Refills: 0 | Status: SHIPPED | OUTPATIENT
Start: 2023-04-13

## 2023-04-13 RX ORDER — LOSARTAN POTASSIUM 50 MG/1
50 TABLET ORAL DAILY
Qty: 30 TABLET | Refills: 3 | Status: SHIPPED | OUTPATIENT
Start: 2023-04-14

## 2023-04-13 RX ORDER — GUAIFENESIN 600 MG/1
600 TABLET, EXTENDED RELEASE ORAL 2 TIMES DAILY
Qty: 60 TABLET | Refills: 0 | Status: SHIPPED | OUTPATIENT
Start: 2023-04-13

## 2023-04-13 RX ORDER — CEFDINIR 300 MG/1
300 CAPSULE ORAL 2 TIMES DAILY
Qty: 8 CAPSULE | Refills: 0 | Status: SHIPPED | OUTPATIENT
Start: 2023-04-13 | End: 2023-04-17

## 2023-04-13 RX ADMIN — DULOXETINE HYDROCHLORIDE 60 MG: 60 CAPSULE, DELAYED RELEASE ORAL at 07:52

## 2023-04-13 RX ADMIN — PANTOPRAZOLE SODIUM 40 MG: 40 TABLET, DELAYED RELEASE ORAL at 06:25

## 2023-04-13 RX ADMIN — ARIPIPRAZOLE 5 MG: 10 TABLET ORAL at 07:52

## 2023-04-13 RX ADMIN — BUMETANIDE 1 MG: 1 TABLET ORAL at 07:52

## 2023-04-13 RX ADMIN — TIOTROPIUM BROMIDE INHALATION SPRAY 2 PUFF: 3.12 SPRAY, METERED RESPIRATORY (INHALATION) at 07:38

## 2023-04-13 RX ADMIN — IPRATROPIUM BROMIDE AND ALBUTEROL SULFATE 1 AMPULE: 2.5; .5 SOLUTION RESPIRATORY (INHALATION) at 07:37

## 2023-04-13 RX ADMIN — FOLIC ACID 1 MG: 1 TABLET ORAL at 07:52

## 2023-04-13 RX ADMIN — ENOXAPARIN SODIUM 30 MG: 100 INJECTION SUBCUTANEOUS at 07:51

## 2023-04-13 RX ADMIN — Medication 10 ML: at 07:54

## 2023-04-13 RX ADMIN — OXYBUTYNIN CHLORIDE 5 MG: 5 TABLET ORAL at 07:52

## 2023-04-13 RX ADMIN — AMLODIPINE BESYLATE 5 MG: 5 TABLET ORAL at 07:52

## 2023-04-13 RX ADMIN — GUAIFENESIN 600 MG: 600 TABLET, EXTENDED RELEASE ORAL at 07:52

## 2023-04-13 RX ADMIN — FERROUS SULFATE TAB 325 MG (65 MG ELEMENTAL FE) 325 MG: 325 (65 FE) TAB at 07:52

## 2023-04-13 RX ADMIN — CYANOCOBALAMIN TAB 500 MCG 1000 MCG: 500 TAB at 07:52

## 2023-04-13 RX ADMIN — ALLOPURINOL 100 MG: 100 TABLET ORAL at 07:52

## 2023-04-13 RX ADMIN — PREDNISONE 40 MG: 20 TABLET ORAL at 07:52

## 2023-04-13 RX ADMIN — LOSARTAN POTASSIUM 50 MG: 25 TABLET, FILM COATED ORAL at 07:52

## 2023-04-13 NOTE — DISCHARGE INSTR - COC
Continuity of Care Form    Patient Name: Michelle Castillo   :  1945  MRN:  [id number]    Admit date:  4/10/2023  Discharge date:  ***    Code Status Order: Full Code   Advance Directives:     Admitting Physician:  Zulma Eaton MD  PCP: Te Marrero MD    Discharging Nurse: [facility] Unit/Room#: 0210/0210-01  Discharging Unit Phone Number: ***    Emergency Contact:   Extended Emergency Contact Information  Primary Emergency Contact: Terry Proctor  Address: [address] Phone: [phone]  Work Phone: [phone]  Mobile Phone: [phone]  Relation: Spouse  Secondary Emergency Contact: Nimo Hernandez   [address] Phone: [phone]  Mobile Phone: [phone]  Relation: Child    Past Surgical History:  Past Surgical History:   Procedure Laterality Date    BRONCHOSCOPY N/A 2021    BRONCHOSCOPY ALVEOLAR LAVAGE performed by Martha Aldana MD at [address]  2021    BRONCHOSCOPY THERAPUTIC ASPIRATION INITIAL performed by Martha Aldana MD at [facility]  3/9/2011    CATARACT REMOVAL WITH IMPLANT  3/23/2011    CHOLECYSTECTOMY      COLON SURGERY      11\" removed    COLONOSCOPY  13    Diverticulosis    HERNIA REPAIR      HYSTERECTOMY (CERVIX STATUS UNKNOWN)      IR MIDLINE CATH  4/10/2023    IR MIDLINE CATH 4/10/2023 [facility] SPECIAL PROCEDURES    LAP BAND  10/5/10    ADJUSTABLE GASTRIC RESTRICTIVE DEVICE    UPPER GASTROINTESTINAL ENDOSCOPY N/A 2021    EGD DIAGNOSTIC ONLY performed by Lima Garnica MD at [address]       Immunization History:   Immunization History   Administered Date(s) Administered    COVID-19, MODERNA BLUE border, Primary or Immunocompromised, (age 12y+), IM, 100 mcg/0.5mL 2021, 2021    COVID-19, MODERNA Booster BLUE border, (age 18y+), IM, 50mcg/0.25mL 2021    FLUZONE 3

## 2023-04-13 NOTE — CARE COORDINATION
CM delivered 2nd IMM and provided verbal explanation at bedside. Pt voiced understanding of discharge MCR rights and is agreeable to discharge within 4 hours of delivery of the IMM notice.     Juan ELMORE, CHRISTOPHER  Case Management Department  Phone: [phone] Fax: [phone]
DISCHARGE ORDER  Date/Time 2023 2:03 PM  Completed by: CHRISTOPHER Bush  Case Management Department  Phone: [phone] Fax: [phone]   Case Management    Patient Name: Tea Goldsmith      : 1945  Admitting Diagnosis: Community acquired bacterial pneumonia [J15.9]  Acute on chronic respiratory failure with hypoxia ([address] [J96.21]  Pneumonia of right lung due to infectious organism, unspecified part of lung [J18.9]  Sepsis with acute hypoxic respiratory failure without septic shock, due to unspecified organism ([address] [A41.9, R65.20, J96.01]      Admit order Date and Status:04/10/2023 Inpatient   (verify MD's last order for status of admission)      Noted discharge order. If applicable PT/OT recommendation at Discharge: home with 24hr assist and home PT/OT    DME recommendation by PT/OT:n/a    Confirmed discharge plan: Yes  with whom pt  If pt confirmed DC plan does family need to be contacted by CM No if yes who pt called her     Discharge Plan:     Date of Last IMM Given: today     Reviewed chart. Role of discharge planner explained and patient verbalized understanding. Discharge order is noted. Spoke with Miranda Lucia at [facility] who states unable to accept but she spoke with Rosalind Hernández at [facility] who accepted for RN/PT/OT and will do start of care either tomorrow or Saturday. Miranda Lucia stated Rosalind Hernández will pull from epic and CM doesn't need to fax anything. Met with pt at bedside. Pt confirmed she is returning home today and her  will be here in about 40 minutes. She stated agreement with [facility] and will call them if she doesn't hear from them by tomorrow. She stated her o2 tank is here and in the car. She denied all needs from CM. Nia Figueroa at [facility] stated no additional testing or orders needed since pt is on 4 liters currently and has 3 liters at home.      Skip Mcmahan RN aware of the above     Has Home O2 in place on admit:  Yes  Informed of need to bring portable home
[facility]  Received referral regarding HC from [address] [place] [address] Way. [employer] to follow up with [facility] and [facility] coverage closer to discharge.       Electronically signed by Princess Coleman RN on 4/12/2023 at 10:09 AM
[facility]  Spoke with Brenda German CM in follow up. Pt discharging home today. Sent request to [facility] for [facility] coverage. [facility] is unable to cover a SOC in [facility] zipcode in 24-48 hours. Telephone call to [facility]. Spoke with Domingo. [facility] is able to see pt for [facility] with SN, PT/OT. SOC will be scheduled for Friday or Saturday. Updated Vandana Yi. Collaborated with [facility] to arrange HC.       Electronically signed by Princess Coleman RN on 4/13/2023 at 1:55 PM
INTERDISCIPLINARY PLAN OF CARE CONFERENCE    Date/Time: 4/12/2023 10:08 AM  Completed by: CHRISTOPHER Valentine  Case Management Department  Phone: [phone] Fax: [phone]    Case Management      Patient Name:  Ana Herrera  YOB: 1945  Admitting Diagnosis: Community acquired bacterial pneumonia [J15.9]  Acute on chronic respiratory failure with hypoxia ([address] [J96.21]  Pneumonia of right lung due to infectious organism, unspecified part of lung [J18.9]  Sepsis with acute hypoxic respiratory failure without septic shock, due to unspecified organism ([address] [A41.9, R65.20, J96.01]     Admit Date/Time:  4/10/2023 11:07 AM    Chart reviewed. Interdisciplinary team contacted or reviewed plan related to patient progress and discharge plans. Disciplines included Case Management, Nursing, and Dietitian. Current Status:ongoing   PT/OT recommendation for discharge plan of care: n/a    Expected D/C Disposition:  Home  Confirmed plan with patient and/or family Yes confirmed with: (name) pt  Met with:pt    Discharge Plan Comments: Chart review completed. Met with pt at bedside. Pt stated she is returning home when discharged. Discussed skilled home care for RN/PT/OT and she stated she wants home care. She requested referral to [facility]) as she thinks she has had them in the past. [facility].     Referral called to Peggy at [facility] for RN/PT/OT. Home O2 in place on admit: Yes  Pt informed of need to bring portable home O2 tank on day of discharge for nursing to connect prior to leaving:  Yes  Verbalized agreement/Understanding:   Yes
None    ADLS  Prior functional level: Independent in ADLs/IADLs  Current functional level: Independent in ADLs/IADLs    PT AM-PAC:   /24  OT AM-PAC:   /24    Family can provide assistance at DC: Yes  Would you like Case Management to discuss the discharge plan with any other family members/significant others, and if so, who? Yes  Plans to Return to Present Housing: Yes  Other Identified Issues/Barriers to RETURNING to current housing: n/a  Potential Assistance needed at discharge: N/A            Potential DME:    Patient expects to discharge to: [address] [place] [address] for transportation at discharge:      Financial    Payor: [employer] / Plan: [place] / Product Type: *No Product type* /     Does insurance require precert for SNF: Yes    Potential assistance Purchasing Medications: No  Meds-to-Beds request:        [place], [address] [place],[address] [address]  Phone: [phone] Fax: [phone]      Notes:    Factors facilitating achievement of predicted outcomes: Family support, Good insight into deficits, and Has needed Durable Medical Equipment at home    Barriers to discharge: n/a    Additional Case Management Notes: Chart review completed. Met with pt at bedside. Pt stated she is independent with her ADL's and will return home. She stated that she is unsure if she will need home care. Message left for dwight at [facility] requesting a call back to confirm o2.  Addendum at 2:15pm: Gena Torres at [facility] stated pt only has home o2 and no bipap; orders are 3 liters cont    CM will follow     The Plan for Transition of Care is related to the following treatment goals of Community acquired bacterial pneumonia [J15.9]  Acute on chronic respiratory failure with hypoxia ([facility] [J96.21]  Pneumonia of right lung due to infectious organism, unspecified part of lung [J18.9]  Sepsis with acute hypoxic respiratory failure without septic

## 2023-04-13 NOTE — PLAN OF CARE
Problem: Discharge Planning  Goal: Discharge to home or other facility with appropriate resources  Outcome: Completed     Problem: Pain  Goal: Verbalizes/displays adequate comfort level or baseline comfort level  Outcome: Completed     Problem: ABCDS Injury Assessment  Goal: Absence of physical injury  Outcome: Completed     Problem: Safety - Adult  Goal: Free from fall injury  Outcome: Completed     Problem: Chronic Conditions and Co-morbidities  Goal: Patient's chronic conditions and co-morbidity symptoms are monitored and maintained or improved  Outcome: Completed     Problem: Skin/Tissue Integrity  Goal: Absence of new skin breakdown  Description: 1. Monitor for areas of redness and/or skin breakdown  2. Assess vascular access sites hourly  3. Every 4-6 hours minimum:  Change oxygen saturation probe site  4. Every 4-6 hours:  If on nasal continuous positive airway pressure, respiratory therapy assess nares and determine need for appliance change or resting period.   Outcome: Completed     Problem: Respiratory - Adult  Goal: Achieves optimal ventilation and oxygenation  Outcome: Completed     Problem: Cardiovascular - Adult  Goal: Maintains optimal cardiac output and hemodynamic stability  Outcome: Completed     Problem: Skin/Tissue Integrity - Adult  Goal: Skin integrity remains intact  Outcome: Completed     Problem: Musculoskeletal - Adult  Goal: Return mobility to safest level of function  Outcome: Completed     Problem: Gastrointestinal - Adult  Goal: Minimal or absence of nausea and vomiting  Outcome: Completed     Problem: Infection - Adult  Goal: Absence of infection at discharge  Outcome: Completed     Problem: Genitourinary - Adult  Goal: Absence of urinary retention  Outcome: Completed     Problem: Metabolic/Fluid and Electrolytes - Adult  Goal: Electrolytes maintained within normal limits  Outcome: Completed

## 2023-04-13 NOTE — PLAN OF CARE
Problem: Discharge Planning  Goal: Discharge to home or other facility with appropriate resources  4/12/2023 2308 by Ally Cavazos RN  Outcome: Progressing  Flowsheets (Taken 4/12/2023 2030)  Discharge to home or other facility with appropriate resources: Identify barriers to discharge with patient and caregiver  4/12/2023 1338 by Tatiana Rodriguez RN  Outcome: Progressing     Problem: Pain  Goal: Verbalizes/displays adequate comfort level or baseline comfort level  4/12/2023 2308 by Ally Cavazos RN  Outcome: Progressing  Flowsheets (Taken 4/12/2023 2026)  Verbalizes/displays adequate comfort level or baseline comfort level: Encourage patient to monitor pain and request assistance  4/12/2023 1338 by Tatiana Rodriguez RN  Outcome: Progressing     Problem: ABCDS Injury Assessment  Goal: Absence of physical injury  4/12/2023 2308 by Ally Cavazos RN  Outcome: Progressing  4/12/2023 1338 by Tatiana Rodriguez RN  Outcome: Progressing     Problem: Safety - Adult  Goal: Free from fall injury  Outcome: Progressing     Problem: Chronic Conditions and Co-morbidities  Goal: Patient's chronic conditions and co-morbidity symptoms are monitored and maintained or improved  Outcome: Progressing  Flowsheets (Taken 4/12/2023 2030)  Care Plan - Patient's Chronic Conditions and Co-Morbidity Symptoms are Monitored and Maintained or Improved: Monitor and assess patient's chronic conditions and comorbid symptoms for stability, deterioration, or improvement     Problem: Skin/Tissue Integrity  Goal: Absence of new skin breakdown  Description: 1. Monitor for areas of redness and/or skin breakdown  2. Assess vascular access sites hourly  3. Every 4-6 hours minimum:  Change oxygen saturation probe site  4. Every 4-6 hours:  If on nasal continuous positive airway pressure, respiratory therapy assess nares and determine need for appliance change or resting period.   Outcome: Progressing     Problem: Respiratory - Adult  Goal: Achieves optimal

## 2023-04-14 LAB
BACTERIA BLD CULT ORG #2: NORMAL
BACTERIA BLD CULT: NORMAL

## 2023-04-14 NOTE — DISCHARGE SUMMARY
5 MG tablet  Commonly known as: ABILIFY     bumetanide 2 MG tablet  Commonly known as: BUMEX  Take 1 tablet by mouth daily     cyanocobalamin 1000 MCG tablet     DULoxetine 60 MG extended release capsule  Commonly known as: CYMBALTA     ferrous sulfate 325 (65 Fe) MG tablet  Commonly known as: IRON 796     folic acid 1 MG tablet  Commonly known as: FOLVITE     oxybutynin 5 MG tablet  Commonly known as: DITROPAN     pantoprazole 40 MG tablet  Commonly known as: PROTONIX  Take 1 tablet by mouth 2 times daily (before meals)     predniSONE 10 MG tablet  Commonly known as: DELTASONE  Take 4 tabs by mouth daily for 3 days, then 3 tabs daily for 3 days, then 2 tabs daily for 3 days, then 1 tab daily for 3 days     tiZANidine 4 MG tablet  Commonly known as: ZANAFLEX     traMADol 50 MG tablet  Commonly known as: ULTRAM               Where to Get Your Medications        These medications were sent to [address] [facility], [address] [facility],[address]  [id number], [address] [facility] [address]      Phone: [phone]   cefdinir 300 MG capsule  guaiFENesin 600 MG extended release tablet  losartan 50 MG tablet  predniSONE 10 MG tablet           Discharged in stable condition to ***    Follow Up:   Follow up with PCP in 1 week***/ physician at [facility]

## 2023-04-17 ENCOUNTER — TELEPHONE (OUTPATIENT)
Dept: PULMONOLOGY | Age: 78
End: 2023-04-17

## 2023-04-17 DIAGNOSIS — R29.898 COGWHEEL RIGIDITY: ICD-10-CM

## 2023-04-17 DIAGNOSIS — G25.2 RESTING TREMOR: Primary | ICD-10-CM

## 2023-04-17 NOTE — PROGRESS NOTES
4 Eyes Skin Assessment     The patient is being assess for   Admission    I agree that 2 RN's have performed a thorough Head to Toe Skin Assessment on the patient. ALL assessment sites listed below have been assessed. Areas assessed for pressure by both nurses:   [x]   Head, Face, and Ears   [x]   Shoulders, Back, and Chest, Abdomen  [x]   Arms, Elbows, and Hands   [x]   Coccyx, Sacrum, and Ischium  [x]   Legs, Feet, and Heels    No skin issues noted        Skin Assessed Under all Medical Devices by both nurses:  O2 device tubing and purewick               All Mepilex Borders were peeled back and area peeked at by both nurses:  No: N/A  Please list where Mepilex Borders are located:  N/A             **SHARE this note so that the co-signing nurse is able to place an eSignature**    Co-signer eSignature: Electronically signed by Sandrine Mckenna RN on 3/45/76 at 4:58 PM EDT    Does the Patient have Skin Breakdown related to pressure?   No              Anoop Prevention initiated:  Yes   Wound Care Orders initiated:  No      [facility] nurse consulted for Pressure Injury (Stage 3,4, Unstageable, DTI, NWPT, Complex wounds)and New or Established Ostomies:  No      Primary Nurse eSignature: Electronically signed by Amaya Randhawa RN on 4/10/23 at 4:32 PM EDT
Admission questions complete, home medications have been verified, and a head-to-toe assessment has been completed. Patient has been orientated to the unit and the room. Call light is in reach and has been explained. IV removed from ER. No further needs noted at this time. Will continue to monitor.
BP (!) 119/48   Pulse 97   Temp 97.1 °F (36.2 °C) (Oral)   Resp 18   Ht 5' 3\" (1.6 m)   Wt 231 lb 3.2 oz (104.9 kg)   SpO2 96%   BMI 40.96 kg/m²     Assessment complete. Meds passed. Pt denies needs at this time. RN notified MD of BP, MD placed new orders regarding BP meds. PT OT working with patient now. Patient on 4L currently, tolerating well. RN informed therapy that the patient can desaturate with activity        Bedside Mobility Assessment Tool (BMAT):     Assessment Level 1- Sit and Shake    1. From a semi-reclined position, ask patient to sit up and rotate to a seated position at the side of the bed. Can use the bedrail. 2. Ask patient to reach out and grab your hand and shake making sure patient reaches across his/her midline. Pass- Patient is able to come to a seated position, maintain core strength. Maintains seated balance while reaching across midline. Move on to Assessment Level 2. Assessment Level 2- Stretch and Point   1. With patient in seated position at the side of the bed, have patient place both feet on the floor (or stool) with knees no higher than hips. 2. Ask patient to stretch one leg and straighten the knee, then bend the ankle/flex and point the toes. If appropriate, repeat with the other leg. Pass- Patient is able to demonstrate appropriate quad strength on intended weight bearing limb(s). Move onto Assessment Level 3. Assessment Level 3- Stand   1. Ask patient to elevate off the bed or chair (seated to standing) using an assistive device (cane, bedrail). 2. Patient should be able to raise buttocks off be and hold for a count of five. May repeat once. Pass- Patient maintains standing stability for at least 5 seconds, proceed to assessment level 4. Assessment Level 4- Walk   1. Ask patient to march in place at bedside. 2. Then ask patient to advance step and return each foot.  Some medical conditions may render a patient from stepping backwards, use your
Bedside Mobility Assessment Tool (BMAT):     Assessment Level 1- Sit and Shake    1. From a semi-reclined position, ask patient to sit up and rotate to a seated position at the side of the bed. Can use the bedrail. 2. Ask patient to reach out and grab your hand and shake making sure patient reaches across his/her midline. Pass- Patient is able to come to a seated position, maintain core strength. Maintains seated balance while reaching across midline. Move on to Assessment Level 2. Assessment Level 2- Stretch and Point   1. With patient in seated position at the side of the bed, have patient place both feet on the floor (or stool) with knees no higher than hips. 2. Ask patient to stretch one leg and straighten the knee, then bend the ankle/flex and point the toes. If appropriate, repeat with the other leg. Pass- Patient is able to demonstrate appropriate quad strength on intended weight bearing limb(s). Move onto Assessment Level 3. Assessment Level 3- Stand   1. Ask patient to elevate off the bed or chair (seated to standing) using an assistive device (cane, bedrail). 2. Patient should be able to raise buttocks off be and hold for a count of five. May repeat once. Pass- Patient maintains standing stability for at least 5 seconds, proceed to assessment level 4. Assessment Level 4- Walk   1. Ask patient to march in place at bedside. 2. Then ask patient to advance step and return each foot. Some medical conditions may render a patient from stepping backwards, use your best clinical judgement. Fail- Patient not able to complete tasks OR requires use of assistive device. Patient is MOBILITY LEVEL 3.        Mobility Level- 3
Bedside report given to [facility] RN  pt in stable condition no needs at this time.  Call light within reach
Bedside report given to [facility] RN  pt in stable condition no needs at this time.  Call light within reach
Hand off report given to Porfirio Pereira RN. Patient is stable showing no signs of distress and has no current needs at this time. Call light is in reach and bed is in lowest position. Care is transferred at this time.
IM Progress Note    Admit Date:  4/10/2023    Admitted with pneumonia, COPD exacerbation. Has history of lung cancer  -Patient with worsening of hypoxemia overnight  on 4/10 with sats of 77% on 5 L; O2 was increased up to 10 L. Currently weaned back down to 7 L  -> weaned to 4 L now    Subjective:  Ms. Brandon Inman is doing much better today . O2 has been weaned down to 4 L which is her baseline . Cough and shortness of breath improved . Luis Parr Seen by pulmonology      Objective:   BP (!) 150/74   Pulse 82   Temp 97.4 °F (36.3 °C) (Oral)   Resp 18   Ht 5' 3\" (1.6 m)   Wt 232 lb 6.4 oz (105.4 kg)   SpO2 90%   BMI 41.17 kg/m²     Intake/Output Summary (Last 24 hours) at 4/13/2023 1340  Last data filed at 4/13/2023 0446  Gross per 24 hour   Intake 106.52 ml   Output 900 ml   Net -793.48 ml           Physical Exam:  General: NAD  awake, alert, and  well oriented  Skin:  Warm and dry  Neck:  JVD absent. Neck supple  Chest: Diminished breath sounds , marlo mild rhonchi +   Cardiovascular:  RRR ,S1S2 normal  Abdomen:  Soft, non tender, non distended, BS +  Extremities:  No edema. Intact peripheral pulses.  Brisk cap refill, < 2 secs  Neuro: non focal      Medications:   Scheduled Meds:   losartan  50 mg Oral Daily    predniSONE  40 mg Oral Daily    enoxaparin  30 mg SubCUTAneous BID    sodium chloride flush  5-40 mL IntraVENous 2 times per day    allopurinol  100 mg Oral BID    amLODIPine  5 mg Oral Daily    ARIPiprazole  5 mg Oral Daily    cyanocobalamin  1,000 mcg Oral Daily    DULoxetine  60 mg Oral Daily    ferrous sulfate  325 mg Oral Daily with breakfast    folic acid  1 mg Oral Daily    oxybutynin  5 mg Oral TID    pantoprazole  40 mg Oral BID AC    tiotropium  2 puff Inhalation Daily    guaiFENesin  600 mg Oral BID    bumetanide  1 mg Oral BID    cefTRIAXone (ROCEPHIN) IV  1,000 mg IntraVENous Q24H    ipratropium-albuterol  1 ampule Inhalation BID    insulin lispro  0-4 Units SubCUTAneous TID WC    insulin
IM Progress Note    Admit Date:  4/10/2023    Admitted with pneumonia, COPD exacerbation. Has history of lung cancer  -Patient with worsening of hypoxemia overnight  on 4/10 with sats of 77% on 5 L; O2 was increased up to 10 L. Currently weaned back down to 7 L  -> weaned to 4 L now    Subjective:  Ms. Juan Judd is doing much better today . O2 has been weaned down to 4 L which is her baseline . Cough and shortness of breath improved . Javier Combs Seen by pulmonology      Objective:   BP (!) 119/48   Pulse 97   Temp 97.1 °F (36.2 °C) (Oral)   Resp 18   Ht 5' 3\" (1.6 m)   Wt 231 lb 3.2 oz (104.9 kg)   SpO2 96%   BMI 40.96 kg/m²     Intake/Output Summary (Last 24 hours) at 4/12/2023 1201  Last data filed at 4/12/2023 0951  Gross per 24 hour   Intake 360 ml   Output 2600 ml   Net -2240 ml           Physical Exam:  General: NAD  awake, alert, and  well oriented  Skin:  Warm and dry  Neck:  JVD absent. Neck supple  Chest: Diminished breath sounds , marlo mild rhonchi +   Cardiovascular:  RRR ,S1S2 normal  Abdomen:  Soft, non tender, non distended, BS +  Extremities:  No edema. Intact peripheral pulses.  Brisk cap refill, < 2 secs  Neuro: non focal      Medications:   Scheduled Meds:   [START ON 4/13/2023] losartan  50 mg Oral Daily    enoxaparin  30 mg SubCUTAneous BID    sodium chloride flush  5-40 mL IntraVENous 2 times per day    methylPREDNISolone  40 mg IntraVENous Q12H    allopurinol  100 mg Oral BID    amLODIPine  5 mg Oral Daily    ARIPiprazole  5 mg Oral Daily    cyanocobalamin  1,000 mcg Oral Daily    DULoxetine  60 mg Oral Daily    ferrous sulfate  325 mg Oral Daily with breakfast    folic acid  1 mg Oral Daily    oxybutynin  5 mg Oral TID    pantoprazole  40 mg Oral BID AC    tiotropium  2 puff Inhalation Daily    guaiFENesin  600 mg Oral BID    bumetanide  1 mg Oral BID    cefTRIAXone (ROCEPHIN) IV  1,000 mg IntraVENous Q24H    And    azithromycin  500 mg Oral Q24H    ipratropium-albuterol  1 ampule
Inpatient Occupational Therapy Evaluation and Treatment    Unit: 2 [place]  Date:  4/12/2023  Patient Name:    Tea Goldsmith  Admitting diagnosis:  Community acquired bacterial pneumonia [J15.9]  Acute on chronic respiratory failure with hypoxia ([facility] [address] [J96.21]  Pneumonia of right lung due to infectious organism, unspecified part of lung [J18.9]  Sepsis with acute hypoxic respiratory failure without septic shock, due to unspecified organism ([facility] [address] [A41.9, R65.20, J96.01]  Admit Date:  4/10/2023  Precautions/Restrictions/WB Status/ Lines/ Wounds/ Oxygen: Fall risk, Bed/chair alarm, Lines (IV, Supplemental O2 (4L), and Purewick catheter), Telemetry, and Continuous pulse oximetry    Treatment Time:  9:05-9:53  Treatment Number:  1  Timed Code Treatment Minutes: 38 minutes  Total Treatment Minutes:  48  minutes    Patient Goals for Therapy: \" to go home \"          Discharge Recommendations: Home with 24/7 assist  and Home OT  DME needs for discharge: Needs Met         Therapy recommendations for staff:   Assist of 1 for transfers with use of Rollator and gait belt to/from chair    History of Present Illness: Admitted with pneumonia, COPD exacerbation. Has history of lung cancer  -Patient with worsening of hypoxemia overnight with sats of 77% on 5 L; O2 was increased up to 10 L. Currently weaned back down to [address] S4 Level Recommendation:  Level 1 Standard    AM-PAC Score: AM-PAC Inpatient Daily Activity Raw Score: 15     Subjective:  Patient lying supine in bed with no family present. Pt agreeable to this OT session.      Cognition:    A&O x4   Able to follow 2 step commands    Pain:   No  Location:   Rating: NA /10  Pain Medicine Status: Denies need    Preadmission Environment:   Pt. Lives     Spouse and son (24/hr assist available)   Home environment:    two story home  Steps to enter first floor:   ramp  Steps to second floor/basement: N/A  Laundry:     1st floor  Bathroom:     tub/shower unit, grab bars
Inpatient Physical Therapy Evaluation    Unit: 2 [place]  Date:  4/12/2023  Patient Name:    John Mckeon  Admitting diagnosis:  Community acquired bacterial pneumonia [J15.9]  Acute on chronic respiratory failure with hypoxia ([facility] [address] [J96.21]  Pneumonia of right lung due to infectious organism, unspecified part of lung [J18.9]  Sepsis with acute hypoxic respiratory failure without septic shock, due to unspecified organism ([address] [A41.9, R65.20, J96.01]  Admit Date:  4/10/2023  Precautions/Restrictions/WB Status/ Lines/ Wounds/ Oxygen: Fall risk, Bed/chair alarm, Lines (IV, Supplemental O2 (4L), and Purewick catheter), Telemetry, and Continuous pulse oximetry    Treatment Time:  09:13-09:53  Treatment Number:  1  Timed Code Treatment Minutes: 30 minutes  Total Treatment Minutes:  40  minutes    Patient Stated Goals for Therapy: \" go home \". Open to home PT      Discharge Recommendations: Home with 24hr assistance and Home PT  DME needs for discharge: Needs Met       Therapy recommendation for EMS Transport: can transport by wheelchair    Therapy recommendations for staff:   Assist of 1 for ambulation with use of Rollator and gait belt within room (to/from toilet - will need an O2 extender)    History of Present Illness:   Admitted with pneumonia, COPD exacerbation. Has history of lung cancer. Required up to 1 [facility] S4 Level Recommendation:  Level 1 Standard    AM-PAC Mobility Score       AM-PAC Inpatient Mobility without Stair Climbing Raw Score : 15    Subjective  Patient lying reclined bed with no family present. Pt agreeable to this PT session.      Cognition    A&O x4   Able to follow 2 step commands    Pain   No  Location: NA  Rating: NA /10  Pain Medicine Status: No request made    Preadmission Environment:   Pt. Lives                                              Spouse and son (24/hr assist available)   Home environment:                            two story home  Steps to enter first floor:
PULMONARY PROGRESS NOTE  CC: COPD and pneumonia    Subjective:   Spells of hypoxemia up to 12 L since yesterday afternoon. Difficulty sleeping. Stable on 4 L today and feeling better, breathing is about 70% back to normal.  Weakness, was glad to work with PT/OT. IV line: Midline 4/10/23    PHYSICAL EXAM:   /60   Pulse 91   Temp 98.4 °F (36.9 °C) (Oral)   Resp 18   Ht 5' 3\" (1.6 m)   Wt 231 lb 3.2 oz (104.9 kg)   SpO2 94%   BMI 40.96 kg/m² ' on 4 L  Constitutional:  No acute distress. HENT:  Oropharynx is clear and moist.   Neck: No tracheal deviation present. Cardiovascular: Normal heart sounds. + lower extremity edema. Pulmonary/Chest: No wheezes. No rhonchi. LLL rales. + decreased breath sounds. No accessory muscle usage or stridor. Musculoskeletal: No cyanosis. No clubbing. Skin: Skin is warm and dry. Psychiatric: Normal mood and affect. Neurologic: speech fluent, alert and oriented, strength symmetric.   Tremor at rest     Scheduled Meds:   enoxaparin  30 mg SubCUTAneous BID    sodium chloride flush  5-40 mL IntraVENous 2 times per day    methylPREDNISolone  40 mg IntraVENous Q12H    allopurinol  100 mg Oral BID    amLODIPine  5 mg Oral Daily    ARIPiprazole  5 mg Oral Daily    cyanocobalamin  1,000 mcg Oral Daily    DULoxetine  60 mg Oral Daily    ferrous sulfate  325 mg Oral Daily with breakfast    folic acid  1 mg Oral Daily    losartan  100 mg Oral Daily    oxybutynin  5 mg Oral TID    pantoprazole  40 mg Oral BID AC    [Held by provider] tiotropium  2 puff Inhalation Daily    guaiFENesin  600 mg Oral BID    bumetanide  1 mg Oral BID    cefTRIAXone (ROCEPHIN) IV  1,000 mg IntraVENous Q24H    And    azithromycin  500 mg Oral Q24H    ipratropium-albuterol  1 ampule Inhalation BID    insulin lispro  0-4 Units SubCUTAneous TID     insulin lispro  0-4 Units SubCUTAneous Nightly     Continuous Infusions:   sodium chloride      dextrose       PRN Meds:  camphor-menthol-methyl
Patient is able to demonstrate the ability to move from a reclining position to an upright position within the recliner. Bedside Mobility Assessment Tool (BMAT):     Assessment Level 1- Sit and Shake    1. From a semi-reclined position, ask patient to sit up and rotate to a seated position at the side of the bed. Can use the bedrail. 2. Ask patient to reach out and grab your hand and shake making sure patient reaches across his/her midline. Pass- Patient is able to come to a seated position, maintain core strength. Maintains seated balance while reaching across midline. Move on to Assessment Level 2. Assessment Level 2- Stretch and Point   1. With patient in seated position at the side of the bed, have patient place both feet on the floor (or stool) with knees no higher than hips. 2. Ask patient to stretch one leg and straighten the knee, then bend the ankle/flex and point the toes. If appropriate, repeat with the other leg. Pass- Patient is able to demonstrate appropriate quad strength on intended weight bearing limb(s). Move onto Assessment Level 3. Assessment Level 3- Stand   1. Ask patient to elevate off the bed or chair (seated to standing) using an assistive device (cane, bedrail). 2. Patient should be able to raise buttocks off be and hold for a count of five. May repeat once. Pass- Patient maintains standing stability for at least 5 seconds, proceed to assessment level 4. Assessment Level 4- Walk   1. Ask patient to march in place at bedside. 2. Then ask patient to advance step and return each foot. Some medical conditions may render a patient from stepping backwards, use your best clinical judgement. Fail- Patient not able to complete tasks OR requires use of assistive device. Patient is MOBILITY LEVEL 3.        Mobility Level- 3
Pt awake at this time. AM labs drawn. Pt denies any needs.  Call light within reach and bed alarm on
Pt awake at this time. Pt requesting something to help her sleep. Informed pt that at this time. Drs do not like pts to have sleeping med so that they are awake when they round in the AM. PRN Tylenol given for knee pain. Offered [place] for knee pt did not want. Pt denies any needs.  Call light within reach
Pt leaving via family vehicle to home. Discharge instructions given. Pt voiced understanding. Copy of discharge and scripts with patient. Iv removed. CP and PE completed. No further needs at discharge. Pt leaving with all personal belonging.
Pt resting. Resp e/e. Shift assessment completed and charted. No needs. Will monitor.  Pedro Robles RN
RT Inhaler-Nebulizer Bronchodilator Protocol Note    There is a bronchodilator order in the chart from a provider indicating to follow the RT Bronchodilator Protocol and there is an Initiate RT Inhaler-Nebulizer Bronchodilator Protocol order as well (see protocol at bottom of note). CXR Findings:  No results found. The findings from the last RT Protocol Assessment were as follows:   History Pulmonary Disease: Chronic pulmonary disease  Respiratory Pattern: Regular pattern and RR 12-20 bpm  Breath Sounds: Slightly diminished and/or crackles  Cough: Strong, spontaneous, non-productive  Indication for Bronchodilator Therapy: Decreased or absent breath sounds  Bronchodilator Assessment Score: 4    Aerosolized bronchodilator medication orders have been revised according to the RT Inhaler-Nebulizer Bronchodilator Protocol below. Respiratory Therapist to perform RT Therapy Protocol Assessment initially then follow the protocol. Repeat RT Therapy Protocol Assessment PRN for score 0-3 or on second treatment, BID, and PRN for scores above 3. No Indications - adjust the frequency to every 6 hours PRN wheezing or bronchospasm, if no treatments needed after 48 hours then discontinue using Per Protocol order mode. If indication present, adjust the RT bronchodilator orders based on the Bronchodilator Assessment Score as indicated below. Use Inhaler orders unless patient has one or more of the following: on home nebulizer, not able to hold breath for 10 seconds, is not alert and oriented, cannot activate and use MDI correctly, or respiratory rate 25 breaths per minute or more, then use the equivalent nebulizer order(s) with same Frequency and PRN reasons based on the score. If a patient is on this medication at home then do not decrease Frequency below that used at home.     0-3 - enter or revise RT bronchodilator order(s) to equivalent RT Bronchodilator order with Frequency of every 4 hours PRN for wheezing or
RT Inhaler-Nebulizer Bronchodilator Protocol Note    There is a bronchodilator order in the chart from a provider indicating to follow the RT Bronchodilator Protocol and there is an Initiate RT Inhaler-Nebulizer Bronchodilator Protocol order as well (see protocol at bottom of note). CXR Findings:  No results found. The findings from the last RT Protocol Assessment were as follows:   History Pulmonary Disease: Chronic pulmonary disease  Respiratory Pattern: Regular pattern and RR 12-20 bpm  Breath Sounds: Slightly diminished and/or crackles  Cough: Strong, spontaneous, non-productive  Indication for Bronchodilator Therapy: Decreased or absent breath sounds  Bronchodilator Assessment Score: 4    Aerosolized bronchodilator medication orders have been revised according to the RT Inhaler-Nebulizer Bronchodilator Protocol below. Respiratory Therapist to perform RT Therapy Protocol Assessment initially then follow the protocol. Repeat RT Therapy Protocol Assessment PRN for score 0-3 or on second treatment, BID, and PRN for scores above 3. No Indications - adjust the frequency to every 6 hours PRN wheezing or bronchospasm, if no treatments needed after 48 hours then discontinue using Per Protocol order mode. If indication present, adjust the RT bronchodilator orders based on the Bronchodilator Assessment Score as indicated below. Use Inhaler orders unless patient has one or more of the following: on home nebulizer, not able to hold breath for 10 seconds, is not alert and oriented, cannot activate and use MDI correctly, or respiratory rate 25 breaths per minute or more, then use the equivalent nebulizer order(s) with same Frequency and PRN reasons based on the score. If a patient is on this medication at home then do not decrease Frequency below that used at home.     0-3 - enter or revise RT bronchodilator order(s) to equivalent RT Bronchodilator order with Frequency of every 4 hours PRN for wheezing or
RT Inhaler-Nebulizer Bronchodilator Protocol Note    There is a bronchodilator order in the chart from a provider indicating to follow the RT Bronchodilator Protocol and there is an Initiate RT Inhaler-Nebulizer Bronchodilator Protocol order as well (see protocol at bottom of note). CXR Findings:  XR CHEST PORTABLE    Result Date: 4/10/2023  Right lower lobe airspace infiltrates compatible with pneumonia. The findings from the last RT Protocol Assessment were as follows:   History Pulmonary Disease: (P) Chronic pulmonary disease  Respiratory Pattern: (P) Regular pattern and RR 12-20 bpm  Breath Sounds: (P) Slightly diminished and/or crackles  Cough: (P) Strong, spontaneous, non-productive  Indication for Bronchodilator Therapy: (P) Decreased or absent breath sounds  Bronchodilator Assessment Score: (P) 4    Aerosolized bronchodilator medication orders have been revised according to the RT Inhaler-Nebulizer Bronchodilator Protocol below. Respiratory Therapist to perform RT Therapy Protocol Assessment initially then follow the protocol. Repeat RT Therapy Protocol Assessment PRN for score 0-3 or on second treatment, BID, and PRN for scores above 3. No Indications - adjust the frequency to every 6 hours PRN wheezing or bronchospasm, if no treatments needed after 48 hours then discontinue using Per Protocol order mode. If indication present, adjust the RT bronchodilator orders based on the Bronchodilator Assessment Score as indicated below. Use Inhaler orders unless patient has one or more of the following: on home nebulizer, not able to hold breath for 10 seconds, is not alert and oriented, cannot activate and use MDI correctly, or respiratory rate 25 breaths per minute or more, then use the equivalent nebulizer order(s) with same Frequency and PRN reasons based on the score. If a patient is on this medication at home then do not decrease Frequency below that used at home.     0-3 - enter or
RT Inhaler-Nebulizer Bronchodilator Protocol Note    There is a bronchodilator order in the chart from a provider indicating to follow the RT Bronchodilator Protocol and there is an Initiate RT Inhaler-Nebulizer Bronchodilator Protocol order as well (see protocol at bottom of note). CXR Findings:  XR CHEST PORTABLE    Result Date: 4/10/2023  Right lower lobe airspace infiltrates compatible with pneumonia. The findings from the last RT Protocol Assessment were as follows:   History Pulmonary Disease: Chronic pulmonary disease  Respiratory Pattern: Regular pattern and RR 12-20 bpm  Breath Sounds: Slightly diminished and/or crackles  Cough: Strong, spontaneous, non-productive  Indication for Bronchodilator Therapy: Decreased or absent breath sounds  Bronchodilator Assessment Score: 4    Aerosolized bronchodilator medication orders have been revised according to the RT Inhaler-Nebulizer Bronchodilator Protocol below. Respiratory Therapist to perform RT Therapy Protocol Assessment initially then follow the protocol. Repeat RT Therapy Protocol Assessment PRN for score 0-3 or on second treatment, BID, and PRN for scores above 3. No Indications - adjust the frequency to every 6 hours PRN wheezing or bronchospasm, if no treatments needed after 48 hours then discontinue using Per Protocol order mode. If indication present, adjust the RT bronchodilator orders based on the Bronchodilator Assessment Score as indicated below. Use Inhaler orders unless patient has one or more of the following: on home nebulizer, not able to hold breath for 10 seconds, is not alert and oriented, cannot activate and use MDI correctly, or respiratory rate 25 breaths per minute or more, then use the equivalent nebulizer order(s) with same Frequency and PRN reasons based on the score. If a patient is on this medication at home then do not decrease Frequency below that used at home.     0-3 - enter or revise RT bronchodilator
Report given to [employer].  Hans Milton RN
Sent referral, CT cancelled.
Shift assessment completed. Patient sitting up in chair with  in room. Denies pain at this time. O2 97% at 6L, turned down to 6L now 93%. Continuous pulse ox remains in place and intact to right finger. No further needs voiced at this time.
Urine sample sent at this time.
Writer and LISSETH Leos received face to face report in ER. However, IR LISSETH Summer there to place midline. Summer will call Dafne when procedure is finished and Dafne will go get patient.
Writer had placed additional 2# PIV via use of US. Upon initiation of IVF bolus, lines infiltrated. NP T. Clearance Seth notified and midline ordered. Writer notified Mo Montano in [employer]. Update shared with shift LISSETH Lassiter.  Laverne Manzo, RN Clinical 
acetaminophen **OR** acetaminophen, albuterol, glucose, dextrose bolus **OR** dextrose bolus, glucagon (rDNA), dextrose    Labs:  CBC:   Recent Labs     04/10/23  1208 04/11/23  0550   WBC 8.3 7.1   HGB 9.7* 8.7*   HCT 30.4* 26.6*   MCV 99.5 99.0    235     BMP:   Recent Labs     04/10/23  1208 04/11/23  0550    140   K 4.5 5.1   CL 99 100   CO2 34* 34*   BUN 18 24*   CREATININE 1.5* 1.4*     Micro:   4/10/23 SARS-CoV-2 & influenza not detected  Procalcitonin 0.31    Imaging: Chest imaging was reviewed by me and showed:  CXR 4/10/2023  Right lower lobe airspace infiltrates compatible with pneumonia. CT Chest 4/11/23:   Impression   Foci of consolidating infiltrate or subsegmental atelectasis mid-lower lung   fields bilaterally worse on the right consistent with bilateral pneumonia   accentuated by underlying advanced chronic lung disease. No associated   pleural effusion. Masslike area pulmonary consolidation left upper lobe measuring 2.7 x 1.5 x   3.2 cm unchanged compared to 06/07/2021 suggesting stable neoplastic disease   or post treatment changes. Dilation of pulmonary arteries to maximal diameter 3.2 cm consistent with   pulmonary hypertension.        Small hiatal hernia       ASSESSMENT:  Acute on chronic hypoxemic respiratory failure; baseline 4.5 L pulse  Severe COPD with severe acute exacerbation   CARIDAD Pulmonary Nodule: [facility] s/p SBRT Feb 2020 with Dr. 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
ipratropium-albuterol  1 ampule Inhalation BID    insulin lispro  0-4 Units SubCUTAneous TID WC    insulin lispro  0-4 Units SubCUTAneous Nightly       Continuous Infusions:   sodium chloride      dextrose         Data:  CBC:   Recent Labs     04/10/23  1208 04/11/23  0550   WBC 8.3 7.1   RBC 3.06* 2.69*   HGB 9.7* 8.7*   HCT 30.4* 26.6*   MCV 99.5 99.0   RDW 14.1 14.2    235     BMP:   Recent Labs     04/10/23  1208 04/11/23  0550    140   K 4.5 5.1   CL 99 100   CO2 34* 34*   BUN 18 24*   CREATININE 1.5* 1.4*     BNP: No results for input(s): BNP in the last 72 hours. PT/INR: No results for input(s): PROTIME, INR in the last 72 hours. APTT: No results for input(s): APTT in the last 72 hours. CARDIAC ENZYMES:   Recent Labs     04/10/23  1208 04/10/23  1708   TROPONINI <0.01 <0.01     FASTING LIPID PANEL:  Lab Results   Component Value Date    CHOL 191 09/20/2011    HDL 58 09/20/2011    TRIG 187 (H) 09/20/2011     LIVER PROFILE:   Recent Labs     04/10/23  1208   AST 17   ALT 11   BILITOT 0.4   ALKPHOS 80*          Cultures  Blood Cultures:  NGTD  Strep and legionella: Negative  COVID/Influenza: not detected     Respiratory Culture: Pending       Radiology  CT CHEST WO CONTRAST   Final Result   Foci of consolidating infiltrate or subsegmental atelectasis mid-lower lung   fields bilaterally worse on the right consistent with bilateral pneumonia   accentuated by underlying advanced chronic lung disease. No associated   pleural effusion. Masslike area pulmonary consolidation left upper lobe measuring 2.7 x 1.5 x   3.2 cm unchanged compared to 06/07/2021 suggesting stable neoplastic disease   or post treatment changes. Dilation of pulmonary arteries to maximal diameter 3.2 cm consistent with   pulmonary hypertension. Small hiatal hernia      RECOMMENDATIONS:   Unavailable         IR MIDLINE CATH   Final Result   Successful placement of midline.       RECOMMENDATIONS:   Unavailable
from a pulmonary perspective. Our office will reach out to arrange f/u for future imaging. I discussed pertinent parts of patient's HPI, PE, assessment & plan with my attending physician, Dr. Lacey Reid.
bolus loss , suspect reduced/impaired A-P bolus transit, suspect premature bolus loss into pharynx, suspect delayed swallow onset, prolonged mastication, oral stasis noted post swallow, no clinical s/s of aspiration, and vitals stable    3 oz water: PASS    Impressions:  Clinical signs of oropharyngeal dysphagia likely acute-on-chronic related to hx of dysphagia, increased O2 demands, respiratory illness, and co-morbidities. Swallow prognosis is fair. Instrumental swallow study is not indicated.  Given tolerance to PO at bedside, pt is safe for oral diet at this time    Instrumentation: Not clinically indicated at this time. Will continue to monitor  Diet recommendation: IDDSI 7 Regular Solids; IDDSI 0 Thin Liquids; Meds whole with thin liquids  Risk management: upright for all intake, stay upright for at least 30 mins after intake, small bites/sips, downgrade to mildly thick if s/s of aspiration develop, oral care q4 hrs to reduce adverse affects in the event of aspiration, increase physical mobility as able, alternate bites/sips, slow rate of intake, general GERD precautions, general aspiration precautions, and hold PO and contact SLP if s/s of aspiration or worsening respiratory status develop.    Prognosis: Fair    Recommended Intervention:  [x] Dysphagia tx  [] Videostroboscopy                      [] NPO   [] MBS       [] Speech/Cog Eval    [x] Therapeutic PO Trials     [] Ice Chips   [] Other:  [] FEES                                                 Dysphagia Therapeutic Intervention:  []  Bolus control Exercises  []  Oral Motor Exercises  []  Martinez Water Protocol  []  Thermal Stimulation  []  Oral Care    []  Vital Stim/NMES  []  Laryngeal Exercises  [x]  Patient/Family Education  []  Pharyngeal Exercises  [x]  Therapeutic PO trials with SLP  [x]  Diet tolerance monitoring  []  Other:     Referrals:  [] ENT    [] PT  [] Pulmonology [] GI  [] Neurology  [] RD  [] OT   []     Goals:  Short Term

## 2023-04-17 NOTE — TELEPHONE ENCOUNTER
Referral put in, and upcoming CT cancelled.        ----- Message from Basil Butler PA-C sent at 4/13/2023  8:05 PM EDT -----  -  Cancel upcoming CT chest.    -  Keep scheduled f/u appointment with Dr. Taylor Laughlin - change visit note needs to include \"hospital f/u; need to schedule f/u CT\"  -  Referral to neurology - dx resting tremor, cogwheel rigidity

## 2023-05-03 ENCOUNTER — OFFICE VISIT (OUTPATIENT)
Dept: PULMONOLOGY | Age: 78
End: 2023-05-03
Payer: MEDICARE

## 2023-05-03 VITALS
HEIGHT: 63 IN | HEART RATE: 100 BPM | SYSTOLIC BLOOD PRESSURE: 124 MMHG | BODY MASS INDEX: 41.11 KG/M2 | RESPIRATION RATE: 14 BRPM | OXYGEN SATURATION: 96 % | DIASTOLIC BLOOD PRESSURE: 66 MMHG | WEIGHT: 232 LBS

## 2023-05-03 DIAGNOSIS — R25.1 TREMOR: ICD-10-CM

## 2023-05-03 DIAGNOSIS — J44.9 CHRONIC OBSTRUCTIVE PULMONARY DISEASE, UNSPECIFIED COPD TYPE (HCC): ICD-10-CM

## 2023-05-03 DIAGNOSIS — R29.898 COGWHEEL RIGIDITY: ICD-10-CM

## 2023-05-03 DIAGNOSIS — R91.1 PULMONARY NODULE: Primary | ICD-10-CM

## 2023-05-03 PROCEDURE — G8427 DOCREV CUR MEDS BY ELIG CLIN: HCPCS | Performed by: INTERNAL MEDICINE

## 2023-05-03 PROCEDURE — 3078F DIAST BP <80 MM HG: CPT | Performed by: INTERNAL MEDICINE

## 2023-05-03 PROCEDURE — G8417 CALC BMI ABV UP PARAM F/U: HCPCS | Performed by: INTERNAL MEDICINE

## 2023-05-03 PROCEDURE — 1123F ACP DISCUSS/DSCN MKR DOCD: CPT | Performed by: INTERNAL MEDICINE

## 2023-05-03 PROCEDURE — 1111F DSCHRG MED/CURRENT MED MERGE: CPT | Performed by: INTERNAL MEDICINE

## 2023-05-03 PROCEDURE — 1090F PRES/ABSN URINE INCON ASSESS: CPT | Performed by: INTERNAL MEDICINE

## 2023-05-03 PROCEDURE — 3074F SYST BP LT 130 MM HG: CPT | Performed by: INTERNAL MEDICINE

## 2023-05-03 PROCEDURE — 3023F SPIROM DOC REV: CPT | Performed by: INTERNAL MEDICINE

## 2023-05-03 PROCEDURE — 1036F TOBACCO NON-USER: CPT | Performed by: INTERNAL MEDICINE

## 2023-05-03 PROCEDURE — G8400 PT W/DXA NO RESULTS DOC: HCPCS | Performed by: INTERNAL MEDICINE

## 2023-05-03 PROCEDURE — 99214 OFFICE O/P EST MOD 30 MIN: CPT | Performed by: INTERNAL MEDICINE

## 2023-05-03 NOTE — PROGRESS NOTES
PULMONARY, CRITICAL CARE AND SLEEP MEDICINE    Outpatient Pulmonary Progress Note   CC: COPD    Interval History 5/3/23:   Hospitalized in April 2023 with COPD exacerbation. Looks markedly better in office today. Interval History 12/12/22:  COPD on 4 to 4.5 L continuous. Had CT CHEST and here to discuss results. reports that she quit smoking about 18 years ago. Her smoking use included cigarettes. She has a 15.00 pack-year smoking history. She has never used smokeless tobacco.     PHYSICAL EXAM:   Blood pressure 124/66, pulse 100, resp. rate 14, height 5' 3\" (1.6 m), weight 232 lb (105.2 kg), SpO2 96 %. 'on NC 4 L   241# May 2013; 258# May 2014; 260# Feb 2015, 246# Sep 2015; 239# Nov 2015, 255# Mar 2016, 243# Sep 2016, 257# Mar 2017, 249# Jan 2018, 249# Jul 2018, 246# Sep 2018, 256# Dec 2018; 252# Sep 2019; 260# Dec 2019; 263# Sep 2020, 255# Dec 2020; 241# Aug 2021; 212# Jul 2022; 229# Nov 2022;   Constitutional:  No acute distress. HENT:  Oropharynx is clear and moist.   Neck: No tracheal deviation present. Cardiovascular: Normal heart sounds. No lower extremity edema. Pulmonary/Chest: No wheezes. No rhonchi. No rales. + decreased breath sounds. No accessory muscle usage or stridor. Musculoskeletal: No cyanosis. No clubbing. Skin: Skin is warm and dry. Psychiatric: Normal mood and affect. Neurologic: speech fluent, alert and oriented, strength symmetric   +Cogwheeling     DATA:   PFT Nov 2009 FEV1 1.07 L  54%  DLCO 7.16  PFT Feb 2013 FEV1 1.0 L  46% DLCO 10.44  PFT 4-21-15 FEV1 1.1 L 51% DLCO 9.72 45% %    PSG at [facility] with re-titration 2010 post surgery  PSG @ [facility] 6-11-14 AHI 0.6    CT CHEST 12/9/22  Mediastinum:  The central airways are clear. No evidence of mediastinal,   hilar or axillary lymphadenopathy. Atherosclerotic disease of the thoracic   aorta and coronary arteries. Cardiac silhouette is unremarkable. The   pulmonary artery is mildly enlarged, measuring 3.0 cm.

## 2023-06-12 ENCOUNTER — APPOINTMENT (OUTPATIENT)
Dept: GENERAL RADIOLOGY | Age: 78
DRG: 193 | End: 2023-06-12
Payer: MEDICARE

## 2023-06-12 ENCOUNTER — HOSPITAL ENCOUNTER (INPATIENT)
Age: 78
LOS: 5 days | Discharge: NURSING, REHAB OR LONG-TERM CARE | DRG: 193 | End: 2023-06-18
Attending: EMERGENCY MEDICINE | Admitting: INTERNAL MEDICINE
Payer: MEDICARE

## 2023-06-12 DIAGNOSIS — J18.9 PNEUMONIA DUE TO INFECTIOUS ORGANISM, UNSPECIFIED LATERALITY, UNSPECIFIED PART OF LUNG: ICD-10-CM

## 2023-06-12 DIAGNOSIS — J96.21 ACUTE ON CHRONIC RESPIRATORY FAILURE WITH HYPOXIA (HCC): Primary | ICD-10-CM

## 2023-06-12 DIAGNOSIS — M17.9 OSTEOARTHRITIS OF KNEE, UNSPECIFIED LATERALITY, UNSPECIFIED OSTEOARTHRITIS TYPE: ICD-10-CM

## 2023-06-12 DIAGNOSIS — J44.1 COPD EXACERBATION (HCC): ICD-10-CM

## 2023-06-12 DIAGNOSIS — N18.9 CHRONIC KIDNEY DISEASE, UNSPECIFIED CKD STAGE: ICD-10-CM

## 2023-06-12 DIAGNOSIS — I50.9 ACUTE ON CHRONIC CONGESTIVE HEART FAILURE, UNSPECIFIED HEART FAILURE TYPE (HCC): ICD-10-CM

## 2023-06-12 LAB
ALBUMIN SERPL-MCNC: 3.4 G/DL (ref 3.4–5)
ALBUMIN/GLOB SERPL: 0.9 {RATIO} (ref 1.1–2.2)
ALP SERPL-CCNC: 142 U/L (ref 40–129)
ALT SERPL-CCNC: 9 U/L (ref 10–40)
ANION GAP SERPL CALCULATED.3IONS-SCNC: 10 MMOL/L (ref 3–16)
AST SERPL-CCNC: 36 U/L (ref 15–37)
BASE EXCESS BLDV CALC-SCNC: 2.8 MMOL/L (ref -3–3)
BASE EXCESS BLDV CALC-SCNC: 4.3 MMOL/L (ref -3–3)
BASOPHILS # BLD: 0 K/UL (ref 0–0.2)
BASOPHILS NFR BLD: 0.3 %
BILIRUB SERPL-MCNC: <0.2 MG/DL (ref 0–1)
BUN SERPL-MCNC: 27 MG/DL (ref 7–20)
CALCIUM SERPL-MCNC: 9.8 MG/DL (ref 8.3–10.6)
CHLORIDE SERPL-SCNC: 94 MMOL/L (ref 99–110)
CO2 BLDV-SCNC: 30 MMOL/L
CO2 BLDV-SCNC: 31 MMOL/L
CO2 SERPL-SCNC: 29 MMOL/L (ref 21–32)
COHGB MFR BLDV: 4.2 % (ref 0–1.5)
CREAT SERPL-MCNC: 1.4 MG/DL (ref 0.6–1.2)
DEPRECATED RDW RBC AUTO: 15 % (ref 12.4–15.4)
EOSINOPHIL # BLD: 0 K/UL (ref 0–0.6)
EOSINOPHIL NFR BLD: 0.2 %
FLUAV RNA RESP QL NAA+PROBE: NOT DETECTED
FLUBV RNA RESP QL NAA+PROBE: NOT DETECTED
GFR SERPLBLD CREATININE-BSD FMLA CKD-EPI: 38 ML/MIN/{1.73_M2}
GLUCOSE SERPL-MCNC: 108 MG/DL (ref 70–99)
HCO3 BLDV-SCNC: 28.6 MMOL/L (ref 23–29)
HCO3 BLDV-SCNC: 29.1 MMOL/L (ref 23–29)
HCT VFR BLD AUTO: 26.2 % (ref 36–48)
HGB BLD-MCNC: 8.6 G/DL (ref 12–16)
LACTATE BLDV-SCNC: 1.9 MMOL/L (ref 0.4–1.9)
LYMPHOCYTES # BLD: 1.7 K/UL (ref 1–5.1)
LYMPHOCYTES NFR BLD: 12 %
MCH RBC QN AUTO: 31.5 PG (ref 26–34)
MCHC RBC AUTO-ENTMCNC: 32.7 G/DL (ref 31–36)
MCV RBC AUTO: 96.4 FL (ref 80–100)
METHGB MFR BLDV: 0.3 %
MONOCYTES # BLD: 0.8 K/UL (ref 0–1.3)
MONOCYTES NFR BLD: 5.6 %
NEUTROPHILS # BLD: 11.7 K/UL (ref 1.7–7.7)
NEUTROPHILS NFR BLD: 81.9 %
NT-PROBNP SERPL-MCNC: 970 PG/ML (ref 0–449)
O2 THERAPY: ABNORMAL
O2 THERAPY: ABNORMAL
PCO2 BLDV: 44.2 MMHG (ref 40–50)
PCO2 BLDV: 50.5 MMHG (ref 40–50)
PH BLDV: 7.37 [PH] (ref 7.35–7.45)
PH BLDV: 7.44 [PH] (ref 7.35–7.45)
PLATELET # BLD AUTO: 251 K/UL (ref 135–450)
PMV BLD AUTO: 8.6 FL (ref 5–10.5)
PO2 BLDV: 56.8 MMHG (ref 25–40)
PO2 BLDV: 69.4 MMHG (ref 25–40)
POTASSIUM SERPL-SCNC: 3.6 MMOL/L (ref 3.5–5.1)
POTASSIUM SERPL-SCNC: 5.3 MMOL/L (ref 3.5–5.1)
PROCALCITONIN SERPL IA-MCNC: 0.39 NG/ML (ref 0–0.15)
PROT SERPL-MCNC: 7.2 G/DL (ref 6.4–8.2)
RBC # BLD AUTO: 2.72 M/UL (ref 4–5.2)
SAO2 % BLDV: 88 %
SAO2 % BLDV: 94 %
SARS-COV-2 RNA RESP QL NAA+PROBE: NOT DETECTED
SODIUM SERPL-SCNC: 133 MMOL/L (ref 136–145)
TROPONIN, HIGH SENSITIVITY: 25 NG/L (ref 0–14)
WBC # BLD AUTO: 14.2 K/UL (ref 4–11)

## 2023-06-12 PROCEDURE — 84484 ASSAY OF TROPONIN QUANT: CPT

## 2023-06-12 PROCEDURE — 93005 ELECTROCARDIOGRAM TRACING: CPT | Performed by: PHYSICIAN ASSISTANT

## 2023-06-12 PROCEDURE — 87636 SARSCOV2 & INF A&B AMP PRB: CPT

## 2023-06-12 PROCEDURE — 99285 EMERGENCY DEPT VISIT HI MDM: CPT

## 2023-06-12 PROCEDURE — 82803 BLOOD GASES ANY COMBINATION: CPT

## 2023-06-12 PROCEDURE — 80053 COMPREHEN METABOLIC PANEL: CPT

## 2023-06-12 PROCEDURE — 6360000002 HC RX W HCPCS: Performed by: PHYSICIAN ASSISTANT

## 2023-06-12 PROCEDURE — 83880 ASSAY OF NATRIURETIC PEPTIDE: CPT

## 2023-06-12 PROCEDURE — 71046 X-RAY EXAM CHEST 2 VIEWS: CPT

## 2023-06-12 PROCEDURE — 36415 COLL VENOUS BLD VENIPUNCTURE: CPT

## 2023-06-12 PROCEDURE — 5A09357 ASSISTANCE WITH RESPIRATORY VENTILATION, LESS THAN 24 CONSECUTIVE HOURS, CONTINUOUS POSITIVE AIRWAY PRESSURE: ICD-10-PCS | Performed by: INTERNAL MEDICINE

## 2023-06-12 PROCEDURE — 6370000000 HC RX 637 (ALT 250 FOR IP): Performed by: EMERGENCY MEDICINE

## 2023-06-12 PROCEDURE — 96367 TX/PROPH/DG ADDL SEQ IV INF: CPT

## 2023-06-12 PROCEDURE — 6370000000 HC RX 637 (ALT 250 FOR IP): Performed by: PHYSICIAN ASSISTANT

## 2023-06-12 PROCEDURE — 2580000003 HC RX 258: Performed by: PHYSICIAN ASSISTANT

## 2023-06-12 PROCEDURE — 83605 ASSAY OF LACTIC ACID: CPT

## 2023-06-12 PROCEDURE — 84145 PROCALCITONIN (PCT): CPT

## 2023-06-12 PROCEDURE — 96365 THER/PROPH/DIAG IV INF INIT: CPT

## 2023-06-12 PROCEDURE — 87040 BLOOD CULTURE FOR BACTERIA: CPT

## 2023-06-12 PROCEDURE — 85025 COMPLETE CBC W/AUTO DIFF WBC: CPT

## 2023-06-12 PROCEDURE — 96375 TX/PRO/DX INJ NEW DRUG ADDON: CPT

## 2023-06-12 PROCEDURE — 94660 CPAP INITIATION&MGMT: CPT

## 2023-06-12 PROCEDURE — 84132 ASSAY OF SERUM POTASSIUM: CPT

## 2023-06-12 RX ORDER — ACETAMINOPHEN 325 MG/1
650 TABLET ORAL ONCE
Status: COMPLETED | OUTPATIENT
Start: 2023-06-12 | End: 2023-06-12

## 2023-06-12 RX ORDER — FUROSEMIDE 10 MG/ML
40 INJECTION INTRAMUSCULAR; INTRAVENOUS ONCE
Status: COMPLETED | OUTPATIENT
Start: 2023-06-12 | End: 2023-06-12

## 2023-06-12 RX ORDER — PRIMIDONE 50 MG/1
50 TABLET ORAL NIGHTLY
COMMUNITY

## 2023-06-12 RX ORDER — ALBUTEROL SULFATE 2.5 MG/3ML
5 SOLUTION RESPIRATORY (INHALATION) ONCE
Status: COMPLETED | OUTPATIENT
Start: 2023-06-12 | End: 2023-06-12

## 2023-06-12 RX ORDER — POTASSIUM CHLORIDE 20 MEQ/1
20 TABLET, EXTENDED RELEASE ORAL DAILY
COMMUNITY

## 2023-06-12 RX ORDER — IPRATROPIUM BROMIDE AND ALBUTEROL SULFATE 2.5; .5 MG/3ML; MG/3ML
1 SOLUTION RESPIRATORY (INHALATION) ONCE
Status: COMPLETED | OUTPATIENT
Start: 2023-06-12 | End: 2023-06-12

## 2023-06-12 RX ADMIN — FUROSEMIDE 40 MG: 10 INJECTION, SOLUTION INTRAMUSCULAR; INTRAVENOUS at 22:11

## 2023-06-12 RX ADMIN — NITROGLYCERIN 0.5 INCH: 20 OINTMENT TOPICAL at 23:15

## 2023-06-12 RX ADMIN — ACETAMINOPHEN 650 MG: 325 TABLET ORAL at 23:32

## 2023-06-12 RX ADMIN — IPRATROPIUM BROMIDE AND ALBUTEROL SULFATE 1 DOSE: .5; 2.5 SOLUTION RESPIRATORY (INHALATION) at 22:07

## 2023-06-12 RX ADMIN — WATER 80 MG: 1 INJECTION INTRAMUSCULAR; INTRAVENOUS; SUBCUTANEOUS at 22:10

## 2023-06-12 RX ADMIN — ALBUTEROL SULFATE 5 MG: 2.5 SOLUTION RESPIRATORY (INHALATION) at 22:07

## 2023-06-12 RX ADMIN — CEFTRIAXONE SODIUM 1000 MG: 1 INJECTION, POWDER, FOR SOLUTION INTRAMUSCULAR; INTRAVENOUS at 23:30

## 2023-06-12 ASSESSMENT — PAIN DESCRIPTION - LOCATION: LOCATION: BACK

## 2023-06-12 ASSESSMENT — PAIN - FUNCTIONAL ASSESSMENT
PAIN_FUNCTIONAL_ASSESSMENT: NONE - DENIES PAIN
PAIN_FUNCTIONAL_ASSESSMENT: PREVENTS OR INTERFERES SOME ACTIVE ACTIVITIES AND ADLS

## 2023-06-12 ASSESSMENT — PAIN SCALES - GENERAL: PAINLEVEL_OUTOF10: 7

## 2023-06-12 ASSESSMENT — PAIN DESCRIPTION - DESCRIPTORS: DESCRIPTORS: ACHING

## 2023-06-12 ASSESSMENT — PAIN DESCRIPTION - ORIENTATION: ORIENTATION: MID;LOWER

## 2023-06-13 ENCOUNTER — TELEPHONE (OUTPATIENT)
Dept: PULMONOLOGY | Age: 78
End: 2023-06-13

## 2023-06-13 PROBLEM — R06.02 SHORTNESS OF BREATH: Status: ACTIVE | Noted: 2023-06-13

## 2023-06-13 LAB
ANION GAP SERPL CALCULATED.3IONS-SCNC: 14 MMOL/L (ref 3–16)
BASOPHILS # BLD: 0 K/UL (ref 0–0.2)
BASOPHILS NFR BLD: 0.1 %
BUN SERPL-MCNC: 31 MG/DL (ref 7–20)
CALCIUM SERPL-MCNC: 9.8 MG/DL (ref 8.3–10.6)
CHLORIDE SERPL-SCNC: 94 MMOL/L (ref 99–110)
CO2 SERPL-SCNC: 26 MMOL/L (ref 21–32)
CREAT SERPL-MCNC: 1.5 MG/DL (ref 0.6–1.2)
D DIMER: 0.96 UG/ML FEU (ref 0–0.6)
DEPRECATED RDW RBC AUTO: 15.7 % (ref 12.4–15.4)
EKG ATRIAL RATE: 98 BPM
EKG DIAGNOSIS: NORMAL
EKG P AXIS: 73 DEGREES
EKG P-R INTERVAL: 182 MS
EKG Q-T INTERVAL: 344 MS
EKG QRS DURATION: 92 MS
EKG QTC CALCULATION (BAZETT): 439 MS
EKG R AXIS: -42 DEGREES
EKG T AXIS: 77 DEGREES
EKG VENTRICULAR RATE: 98 BPM
EOSINOPHIL # BLD: 0 K/UL (ref 0–0.6)
EOSINOPHIL NFR BLD: 0 %
GFR SERPLBLD CREATININE-BSD FMLA CKD-EPI: 35 ML/MIN/{1.73_M2}
GLUCOSE BLD-MCNC: 127 MG/DL (ref 70–99)
GLUCOSE BLD-MCNC: 161 MG/DL (ref 70–99)
GLUCOSE BLD-MCNC: 162 MG/DL (ref 70–99)
GLUCOSE BLD-MCNC: 186 MG/DL (ref 70–99)
GLUCOSE SERPL-MCNC: 172 MG/DL (ref 70–99)
HCT VFR BLD AUTO: 29.9 % (ref 36–48)
HGB BLD-MCNC: 9.3 G/DL (ref 12–16)
LYMPHOCYTES # BLD: 0.6 K/UL (ref 1–5.1)
LYMPHOCYTES NFR BLD: 4.2 %
MCH RBC QN AUTO: 31.6 PG (ref 26–34)
MCHC RBC AUTO-ENTMCNC: 31.1 G/DL (ref 31–36)
MCV RBC AUTO: 101.5 FL (ref 80–100)
MONOCYTES # BLD: 0.2 K/UL (ref 0–1.3)
MONOCYTES NFR BLD: 1.5 %
NEUTROPHILS # BLD: 14 K/UL (ref 1.7–7.7)
NEUTROPHILS NFR BLD: 94.2 %
PERFORMED ON: ABNORMAL
PLATELET # BLD AUTO: 215 K/UL (ref 135–450)
PMV BLD AUTO: 8.8 FL (ref 5–10.5)
POTASSIUM SERPL-SCNC: 4 MMOL/L (ref 3.5–5.1)
PROCALCITONIN SERPL IA-MCNC: 0.59 NG/ML (ref 0–0.15)
RBC # BLD AUTO: 2.95 M/UL (ref 4–5.2)
SODIUM SERPL-SCNC: 134 MMOL/L (ref 136–145)
TROPONIN, HIGH SENSITIVITY: 26 NG/L (ref 0–14)
WBC # BLD AUTO: 14.9 K/UL (ref 4–11)

## 2023-06-13 PROCEDURE — 85025 COMPLETE CBC W/AUTO DIFF WBC: CPT

## 2023-06-13 PROCEDURE — 84145 PROCALCITONIN (PCT): CPT

## 2023-06-13 PROCEDURE — 2000000000 HC ICU R&B

## 2023-06-13 PROCEDURE — 94640 AIRWAY INHALATION TREATMENT: CPT

## 2023-06-13 PROCEDURE — 2580000003 HC RX 258: Performed by: INTERNAL MEDICINE

## 2023-06-13 PROCEDURE — 93010 ELECTROCARDIOGRAM REPORT: CPT | Performed by: INTERNAL MEDICINE

## 2023-06-13 PROCEDURE — 97162 PT EVAL MOD COMPLEX 30 MIN: CPT

## 2023-06-13 PROCEDURE — 2700000000 HC OXYGEN THERAPY PER DAY

## 2023-06-13 PROCEDURE — 6360000002 HC RX W HCPCS: Performed by: INTERNAL MEDICINE

## 2023-06-13 PROCEDURE — 97166 OT EVAL MOD COMPLEX 45 MIN: CPT

## 2023-06-13 PROCEDURE — 97530 THERAPEUTIC ACTIVITIES: CPT

## 2023-06-13 PROCEDURE — 85379 FIBRIN DEGRADATION QUANT: CPT

## 2023-06-13 PROCEDURE — 97116 GAIT TRAINING THERAPY: CPT

## 2023-06-13 PROCEDURE — 6370000000 HC RX 637 (ALT 250 FOR IP): Performed by: INTERNAL MEDICINE

## 2023-06-13 PROCEDURE — 92526 ORAL FUNCTION THERAPY: CPT

## 2023-06-13 PROCEDURE — 99223 1ST HOSP IP/OBS HIGH 75: CPT | Performed by: INTERNAL MEDICINE

## 2023-06-13 PROCEDURE — 36415 COLL VENOUS BLD VENIPUNCTURE: CPT

## 2023-06-13 PROCEDURE — 80048 BASIC METABOLIC PNL TOTAL CA: CPT

## 2023-06-13 PROCEDURE — 6360000002 HC RX W HCPCS: Performed by: PHYSICIAN ASSISTANT

## 2023-06-13 PROCEDURE — 87449 NOS EACH ORGANISM AG IA: CPT

## 2023-06-13 PROCEDURE — 92610 EVALUATE SWALLOWING FUNCTION: CPT

## 2023-06-13 PROCEDURE — 97535 SELF CARE MNGMENT TRAINING: CPT

## 2023-06-13 PROCEDURE — 2580000003 HC RX 258: Performed by: PHYSICIAN ASSISTANT

## 2023-06-13 PROCEDURE — 94761 N-INVAS EAR/PLS OXIMETRY MLT: CPT

## 2023-06-13 RX ORDER — ENOXAPARIN SODIUM 100 MG/ML
40 INJECTION SUBCUTANEOUS DAILY
Status: DISCONTINUED | OUTPATIENT
Start: 2023-06-13 | End: 2023-06-16

## 2023-06-13 RX ORDER — ACETAMINOPHEN 325 MG/1
650 TABLET ORAL EVERY 6 HOURS PRN
Status: DISCONTINUED | OUTPATIENT
Start: 2023-06-13 | End: 2023-06-18 | Stop reason: HOSPADM

## 2023-06-13 RX ORDER — ALLOPURINOL 100 MG/1
100 TABLET ORAL 2 TIMES DAILY
Status: DISCONTINUED | OUTPATIENT
Start: 2023-06-13 | End: 2023-06-18 | Stop reason: HOSPADM

## 2023-06-13 RX ORDER — SODIUM CHLORIDE 0.9 % (FLUSH) 0.9 %
10 SYRINGE (ML) INJECTION EVERY 12 HOURS SCHEDULED
Status: DISCONTINUED | OUTPATIENT
Start: 2023-06-13 | End: 2023-06-18 | Stop reason: HOSPADM

## 2023-06-13 RX ORDER — PREDNISONE 20 MG/1
20 TABLET ORAL DAILY
Status: DISCONTINUED | OUTPATIENT
Start: 2023-06-13 | End: 2023-06-13 | Stop reason: SDUPTHER

## 2023-06-13 RX ORDER — PANTOPRAZOLE SODIUM 40 MG/1
40 TABLET, DELAYED RELEASE ORAL
Status: DISCONTINUED | OUTPATIENT
Start: 2023-06-13 | End: 2023-06-18 | Stop reason: HOSPADM

## 2023-06-13 RX ORDER — ALBUTEROL SULFATE 90 UG/1
2 AEROSOL, METERED RESPIRATORY (INHALATION) EVERY 6 HOURS PRN
Status: DISCONTINUED | OUTPATIENT
Start: 2023-06-13 | End: 2023-06-18 | Stop reason: HOSPADM

## 2023-06-13 RX ORDER — DULOXETIN HYDROCHLORIDE 60 MG/1
60 CAPSULE, DELAYED RELEASE ORAL DAILY
Status: DISCONTINUED | OUTPATIENT
Start: 2023-06-13 | End: 2023-06-18 | Stop reason: HOSPADM

## 2023-06-13 RX ORDER — ALLOPURINOL 100 MG/1
100 TABLET ORAL DAILY
COMMUNITY

## 2023-06-13 RX ORDER — GUAIFENESIN 600 MG/1
600 TABLET, EXTENDED RELEASE ORAL 2 TIMES DAILY
Status: DISCONTINUED | OUTPATIENT
Start: 2023-06-13 | End: 2023-06-13 | Stop reason: SDUPTHER

## 2023-06-13 RX ORDER — SODIUM CHLORIDE 9 MG/ML
INJECTION, SOLUTION INTRAVENOUS CONTINUOUS
Status: DISCONTINUED | OUTPATIENT
Start: 2023-06-13 | End: 2023-06-13

## 2023-06-13 RX ORDER — DOXYCYCLINE HYCLATE 100 MG
100 TABLET ORAL EVERY 12 HOURS SCHEDULED
Status: COMPLETED | OUTPATIENT
Start: 2023-06-13 | End: 2023-06-17

## 2023-06-13 RX ORDER — ONDANSETRON 4 MG/1
4 TABLET, ORALLY DISINTEGRATING ORAL EVERY 8 HOURS PRN
Status: DISCONTINUED | OUTPATIENT
Start: 2023-06-13 | End: 2023-06-18 | Stop reason: HOSPADM

## 2023-06-13 RX ORDER — BUMETANIDE 1 MG/1
2 TABLET ORAL DAILY
Status: DISCONTINUED | OUTPATIENT
Start: 2023-06-13 | End: 2023-06-18 | Stop reason: HOSPADM

## 2023-06-13 RX ORDER — AMLODIPINE BESYLATE 5 MG/1
5 TABLET ORAL DAILY
Status: DISCONTINUED | OUTPATIENT
Start: 2023-06-13 | End: 2023-06-18 | Stop reason: HOSPADM

## 2023-06-13 RX ORDER — LOSARTAN POTASSIUM 25 MG/1
50 TABLET ORAL DAILY
Status: DISCONTINUED | OUTPATIENT
Start: 2023-06-13 | End: 2023-06-18 | Stop reason: HOSPADM

## 2023-06-13 RX ORDER — ARIPIPRAZOLE 10 MG/1
5 TABLET ORAL DAILY
Status: DISCONTINUED | OUTPATIENT
Start: 2023-06-13 | End: 2023-06-18 | Stop reason: HOSPADM

## 2023-06-13 RX ORDER — PRIMIDONE 50 MG/1
50 TABLET ORAL NIGHTLY
Status: DISCONTINUED | OUTPATIENT
Start: 2023-06-13 | End: 2023-06-16

## 2023-06-13 RX ORDER — GUAIFENESIN 600 MG/1
600 TABLET, EXTENDED RELEASE ORAL 2 TIMES DAILY
Status: DISCONTINUED | OUTPATIENT
Start: 2023-06-13 | End: 2023-06-18 | Stop reason: HOSPADM

## 2023-06-13 RX ORDER — TRAMADOL HYDROCHLORIDE 50 MG/1
50 TABLET ORAL EVERY 6 HOURS PRN
Status: DISCONTINUED | OUTPATIENT
Start: 2023-06-13 | End: 2023-06-18 | Stop reason: HOSPADM

## 2023-06-13 RX ORDER — SODIUM CHLORIDE 9 MG/ML
INJECTION, SOLUTION INTRAVENOUS PRN
Status: DISCONTINUED | OUTPATIENT
Start: 2023-06-13 | End: 2023-06-18 | Stop reason: HOSPADM

## 2023-06-13 RX ORDER — POLYETHYLENE GLYCOL 3350 17 G/17G
17 POWDER, FOR SOLUTION ORAL DAILY PRN
Status: DISCONTINUED | OUTPATIENT
Start: 2023-06-13 | End: 2023-06-18 | Stop reason: HOSPADM

## 2023-06-13 RX ORDER — ARIPIPRAZOLE 5 MG/1
5 TABLET ORAL DAILY
Status: ON HOLD | COMMUNITY
End: 2023-06-13

## 2023-06-13 RX ORDER — TIZANIDINE 4 MG/1
4 TABLET ORAL EVERY 8 HOURS PRN
Status: DISCONTINUED | OUTPATIENT
Start: 2023-06-13 | End: 2023-06-13

## 2023-06-13 RX ORDER — CHOLECALCIFEROL (VITAMIN D3) 125 MCG
500 CAPSULE ORAL DAILY
COMMUNITY

## 2023-06-13 RX ORDER — SODIUM CHLORIDE 0.9 % (FLUSH) 0.9 %
10 SYRINGE (ML) INJECTION PRN
Status: DISCONTINUED | OUTPATIENT
Start: 2023-06-13 | End: 2023-06-18 | Stop reason: HOSPADM

## 2023-06-13 RX ORDER — ACETAMINOPHEN 650 MG/1
650 SUPPOSITORY RECTAL EVERY 6 HOURS PRN
Status: DISCONTINUED | OUTPATIENT
Start: 2023-06-13 | End: 2023-06-18 | Stop reason: HOSPADM

## 2023-06-13 RX ORDER — ONDANSETRON 2 MG/ML
4 INJECTION INTRAMUSCULAR; INTRAVENOUS EVERY 6 HOURS PRN
Status: DISCONTINUED | OUTPATIENT
Start: 2023-06-13 | End: 2023-06-18 | Stop reason: HOSPADM

## 2023-06-13 RX ORDER — BUMETANIDE 2 MG/1
2 TABLET ORAL DAILY
COMMUNITY

## 2023-06-13 RX ORDER — IPRATROPIUM BROMIDE AND ALBUTEROL SULFATE 2.5; .5 MG/3ML; MG/3ML
1 SOLUTION RESPIRATORY (INHALATION)
Status: DISCONTINUED | OUTPATIENT
Start: 2023-06-13 | End: 2023-06-13

## 2023-06-13 RX ORDER — AMLODIPINE BESYLATE 5 MG/1
5 TABLET ORAL DAILY
COMMUNITY

## 2023-06-13 RX ORDER — IPRATROPIUM BROMIDE AND ALBUTEROL SULFATE 2.5; .5 MG/3ML; MG/3ML
1 SOLUTION RESPIRATORY (INHALATION) 2 TIMES DAILY
Status: DISCONTINUED | OUTPATIENT
Start: 2023-06-13 | End: 2023-06-18 | Stop reason: HOSPADM

## 2023-06-13 RX ORDER — ALBUTEROL SULFATE 2.5 MG/3ML
2.5 SOLUTION RESPIRATORY (INHALATION)
Status: DISCONTINUED | OUTPATIENT
Start: 2023-06-13 | End: 2023-06-18 | Stop reason: HOSPADM

## 2023-06-13 RX ORDER — ALBUTEROL SULFATE 2.5 MG/3ML
2.5 SOLUTION RESPIRATORY (INHALATION) EVERY 4 HOURS PRN
Status: DISCONTINUED | OUTPATIENT
Start: 2023-06-13 | End: 2023-06-13 | Stop reason: SDUPTHER

## 2023-06-13 RX ORDER — CALCIUM GLUCONATE 94 MG/ML
1000 INJECTION, SOLUTION INTRAVENOUS ONCE
Status: DISCONTINUED | OUTPATIENT
Start: 2023-06-13 | End: 2023-06-13

## 2023-06-13 RX ORDER — PREDNISONE 20 MG/1
20 TABLET ORAL DAILY
Status: DISCONTINUED | OUTPATIENT
Start: 2023-06-13 | End: 2023-06-18 | Stop reason: HOSPADM

## 2023-06-13 RX ADMIN — PANTOPRAZOLE SODIUM 40 MG: 40 TABLET, DELAYED RELEASE ORAL at 08:51

## 2023-06-13 RX ADMIN — GUAIFENESIN 600 MG: 600 TABLET, EXTENDED RELEASE ORAL at 20:14

## 2023-06-13 RX ADMIN — ENOXAPARIN SODIUM 40 MG: 100 INJECTION SUBCUTANEOUS at 12:14

## 2023-06-13 RX ADMIN — TRAMADOL HYDROCHLORIDE 50 MG: 50 TABLET, COATED ORAL at 22:14

## 2023-06-13 RX ADMIN — ALLOPURINOL 100 MG: 100 TABLET ORAL at 08:49

## 2023-06-13 RX ADMIN — DOXYCYCLINE HYCLATE 100 MG: 100 TABLET, COATED ORAL at 20:14

## 2023-06-13 RX ADMIN — AZITHROMYCIN MONOHYDRATE 500 MG: 500 INJECTION, POWDER, LYOPHILIZED, FOR SOLUTION INTRAVENOUS at 00:18

## 2023-06-13 RX ADMIN — PREDNISONE 20 MG: 20 TABLET ORAL at 12:13

## 2023-06-13 RX ADMIN — BUMETANIDE 2 MG: 1 TABLET ORAL at 08:50

## 2023-06-13 RX ADMIN — ARIPIPRAZOLE 5 MG: 10 TABLET ORAL at 08:49

## 2023-06-13 RX ADMIN — PRIMIDONE 50 MG: 50 TABLET ORAL at 20:14

## 2023-06-13 RX ADMIN — IPRATROPIUM BROMIDE AND ALBUTEROL SULFATE 1 DOSE: .5; 2.5 SOLUTION RESPIRATORY (INHALATION) at 07:20

## 2023-06-13 RX ADMIN — SODIUM CHLORIDE: 9 INJECTION, SOLUTION INTRAVENOUS at 06:13

## 2023-06-13 RX ADMIN — MUPIROCIN: 20 OINTMENT TOPICAL at 20:20

## 2023-06-13 RX ADMIN — DULOXETINE HYDROCHLORIDE 60 MG: 60 CAPSULE, DELAYED RELEASE ORAL at 08:49

## 2023-06-13 RX ADMIN — CEFTRIAXONE SODIUM 1000 MG: 1 INJECTION, POWDER, FOR SOLUTION INTRAMUSCULAR; INTRAVENOUS at 23:35

## 2023-06-13 RX ADMIN — Medication 10 ML: at 20:14

## 2023-06-13 RX ADMIN — IPRATROPIUM BROMIDE AND ALBUTEROL SULFATE 1 DOSE: .5; 2.5 SOLUTION RESPIRATORY (INHALATION) at 19:55

## 2023-06-13 RX ADMIN — ALLOPURINOL 100 MG: 100 TABLET ORAL at 20:14

## 2023-06-13 RX ADMIN — GUAIFENESIN 600 MG: 600 TABLET, EXTENDED RELEASE ORAL at 08:49

## 2023-06-13 RX ADMIN — PANTOPRAZOLE SODIUM 40 MG: 40 TABLET, DELAYED RELEASE ORAL at 16:26

## 2023-06-13 RX ADMIN — DOXYCYCLINE HYCLATE 100 MG: 100 TABLET, COATED ORAL at 08:49

## 2023-06-13 ASSESSMENT — LIFESTYLE VARIABLES
HOW OFTEN DO YOU HAVE A DRINK CONTAINING ALCOHOL: NEVER
HOW MANY STANDARD DRINKS CONTAINING ALCOHOL DO YOU HAVE ON A TYPICAL DAY: PATIENT DOES NOT DRINK

## 2023-06-13 ASSESSMENT — PAIN SCALES - GENERAL: PAINLEVEL_OUTOF10: 0

## 2023-06-14 PROBLEM — N18.9 CHRONIC KIDNEY DISEASE: Status: ACTIVE | Noted: 2017-06-19

## 2023-06-14 PROBLEM — J44.1 COPD EXACERBATION (HCC): Status: ACTIVE | Noted: 2023-06-14

## 2023-06-14 LAB
BASOPHILS # BLD: 0 K/UL (ref 0–0.2)
BASOPHILS NFR BLD: 0 %
DEPRECATED RDW RBC AUTO: 15.3 % (ref 12.4–15.4)
EKG ATRIAL RATE: 83 BPM
EKG DIAGNOSIS: NORMAL
EKG P AXIS: 75 DEGREES
EKG P-R INTERVAL: 190 MS
EKG Q-T INTERVAL: 362 MS
EKG QRS DURATION: 94 MS
EKG QTC CALCULATION (BAZETT): 425 MS
EKG R AXIS: -48 DEGREES
EKG T AXIS: 75 DEGREES
EKG VENTRICULAR RATE: 83 BPM
EOSINOPHIL # BLD: 0 K/UL (ref 0–0.6)
EOSINOPHIL NFR BLD: 0 %
GLUCOSE BLD-MCNC: 111 MG/DL (ref 70–99)
GLUCOSE BLD-MCNC: 120 MG/DL (ref 70–99)
GLUCOSE BLD-MCNC: 146 MG/DL (ref 70–99)
GLUCOSE BLD-MCNC: 150 MG/DL (ref 70–99)
HCT VFR BLD AUTO: 24 % (ref 36–48)
HGB BLD-MCNC: 7.7 G/DL (ref 12–16)
LEGIONELLA AG UR QL: NORMAL
LYMPHOCYTES # BLD: 1 K/UL (ref 1–5.1)
LYMPHOCYTES NFR BLD: 7 %
MCH RBC QN AUTO: 31.4 PG (ref 26–34)
MCHC RBC AUTO-ENTMCNC: 32.1 G/DL (ref 31–36)
MCV RBC AUTO: 97.9 FL (ref 80–100)
MONOCYTES # BLD: 0.5 K/UL (ref 0–1.3)
MONOCYTES NFR BLD: 3.4 %
NEUTROPHILS # BLD: 12.5 K/UL (ref 1.7–7.7)
NEUTROPHILS NFR BLD: 89.6 %
PERFORMED ON: ABNORMAL
PLATELET # BLD AUTO: 212 K/UL (ref 135–450)
PMV BLD AUTO: 8.7 FL (ref 5–10.5)
RBC # BLD AUTO: 2.45 M/UL (ref 4–5.2)
S PNEUM AG UR QL: ABNORMAL
TROPONIN, HIGH SENSITIVITY: 15 NG/L (ref 0–14)
TROPONIN, HIGH SENSITIVITY: 16 NG/L (ref 0–14)
TROPONIN, HIGH SENSITIVITY: 18 NG/L (ref 0–14)
WBC # BLD AUTO: 14 K/UL (ref 4–11)

## 2023-06-14 PROCEDURE — 94761 N-INVAS EAR/PLS OXIMETRY MLT: CPT

## 2023-06-14 PROCEDURE — 2580000003 HC RX 258: Performed by: INTERNAL MEDICINE

## 2023-06-14 PROCEDURE — 93005 ELECTROCARDIOGRAM TRACING: CPT | Performed by: INTERNAL MEDICINE

## 2023-06-14 PROCEDURE — 87205 SMEAR GRAM STAIN: CPT

## 2023-06-14 PROCEDURE — 6360000002 HC RX W HCPCS: Performed by: INTERNAL MEDICINE

## 2023-06-14 PROCEDURE — 85025 COMPLETE CBC W/AUTO DIFF WBC: CPT

## 2023-06-14 PROCEDURE — 36415 COLL VENOUS BLD VENIPUNCTURE: CPT

## 2023-06-14 PROCEDURE — 87070 CULTURE OTHR SPECIMN AEROBIC: CPT

## 2023-06-14 PROCEDURE — 93010 ELECTROCARDIOGRAM REPORT: CPT | Performed by: INTERNAL MEDICINE

## 2023-06-14 PROCEDURE — 92526 ORAL FUNCTION THERAPY: CPT

## 2023-06-14 PROCEDURE — 2700000000 HC OXYGEN THERAPY PER DAY

## 2023-06-14 PROCEDURE — 94640 AIRWAY INHALATION TREATMENT: CPT

## 2023-06-14 PROCEDURE — 6370000000 HC RX 637 (ALT 250 FOR IP): Performed by: INTERNAL MEDICINE

## 2023-06-14 PROCEDURE — 84484 ASSAY OF TROPONIN QUANT: CPT

## 2023-06-14 PROCEDURE — 99233 SBSQ HOSP IP/OBS HIGH 50: CPT | Performed by: INTERNAL MEDICINE

## 2023-06-14 PROCEDURE — 1200000000 HC SEMI PRIVATE

## 2023-06-14 RX ADMIN — AMLODIPINE BESYLATE 5 MG: 5 TABLET ORAL at 08:41

## 2023-06-14 RX ADMIN — BUMETANIDE 2 MG: 1 TABLET ORAL at 10:28

## 2023-06-14 RX ADMIN — DOXYCYCLINE HYCLATE 100 MG: 100 TABLET, COATED ORAL at 20:00

## 2023-06-14 RX ADMIN — ALLOPURINOL 100 MG: 100 TABLET ORAL at 19:39

## 2023-06-14 RX ADMIN — ALLOPURINOL 100 MG: 100 TABLET ORAL at 08:41

## 2023-06-14 RX ADMIN — IPRATROPIUM BROMIDE AND ALBUTEROL SULFATE 1 DOSE: .5; 2.5 SOLUTION RESPIRATORY (INHALATION) at 07:28

## 2023-06-14 RX ADMIN — GUAIFENESIN 600 MG: 600 TABLET, EXTENDED RELEASE ORAL at 08:40

## 2023-06-14 RX ADMIN — GUAIFENESIN 600 MG: 600 TABLET, EXTENDED RELEASE ORAL at 19:39

## 2023-06-14 RX ADMIN — IPRATROPIUM BROMIDE AND ALBUTEROL SULFATE 1 DOSE: .5; 2.5 SOLUTION RESPIRATORY (INHALATION) at 19:55

## 2023-06-14 RX ADMIN — ARIPIPRAZOLE 5 MG: 10 TABLET ORAL at 08:40

## 2023-06-14 RX ADMIN — PREDNISONE 20 MG: 20 TABLET ORAL at 08:41

## 2023-06-14 RX ADMIN — MUPIROCIN: 20 OINTMENT TOPICAL at 08:40

## 2023-06-14 RX ADMIN — PANTOPRAZOLE SODIUM 40 MG: 40 TABLET, DELAYED RELEASE ORAL at 16:46

## 2023-06-14 RX ADMIN — Medication 10 ML: at 19:40

## 2023-06-14 RX ADMIN — DULOXETINE HYDROCHLORIDE 60 MG: 60 CAPSULE, DELAYED RELEASE ORAL at 08:41

## 2023-06-14 RX ADMIN — PANTOPRAZOLE SODIUM 40 MG: 40 TABLET, DELAYED RELEASE ORAL at 05:00

## 2023-06-14 RX ADMIN — ENOXAPARIN SODIUM 40 MG: 100 INJECTION SUBCUTANEOUS at 08:41

## 2023-06-14 RX ADMIN — PRIMIDONE 50 MG: 50 TABLET ORAL at 20:00

## 2023-06-14 RX ADMIN — DOXYCYCLINE HYCLATE 100 MG: 100 TABLET, COATED ORAL at 08:41

## 2023-06-14 RX ADMIN — LOSARTAN POTASSIUM 50 MG: 25 TABLET, FILM COATED ORAL at 08:41

## 2023-06-14 RX ADMIN — Medication 10 ML: at 08:42

## 2023-06-15 PROBLEM — R77.8 ELEVATED TROPONIN: Status: ACTIVE | Noted: 2023-06-15

## 2023-06-15 PROBLEM — R79.89 ELEVATED TROPONIN: Status: ACTIVE | Noted: 2023-06-15

## 2023-06-15 LAB
ANION GAP SERPL CALCULATED.3IONS-SCNC: 10 MMOL/L (ref 3–16)
BUN SERPL-MCNC: 32 MG/DL (ref 7–20)
CALCIUM SERPL-MCNC: 9.7 MG/DL (ref 8.3–10.6)
CHLORIDE SERPL-SCNC: 100 MMOL/L (ref 99–110)
CO2 SERPL-SCNC: 27 MMOL/L (ref 21–32)
CREAT SERPL-MCNC: 1.1 MG/DL (ref 0.6–1.2)
GFR SERPLBLD CREATININE-BSD FMLA CKD-EPI: 51 ML/MIN/{1.73_M2}
GLUCOSE BLD-MCNC: 100 MG/DL (ref 70–99)
GLUCOSE BLD-MCNC: 109 MG/DL (ref 70–99)
GLUCOSE BLD-MCNC: 127 MG/DL (ref 70–99)
GLUCOSE BLD-MCNC: 56 MG/DL (ref 70–99)
GLUCOSE BLD-MCNC: 82 MG/DL (ref 70–99)
GLUCOSE SERPL-MCNC: 92 MG/DL (ref 70–99)
PERFORMED ON: ABNORMAL
PERFORMED ON: NORMAL
POTASSIUM SERPL-SCNC: 4.6 MMOL/L (ref 3.5–5.1)
SODIUM SERPL-SCNC: 137 MMOL/L (ref 136–145)
TROPONIN, HIGH SENSITIVITY: 16 NG/L (ref 0–14)
TROPONIN, HIGH SENSITIVITY: 19 NG/L (ref 0–14)
TROPONIN, HIGH SENSITIVITY: 21 NG/L (ref 0–14)

## 2023-06-15 PROCEDURE — 94640 AIRWAY INHALATION TREATMENT: CPT

## 2023-06-15 PROCEDURE — 6370000000 HC RX 637 (ALT 250 FOR IP): Performed by: INTERNAL MEDICINE

## 2023-06-15 PROCEDURE — 36415 COLL VENOUS BLD VENIPUNCTURE: CPT

## 2023-06-15 PROCEDURE — 2700000000 HC OXYGEN THERAPY PER DAY

## 2023-06-15 PROCEDURE — 2580000003 HC RX 258: Performed by: INTERNAL MEDICINE

## 2023-06-15 PROCEDURE — 1200000000 HC SEMI PRIVATE

## 2023-06-15 PROCEDURE — 99232 SBSQ HOSP IP/OBS MODERATE 35: CPT | Performed by: INTERNAL MEDICINE

## 2023-06-15 PROCEDURE — 84484 ASSAY OF TROPONIN QUANT: CPT

## 2023-06-15 PROCEDURE — 94761 N-INVAS EAR/PLS OXIMETRY MLT: CPT

## 2023-06-15 PROCEDURE — 80048 BASIC METABOLIC PNL TOTAL CA: CPT

## 2023-06-15 PROCEDURE — 6360000002 HC RX W HCPCS: Performed by: INTERNAL MEDICINE

## 2023-06-15 PROCEDURE — 97110 THERAPEUTIC EXERCISES: CPT

## 2023-06-15 PROCEDURE — 99223 1ST HOSP IP/OBS HIGH 75: CPT | Performed by: NURSE PRACTITIONER

## 2023-06-15 RX ORDER — FLUTICASONE PROPIONATE 220 UG/1
1 AEROSOL, METERED RESPIRATORY (INHALATION) 2 TIMES DAILY
Status: DISCONTINUED | OUTPATIENT
Start: 2023-06-15 | End: 2023-06-18 | Stop reason: HOSPADM

## 2023-06-15 RX ADMIN — Medication 10 ML: at 08:09

## 2023-06-15 RX ADMIN — ALLOPURINOL 100 MG: 100 TABLET ORAL at 21:57

## 2023-06-15 RX ADMIN — BUMETANIDE 2 MG: 1 TABLET ORAL at 08:00

## 2023-06-15 RX ADMIN — Medication 10 ML: at 21:59

## 2023-06-15 RX ADMIN — GUAIFENESIN 600 MG: 600 TABLET, EXTENDED RELEASE ORAL at 07:59

## 2023-06-15 RX ADMIN — IPRATROPIUM BROMIDE AND ALBUTEROL SULFATE 1 DOSE: .5; 2.5 SOLUTION RESPIRATORY (INHALATION) at 20:52

## 2023-06-15 RX ADMIN — ALLOPURINOL 100 MG: 100 TABLET ORAL at 07:59

## 2023-06-15 RX ADMIN — DOXYCYCLINE HYCLATE 100 MG: 100 TABLET, COATED ORAL at 07:59

## 2023-06-15 RX ADMIN — TRAMADOL HYDROCHLORIDE 50 MG: 50 TABLET, COATED ORAL at 07:58

## 2023-06-15 RX ADMIN — AMLODIPINE BESYLATE 5 MG: 5 TABLET ORAL at 07:59

## 2023-06-15 RX ADMIN — GUAIFENESIN 600 MG: 600 TABLET, EXTENDED RELEASE ORAL at 21:58

## 2023-06-15 RX ADMIN — PREDNISONE 20 MG: 20 TABLET ORAL at 07:59

## 2023-06-15 RX ADMIN — CEFTRIAXONE SODIUM 1000 MG: 1 INJECTION, POWDER, FOR SOLUTION INTRAMUSCULAR; INTRAVENOUS at 00:04

## 2023-06-15 RX ADMIN — Medication 1 PUFF: at 20:55

## 2023-06-15 RX ADMIN — DOXYCYCLINE HYCLATE 100 MG: 100 TABLET, COATED ORAL at 21:58

## 2023-06-15 RX ADMIN — PRIMIDONE 50 MG: 50 TABLET ORAL at 21:57

## 2023-06-15 RX ADMIN — PANTOPRAZOLE SODIUM 40 MG: 40 TABLET, DELAYED RELEASE ORAL at 05:00

## 2023-06-15 RX ADMIN — MUPIROCIN: 20 OINTMENT TOPICAL at 08:09

## 2023-06-15 RX ADMIN — ARIPIPRAZOLE 5 MG: 10 TABLET ORAL at 07:59

## 2023-06-15 RX ADMIN — DULOXETINE HYDROCHLORIDE 60 MG: 60 CAPSULE, DELAYED RELEASE ORAL at 07:58

## 2023-06-15 RX ADMIN — TRAMADOL HYDROCHLORIDE 50 MG: 50 TABLET, COATED ORAL at 22:02

## 2023-06-15 RX ADMIN — IPRATROPIUM BROMIDE AND ALBUTEROL SULFATE 1 DOSE: .5; 2.5 SOLUTION RESPIRATORY (INHALATION) at 08:45

## 2023-06-15 RX ADMIN — ENOXAPARIN SODIUM 40 MG: 100 INJECTION SUBCUTANEOUS at 07:59

## 2023-06-15 RX ADMIN — ONDANSETRON 4 MG: 4 TABLET, ORALLY DISINTEGRATING ORAL at 07:59

## 2023-06-15 RX ADMIN — PANTOPRAZOLE SODIUM 40 MG: 40 TABLET, DELAYED RELEASE ORAL at 16:23

## 2023-06-15 ASSESSMENT — PAIN SCALES - GENERAL
PAINLEVEL_OUTOF10: 7
PAINLEVEL_OUTOF10: 7

## 2023-06-15 ASSESSMENT — PAIN - FUNCTIONAL ASSESSMENT
PAIN_FUNCTIONAL_ASSESSMENT: ACTIVITIES ARE NOT PREVENTED
PAIN_FUNCTIONAL_ASSESSMENT: PREVENTS OR INTERFERES SOME ACTIVE ACTIVITIES AND ADLS

## 2023-06-15 ASSESSMENT — ENCOUNTER SYMPTOMS
SHORTNESS OF BREATH: 1
GASTROINTESTINAL NEGATIVE: 1

## 2023-06-15 ASSESSMENT — PAIN DESCRIPTION - LOCATION
LOCATION: NECK;SHOULDER
LOCATION: GENERALIZED

## 2023-06-15 ASSESSMENT — PAIN DESCRIPTION - DESCRIPTORS
DESCRIPTORS: ACHING
DESCRIPTORS: SORE

## 2023-06-16 PROBLEM — R07.9 CHEST PAIN: Status: ACTIVE | Noted: 2023-06-16

## 2023-06-16 PROBLEM — I49.3 PVC'S (PREMATURE VENTRICULAR CONTRACTIONS): Status: ACTIVE | Noted: 2023-06-16

## 2023-06-16 LAB
ANION GAP SERPL CALCULATED.3IONS-SCNC: 8 MMOL/L (ref 3–16)
BACTERIA SPEC RESP CULT: NORMAL
BUN SERPL-MCNC: 26 MG/DL (ref 7–20)
CALCIUM SERPL-MCNC: 9.7 MG/DL (ref 8.3–10.6)
CHLORIDE SERPL-SCNC: 96 MMOL/L (ref 99–110)
CO2 SERPL-SCNC: 33 MMOL/L (ref 21–32)
CREAT SERPL-MCNC: 1.2 MG/DL (ref 0.6–1.2)
GFR SERPLBLD CREATININE-BSD FMLA CKD-EPI: 46 ML/MIN/{1.73_M2}
GLUCOSE BLD-MCNC: 177 MG/DL (ref 70–99)
GLUCOSE BLD-MCNC: 184 MG/DL (ref 70–99)
GLUCOSE BLD-MCNC: 71 MG/DL (ref 70–99)
GLUCOSE BLD-MCNC: 83 MG/DL (ref 70–99)
GLUCOSE SERPL-MCNC: 84 MG/DL (ref 70–99)
GRAM STN SPEC: NORMAL
LV EF: 55 %
LVEF MODALITY: NORMAL
PERFORMED ON: ABNORMAL
PERFORMED ON: ABNORMAL
PERFORMED ON: NORMAL
PERFORMED ON: NORMAL
POTASSIUM SERPL-SCNC: 4.3 MMOL/L (ref 3.5–5.1)
SODIUM SERPL-SCNC: 137 MMOL/L (ref 136–145)

## 2023-06-16 PROCEDURE — 93306 TTE W/DOPPLER COMPLETE: CPT

## 2023-06-16 PROCEDURE — 36415 COLL VENOUS BLD VENIPUNCTURE: CPT

## 2023-06-16 PROCEDURE — 94640 AIRWAY INHALATION TREATMENT: CPT

## 2023-06-16 PROCEDURE — 1200000000 HC SEMI PRIVATE

## 2023-06-16 PROCEDURE — 6370000000 HC RX 637 (ALT 250 FOR IP): Performed by: INTERNAL MEDICINE

## 2023-06-16 PROCEDURE — 99232 SBSQ HOSP IP/OBS MODERATE 35: CPT | Performed by: INTERNAL MEDICINE

## 2023-06-16 PROCEDURE — 80048 BASIC METABOLIC PNL TOTAL CA: CPT

## 2023-06-16 PROCEDURE — 2580000003 HC RX 258: Performed by: INTERNAL MEDICINE

## 2023-06-16 PROCEDURE — 87635 SARS-COV-2 COVID-19 AMP PRB: CPT

## 2023-06-16 PROCEDURE — 6360000002 HC RX W HCPCS: Performed by: INTERNAL MEDICINE

## 2023-06-16 PROCEDURE — 94761 N-INVAS EAR/PLS OXIMETRY MLT: CPT

## 2023-06-16 PROCEDURE — 2700000000 HC OXYGEN THERAPY PER DAY

## 2023-06-16 PROCEDURE — 99233 SBSQ HOSP IP/OBS HIGH 50: CPT | Performed by: INTERNAL MEDICINE

## 2023-06-16 RX ORDER — PRIMIDONE 50 MG/1
100 TABLET ORAL NIGHTLY
Status: DISCONTINUED | OUTPATIENT
Start: 2023-06-16 | End: 2023-06-18 | Stop reason: HOSPADM

## 2023-06-16 RX ORDER — ENOXAPARIN SODIUM 100 MG/ML
30 INJECTION SUBCUTANEOUS 2 TIMES DAILY
Status: DISCONTINUED | OUTPATIENT
Start: 2023-06-17 | End: 2023-06-18 | Stop reason: HOSPADM

## 2023-06-16 RX ADMIN — DOXYCYCLINE HYCLATE 100 MG: 100 TABLET, COATED ORAL at 09:36

## 2023-06-16 RX ADMIN — ALLOPURINOL 100 MG: 100 TABLET ORAL at 09:36

## 2023-06-16 RX ADMIN — Medication 10 ML: at 20:47

## 2023-06-16 RX ADMIN — ALLOPURINOL 100 MG: 100 TABLET ORAL at 20:42

## 2023-06-16 RX ADMIN — Medication 1 PUFF: at 20:20

## 2023-06-16 RX ADMIN — GUAIFENESIN 600 MG: 600 TABLET, EXTENDED RELEASE ORAL at 20:41

## 2023-06-16 RX ADMIN — PRIMIDONE 100 MG: 50 TABLET ORAL at 20:42

## 2023-06-16 RX ADMIN — ENOXAPARIN SODIUM 40 MG: 100 INJECTION SUBCUTANEOUS at 09:36

## 2023-06-16 RX ADMIN — PANTOPRAZOLE SODIUM 40 MG: 40 TABLET, DELAYED RELEASE ORAL at 06:50

## 2023-06-16 RX ADMIN — TRAMADOL HYDROCHLORIDE 50 MG: 50 TABLET, COATED ORAL at 19:04

## 2023-06-16 RX ADMIN — ACETAMINOPHEN 650 MG: 325 TABLET ORAL at 20:45

## 2023-06-16 RX ADMIN — DOXYCYCLINE HYCLATE 100 MG: 100 TABLET, COATED ORAL at 20:42

## 2023-06-16 RX ADMIN — Medication 10 ML: at 09:37

## 2023-06-16 RX ADMIN — CEFTRIAXONE SODIUM 1000 MG: 1 INJECTION, POWDER, FOR SOLUTION INTRAMUSCULAR; INTRAVENOUS at 00:25

## 2023-06-16 RX ADMIN — IPRATROPIUM BROMIDE AND ALBUTEROL SULFATE 1 DOSE: .5; 2.5 SOLUTION RESPIRATORY (INHALATION) at 20:18

## 2023-06-16 RX ADMIN — ARIPIPRAZOLE 5 MG: 10 TABLET ORAL at 09:36

## 2023-06-16 RX ADMIN — IPRATROPIUM BROMIDE AND ALBUTEROL SULFATE 1 DOSE: .5; 2.5 SOLUTION RESPIRATORY (INHALATION) at 07:52

## 2023-06-16 RX ADMIN — BUMETANIDE 2 MG: 1 TABLET ORAL at 11:38

## 2023-06-16 RX ADMIN — MUPIROCIN: 20 OINTMENT TOPICAL at 11:38

## 2023-06-16 RX ADMIN — Medication 1 PUFF: at 07:52

## 2023-06-16 RX ADMIN — PANTOPRAZOLE SODIUM 40 MG: 40 TABLET, DELAYED RELEASE ORAL at 16:12

## 2023-06-16 RX ADMIN — DULOXETINE HYDROCHLORIDE 60 MG: 60 CAPSULE, DELAYED RELEASE ORAL at 09:36

## 2023-06-16 RX ADMIN — PREDNISONE 20 MG: 20 TABLET ORAL at 09:36

## 2023-06-16 RX ADMIN — AMLODIPINE BESYLATE 5 MG: 5 TABLET ORAL at 09:36

## 2023-06-16 RX ADMIN — GUAIFENESIN 600 MG: 600 TABLET, EXTENDED RELEASE ORAL at 09:36

## 2023-06-16 ASSESSMENT — PAIN DESCRIPTION - LOCATION
LOCATION: SHOULDER
LOCATION: GENERALIZED

## 2023-06-16 ASSESSMENT — PAIN SCALES - GENERAL
PAINLEVEL_OUTOF10: 3
PAINLEVEL_OUTOF10: 7
PAINLEVEL_OUTOF10: 0
PAINLEVEL_OUTOF10: 0

## 2023-06-16 ASSESSMENT — PAIN - FUNCTIONAL ASSESSMENT: PAIN_FUNCTIONAL_ASSESSMENT: ACTIVITIES ARE NOT PREVENTED

## 2023-06-16 ASSESSMENT — PAIN DESCRIPTION - ORIENTATION
ORIENTATION: OTHER (COMMENT)
ORIENTATION: LEFT

## 2023-06-16 ASSESSMENT — PAIN SCALES - WONG BAKER: WONGBAKER_NUMERICALRESPONSE: 0

## 2023-06-16 ASSESSMENT — PAIN DESCRIPTION - DESCRIPTORS
DESCRIPTORS: ACHING
DESCRIPTORS: SORE

## 2023-06-17 LAB
ANION GAP SERPL CALCULATED.3IONS-SCNC: 6 MMOL/L (ref 3–16)
BACTERIA BLD CULT ORG #2: NORMAL
BACTERIA BLD CULT: NORMAL
BUN SERPL-MCNC: 25 MG/DL (ref 7–20)
CALCIUM SERPL-MCNC: 9.7 MG/DL (ref 8.3–10.6)
CHLORIDE SERPL-SCNC: 97 MMOL/L (ref 99–110)
CHOLEST SERPL-MCNC: 149 MG/DL (ref 0–199)
CO2 SERPL-SCNC: 35 MMOL/L (ref 21–32)
CREAT SERPL-MCNC: 1.1 MG/DL (ref 0.6–1.2)
GFR SERPLBLD CREATININE-BSD FMLA CKD-EPI: 51 ML/MIN/{1.73_M2}
GLUCOSE BLD-MCNC: 109 MG/DL (ref 70–99)
GLUCOSE BLD-MCNC: 156 MG/DL (ref 70–99)
GLUCOSE BLD-MCNC: 161 MG/DL (ref 70–99)
GLUCOSE BLD-MCNC: 97 MG/DL (ref 70–99)
GLUCOSE SERPL-MCNC: 107 MG/DL (ref 70–99)
HDLC SERPL-MCNC: 67 MG/DL (ref 40–60)
LDL CHOLESTEROL CALCULATED: 71 MG/DL
PERFORMED ON: ABNORMAL
PERFORMED ON: NORMAL
POTASSIUM SERPL-SCNC: 4.3 MMOL/L (ref 3.5–5.1)
SODIUM SERPL-SCNC: 138 MMOL/L (ref 136–145)
TRIGL SERPL-MCNC: 55 MG/DL (ref 0–150)
VLDLC SERPL CALC-MCNC: 11 MG/DL

## 2023-06-17 PROCEDURE — 99232 SBSQ HOSP IP/OBS MODERATE 35: CPT | Performed by: INTERNAL MEDICINE

## 2023-06-17 PROCEDURE — 6360000002 HC RX W HCPCS: Performed by: INTERNAL MEDICINE

## 2023-06-17 PROCEDURE — 6370000000 HC RX 637 (ALT 250 FOR IP): Performed by: INTERNAL MEDICINE

## 2023-06-17 PROCEDURE — 80048 BASIC METABOLIC PNL TOTAL CA: CPT

## 2023-06-17 PROCEDURE — 97535 SELF CARE MNGMENT TRAINING: CPT

## 2023-06-17 PROCEDURE — 2580000003 HC RX 258: Performed by: INTERNAL MEDICINE

## 2023-06-17 PROCEDURE — 36415 COLL VENOUS BLD VENIPUNCTURE: CPT

## 2023-06-17 PROCEDURE — 94761 N-INVAS EAR/PLS OXIMETRY MLT: CPT

## 2023-06-17 PROCEDURE — 1200000000 HC SEMI PRIVATE

## 2023-06-17 PROCEDURE — 94640 AIRWAY INHALATION TREATMENT: CPT

## 2023-06-17 PROCEDURE — 2700000000 HC OXYGEN THERAPY PER DAY

## 2023-06-17 PROCEDURE — 80061 LIPID PANEL: CPT

## 2023-06-17 PROCEDURE — 97530 THERAPEUTIC ACTIVITIES: CPT

## 2023-06-17 PROCEDURE — 6370000000 HC RX 637 (ALT 250 FOR IP): Performed by: STUDENT IN AN ORGANIZED HEALTH CARE EDUCATION/TRAINING PROGRAM

## 2023-06-17 RX ORDER — MECOBALAMIN 5000 MCG
5 TABLET,DISINTEGRATING ORAL ONCE
Status: COMPLETED | OUTPATIENT
Start: 2023-06-17 | End: 2023-06-17

## 2023-06-17 RX ORDER — POLYETHYLENE GLYCOL 3350 17 G/17G
17 POWDER, FOR SOLUTION ORAL DAILY
Status: COMPLETED | OUTPATIENT
Start: 2023-06-17 | End: 2023-06-17

## 2023-06-17 RX ADMIN — ENOXAPARIN SODIUM 30 MG: 100 INJECTION SUBCUTANEOUS at 21:01

## 2023-06-17 RX ADMIN — GUAIFENESIN 600 MG: 600 TABLET, EXTENDED RELEASE ORAL at 10:54

## 2023-06-17 RX ADMIN — ALLOPURINOL 100 MG: 100 TABLET ORAL at 21:02

## 2023-06-17 RX ADMIN — Medication 1 PUFF: at 07:39

## 2023-06-17 RX ADMIN — ARIPIPRAZOLE 5 MG: 10 TABLET ORAL at 10:53

## 2023-06-17 RX ADMIN — AMLODIPINE BESYLATE 5 MG: 5 TABLET ORAL at 10:53

## 2023-06-17 RX ADMIN — Medication 5 MG: at 02:25

## 2023-06-17 RX ADMIN — PREDNISONE 20 MG: 20 TABLET ORAL at 10:54

## 2023-06-17 RX ADMIN — PRIMIDONE 100 MG: 50 TABLET ORAL at 21:02

## 2023-06-17 RX ADMIN — Medication 10 ML: at 21:01

## 2023-06-17 RX ADMIN — ACETAMINOPHEN 650 MG: 325 TABLET ORAL at 23:33

## 2023-06-17 RX ADMIN — IPRATROPIUM BROMIDE AND ALBUTEROL SULFATE 1 DOSE: .5; 2.5 SOLUTION RESPIRATORY (INHALATION) at 20:18

## 2023-06-17 RX ADMIN — PANTOPRAZOLE SODIUM 40 MG: 40 TABLET, DELAYED RELEASE ORAL at 06:04

## 2023-06-17 RX ADMIN — IPRATROPIUM BROMIDE AND ALBUTEROL SULFATE 1 DOSE: .5; 2.5 SOLUTION RESPIRATORY (INHALATION) at 07:36

## 2023-06-17 RX ADMIN — Medication 1 PUFF: at 20:18

## 2023-06-17 RX ADMIN — MUPIROCIN: 20 OINTMENT TOPICAL at 21:02

## 2023-06-17 RX ADMIN — DOXYCYCLINE HYCLATE 100 MG: 100 TABLET, COATED ORAL at 10:53

## 2023-06-17 RX ADMIN — PANTOPRAZOLE SODIUM 40 MG: 40 TABLET, DELAYED RELEASE ORAL at 16:16

## 2023-06-17 RX ADMIN — MUPIROCIN: 20 OINTMENT TOPICAL at 10:59

## 2023-06-17 RX ADMIN — DULOXETINE HYDROCHLORIDE 60 MG: 60 CAPSULE, DELAYED RELEASE ORAL at 10:53

## 2023-06-17 RX ADMIN — CEFTRIAXONE SODIUM 1000 MG: 1 INJECTION, POWDER, FOR SOLUTION INTRAMUSCULAR; INTRAVENOUS at 01:17

## 2023-06-17 RX ADMIN — CEFTRIAXONE SODIUM 1000 MG: 1 INJECTION, POWDER, FOR SOLUTION INTRAMUSCULAR; INTRAVENOUS at 23:30

## 2023-06-17 RX ADMIN — POLYETHYLENE GLYCOL 3350 17 G: 17 POWDER, FOR SOLUTION ORAL at 21:07

## 2023-06-17 RX ADMIN — GUAIFENESIN 600 MG: 600 TABLET, EXTENDED RELEASE ORAL at 21:02

## 2023-06-17 RX ADMIN — Medication 10 ML: at 10:55

## 2023-06-17 RX ADMIN — Medication 5 MG: at 21:54

## 2023-06-17 RX ADMIN — ALLOPURINOL 100 MG: 100 TABLET ORAL at 10:53

## 2023-06-17 RX ADMIN — ENOXAPARIN SODIUM 30 MG: 100 INJECTION SUBCUTANEOUS at 10:54

## 2023-06-17 RX ADMIN — BUMETANIDE 2 MG: 1 TABLET ORAL at 10:59

## 2023-06-17 RX ADMIN — SODIUM CHLORIDE 20 ML/HR: 9 INJECTION, SOLUTION INTRAVENOUS at 01:14

## 2023-06-17 RX ADMIN — DOXYCYCLINE HYCLATE 100 MG: 100 TABLET, COATED ORAL at 21:02

## 2023-06-17 ASSESSMENT — PAIN DESCRIPTION - DESCRIPTORS: DESCRIPTORS: DISCOMFORT

## 2023-06-17 ASSESSMENT — PAIN SCALES - GENERAL: PAINLEVEL_OUTOF10: 1

## 2023-06-17 ASSESSMENT — PAIN DESCRIPTION - LOCATION: LOCATION: GENERALIZED

## 2023-06-18 VITALS
HEART RATE: 83 BPM | SYSTOLIC BLOOD PRESSURE: 133 MMHG | BODY MASS INDEX: 39.94 KG/M2 | HEIGHT: 63 IN | WEIGHT: 225.4 LBS | OXYGEN SATURATION: 94 % | DIASTOLIC BLOOD PRESSURE: 54 MMHG | TEMPERATURE: 98.6 F | RESPIRATION RATE: 18 BRPM

## 2023-06-18 LAB
GLUCOSE BLD-MCNC: 113 MG/DL (ref 70–99)
GLUCOSE BLD-MCNC: 92 MG/DL (ref 70–99)
PERFORMED ON: ABNORMAL
PERFORMED ON: NORMAL
SARS-COV-2 RDRP RESP QL NAA+PROBE: NOT DETECTED
SARS-COV-2 RNA RESP QL NAA+PROBE: NOT DETECTED

## 2023-06-18 PROCEDURE — 94640 AIRWAY INHALATION TREATMENT: CPT

## 2023-06-18 PROCEDURE — 99239 HOSP IP/OBS DSCHRG MGMT >30: CPT | Performed by: INTERNAL MEDICINE

## 2023-06-18 PROCEDURE — 6370000000 HC RX 637 (ALT 250 FOR IP): Performed by: INTERNAL MEDICINE

## 2023-06-18 PROCEDURE — 2580000003 HC RX 258: Performed by: INTERNAL MEDICINE

## 2023-06-18 PROCEDURE — 99232 SBSQ HOSP IP/OBS MODERATE 35: CPT | Performed by: INTERNAL MEDICINE

## 2023-06-18 PROCEDURE — 87635 SARS-COV-2 COVID-19 AMP PRB: CPT

## 2023-06-18 PROCEDURE — 6360000002 HC RX W HCPCS: Performed by: INTERNAL MEDICINE

## 2023-06-18 PROCEDURE — 2700000000 HC OXYGEN THERAPY PER DAY

## 2023-06-18 PROCEDURE — 94761 N-INVAS EAR/PLS OXIMETRY MLT: CPT

## 2023-06-18 RX ORDER — ARIPIPRAZOLE 5 MG/1
5 TABLET ORAL DAILY
Qty: 30 TABLET | Refills: 0 | Status: SHIPPED
Start: 2023-06-18

## 2023-06-18 RX ORDER — PREDNISONE 10 MG/1
10 TABLET ORAL DAILY
Qty: 3 TABLET | Refills: 0
Start: 2023-06-19 | End: 2023-06-22

## 2023-06-18 RX ORDER — TRAMADOL HYDROCHLORIDE 50 MG/1
50 TABLET ORAL EVERY 6 HOURS PRN
Qty: 12 TABLET | Refills: 0 | Status: SHIPPED | OUTPATIENT
Start: 2023-06-18 | End: 2023-06-21

## 2023-06-18 RX ORDER — LOSARTAN POTASSIUM 50 MG/1
50 TABLET ORAL DAILY
Qty: 30 TABLET | Refills: 3
Start: 2023-06-18

## 2023-06-18 RX ADMIN — GUAIFENESIN 600 MG: 600 TABLET, EXTENDED RELEASE ORAL at 09:14

## 2023-06-18 RX ADMIN — AMLODIPINE BESYLATE 5 MG: 5 TABLET ORAL at 09:14

## 2023-06-18 RX ADMIN — ARIPIPRAZOLE 5 MG: 10 TABLET ORAL at 09:13

## 2023-06-18 RX ADMIN — IPRATROPIUM BROMIDE AND ALBUTEROL SULFATE 1 DOSE: .5; 2.5 SOLUTION RESPIRATORY (INHALATION) at 07:35

## 2023-06-18 RX ADMIN — DULOXETINE HYDROCHLORIDE 60 MG: 60 CAPSULE, DELAYED RELEASE ORAL at 09:14

## 2023-06-18 RX ADMIN — Medication 1 PUFF: at 07:40

## 2023-06-18 RX ADMIN — PANTOPRAZOLE SODIUM 40 MG: 40 TABLET, DELAYED RELEASE ORAL at 05:11

## 2023-06-18 RX ADMIN — Medication 10 ML: at 09:16

## 2023-06-18 RX ADMIN — ENOXAPARIN SODIUM 30 MG: 100 INJECTION SUBCUTANEOUS at 09:13

## 2023-06-18 RX ADMIN — PREDNISONE 20 MG: 20 TABLET ORAL at 09:14

## 2023-06-18 RX ADMIN — ALLOPURINOL 100 MG: 100 TABLET ORAL at 09:14

## 2023-06-18 RX ADMIN — BUMETANIDE 2 MG: 1 TABLET ORAL at 09:16

## 2023-06-19 ENCOUNTER — TELEPHONE (OUTPATIENT)
Dept: CARDIOLOGY CLINIC | Age: 78
End: 2023-06-19

## 2023-06-19 NOTE — TELEPHONE ENCOUNTER
----- Message from Michael Bustos MD sent at 6/19/2023  8:05 AM EDT -----  [employer].  cpm

## 2023-07-03 NOTE — TELEPHONE ENCOUNTER
NATALIYA pt.  She is doing OK right now and is OK to wait until 9/5/23. If anything comes up beforehand, she will call us.

## 2023-07-15 PROBLEM — R79.89 ELEVATED TROPONIN: Status: RESOLVED | Noted: 2023-06-15 | Resolved: 2023-07-15

## 2023-07-15 PROBLEM — R77.8 ELEVATED TROPONIN: Status: RESOLVED | Noted: 2023-06-15 | Resolved: 2023-07-15

## 2023-08-09 ENCOUNTER — APPOINTMENT (OUTPATIENT)
Dept: VASCULAR LAB | Age: 78
DRG: 291 | End: 2023-08-09
Payer: MEDICARE

## 2023-08-09 ENCOUNTER — APPOINTMENT (OUTPATIENT)
Dept: GENERAL RADIOLOGY | Age: 78
DRG: 291 | End: 2023-08-09
Payer: MEDICARE

## 2023-08-09 ENCOUNTER — HOSPITAL ENCOUNTER (INPATIENT)
Age: 78
LOS: 6 days | Discharge: NURSING, REHAB OR LONG-TERM CARE | DRG: 291 | End: 2023-08-15
Attending: EMERGENCY MEDICINE | Admitting: STUDENT IN AN ORGANIZED HEALTH CARE EDUCATION/TRAINING PROGRAM
Payer: MEDICARE

## 2023-08-09 DIAGNOSIS — R60.0 BILATERAL LOWER EXTREMITY EDEMA: Primary | ICD-10-CM

## 2023-08-09 DIAGNOSIS — R77.8 ELEVATED TROPONIN: ICD-10-CM

## 2023-08-09 DIAGNOSIS — I50.9 DECOMPENSATED HEART FAILURE (HCC): ICD-10-CM

## 2023-08-09 PROBLEM — R79.89 ELEVATED TROPONIN: Status: ACTIVE | Noted: 2023-08-09

## 2023-08-09 PROBLEM — I50.33 ACUTE ON CHRONIC DIASTOLIC (CONGESTIVE) HEART FAILURE (HCC): Status: ACTIVE | Noted: 2023-08-09

## 2023-08-09 LAB
ALBUMIN SERPL-MCNC: 3.2 G/DL (ref 3.4–5)
ALBUMIN/GLOB SERPL: 1 {RATIO} (ref 1.1–2.2)
ALP SERPL-CCNC: 153 U/L (ref 40–129)
ALT SERPL-CCNC: 15 U/L (ref 10–40)
ANION GAP SERPL CALCULATED.3IONS-SCNC: 11 MMOL/L (ref 3–16)
APTT BLD: 26.9 SEC (ref 22.7–35.9)
AST SERPL-CCNC: 27 U/L (ref 15–37)
BACTERIA URNS QL MICRO: ABNORMAL /HPF
BASE EXCESS BLDV CALC-SCNC: 5.5 MMOL/L (ref -3–3)
BASOPHILS # BLD: 0 K/UL (ref 0–0.2)
BASOPHILS NFR BLD: 0.6 %
BILIRUB SERPL-MCNC: <0.2 MG/DL (ref 0–1)
BILIRUB UR QL STRIP.AUTO: ABNORMAL
BUN SERPL-MCNC: 21 MG/DL (ref 7–20)
CALCIUM SERPL-MCNC: 9.5 MG/DL (ref 8.3–10.6)
CHLORIDE SERPL-SCNC: 99 MMOL/L (ref 99–110)
CLARITY UR: CLEAR
CO2 BLDV-SCNC: 32 MMOL/L
CO2 SERPL-SCNC: 27 MMOL/L (ref 21–32)
COHGB MFR BLDV: 8.5 % (ref 0–1.5)
COLOR UR: YELLOW
CREAT SERPL-MCNC: 1.2 MG/DL (ref 0.6–1.2)
DEPRECATED RDW RBC AUTO: 15.4 % (ref 12.4–15.4)
EKG ATRIAL RATE: 70 BPM
EKG DIAGNOSIS: ABNORMAL
EKG P AXIS: -21 DEGREES
EKG P-R INTERVAL: 204 MS
EKG Q-T INTERVAL: 380 MS
EKG QRS DURATION: 88 MS
EKG QTC CALCULATION (BAZETT): 410 MS
EKG R AXIS: 70 DEGREES
EKG T AXIS: 14 DEGREES
EKG VENTRICULAR RATE: 70 BPM
EOSINOPHIL # BLD: 0.3 K/UL (ref 0–0.6)
EOSINOPHIL NFR BLD: 5.3 %
EPI CELLS #/AREA URNS HPF: ABNORMAL /HPF (ref 0–5)
GFR SERPLBLD CREATININE-BSD FMLA CKD-EPI: 46 ML/MIN/{1.73_M2}
GLUCOSE SERPL-MCNC: 101 MG/DL (ref 70–99)
GLUCOSE UR STRIP.AUTO-MCNC: NEGATIVE MG/DL
HCO3 BLDV-SCNC: 30.3 MMOL/L (ref 23–29)
HCT VFR BLD AUTO: 29.2 % (ref 36–48)
HGB BLD-MCNC: 9.8 G/DL (ref 12–16)
HGB UR QL STRIP.AUTO: ABNORMAL
INR PPP: 0.95 (ref 0.84–1.16)
KETONES UR STRIP.AUTO-MCNC: NEGATIVE MG/DL
LEUKOCYTE ESTERASE UR QL STRIP.AUTO: ABNORMAL
LYMPHOCYTES # BLD: 2.1 K/UL (ref 1–5.1)
LYMPHOCYTES NFR BLD: 32.6 %
MCH RBC QN AUTO: 31 PG (ref 26–34)
MCHC RBC AUTO-ENTMCNC: 33.4 G/DL (ref 31–36)
MCV RBC AUTO: 92.7 FL (ref 80–100)
METHGB MFR BLDV: 0.3 %
MONOCYTES # BLD: 0.4 K/UL (ref 0–1.3)
MONOCYTES NFR BLD: 6.9 %
NEUTROPHILS # BLD: 3.5 K/UL (ref 1.7–7.7)
NEUTROPHILS NFR BLD: 54.6 %
NITRITE UR QL STRIP.AUTO: NEGATIVE
NT-PROBNP SERPL-MCNC: 550 PG/ML (ref 0–449)
O2 THERAPY: ABNORMAL
PCO2 BLDV: 45.5 MMHG (ref 40–50)
PH BLDV: 7.44 [PH] (ref 7.35–7.45)
PH UR STRIP.AUTO: 7.5 [PH] (ref 5–8)
PLATELET # BLD AUTO: 264 K/UL (ref 135–450)
PMV BLD AUTO: 9.3 FL (ref 5–10.5)
PO2 BLDV: 63.5 MMHG (ref 25–40)
POTASSIUM SERPL-SCNC: 4.7 MMOL/L (ref 3.5–5.1)
PROT SERPL-MCNC: 6.4 G/DL (ref 6.4–8.2)
PROT UR STRIP.AUTO-MCNC: ABNORMAL MG/DL
PROTHROMBIN TIME: 12.7 SEC (ref 11.5–14.8)
RBC # BLD AUTO: 3.15 M/UL (ref 4–5.2)
RBC #/AREA URNS HPF: ABNORMAL /HPF (ref 0–4)
RENAL EPI CELLS #/AREA UR COMP ASSIST: ABNORMAL /HPF (ref 0–1)
SAO2 % BLDV: 93 %
SODIUM SERPL-SCNC: 137 MMOL/L (ref 136–145)
SP GR UR STRIP.AUTO: <=1.005 (ref 1–1.03)
TROPONIN, HIGH SENSITIVITY: 22 NG/L (ref 0–14)
TROPONIN, HIGH SENSITIVITY: 23 NG/L (ref 0–14)
TROPONIN, HIGH SENSITIVITY: 24 NG/L (ref 0–14)
UA COMPLETE W REFLEX CULTURE PNL UR: YES
UA DIPSTICK W REFLEX MICRO PNL UR: YES
URN SPEC COLLECT METH UR: ABNORMAL
UROBILINOGEN UR STRIP-ACNC: 1 E.U./DL
WBC # BLD AUTO: 6.3 K/UL (ref 4–11)
WBC #/AREA URNS HPF: ABNORMAL /HPF (ref 0–5)

## 2023-08-09 PROCEDURE — 6370000000 HC RX 637 (ALT 250 FOR IP): Performed by: STUDENT IN AN ORGANIZED HEALTH CARE EDUCATION/TRAINING PROGRAM

## 2023-08-09 PROCEDURE — 36415 COLL VENOUS BLD VENIPUNCTURE: CPT

## 2023-08-09 PROCEDURE — 93010 ELECTROCARDIOGRAM REPORT: CPT | Performed by: INTERNAL MEDICINE

## 2023-08-09 PROCEDURE — 83880 ASSAY OF NATRIURETIC PEPTIDE: CPT

## 2023-08-09 PROCEDURE — 94761 N-INVAS EAR/PLS OXIMETRY MLT: CPT

## 2023-08-09 PROCEDURE — 85025 COMPLETE CBC W/AUTO DIFF WBC: CPT

## 2023-08-09 PROCEDURE — 6370000000 HC RX 637 (ALT 250 FOR IP): Performed by: EMERGENCY MEDICINE

## 2023-08-09 PROCEDURE — 71045 X-RAY EXAM CHEST 1 VIEW: CPT

## 2023-08-09 PROCEDURE — 83550 IRON BINDING TEST: CPT

## 2023-08-09 PROCEDURE — 93005 ELECTROCARDIOGRAM TRACING: CPT | Performed by: EMERGENCY MEDICINE

## 2023-08-09 PROCEDURE — 81001 URINALYSIS AUTO W/SCOPE: CPT

## 2023-08-09 PROCEDURE — 84484 ASSAY OF TROPONIN QUANT: CPT

## 2023-08-09 PROCEDURE — 2700000000 HC OXYGEN THERAPY PER DAY

## 2023-08-09 PROCEDURE — 6360000002 HC RX W HCPCS: Performed by: STUDENT IN AN ORGANIZED HEALTH CARE EDUCATION/TRAINING PROGRAM

## 2023-08-09 PROCEDURE — 2060000000 HC ICU INTERMEDIATE R&B

## 2023-08-09 PROCEDURE — 83540 ASSAY OF IRON: CPT

## 2023-08-09 PROCEDURE — 93970 EXTREMITY STUDY: CPT

## 2023-08-09 PROCEDURE — 99285 EMERGENCY DEPT VISIT HI MDM: CPT

## 2023-08-09 PROCEDURE — 85730 THROMBOPLASTIN TIME PARTIAL: CPT

## 2023-08-09 PROCEDURE — 2580000003 HC RX 258: Performed by: STUDENT IN AN ORGANIZED HEALTH CARE EDUCATION/TRAINING PROGRAM

## 2023-08-09 PROCEDURE — 87086 URINE CULTURE/COLONY COUNT: CPT

## 2023-08-09 PROCEDURE — 82803 BLOOD GASES ANY COMBINATION: CPT

## 2023-08-09 PROCEDURE — 80053 COMPREHEN METABOLIC PANEL: CPT

## 2023-08-09 PROCEDURE — 6360000002 HC RX W HCPCS: Performed by: EMERGENCY MEDICINE

## 2023-08-09 PROCEDURE — 85610 PROTHROMBIN TIME: CPT

## 2023-08-09 RX ORDER — FLUTICASONE PROPIONATE 110 UG/1
4 AEROSOL, METERED RESPIRATORY (INHALATION)
Status: DISCONTINUED | OUTPATIENT
Start: 2023-08-09 | End: 2023-08-15 | Stop reason: HOSPADM

## 2023-08-09 RX ORDER — FERROUS SULFATE 325(65) MG
325 TABLET ORAL
Status: DISCONTINUED | OUTPATIENT
Start: 2023-08-10 | End: 2023-08-15 | Stop reason: HOSPADM

## 2023-08-09 RX ORDER — ONDANSETRON 2 MG/ML
4 INJECTION INTRAMUSCULAR; INTRAVENOUS EVERY 6 HOURS PRN
Status: DISCONTINUED | OUTPATIENT
Start: 2023-08-09 | End: 2023-08-15 | Stop reason: HOSPADM

## 2023-08-09 RX ORDER — PANTOPRAZOLE SODIUM 40 MG/1
40 TABLET, DELAYED RELEASE ORAL
Status: DISCONTINUED | OUTPATIENT
Start: 2023-08-10 | End: 2023-08-15 | Stop reason: HOSPADM

## 2023-08-09 RX ORDER — LOSARTAN POTASSIUM 25 MG/1
50 TABLET ORAL DAILY
Status: DISCONTINUED | OUTPATIENT
Start: 2023-08-10 | End: 2023-08-15 | Stop reason: HOSPADM

## 2023-08-09 RX ORDER — GUAIFENESIN 600 MG/1
600 TABLET, EXTENDED RELEASE ORAL 2 TIMES DAILY
Status: DISCONTINUED | OUTPATIENT
Start: 2023-08-09 | End: 2023-08-15 | Stop reason: HOSPADM

## 2023-08-09 RX ORDER — ALBUTEROL SULFATE 90 UG/1
2 AEROSOL, METERED RESPIRATORY (INHALATION) EVERY 6 HOURS PRN
Status: DISCONTINUED | OUTPATIENT
Start: 2023-08-09 | End: 2023-08-15 | Stop reason: HOSPADM

## 2023-08-09 RX ORDER — PREDNISONE 20 MG/1
40 TABLET ORAL DAILY
Status: COMPLETED | OUTPATIENT
Start: 2023-08-10 | End: 2023-08-14

## 2023-08-09 RX ORDER — FENTANYL CITRATE 50 UG/ML
25 INJECTION, SOLUTION INTRAMUSCULAR; INTRAVENOUS ONCE
Status: DISCONTINUED | OUTPATIENT
Start: 2023-08-09 | End: 2023-08-09

## 2023-08-09 RX ORDER — OXYCODONE HYDROCHLORIDE AND ACETAMINOPHEN 5; 325 MG/1; MG/1
1 TABLET ORAL EVERY 6 HOURS PRN
Status: DISCONTINUED | OUTPATIENT
Start: 2023-08-09 | End: 2023-08-15 | Stop reason: HOSPADM

## 2023-08-09 RX ORDER — SODIUM CHLORIDE 0.9 % (FLUSH) 0.9 %
5-40 SYRINGE (ML) INJECTION EVERY 12 HOURS SCHEDULED
Status: DISCONTINUED | OUTPATIENT
Start: 2023-08-09 | End: 2023-08-15 | Stop reason: HOSPADM

## 2023-08-09 RX ORDER — CHOLECALCIFEROL (VITAMIN D3) 125 MCG
500 CAPSULE ORAL DAILY
Status: DISCONTINUED | OUTPATIENT
Start: 2023-08-10 | End: 2023-08-15 | Stop reason: HOSPADM

## 2023-08-09 RX ORDER — ALLOPURINOL 100 MG/1
100 TABLET ORAL DAILY
Status: DISCONTINUED | OUTPATIENT
Start: 2023-08-10 | End: 2023-08-15 | Stop reason: HOSPADM

## 2023-08-09 RX ORDER — ENOXAPARIN SODIUM 100 MG/ML
40 INJECTION SUBCUTANEOUS DAILY
Status: DISCONTINUED | OUTPATIENT
Start: 2023-08-10 | End: 2023-08-10

## 2023-08-09 RX ORDER — FOLIC ACID 1 MG/1
1 TABLET ORAL DAILY
Status: DISCONTINUED | OUTPATIENT
Start: 2023-08-10 | End: 2023-08-15 | Stop reason: HOSPADM

## 2023-08-09 RX ORDER — ONDANSETRON 4 MG/1
4 TABLET, ORALLY DISINTEGRATING ORAL EVERY 8 HOURS PRN
Status: DISCONTINUED | OUTPATIENT
Start: 2023-08-09 | End: 2023-08-15 | Stop reason: HOSPADM

## 2023-08-09 RX ORDER — SODIUM CHLORIDE 0.9 % (FLUSH) 0.9 %
5-40 SYRINGE (ML) INJECTION PRN
Status: DISCONTINUED | OUTPATIENT
Start: 2023-08-09 | End: 2023-08-15 | Stop reason: HOSPADM

## 2023-08-09 RX ORDER — ACETAMINOPHEN 650 MG/1
650 SUPPOSITORY RECTAL EVERY 6 HOURS PRN
Status: DISCONTINUED | OUTPATIENT
Start: 2023-08-09 | End: 2023-08-15 | Stop reason: HOSPADM

## 2023-08-09 RX ORDER — SODIUM CHLORIDE 9 MG/ML
INJECTION, SOLUTION INTRAVENOUS PRN
Status: DISCONTINUED | OUTPATIENT
Start: 2023-08-09 | End: 2023-08-15 | Stop reason: HOSPADM

## 2023-08-09 RX ORDER — ARIPIPRAZOLE 10 MG/1
5 TABLET ORAL DAILY
Status: DISCONTINUED | OUTPATIENT
Start: 2023-08-10 | End: 2023-08-15 | Stop reason: HOSPADM

## 2023-08-09 RX ORDER — METHYLPREDNISOLONE SODIUM SUCCINATE 40 MG/ML
60 INJECTION, POWDER, LYOPHILIZED, FOR SOLUTION INTRAMUSCULAR; INTRAVENOUS ONCE
Status: COMPLETED | OUTPATIENT
Start: 2023-08-09 | End: 2023-08-09

## 2023-08-09 RX ORDER — AMLODIPINE BESYLATE 5 MG/1
5 TABLET ORAL DAILY
Status: DISCONTINUED | OUTPATIENT
Start: 2023-08-10 | End: 2023-08-15 | Stop reason: HOSPADM

## 2023-08-09 RX ORDER — IPRATROPIUM BROMIDE AND ALBUTEROL SULFATE 2.5; .5 MG/3ML; MG/3ML
1 SOLUTION RESPIRATORY (INHALATION) ONCE
Status: COMPLETED | OUTPATIENT
Start: 2023-08-09 | End: 2023-08-09

## 2023-08-09 RX ORDER — ACETAMINOPHEN 325 MG/1
650 TABLET ORAL EVERY 6 HOURS PRN
Status: DISCONTINUED | OUTPATIENT
Start: 2023-08-09 | End: 2023-08-15 | Stop reason: HOSPADM

## 2023-08-09 RX ORDER — FUROSEMIDE 10 MG/ML
40 INJECTION INTRAMUSCULAR; INTRAVENOUS ONCE
Status: COMPLETED | OUTPATIENT
Start: 2023-08-09 | End: 2023-08-09

## 2023-08-09 RX ORDER — IPRATROPIUM BROMIDE AND ALBUTEROL SULFATE 2.5; .5 MG/3ML; MG/3ML
1 SOLUTION RESPIRATORY (INHALATION)
Status: DISCONTINUED | OUTPATIENT
Start: 2023-08-09 | End: 2023-08-10

## 2023-08-09 RX ORDER — FUROSEMIDE 10 MG/ML
40 INJECTION INTRAMUSCULAR; INTRAVENOUS 2 TIMES DAILY
Status: DISCONTINUED | OUTPATIENT
Start: 2023-08-10 | End: 2023-08-13

## 2023-08-09 RX ORDER — DULOXETIN HYDROCHLORIDE 60 MG/1
60 CAPSULE, DELAYED RELEASE ORAL DAILY
Status: DISCONTINUED | OUTPATIENT
Start: 2023-08-10 | End: 2023-08-15 | Stop reason: HOSPADM

## 2023-08-09 RX ORDER — POLYETHYLENE GLYCOL 3350 17 G/17G
17 POWDER, FOR SOLUTION ORAL DAILY PRN
Status: DISCONTINUED | OUTPATIENT
Start: 2023-08-09 | End: 2023-08-15 | Stop reason: HOSPADM

## 2023-08-09 RX ADMIN — IPRATROPIUM BROMIDE AND ALBUTEROL SULFATE 1 DOSE: 2.5; .5 SOLUTION RESPIRATORY (INHALATION) at 20:43

## 2023-08-09 RX ADMIN — SODIUM CHLORIDE, PRESERVATIVE FREE 10 ML: 5 INJECTION INTRAVENOUS at 23:33

## 2023-08-09 RX ADMIN — OXYCODONE HYDROCHLORIDE AND ACETAMINOPHEN 1 TABLET: 5; 325 TABLET ORAL at 23:24

## 2023-08-09 RX ADMIN — METHYLPREDNISOLONE SODIUM SUCCINATE 60 MG: 40 INJECTION, POWDER, LYOPHILIZED, FOR SOLUTION INTRAMUSCULAR; INTRAVENOUS at 23:24

## 2023-08-09 RX ADMIN — FUROSEMIDE 40 MG: 10 INJECTION, SOLUTION INTRAMUSCULAR; INTRAVENOUS at 20:45

## 2023-08-09 RX ADMIN — GUAIFENESIN 600 MG: 600 TABLET, EXTENDED RELEASE ORAL at 23:24

## 2023-08-09 ASSESSMENT — PAIN DESCRIPTION - DESCRIPTORS: DESCRIPTORS: ACHING

## 2023-08-09 ASSESSMENT — PAIN SCALES - GENERAL
PAINLEVEL_OUTOF10: 9
PAINLEVEL_OUTOF10: 6

## 2023-08-09 ASSESSMENT — PAIN DESCRIPTION - ORIENTATION: ORIENTATION: RIGHT;LEFT

## 2023-08-09 ASSESSMENT — PAIN - FUNCTIONAL ASSESSMENT: PAIN_FUNCTIONAL_ASSESSMENT: PREVENTS OR INTERFERES SOME ACTIVE ACTIVITIES AND ADLS

## 2023-08-09 ASSESSMENT — LIFESTYLE VARIABLES: HOW OFTEN DO YOU HAVE A DRINK CONTAINING ALCOHOL: NEVER

## 2023-08-09 ASSESSMENT — PAIN DESCRIPTION - LOCATION: LOCATION: LEG

## 2023-08-09 NOTE — H&P
TECHNOLOGIST PROVIDED HISTORY: Reason for exam:->SOB Reason for Exam: sob FINDINGS: Heart size is upper limits of normal aorta is normal.  Lungs are normally expanded. No consolidation. No pulmonary edema. No pleural effusions. Osteoarthritic changes shoulders. Lungs are clear     VL Extremity Venous Bilateral    Result Date: 8/9/2023  Vascular Lower Extremities DVT Study Procedure -- PRELIMINARY SONOGRAPHER REPORT --   Demographics   Patient Name        Carrillo Jaime   Date of Study       08/09/2023  Gender                 Female   Patient Number      [id number]  Date of Birth          1945   Visit Number        [id number]   Age                    78 year(s)   Accession Number    [id number]  Room Number            R   Corporate ID        [id number]      Sonographer            Reji Tan,                                                         PEDROT, RDCS,   Ordering Physician              Interpreting Physician [facility]                                                         Readers  Procedure Type of Study:   Veins:Lower Extremities DVT Study, VASC EXTREMITY VENOUS DUPLEX BILATERAL. Tech Comments Right No evidence of deep or superficial venous thrombosis is seen involving the lower extremity. Left No evidence of deep or superficial venous thrombosis is seen involving the lower extremity. There is no previous exam for comparison.       Personally reviewed Lab Studies, Imaging, and discussed case with pt,     Electronically signed by Tessy Rocha MD on 8/9/2023 at 8:34 PM

## 2023-08-09 NOTE — ED NOTES
1901 perfect serve sent to Dr. Irena Fox  08/09/23 5777    3749 consult completed with admit orders placed on epic      Ara Ridgel  08/09/23 8333

## 2023-08-09 NOTE — CARE COORDINATION
Lower extremity venous duplex was performed. Preliminary results: Negative for deep and superficial venous thrombosis of the lower extremities bilaterally.    Verbal reported to Bertin Leigh, DO    There will be a slight delay in the finalization of this report due to the need for a \"physician build\" by the [facility] team.   # Danielle Levy [phone]

## 2023-08-09 NOTE — ED PROVIDER NOTES
[address] [facility] Name: Mayo Martinez  MRN: [id number]  [address] [facility] 1945  Date of evaluation: 8/9/2023  Provider: Marlyn Mccauley DO    CHIEF COMPLAINT       Chief Complaint   Patient presents with    Leg Swelling     Reports swelling in both legs, seen at PCP a few weeks ago for same and given water pills and feels they aren't helping any longer         HISTORY OF PRESENT ILLNESS   (Location/Symptom, Timing/Onset, Context/Setting, Quality, Duration, Modifying Factors, Severity)  Note limiting factors. Mayo Martinez is a 78 y.o. female with past medical history of arthritis, asthma, chronic bronchitis, CHF, chronic respiratory failure on 4 L home oxygen, depression, diabetes, lung emphysema, GI bleed, hypertension, sleep apnea on CPAP, and SBO who presents to the emergency department accompanied by  at bedside for chief complaint of lower extremity edema and shortness of breath. Patient has been experiencing worsening bilateral lower extremity edema over the last week and her primary care doctor trialed her on a 5-day course of twice daily Bumex without relief so she was encouraged to present to the emergency department for evaluation. Patient does have a history of CHF but has repeated hypotension so she was discontinued off of her diuretic in June after being hospitalized. Patient states that she is becoming increasing short of breath even at rest.  Patient is admitting to a dry cough. Patient denies any history of blood clots or recent long distance travel. Patient is fully vaccinated against COVID-19. Patient states that  is having difficulty with ambulation due to her increased lower extremity edema. Patient denies any headache, fever, chills, chest pain, abdominal pain, nausea, vomiting, and diarrhea. Nursing Notes were reviewed.     REVIEW OF SYSTEMS    (2-9 systems for level 4, 10 or more for level 5)     Review of Systems  CONSTITUTIONAL: no

## 2023-08-10 PROBLEM — I50.9 DECOMPENSATED HEART FAILURE (HCC): Status: ACTIVE | Noted: 2023-08-10

## 2023-08-10 LAB
ANION GAP SERPL CALCULATED.3IONS-SCNC: 9 MMOL/L (ref 3–16)
BACTERIA UR CULT: NORMAL
BUN SERPL-MCNC: 21 MG/DL (ref 7–20)
CALCIUM SERPL-MCNC: 9.7 MG/DL (ref 8.3–10.6)
CHLORIDE SERPL-SCNC: 99 MMOL/L (ref 99–110)
CHOLEST SERPL-MCNC: 171 MG/DL (ref 0–199)
CO2 SERPL-SCNC: 28 MMOL/L (ref 21–32)
CREAT SERPL-MCNC: 1.2 MG/DL (ref 0.6–1.2)
GFR SERPLBLD CREATININE-BSD FMLA CKD-EPI: 46 ML/MIN/{1.73_M2}
GLUCOSE SERPL-MCNC: 161 MG/DL (ref 70–99)
HDLC SERPL-MCNC: 59 MG/DL (ref 40–60)
IRON SATN MFR SERPL: 26 % (ref 15–50)
IRON SERPL-MCNC: 87 UG/DL (ref 37–145)
LDLC SERPL CALC-MCNC: 90 MG/DL
MAGNESIUM SERPL-MCNC: 2 MG/DL (ref 1.8–2.4)
POTASSIUM SERPL-SCNC: 5 MMOL/L (ref 3.5–5.1)
SODIUM SERPL-SCNC: 136 MMOL/L (ref 136–145)
TIBC SERPL-MCNC: 334 UG/DL (ref 260–445)
TRIGL SERPL-MCNC: 110 MG/DL (ref 0–150)
TSH SERPL DL<=0.005 MIU/L-ACNC: 2.57 UIU/ML (ref 0.27–4.2)
VLDLC SERPL CALC-MCNC: 22 MG/DL

## 2023-08-10 PROCEDURE — 2700000000 HC OXYGEN THERAPY PER DAY

## 2023-08-10 PROCEDURE — 99232 SBSQ HOSP IP/OBS MODERATE 35: CPT | Performed by: INTERNAL MEDICINE

## 2023-08-10 PROCEDURE — 2060000000 HC ICU INTERMEDIATE R&B

## 2023-08-10 PROCEDURE — 6370000000 HC RX 637 (ALT 250 FOR IP): Performed by: STUDENT IN AN ORGANIZED HEALTH CARE EDUCATION/TRAINING PROGRAM

## 2023-08-10 PROCEDURE — 94010 BREATHING CAPACITY TEST: CPT

## 2023-08-10 PROCEDURE — 83735 ASSAY OF MAGNESIUM: CPT

## 2023-08-10 PROCEDURE — 6370000000 HC RX 637 (ALT 250 FOR IP): Performed by: INTERNAL MEDICINE

## 2023-08-10 PROCEDURE — 80048 BASIC METABOLIC PNL TOTAL CA: CPT

## 2023-08-10 PROCEDURE — 94761 N-INVAS EAR/PLS OXIMETRY MLT: CPT

## 2023-08-10 PROCEDURE — 84443 ASSAY THYROID STIM HORMONE: CPT

## 2023-08-10 PROCEDURE — 6360000002 HC RX W HCPCS: Performed by: STUDENT IN AN ORGANIZED HEALTH CARE EDUCATION/TRAINING PROGRAM

## 2023-08-10 PROCEDURE — 94640 AIRWAY INHALATION TREATMENT: CPT

## 2023-08-10 PROCEDURE — 80061 LIPID PANEL: CPT

## 2023-08-10 PROCEDURE — 2580000003 HC RX 258: Performed by: STUDENT IN AN ORGANIZED HEALTH CARE EDUCATION/TRAINING PROGRAM

## 2023-08-10 PROCEDURE — 36415 COLL VENOUS BLD VENIPUNCTURE: CPT

## 2023-08-10 RX ORDER — PRIMIDONE 50 MG/1
100 TABLET ORAL NIGHTLY
Status: DISCONTINUED | OUTPATIENT
Start: 2023-08-10 | End: 2023-08-15 | Stop reason: HOSPADM

## 2023-08-10 RX ORDER — ENOXAPARIN SODIUM 100 MG/ML
30 INJECTION SUBCUTANEOUS 2 TIMES DAILY
Status: DISCONTINUED | OUTPATIENT
Start: 2023-08-11 | End: 2023-08-15 | Stop reason: HOSPADM

## 2023-08-10 RX ORDER — IPRATROPIUM BROMIDE AND ALBUTEROL SULFATE 2.5; .5 MG/3ML; MG/3ML
1 SOLUTION RESPIRATORY (INHALATION)
Status: DISCONTINUED | OUTPATIENT
Start: 2023-08-10 | End: 2023-08-15 | Stop reason: HOSPADM

## 2023-08-10 RX ADMIN — Medication 4 PUFF: at 20:30

## 2023-08-10 RX ADMIN — FUROSEMIDE 40 MG: 10 INJECTION, SOLUTION INTRAMUSCULAR; INTRAVENOUS at 17:18

## 2023-08-10 RX ADMIN — PANTOPRAZOLE SODIUM 40 MG: 40 TABLET, DELAYED RELEASE ORAL at 06:05

## 2023-08-10 RX ADMIN — OXYCODONE HYDROCHLORIDE AND ACETAMINOPHEN 1 TABLET: 5; 325 TABLET ORAL at 06:23

## 2023-08-10 RX ADMIN — SODIUM CHLORIDE, PRESERVATIVE FREE 10 ML: 5 INJECTION INTRAVENOUS at 08:13

## 2023-08-10 RX ADMIN — PRIMIDONE 100 MG: 50 TABLET ORAL at 23:00

## 2023-08-10 RX ADMIN — GUAIFENESIN 600 MG: 600 TABLET, EXTENDED RELEASE ORAL at 22:15

## 2023-08-10 RX ADMIN — PREDNISONE 40 MG: 20 TABLET ORAL at 08:05

## 2023-08-10 RX ADMIN — ENOXAPARIN SODIUM 40 MG: 100 INJECTION SUBCUTANEOUS at 08:06

## 2023-08-10 RX ADMIN — OXYCODONE HYDROCHLORIDE AND ACETAMINOPHEN 1 TABLET: 5; 325 TABLET ORAL at 11:45

## 2023-08-10 RX ADMIN — PANTOPRAZOLE SODIUM 40 MG: 40 TABLET, DELAYED RELEASE ORAL at 17:18

## 2023-08-10 RX ADMIN — LOSARTAN POTASSIUM 50 MG: 25 TABLET, FILM COATED ORAL at 08:05

## 2023-08-10 RX ADMIN — GUAIFENESIN 600 MG: 600 TABLET, EXTENDED RELEASE ORAL at 08:04

## 2023-08-10 RX ADMIN — IPRATROPIUM BROMIDE AND ALBUTEROL SULFATE 1 DOSE: 2.5; .5 SOLUTION RESPIRATORY (INHALATION) at 20:30

## 2023-08-10 RX ADMIN — ARIPIPRAZOLE 5 MG: 10 TABLET ORAL at 08:05

## 2023-08-10 RX ADMIN — FUROSEMIDE 40 MG: 10 INJECTION, SOLUTION INTRAMUSCULAR; INTRAVENOUS at 08:06

## 2023-08-10 RX ADMIN — CYANOCOBALAMIN TAB 500 MCG 500 MCG: 500 TAB at 08:05

## 2023-08-10 RX ADMIN — DULOXETINE HYDROCHLORIDE 60 MG: 60 CAPSULE, DELAYED RELEASE ORAL at 08:05

## 2023-08-10 RX ADMIN — TIOTROPIUM BROMIDE INHALATION SPRAY 2 PUFF: 3.12 SPRAY, METERED RESPIRATORY (INHALATION) at 08:55

## 2023-08-10 RX ADMIN — FERROUS SULFATE TAB 325 MG (65 MG ELEMENTAL FE) 325 MG: 325 (65 FE) TAB at 08:05

## 2023-08-10 RX ADMIN — OXYCODONE HYDROCHLORIDE AND ACETAMINOPHEN 1 TABLET: 5; 325 TABLET ORAL at 22:15

## 2023-08-10 RX ADMIN — ALLOPURINOL 100 MG: 100 TABLET ORAL at 08:05

## 2023-08-10 RX ADMIN — FOLIC ACID 1 MG: 1 TABLET ORAL at 08:05

## 2023-08-10 RX ADMIN — Medication 4 PUFF: at 08:55

## 2023-08-10 RX ADMIN — SODIUM CHLORIDE, PRESERVATIVE FREE 10 ML: 5 INJECTION INTRAVENOUS at 22:49

## 2023-08-10 ASSESSMENT — PAIN SCALES - GENERAL
PAINLEVEL_OUTOF10: 8
PAINLEVEL_OUTOF10: 8
PAINLEVEL_OUTOF10: 0
PAINLEVEL_OUTOF10: 0
PAINLEVEL_OUTOF10: 8

## 2023-08-10 ASSESSMENT — COPD QUESTIONNAIRES
QUESTION5_HOMEACTIVITIES: 5
QUESTION6_LEAVINGHOUSE: 2
QUESTION7_SLEEPQUALITY: 4
QUESTION8_ENERGYLEVEL: 5
QUESTION1_COUGHFREQUENCY: 4
QUESTION3_CHESTTIGHTNESS: 0
QUESTION4_WALKINCLINE: 3
CAT_TOTALSCORE: 25
QUESTION2_CHESTPHLEGM: 2

## 2023-08-10 ASSESSMENT — PAIN DESCRIPTION - DESCRIPTORS
DESCRIPTORS: ACHING

## 2023-08-10 ASSESSMENT — PAIN DESCRIPTION - LOCATION
LOCATION: LEG

## 2023-08-10 ASSESSMENT — PAIN DESCRIPTION - ORIENTATION
ORIENTATION: LEFT
ORIENTATION: RIGHT;LEFT
ORIENTATION: RIGHT;LEFT

## 2023-08-10 ASSESSMENT — PAIN SCALES - WONG BAKER: WONGBAKER_NUMERICALRESPONSE: 0

## 2023-08-10 NOTE — ED NOTES
Report given to [facility] Scotty Mosqueda RN.       Bennie Tinoco RN  08/09/23 2053

## 2023-08-10 NOTE — CONSULTS
[address]   COPD / HEART FAILURE PROGRAM      NAME:  Amaury Ferreira  AGE: 78 y.o. GENDER: female  : 1945  TODAY'S DATE:  8/10/2023    Subjective:     VISIT TYPE: Evaluation / Consult    ADMIT DATE: 2023    PAST MEDICAL HISTORY:      Diagnosis Date    Arthritis     Asthma     Bronchitis chronic     CHF (congestive heart failure) (HCC)     Chronic respiratory failure (HCC)     COPD (chronic obstructive pulmonary disease) ([address])     Depression     Diabetes mellitus ([address])     BORDERLINE NO MEDS    Emphysema of lung ([address])     spontaneous pneumo 2020    GI bleed 12/15/2021    Glaucoma     Hypertension     Hypoxemia 04/15/2011    Morbid obesity ([address])     Oxygen dependent     Uses O2 NC at 3L/min continuously    Pneumonia of left upper lobe due to Pseudomonas species ([address]) 2021    Rash     due to nasal canula    SBO (small bowel obstruction) ([address]) 02/10/2012    Shingles     recently no problems now    Sleep apnea     C pap    Spontaneous tension pneumothorax 2020     HOME MEDICATIONS:  Prior to Admission medications    Medication Sig Start Date End Date Taking?  Authorizing Provider   ARIPiprazole (ABILIFY) 5 MG tablet Take 1 tablet by mouth daily 23   Babs Zafar DO   losartan (COZAAR) 50 MG tablet Take 1 tablet by mouth daily Hold for now due to normal blood pressure 23   Babs Zafar, DO   amLODIPine (NORVASC) 5 MG tablet Take 1 tablet by mouth daily    Historical Provider, MD   bumetanide (BUMEX) 2 MG tablet Take 1 tablet by mouth daily    Historical Provider, MD   allopurinol (ZYLOPRIM) 100 MG tablet Take 1 tablet by mouth daily    Historical Provider, MD   vitamin B-12 (CYANOCOBALAMIN) 500 MCG tablet Take 1 tablet by mouth daily    Historical Provider, MD   potassium chloride (KLOR-CON M) 20 MEQ extended release tablet Take 1 tablet by mouth daily    Historical Provider, MD   primidone (MYSOLINE) 50 MG tablet Take 1 tablet by mouth at bedtime Take 1 tablet at

## 2023-08-10 NOTE — CARE COORDINATION
Case Management Assessment  Initial Evaluation    Date/Time of Evaluation: 8/10/2023 8:59 AM  Assessment Completed by: Mateo Rolle RN    If patient is discharged prior to next notation, then this note serves as note for discharge by case management. Patient Name: Jensen Hoffman                   YOB: 1945  Diagnosis: Elevated troponin [R77.8]  Bilateral lower extremity edema [R60.0]  Acute on chronic diastolic (congestive) heart failure ([address]) [I50.33]                   Date / Time: 8/9/2023  3:35 PM    Patient Admission Status: Inpatient   Readmission Risk (Low < 19, Mod (19-27), High > 27): Readmission Risk Score: 20.4    Current PCP: Berto Webster MD  PCP verified by CM? Yes Parveen Saeed MD)    Chart Reviewed: Yes      History Provided by: Patient  Patient Orientation: Alert and Oriented    Patient Cognition: Alert    Hospitalization in the last 30 days (Readmission):  No    If yes, Readmission Assessment in  Navigator will be completed. Advance Directives:      Code Status: Full Code   Patient's Primary Decision Maker is: Legal Next of Kin    [place] [facility] [phone]    Secondary Decision Maker: Irmakita Van - Child - [phone]    Discharge Planning:    Patient lives with: Spouse/Significant Other Type of Home: House  Primary Care Giver: Self  Patient Support Systems include: Spouse/Significant Other   Current Financial resources: Medicare  Current community resources: ECF/Home Care  Current services prior to admission: Durable Medical Equipment            Current DME: Julian Alfred, Wheelchair, Cane, Oxygen Therapy (Comment) (scooter; O2 through [facility] [facility])            Type of Home Care services:  PT, OT    ADLS  Prior functional level: Mobility, Housework, Shopping (uses walker and scooter)  Current functional level: Assistance with the following:, Housework, Shopping    PT AM-PAC:   /24  OT AM-PAC:   /24    Family can provide assistance at DC:  Yes

## 2023-08-11 LAB
ANION GAP SERPL CALCULATED.3IONS-SCNC: 8 MMOL/L (ref 3–16)
BUN SERPL-MCNC: 28 MG/DL (ref 7–20)
CALCIUM SERPL-MCNC: 9.7 MG/DL (ref 8.3–10.6)
CHLORIDE SERPL-SCNC: 98 MMOL/L (ref 99–110)
CO2 SERPL-SCNC: 31 MMOL/L (ref 21–32)
CREAT SERPL-MCNC: 1.3 MG/DL (ref 0.6–1.2)
GFR SERPLBLD CREATININE-BSD FMLA CKD-EPI: 41 ML/MIN/{1.73_M2}
GLUCOSE SERPL-MCNC: 107 MG/DL (ref 70–99)
MAGNESIUM SERPL-MCNC: 2 MG/DL (ref 1.8–2.4)
POTASSIUM SERPL-SCNC: 4 MMOL/L (ref 3.5–5.1)
SODIUM SERPL-SCNC: 137 MMOL/L (ref 136–145)

## 2023-08-11 PROCEDURE — 6370000000 HC RX 637 (ALT 250 FOR IP): Performed by: INTERNAL MEDICINE

## 2023-08-11 PROCEDURE — 94761 N-INVAS EAR/PLS OXIMETRY MLT: CPT

## 2023-08-11 PROCEDURE — 99232 SBSQ HOSP IP/OBS MODERATE 35: CPT | Performed by: INTERNAL MEDICINE

## 2023-08-11 PROCEDURE — 6360000002 HC RX W HCPCS: Performed by: STUDENT IN AN ORGANIZED HEALTH CARE EDUCATION/TRAINING PROGRAM

## 2023-08-11 PROCEDURE — 94640 AIRWAY INHALATION TREATMENT: CPT

## 2023-08-11 PROCEDURE — 83735 ASSAY OF MAGNESIUM: CPT

## 2023-08-11 PROCEDURE — 36415 COLL VENOUS BLD VENIPUNCTURE: CPT

## 2023-08-11 PROCEDURE — 6370000000 HC RX 637 (ALT 250 FOR IP): Performed by: STUDENT IN AN ORGANIZED HEALTH CARE EDUCATION/TRAINING PROGRAM

## 2023-08-11 PROCEDURE — 2060000000 HC ICU INTERMEDIATE R&B

## 2023-08-11 PROCEDURE — 80048 BASIC METABOLIC PNL TOTAL CA: CPT

## 2023-08-11 PROCEDURE — 2700000000 HC OXYGEN THERAPY PER DAY

## 2023-08-11 PROCEDURE — 2580000003 HC RX 258: Performed by: INTERNAL MEDICINE

## 2023-08-11 PROCEDURE — 6360000002 HC RX W HCPCS: Performed by: INTERNAL MEDICINE

## 2023-08-11 PROCEDURE — 2580000003 HC RX 258: Performed by: STUDENT IN AN ORGANIZED HEALTH CARE EDUCATION/TRAINING PROGRAM

## 2023-08-11 RX ADMIN — ALLOPURINOL 100 MG: 100 TABLET ORAL at 09:54

## 2023-08-11 RX ADMIN — SODIUM CHLORIDE, PRESERVATIVE FREE 10 ML: 5 INJECTION INTRAVENOUS at 20:53

## 2023-08-11 RX ADMIN — PANTOPRAZOLE SODIUM 40 MG: 40 TABLET, DELAYED RELEASE ORAL at 05:34

## 2023-08-11 RX ADMIN — IPRATROPIUM BROMIDE AND ALBUTEROL SULFATE 1 DOSE: 2.5; .5 SOLUTION RESPIRATORY (INHALATION) at 19:56

## 2023-08-11 RX ADMIN — ENOXAPARIN SODIUM 30 MG: 100 INJECTION SUBCUTANEOUS at 09:55

## 2023-08-11 RX ADMIN — LOSARTAN POTASSIUM 50 MG: 25 TABLET, FILM COATED ORAL at 09:54

## 2023-08-11 RX ADMIN — CYANOCOBALAMIN TAB 500 MCG 500 MCG: 500 TAB at 09:55

## 2023-08-11 RX ADMIN — FOLIC ACID 1 MG: 1 TABLET ORAL at 09:55

## 2023-08-11 RX ADMIN — AMLODIPINE BESYLATE 5 MG: 5 TABLET ORAL at 09:54

## 2023-08-11 RX ADMIN — PANTOPRAZOLE SODIUM 40 MG: 40 TABLET, DELAYED RELEASE ORAL at 18:17

## 2023-08-11 RX ADMIN — AZITHROMYCIN MONOHYDRATE 500 MG: 500 INJECTION, POWDER, LYOPHILIZED, FOR SOLUTION INTRAVENOUS at 23:11

## 2023-08-11 RX ADMIN — Medication 4 PUFF: at 19:56

## 2023-08-11 RX ADMIN — OXYCODONE HYDROCHLORIDE AND ACETAMINOPHEN 1 TABLET: 5; 325 TABLET ORAL at 20:52

## 2023-08-11 RX ADMIN — PRIMIDONE 100 MG: 50 TABLET ORAL at 20:52

## 2023-08-11 RX ADMIN — ENOXAPARIN SODIUM 30 MG: 100 INJECTION SUBCUTANEOUS at 20:53

## 2023-08-11 RX ADMIN — SODIUM CHLORIDE, PRESERVATIVE FREE 10 ML: 5 INJECTION INTRAVENOUS at 09:55

## 2023-08-11 RX ADMIN — DULOXETINE HYDROCHLORIDE 60 MG: 60 CAPSULE, DELAYED RELEASE ORAL at 09:54

## 2023-08-11 RX ADMIN — FERROUS SULFATE TAB 325 MG (65 MG ELEMENTAL FE) 325 MG: 325 (65 FE) TAB at 09:54

## 2023-08-11 RX ADMIN — OXYCODONE HYDROCHLORIDE AND ACETAMINOPHEN 1 TABLET: 5; 325 TABLET ORAL at 05:34

## 2023-08-11 RX ADMIN — FUROSEMIDE 40 MG: 10 INJECTION, SOLUTION INTRAMUSCULAR; INTRAVENOUS at 09:55

## 2023-08-11 RX ADMIN — TIOTROPIUM BROMIDE INHALATION SPRAY 2 PUFF: 3.12 SPRAY, METERED RESPIRATORY (INHALATION) at 07:57

## 2023-08-11 RX ADMIN — FUROSEMIDE 40 MG: 10 INJECTION, SOLUTION INTRAMUSCULAR; INTRAVENOUS at 18:17

## 2023-08-11 RX ADMIN — PREDNISONE 40 MG: 20 TABLET ORAL at 09:54

## 2023-08-11 RX ADMIN — IPRATROPIUM BROMIDE AND ALBUTEROL SULFATE 1 DOSE: 2.5; .5 SOLUTION RESPIRATORY (INHALATION) at 07:56

## 2023-08-11 RX ADMIN — Medication 4 PUFF: at 07:57

## 2023-08-11 RX ADMIN — GUAIFENESIN 600 MG: 600 TABLET, EXTENDED RELEASE ORAL at 20:52

## 2023-08-11 RX ADMIN — GUAIFENESIN 600 MG: 600 TABLET, EXTENDED RELEASE ORAL at 09:54

## 2023-08-11 RX ADMIN — ARIPIPRAZOLE 5 MG: 10 TABLET ORAL at 09:54

## 2023-08-11 RX ADMIN — ACETAMINOPHEN 650 MG: 325 TABLET ORAL at 09:53

## 2023-08-11 ASSESSMENT — PAIN SCALES - GENERAL
PAINLEVEL_OUTOF10: 5
PAINLEVEL_OUTOF10: 8

## 2023-08-11 ASSESSMENT — PAIN DESCRIPTION - ORIENTATION
ORIENTATION: LEFT

## 2023-08-11 ASSESSMENT — PAIN DESCRIPTION - DESCRIPTORS
DESCRIPTORS: ACHING
DESCRIPTORS: ACHING;THROBBING
DESCRIPTORS: ACHING

## 2023-08-11 ASSESSMENT — PAIN DESCRIPTION - LOCATION
LOCATION: LEG

## 2023-08-11 ASSESSMENT — PAIN - FUNCTIONAL ASSESSMENT: PAIN_FUNCTIONAL_ASSESSMENT: PREVENTS OR INTERFERES SOME ACTIVE ACTIVITIES AND ADLS

## 2023-08-11 NOTE — CARE COORDINATION
INTERDISCIPLINARY PLAN OF CARE CONFERENCE    Date/Time: 8/11/2023 1:41 PM  Completed by: Jarett Cartwright RN, Case Management      Patient Name:  Scottie Moran  YOB: 1945  Admitting Diagnosis: Elevated troponin [R77.8]  Bilateral lower extremity edema [R60.0]  Acute on chronic diastolic (congestive) heart failure ([address]) [I50.33]     Admit Date/Time:  8/9/2023  3:35 PM    Chart reviewed. Interdisciplinary team contacted or reviewed plan related to patient progress and discharge plans. Disciplines included Case Management, Nursing, and Dietitian. Current Status:ip  PT/OT recommendation for discharge plan of care: na    Expected D/C Disposition:  Home  Confirmed plan with patient and/or family Yes confirmed with: (name) Leroy Wilkins    Met with: pt  Discharge Plan Comments: met with rico at bedside. Pt is IPTA and states she has home care. She is unsure of the name of the company but she is trying to call someone to see if they know. Pt has concentrator here and states she has a ride at [employer]. Pt has necessary DME. Will follow     Home O2 in place on admit: Yes  Pt informed of need to bring portable home O2 tank on day of discharge for nursing to connect prior to leaving:  Yes  Verbalized agreement/Understanding:   Yes

## 2023-08-11 NOTE — FLOWSHEET NOTE
08/11/23 0534   Vital Signs   Respirations 18   Pain Assessment   Pain Assessment 0-10   Pain Level 8   Patient's Stated Pain Goal 0 - No pain   Pain Location Leg   Pain Orientation Left   Pain Descriptors Aching   Functional Pain Assessment Prevents or interferes some active activities and ADLs   Non-Pharmaceutical Pain Intervention(s) Elevation   Opioid-Induced Sedation   POSS Score 1     No changes noted from previous assessment. Patient has c/o left leg pain with non pharmaceutical interventions ineffective at this time, prn Percocet given per orders. Patient given water per request and denies further needs at this time. Call light is within reach and patient uses appropriately.

## 2023-08-12 LAB
ANION GAP SERPL CALCULATED.3IONS-SCNC: 9 MMOL/L (ref 3–16)
BUN SERPL-MCNC: 31 MG/DL (ref 7–20)
CALCIUM SERPL-MCNC: 9 MG/DL (ref 8.3–10.6)
CHLORIDE SERPL-SCNC: 97 MMOL/L (ref 99–110)
CO2 SERPL-SCNC: 29 MMOL/L (ref 21–32)
CREAT SERPL-MCNC: 1.2 MG/DL (ref 0.6–1.2)
EKG ATRIAL RATE: 94 BPM
EKG DIAGNOSIS: NORMAL
EKG P AXIS: 69 DEGREES
EKG P-R INTERVAL: 198 MS
EKG Q-T INTERVAL: 342 MS
EKG QRS DURATION: 94 MS
EKG QTC CALCULATION (BAZETT): 427 MS
EKG R AXIS: -35 DEGREES
EKG T AXIS: 55 DEGREES
EKG VENTRICULAR RATE: 94 BPM
GFR SERPLBLD CREATININE-BSD FMLA CKD-EPI: 46 ML/MIN/{1.73_M2}
GLUCOSE SERPL-MCNC: 97 MG/DL (ref 70–99)
MAGNESIUM SERPL-MCNC: 2 MG/DL (ref 1.8–2.4)
MAGNESIUM SERPL-MCNC: 2 MG/DL (ref 1.8–2.4)
NT-PROBNP SERPL-MCNC: 642 PG/ML (ref 0–449)
POTASSIUM SERPL-SCNC: 4.1 MMOL/L (ref 3.5–5.1)
POTASSIUM SERPL-SCNC: 4.3 MMOL/L (ref 3.5–5.1)
SODIUM SERPL-SCNC: 135 MMOL/L (ref 136–145)

## 2023-08-12 PROCEDURE — 6370000000 HC RX 637 (ALT 250 FOR IP): Performed by: INTERNAL MEDICINE

## 2023-08-12 PROCEDURE — 6360000002 HC RX W HCPCS: Performed by: STUDENT IN AN ORGANIZED HEALTH CARE EDUCATION/TRAINING PROGRAM

## 2023-08-12 PROCEDURE — 6370000000 HC RX 637 (ALT 250 FOR IP): Performed by: STUDENT IN AN ORGANIZED HEALTH CARE EDUCATION/TRAINING PROGRAM

## 2023-08-12 PROCEDURE — 36415 COLL VENOUS BLD VENIPUNCTURE: CPT

## 2023-08-12 PROCEDURE — 6360000002 HC RX W HCPCS: Performed by: INTERNAL MEDICINE

## 2023-08-12 PROCEDURE — 94761 N-INVAS EAR/PLS OXIMETRY MLT: CPT

## 2023-08-12 PROCEDURE — 83735 ASSAY OF MAGNESIUM: CPT

## 2023-08-12 PROCEDURE — 2060000000 HC ICU INTERMEDIATE R&B

## 2023-08-12 PROCEDURE — 99222 1ST HOSP IP/OBS MODERATE 55: CPT | Performed by: INTERNAL MEDICINE

## 2023-08-12 PROCEDURE — 2580000003 HC RX 258: Performed by: STUDENT IN AN ORGANIZED HEALTH CARE EDUCATION/TRAINING PROGRAM

## 2023-08-12 PROCEDURE — 84132 ASSAY OF SERUM POTASSIUM: CPT

## 2023-08-12 PROCEDURE — 83880 ASSAY OF NATRIURETIC PEPTIDE: CPT

## 2023-08-12 PROCEDURE — 2580000003 HC RX 258: Performed by: INTERNAL MEDICINE

## 2023-08-12 PROCEDURE — 2700000000 HC OXYGEN THERAPY PER DAY

## 2023-08-12 PROCEDURE — 99232 SBSQ HOSP IP/OBS MODERATE 35: CPT | Performed by: INTERNAL MEDICINE

## 2023-08-12 PROCEDURE — 80048 BASIC METABOLIC PNL TOTAL CA: CPT

## 2023-08-12 PROCEDURE — 94640 AIRWAY INHALATION TREATMENT: CPT

## 2023-08-12 RX ADMIN — ALLOPURINOL 100 MG: 100 TABLET ORAL at 08:44

## 2023-08-12 RX ADMIN — OXYCODONE HYDROCHLORIDE AND ACETAMINOPHEN 1 TABLET: 5; 325 TABLET ORAL at 20:38

## 2023-08-12 RX ADMIN — PANTOPRAZOLE SODIUM 40 MG: 40 TABLET, DELAYED RELEASE ORAL at 05:10

## 2023-08-12 RX ADMIN — ENOXAPARIN SODIUM 30 MG: 100 INJECTION SUBCUTANEOUS at 08:44

## 2023-08-12 RX ADMIN — GUAIFENESIN 600 MG: 600 TABLET, EXTENDED RELEASE ORAL at 08:43

## 2023-08-12 RX ADMIN — FERROUS SULFATE TAB 325 MG (65 MG ELEMENTAL FE) 325 MG: 325 (65 FE) TAB at 08:43

## 2023-08-12 RX ADMIN — AMLODIPINE BESYLATE 5 MG: 5 TABLET ORAL at 08:43

## 2023-08-12 RX ADMIN — ENOXAPARIN SODIUM 30 MG: 100 INJECTION SUBCUTANEOUS at 20:39

## 2023-08-12 RX ADMIN — IPRATROPIUM BROMIDE AND ALBUTEROL SULFATE 1 DOSE: 2.5; .5 SOLUTION RESPIRATORY (INHALATION) at 19:36

## 2023-08-12 RX ADMIN — Medication 4 PUFF: at 19:37

## 2023-08-12 RX ADMIN — OXYCODONE HYDROCHLORIDE AND ACETAMINOPHEN 1 TABLET: 5; 325 TABLET ORAL at 08:42

## 2023-08-12 RX ADMIN — FUROSEMIDE 40 MG: 10 INJECTION, SOLUTION INTRAMUSCULAR; INTRAVENOUS at 18:56

## 2023-08-12 RX ADMIN — FUROSEMIDE 40 MG: 10 INJECTION, SOLUTION INTRAMUSCULAR; INTRAVENOUS at 08:44

## 2023-08-12 RX ADMIN — ONDANSETRON 4 MG: 2 INJECTION INTRAMUSCULAR; INTRAVENOUS at 12:54

## 2023-08-12 RX ADMIN — AZITHROMYCIN MONOHYDRATE 500 MG: 500 INJECTION, POWDER, LYOPHILIZED, FOR SOLUTION INTRAVENOUS at 20:45

## 2023-08-12 RX ADMIN — FOLIC ACID 1 MG: 1 TABLET ORAL at 08:43

## 2023-08-12 RX ADMIN — PREDNISONE 40 MG: 20 TABLET ORAL at 08:43

## 2023-08-12 RX ADMIN — IPRATROPIUM BROMIDE AND ALBUTEROL SULFATE 1 DOSE: 2.5; .5 SOLUTION RESPIRATORY (INHALATION) at 08:09

## 2023-08-12 RX ADMIN — ARIPIPRAZOLE 5 MG: 10 TABLET ORAL at 08:43

## 2023-08-12 RX ADMIN — LOSARTAN POTASSIUM 50 MG: 25 TABLET, FILM COATED ORAL at 08:42

## 2023-08-12 RX ADMIN — SODIUM CHLORIDE, PRESERVATIVE FREE 10 ML: 5 INJECTION INTRAVENOUS at 08:45

## 2023-08-12 RX ADMIN — CYANOCOBALAMIN TAB 500 MCG 500 MCG: 500 TAB at 08:43

## 2023-08-12 RX ADMIN — DULOXETINE HYDROCHLORIDE 60 MG: 60 CAPSULE, DELAYED RELEASE ORAL at 08:43

## 2023-08-12 RX ADMIN — GUAIFENESIN 600 MG: 600 TABLET, EXTENDED RELEASE ORAL at 20:38

## 2023-08-12 RX ADMIN — PRIMIDONE 100 MG: 50 TABLET ORAL at 20:38

## 2023-08-12 RX ADMIN — Medication 4 PUFF: at 08:09

## 2023-08-12 RX ADMIN — SODIUM CHLORIDE, PRESERVATIVE FREE 10 ML: 5 INJECTION INTRAVENOUS at 20:39

## 2023-08-12 RX ADMIN — ACETAMINOPHEN 650 MG: 325 TABLET ORAL at 05:10

## 2023-08-12 RX ADMIN — OXYCODONE HYDROCHLORIDE AND ACETAMINOPHEN 1 TABLET: 5; 325 TABLET ORAL at 01:56

## 2023-08-12 ASSESSMENT — PAIN DESCRIPTION - ORIENTATION
ORIENTATION: LEFT;RIGHT
ORIENTATION: LEFT

## 2023-08-12 ASSESSMENT — PAIN SCALES - GENERAL
PAINLEVEL_OUTOF10: 2
PAINLEVEL_OUTOF10: 7

## 2023-08-12 ASSESSMENT — PAIN DESCRIPTION - LOCATION
LOCATION: LEG

## 2023-08-12 ASSESSMENT — PAIN DESCRIPTION - DESCRIPTORS
DESCRIPTORS: ACHING;THROBBING
DESCRIPTORS: THROBBING
DESCRIPTORS: THROBBING
DESCRIPTORS: ACHING;THROBBING

## 2023-08-12 NOTE — CONSULTS
[address] [facility]   Cardiology Consultation     Date: 8/12/2023  Reason for Consultation: heart failure   Consult Requesting Physician: Ale Mann DO     CC: shortness of breath, cough, swelling     HPI:   Moy Rausch is a 78 y.o. female history of AVM, COPD, CKD, hypertension, history of a pulmonary embolism (1/12/2022), IVC filter, spontaneous pneumothorax 2020 Parkinson's, GERD, diastolic congestive heart failure (2019), follows with [facility] internal medicine, at recent visit on 8/4/2023 where she was noted to have increase in lower extremity swelling, amlodipine was stopped and her Bumex was doubled to 1 mg twice daily. Presents to the hospital with bilateral lower extremity swelling worse over the past week. She denies shortness of breath to me. Weight is up approximately 7 to 8 pounds from baseline. Elevated proBNP, mildly elevated high-sensitivity troponin. Started on Lasix 40 mg twice daily in hospital.  Also has productive cough, suspected COPD and was started on breathing treatments, steroid burst.    Review of System:  Complete 10 point ROS performed and negative unless noted in above HPI or below    Prior to Admission medications    Medication Sig Start Date End Date Taking?  Authorizing Provider   ARIPiprazole (ABILIFY) 5 MG tablet Take 1 tablet by mouth daily 6/18/23   Babs Zafar DO   losartan (COZAAR) 50 MG tablet Take 1 tablet by mouth daily Hold for now due to normal blood pressure 6/18/23   Babs Zafar,    amLODIPine (NORVASC) 5 MG tablet Take 1 tablet by mouth daily    Historical Provider, MD   bumetanide (BUMEX) 2 MG tablet Take 1 tablet by mouth daily    Historical Provider, MD   allopurinol (ZYLOPRIM) 100 MG tablet Take 1 tablet by mouth daily    Historical Provider, MD   vitamin B-12 (CYANOCOBALAMIN) 500 MCG tablet Take 1 tablet by mouth daily  Patient not taking: Reported on 8/10/2023    Historical Provider, MD   potassium chloride (KLOR-CON M) 20 MEQ extended release

## 2023-08-13 LAB
ANION GAP SERPL CALCULATED.3IONS-SCNC: 10 MMOL/L (ref 3–16)
BUN SERPL-MCNC: 34 MG/DL (ref 7–20)
CALCIUM SERPL-MCNC: 9.3 MG/DL (ref 8.3–10.6)
CHLORIDE SERPL-SCNC: 97 MMOL/L (ref 99–110)
CO2 SERPL-SCNC: 33 MMOL/L (ref 21–32)
CREAT SERPL-MCNC: 1.3 MG/DL (ref 0.6–1.2)
GFR SERPLBLD CREATININE-BSD FMLA CKD-EPI: 41 ML/MIN/{1.73_M2}
GLUCOSE SERPL-MCNC: 104 MG/DL (ref 70–99)
MAGNESIUM SERPL-MCNC: 2.1 MG/DL (ref 1.8–2.4)
POTASSIUM SERPL-SCNC: 4.4 MMOL/L (ref 3.5–5.1)
SODIUM SERPL-SCNC: 140 MMOL/L (ref 136–145)

## 2023-08-13 PROCEDURE — 2700000000 HC OXYGEN THERAPY PER DAY

## 2023-08-13 PROCEDURE — 2060000000 HC ICU INTERMEDIATE R&B

## 2023-08-13 PROCEDURE — 94761 N-INVAS EAR/PLS OXIMETRY MLT: CPT

## 2023-08-13 PROCEDURE — 94640 AIRWAY INHALATION TREATMENT: CPT

## 2023-08-13 PROCEDURE — 6360000002 HC RX W HCPCS: Performed by: INTERNAL MEDICINE

## 2023-08-13 PROCEDURE — 99232 SBSQ HOSP IP/OBS MODERATE 35: CPT | Performed by: INTERNAL MEDICINE

## 2023-08-13 PROCEDURE — 80048 BASIC METABOLIC PNL TOTAL CA: CPT

## 2023-08-13 PROCEDURE — 6360000002 HC RX W HCPCS: Performed by: STUDENT IN AN ORGANIZED HEALTH CARE EDUCATION/TRAINING PROGRAM

## 2023-08-13 PROCEDURE — 2580000003 HC RX 258: Performed by: STUDENT IN AN ORGANIZED HEALTH CARE EDUCATION/TRAINING PROGRAM

## 2023-08-13 PROCEDURE — 6370000000 HC RX 637 (ALT 250 FOR IP): Performed by: INTERNAL MEDICINE

## 2023-08-13 PROCEDURE — 83735 ASSAY OF MAGNESIUM: CPT

## 2023-08-13 PROCEDURE — 6370000000 HC RX 637 (ALT 250 FOR IP): Performed by: STUDENT IN AN ORGANIZED HEALTH CARE EDUCATION/TRAINING PROGRAM

## 2023-08-13 PROCEDURE — 2580000003 HC RX 258: Performed by: INTERNAL MEDICINE

## 2023-08-13 PROCEDURE — 36415 COLL VENOUS BLD VENIPUNCTURE: CPT

## 2023-08-13 RX ORDER — BUMETANIDE 1 MG/1
1 TABLET ORAL 2 TIMES DAILY
Status: DISCONTINUED | OUTPATIENT
Start: 2023-08-13 | End: 2023-08-15 | Stop reason: HOSPADM

## 2023-08-13 RX ADMIN — FUROSEMIDE 40 MG: 10 INJECTION, SOLUTION INTRAMUSCULAR; INTRAVENOUS at 08:42

## 2023-08-13 RX ADMIN — ALLOPURINOL 100 MG: 100 TABLET ORAL at 08:41

## 2023-08-13 RX ADMIN — PREDNISONE 40 MG: 20 TABLET ORAL at 08:41

## 2023-08-13 RX ADMIN — GUAIFENESIN 600 MG: 600 TABLET, EXTENDED RELEASE ORAL at 21:52

## 2023-08-13 RX ADMIN — BUMETANIDE 1 MG: 1 TABLET ORAL at 21:52

## 2023-08-13 RX ADMIN — OXYCODONE HYDROCHLORIDE AND ACETAMINOPHEN 1 TABLET: 5; 325 TABLET ORAL at 21:53

## 2023-08-13 RX ADMIN — AMLODIPINE BESYLATE 5 MG: 5 TABLET ORAL at 08:41

## 2023-08-13 RX ADMIN — AZITHROMYCIN MONOHYDRATE 500 MG: 500 INJECTION, POWDER, LYOPHILIZED, FOR SOLUTION INTRAVENOUS at 21:52

## 2023-08-13 RX ADMIN — IPRATROPIUM BROMIDE AND ALBUTEROL SULFATE 1 DOSE: 2.5; .5 SOLUTION RESPIRATORY (INHALATION) at 20:17

## 2023-08-13 RX ADMIN — OXYCODONE HYDROCHLORIDE AND ACETAMINOPHEN 1 TABLET: 5; 325 TABLET ORAL at 05:25

## 2023-08-13 RX ADMIN — FERROUS SULFATE TAB 325 MG (65 MG ELEMENTAL FE) 325 MG: 325 (65 FE) TAB at 08:41

## 2023-08-13 RX ADMIN — PRIMIDONE 100 MG: 50 TABLET ORAL at 21:53

## 2023-08-13 RX ADMIN — SODIUM CHLORIDE, PRESERVATIVE FREE 10 ML: 5 INJECTION INTRAVENOUS at 21:49

## 2023-08-13 RX ADMIN — SODIUM CHLORIDE, PRESERVATIVE FREE 10 ML: 5 INJECTION INTRAVENOUS at 08:42

## 2023-08-13 RX ADMIN — Medication 4 PUFF: at 07:57

## 2023-08-13 RX ADMIN — IPRATROPIUM BROMIDE AND ALBUTEROL SULFATE 1 DOSE: 2.5; .5 SOLUTION RESPIRATORY (INHALATION) at 07:57

## 2023-08-13 RX ADMIN — BUMETANIDE 1 MG: 1 TABLET ORAL at 10:52

## 2023-08-13 RX ADMIN — FOLIC ACID 1 MG: 1 TABLET ORAL at 08:41

## 2023-08-13 RX ADMIN — GUAIFENESIN 600 MG: 600 TABLET, EXTENDED RELEASE ORAL at 08:41

## 2023-08-13 RX ADMIN — PANTOPRAZOLE SODIUM 40 MG: 40 TABLET, DELAYED RELEASE ORAL at 05:25

## 2023-08-13 RX ADMIN — LOSARTAN POTASSIUM 50 MG: 25 TABLET, FILM COATED ORAL at 08:41

## 2023-08-13 RX ADMIN — CYANOCOBALAMIN TAB 500 MCG 500 MCG: 500 TAB at 08:41

## 2023-08-13 RX ADMIN — ARIPIPRAZOLE 5 MG: 10 TABLET ORAL at 08:41

## 2023-08-13 RX ADMIN — ENOXAPARIN SODIUM 30 MG: 100 INJECTION SUBCUTANEOUS at 08:42

## 2023-08-13 RX ADMIN — DULOXETINE HYDROCHLORIDE 60 MG: 60 CAPSULE, DELAYED RELEASE ORAL at 08:40

## 2023-08-13 RX ADMIN — PANTOPRAZOLE SODIUM 40 MG: 40 TABLET, DELAYED RELEASE ORAL at 16:27

## 2023-08-13 RX ADMIN — TIOTROPIUM BROMIDE INHALATION SPRAY 2 PUFF: 3.12 SPRAY, METERED RESPIRATORY (INHALATION) at 07:58

## 2023-08-13 RX ADMIN — ENOXAPARIN SODIUM 30 MG: 100 INJECTION SUBCUTANEOUS at 21:52

## 2023-08-13 RX ADMIN — Medication 4 PUFF: at 20:17

## 2023-08-13 ASSESSMENT — PAIN SCALES - GENERAL
PAINLEVEL_OUTOF10: 7
PAINLEVEL_OUTOF10: 7

## 2023-08-13 ASSESSMENT — PAIN DESCRIPTION - LOCATION
LOCATION: LEG
LOCATION: LEG

## 2023-08-13 ASSESSMENT — PAIN DESCRIPTION - ORIENTATION
ORIENTATION: RIGHT;LEFT
ORIENTATION: RIGHT;LEFT

## 2023-08-13 ASSESSMENT — PAIN DESCRIPTION - DESCRIPTORS
DESCRIPTORS: THROBBING
DESCRIPTORS: THROBBING

## 2023-08-14 PROBLEM — I50.33 ACUTE ON CHRONIC HEART FAILURE WITH PRESERVED EJECTION FRACTION (HCC): Status: ACTIVE | Noted: 2023-08-14

## 2023-08-14 LAB
ANION GAP SERPL CALCULATED.3IONS-SCNC: 8 MMOL/L (ref 3–16)
BUN SERPL-MCNC: 35 MG/DL (ref 7–20)
CALCIUM SERPL-MCNC: 9.4 MG/DL (ref 8.3–10.6)
CHLORIDE SERPL-SCNC: 98 MMOL/L (ref 99–110)
CO2 SERPL-SCNC: 34 MMOL/L (ref 21–32)
CREAT SERPL-MCNC: 1.2 MG/DL (ref 0.6–1.2)
GFR SERPLBLD CREATININE-BSD FMLA CKD-EPI: 46 ML/MIN/{1.73_M2}
GLUCOSE SERPL-MCNC: 102 MG/DL (ref 70–99)
MAGNESIUM SERPL-MCNC: 2.2 MG/DL (ref 1.8–2.4)
POTASSIUM SERPL-SCNC: 4.3 MMOL/L (ref 3.5–5.1)
SODIUM SERPL-SCNC: 140 MMOL/L (ref 136–145)

## 2023-08-14 PROCEDURE — 99233 SBSQ HOSP IP/OBS HIGH 50: CPT | Performed by: INTERNAL MEDICINE

## 2023-08-14 PROCEDURE — 80048 BASIC METABOLIC PNL TOTAL CA: CPT

## 2023-08-14 PROCEDURE — 6370000000 HC RX 637 (ALT 250 FOR IP): Performed by: STUDENT IN AN ORGANIZED HEALTH CARE EDUCATION/TRAINING PROGRAM

## 2023-08-14 PROCEDURE — 94761 N-INVAS EAR/PLS OXIMETRY MLT: CPT

## 2023-08-14 PROCEDURE — 2700000000 HC OXYGEN THERAPY PER DAY

## 2023-08-14 PROCEDURE — 97165 OT EVAL LOW COMPLEX 30 MIN: CPT

## 2023-08-14 PROCEDURE — 99232 SBSQ HOSP IP/OBS MODERATE 35: CPT | Performed by: NURSE PRACTITIONER

## 2023-08-14 PROCEDURE — 6370000000 HC RX 637 (ALT 250 FOR IP): Performed by: INTERNAL MEDICINE

## 2023-08-14 PROCEDURE — 83735 ASSAY OF MAGNESIUM: CPT

## 2023-08-14 PROCEDURE — 2580000003 HC RX 258: Performed by: STUDENT IN AN ORGANIZED HEALTH CARE EDUCATION/TRAINING PROGRAM

## 2023-08-14 PROCEDURE — 97530 THERAPEUTIC ACTIVITIES: CPT

## 2023-08-14 PROCEDURE — 6360000002 HC RX W HCPCS: Performed by: INTERNAL MEDICINE

## 2023-08-14 PROCEDURE — 97162 PT EVAL MOD COMPLEX 30 MIN: CPT

## 2023-08-14 PROCEDURE — 2060000000 HC ICU INTERMEDIATE R&B

## 2023-08-14 PROCEDURE — 36415 COLL VENOUS BLD VENIPUNCTURE: CPT

## 2023-08-14 PROCEDURE — 94640 AIRWAY INHALATION TREATMENT: CPT

## 2023-08-14 RX ADMIN — PANTOPRAZOLE SODIUM 40 MG: 40 TABLET, DELAYED RELEASE ORAL at 16:21

## 2023-08-14 RX ADMIN — TIOTROPIUM BROMIDE INHALATION SPRAY 2 PUFF: 3.12 SPRAY, METERED RESPIRATORY (INHALATION) at 08:45

## 2023-08-14 RX ADMIN — Medication 4 PUFF: at 21:33

## 2023-08-14 RX ADMIN — OXYCODONE HYDROCHLORIDE AND ACETAMINOPHEN 1 TABLET: 5; 325 TABLET ORAL at 21:47

## 2023-08-14 RX ADMIN — ARIPIPRAZOLE 5 MG: 10 TABLET ORAL at 09:41

## 2023-08-14 RX ADMIN — ENOXAPARIN SODIUM 30 MG: 100 INJECTION SUBCUTANEOUS at 09:41

## 2023-08-14 RX ADMIN — BUMETANIDE 1 MG: 1 TABLET ORAL at 09:41

## 2023-08-14 RX ADMIN — SODIUM CHLORIDE, PRESERVATIVE FREE 10 ML: 5 INJECTION INTRAVENOUS at 09:47

## 2023-08-14 RX ADMIN — GUAIFENESIN 600 MG: 600 TABLET, EXTENDED RELEASE ORAL at 09:42

## 2023-08-14 RX ADMIN — ALLOPURINOL 100 MG: 100 TABLET ORAL at 09:42

## 2023-08-14 RX ADMIN — IPRATROPIUM BROMIDE AND ALBUTEROL SULFATE 1 DOSE: 2.5; .5 SOLUTION RESPIRATORY (INHALATION) at 21:32

## 2023-08-14 RX ADMIN — CYANOCOBALAMIN TAB 500 MCG 500 MCG: 500 TAB at 09:42

## 2023-08-14 RX ADMIN — PREDNISONE 40 MG: 20 TABLET ORAL at 09:42

## 2023-08-14 RX ADMIN — AMLODIPINE BESYLATE 5 MG: 5 TABLET ORAL at 09:42

## 2023-08-14 RX ADMIN — GUAIFENESIN 600 MG: 600 TABLET, EXTENDED RELEASE ORAL at 21:47

## 2023-08-14 RX ADMIN — IPRATROPIUM BROMIDE AND ALBUTEROL SULFATE 1 DOSE: 2.5; .5 SOLUTION RESPIRATORY (INHALATION) at 08:45

## 2023-08-14 RX ADMIN — PRIMIDONE 100 MG: 50 TABLET ORAL at 21:47

## 2023-08-14 RX ADMIN — DULOXETINE HYDROCHLORIDE 60 MG: 60 CAPSULE, DELAYED RELEASE ORAL at 09:42

## 2023-08-14 RX ADMIN — Medication 4 PUFF: at 08:45

## 2023-08-14 RX ADMIN — PANTOPRAZOLE SODIUM 40 MG: 40 TABLET, DELAYED RELEASE ORAL at 05:09

## 2023-08-14 RX ADMIN — OXYCODONE HYDROCHLORIDE AND ACETAMINOPHEN 1 TABLET: 5; 325 TABLET ORAL at 05:09

## 2023-08-14 RX ADMIN — POLYETHYLENE GLYCOL (3350) 17 G: 17 POWDER, FOR SOLUTION ORAL at 21:47

## 2023-08-14 RX ADMIN — SODIUM CHLORIDE, PRESERVATIVE FREE 10 ML: 5 INJECTION INTRAVENOUS at 21:49

## 2023-08-14 RX ADMIN — FERROUS SULFATE TAB 325 MG (65 MG ELEMENTAL FE) 325 MG: 325 (65 FE) TAB at 09:41

## 2023-08-14 RX ADMIN — ENOXAPARIN SODIUM 30 MG: 100 INJECTION SUBCUTANEOUS at 21:47

## 2023-08-14 RX ADMIN — ACETAMINOPHEN 650 MG: 325 TABLET ORAL at 16:29

## 2023-08-14 RX ADMIN — BUMETANIDE 1 MG: 1 TABLET ORAL at 21:47

## 2023-08-14 RX ADMIN — FOLIC ACID 1 MG: 1 TABLET ORAL at 09:42

## 2023-08-14 RX ADMIN — LOSARTAN POTASSIUM 50 MG: 25 TABLET, FILM COATED ORAL at 09:42

## 2023-08-14 ASSESSMENT — PAIN DESCRIPTION - LOCATION
LOCATION: LEG

## 2023-08-14 ASSESSMENT — PAIN SCALES - WONG BAKER: WONGBAKER_NUMERICALRESPONSE: 0

## 2023-08-14 ASSESSMENT — PAIN DESCRIPTION - ORIENTATION
ORIENTATION: LEFT
ORIENTATION: RIGHT;LEFT
ORIENTATION: LEFT
ORIENTATION: LEFT

## 2023-08-14 ASSESSMENT — PAIN SCALES - GENERAL
PAINLEVEL_OUTOF10: 0
PAINLEVEL_OUTOF10: 6
PAINLEVEL_OUTOF10: 6
PAINLEVEL_OUTOF10: 7
PAINLEVEL_OUTOF10: 6

## 2023-08-14 ASSESSMENT — PAIN DESCRIPTION - DESCRIPTORS
DESCRIPTORS: THROBBING

## 2023-08-14 ASSESSMENT — ENCOUNTER SYMPTOMS
RESPIRATORY NEGATIVE: 1
GASTROINTESTINAL NEGATIVE: 1

## 2023-08-14 ASSESSMENT — PAIN - FUNCTIONAL ASSESSMENT: PAIN_FUNCTIONAL_ASSESSMENT: PREVENTS OR INTERFERES SOME ACTIVE ACTIVITIES AND ADLS

## 2023-08-14 NOTE — PLAN OF CARE
Patient's EF (Ejection Fraction) is greater than 40%    Heart Failure Medications:  Diuretics[de-identified] Furosemide    (One of the following REQUIRED for EF </= 40%/SYSTOLIC FAILURE but MAY be used in EF% >40%/DIASTOLIC FAILURE)        ACE[de-identified] None        ARB[de-identified] Losartan         ARNI[de-identified] None    (Beta Blockers)  NON- Evidenced Based Beta Blocker (for EF% >40%/DIASTOLIC FAILURE): None    Evidenced Based Beta Blocker::(REQUIRED for EF% <40%/SYSTOLIC FAILURE) None  . .................................................................................................................................................. Healthy Weight Tracking - BMI + Meds 6/18/2023 6/18/2023 8/9/2023 8/10/2023 8/11/2023 8/12/2023 8/13/2023   Weight 223 lb 1 oz 225 lb 6.4 oz 228 lb 1.6 oz 231 lb 1.6 oz 231 lb 12.8 oz 227 lb 11.8 oz 223 lb 9.6 oz   Height - - 5' 3\" - - - -   Body Mass Index 39.51 kg/m2 39.93 kg/m2 40.41 kg/m2 40.94 kg/m2 41.06 kg/m2 40.34 kg/m2 39.61 kg/m2   Some recent data might be hidden         Patient's weights and intake/output reviewed: Yes    Daily Weight log at bedside, patient/family participation in use of log: \"yes    Patient's Last Weight: 223.9 lbs obtained by standing scale. Difference of 3.21 lbs less than last documented weight.       Intake/Output Summary (Last 24 hours) at 8/13/2023 0942  Last data filed at 8/13/2023 6697  Gross per 24 hour   Intake 360 ml   Output 2800 ml   Net -2440 ml       Education Booklet Provided: yes    Comorbidities Reviewed Yes    Patient has a past medical history of Arthritis, Asthma, Bronchitis chronic, CHF (congestive heart failure) ([address]), Chronic respiratory failure ([address]), COPD (chronic obstructive pulmonary disease) ([address]), Depression, Diabetes mellitus ([address]), Emphysema of lung ([address]), GI bleed, Glaucoma, Hypertension, Hypoxemia, Morbid obesity ([address]), Oxygen dependent, Pneumonia of left upper lobe due to Pseudomonas species ([address]), Rash, SBO (small bowel obstruction) ([address]), Shingles, Sleep
Problem: Discharge Planning  Goal: Discharge to home or other facility with appropriate resources  8/10/2023 0027 by Bnony Ortiz RN  Outcome: Progressing     Problem: Safety - Adult  Goal: Free from fall injury  8/10/2023 0027 by Bonny Ortiz RN  Outcome: Progressing     Problem: Pain  Goal: Verbalizes/displays adequate comfort level or baseline comfort level  8/10/2023 1310 by Cleon Call, RN  Outcome: Progressing  Flowsheets (Taken 8/10/2023 1310)  Verbalizes/displays adequate comfort level or baseline comfort level:   Encourage patient to monitor pain and request assistance   Assess pain using appropriate pain scale  8/10/2023 0027 by Bonny Ortiz RN  Outcome: Progressing
Problem: Discharge Planning  Goal: Discharge to home or other facility with appropriate resources  8/12/2023 1434 by Radha Khalil RN  Outcome: Progressing  8/12/2023 0622 by Bhavin Solitario RN  Outcome: Progressing     Problem: Safety - Adult  Goal: Free from fall injury  8/12/2023 1434 by Radha Khalil RN  Outcome: Progressing  8/12/2023 0622 by Bhavin Solitario RN  Outcome: Progressing     Problem: Pain  Goal: Verbalizes/displays adequate comfort level or baseline comfort level  8/12/2023 1434 by Radha Khalil RN  Outcome: Progressing  8/12/2023 0622 by Bhavin Solitario RN  Outcome: Progressing     Problem: Chronic Conditions and Co-morbidities  Goal: Patient's chronic conditions and co-morbidity symptoms are monitored and maintained or improved  8/12/2023 1434 by Radha Khalil RN  Outcome: Progressing  8/12/2023 0622 by Bhavin Solitario RN  Outcome: Progressing  Note:   HEART FAILURE CARE PLAN:    Comorbidities Reviewed: Yes   Patient has a past medical history of Arthritis, Asthma, Bronchitis chronic, CHF (congestive heart failure) ([address]), Chronic respiratory failure ([address]), COPD (chronic obstructive pulmonary disease) ([address]), Depression, Diabetes mellitus ([address]), Emphysema of lung ([address]), GI bleed, Glaucoma, Hypertension, Hypoxemia, Morbid obesity ([address]), Oxygen dependent, Pneumonia of left upper lobe due to Pseudomonas species ([address]), Rash, SBO (small bowel obstruction) ([address]), Shingles, Sleep apnea, and Spontaneous tension pneumothorax. ECHOCARDIOGRAM Reviewed: Yes   Patient's Ejection Fraction (EF) is greater than 40%    Weights Reviewed:  Yes   Admission weight: 228 lb 1.6 oz (103.5 kg)   Wt Readings from Last 3 Encounters:   08/12/23 227 lb 11.8 oz (103.3 kg)   06/18/23 225 lb 6.4 oz (102.2 kg)   05/03/23 232 lb (105.2 kg)     Intake & Output Reviewed: Yes     Intake/Output Summary (Last 24 hours) at 8/12/2023 0622  Last data filed at 8/11/2023 1750  Gross per 24 hour   Intake 402 ml   Output 800 ml
Problem: Discharge Planning  Goal: Discharge to home or other facility with appropriate resources  8/13/2023 0941 by Jesus Castaneda RN  Outcome: Progressing  8/13/2023 0514 by Heidi Trejo RN  Outcome: Progressing     Problem: Safety - Adult  Goal: Free from fall injury  8/13/2023 0941 by Jessu Castaneda RN  Outcome: Progressing  8/13/2023 0514 by Heidi Trejo RN  Outcome: Progressing     Problem: Pain  Goal: Verbalizes/displays adequate comfort level or baseline comfort level  8/13/2023 0941 by Jesus Castaneda RN  Outcome: Progressing  8/13/2023 0514 by Heidi Trejo RN  Outcome: Progressing     Problem: Chronic Conditions and Co-morbidities  Goal: Patient's chronic conditions and co-morbidity symptoms are monitored and maintained or improved  8/13/2023 0941 by Jesus Castaneda RN  Outcome: Progressing  8/13/2023 0514 by Heidi Trejo RN  Outcome: Progressing  Note:   HEART FAILURE CARE PLAN:    Comorbidities Reviewed: Yes   Patient has a past medical history of Arthritis, Asthma, Bronchitis chronic, CHF (congestive heart failure) ([address]), Chronic respiratory failure ([address]), COPD (chronic obstructive pulmonary disease) ([address]), Depression, Diabetes mellitus ([address]), Emphysema of lung ([address]), GI bleed, Glaucoma, Hypertension, Hypoxemia, Morbid obesity ([address]), Oxygen dependent, Pneumonia of left upper lobe due to Pseudomonas species ([address]), Rash, SBO (small bowel obstruction) ([address]), Shingles, Sleep apnea, and Spontaneous tension pneumothorax. ECHOCARDIOGRAM Reviewed: Yes   Patient's Ejection Fraction (EF) is greater than 40%    Weights Reviewed:  Yes   Admission weight: 228 lb 1.6 oz (103.5 kg)   Wt Readings from Last 3 Encounters:   08/13/23 223 lb 9.6 oz (101.4 kg)   06/18/23 225 lb 6.4 oz (102.2 kg)   05/03/23 232 lb (105.2 kg)     Intake & Output Reviewed: Yes     Intake/Output Summary (Last 24 hours) at 8/13/2023 0514  Last data filed at 8/13/2023 0248  Gross per 24 hour   Intake 600 ml   Output 3300 ml
Problem: Discharge Planning  Goal: Discharge to home or other facility with appropriate resources  8/14/2023 1048 by Paxton Mcnamara RN  Outcome: Progressing  8/14/2023 0531 by Bhavin Solitario RN  Outcome: Progressing     Problem: Safety - Adult  Goal: Free from fall injury  8/14/2023 1048 by Paxton Mcnamara RN  Outcome: Progressing  8/14/2023 0531 by Bhavin Solitario RN  Outcome: Progressing     Problem: Pain  Goal: Verbalizes/displays adequate comfort level or baseline comfort level  8/14/2023 1048 by Paxton Mcnamara RN  Outcome: Progressing  8/14/2023 0531 by Bhavin Solitario RN  Outcome: Progressing     Problem: Chronic Conditions and Co-morbidities  Goal: Patient's chronic conditions and co-morbidity symptoms are monitored and maintained or improved  8/14/2023 1048 by Paxton Mcnamara RN  Outcome: Progressing  8/14/2023 0531 by Bhavin Solitario RN  Outcome: Progressing  Note:   HEART FAILURE CARE PLAN:    Comorbidities Reviewed: Yes   Patient has a past medical history of Arthritis, Asthma, Bronchitis chronic, CHF (congestive heart failure) ([address]), Chronic respiratory failure ([address]), COPD (chronic obstructive pulmonary disease) ([address]), Depression, Diabetes mellitus ([address]), Emphysema of lung ([address]), GI bleed, Glaucoma, Hypertension, Hypoxemia, Morbid obesity ([address]), Oxygen dependent, Pneumonia of left upper lobe due to Pseudomonas species ([address]), Rash, SBO (small bowel obstruction) ([address]), Shingles, Sleep apnea, and Spontaneous tension pneumothorax. ECHOCARDIOGRAM Reviewed: Yes   Patient's Ejection Fraction (EF) is greater than 40%    Weights Reviewed:  Yes   Admission weight: 228 lb 1.6 oz (103.5 kg)   Wt Readings from Last 3 Encounters:   08/14/23 224 lb 11.2 oz (101.9 kg)   06/18/23 225 lb 6.4 oz (102.2 kg)   05/03/23 232 lb (105.2 kg)     Intake & Output Reviewed: Yes     Intake/Output Summary (Last 24 hours) at 8/14/2023 0531  Last data filed at 8/14/2023 0500  Gross per 24 hour   Intake 420 ml   Output 1900 ml   Net
Problem: Discharge Planning  Goal: Discharge to home or other facility with appropriate resources  Outcome: Progressing     Problem: Safety - Adult  Goal: Free from fall injury  Outcome: Progressing     Problem: Pain  Goal: Verbalizes/displays adequate comfort level or baseline comfort level  Outcome: Progressing
Problem: Discharge Planning  Goal: Discharge to home or other facility with appropriate resources  Outcome: Progressing     Problem: Safety - Adult  Goal: Free from fall injury  Outcome: Progressing     Problem: Pain  Goal: Verbalizes/displays adequate comfort level or baseline comfort level  Outcome: Progressing     Problem: Chronic Conditions and Co-morbidities  Goal: Patient's chronic conditions and co-morbidity symptoms are monitored and maintained or improved  Outcome: Progressing  Note:   HEART FAILURE CARE PLAN:    Comorbidities Reviewed: Yes   Patient has a past medical history of Arthritis, Asthma, Bronchitis chronic, CHF (congestive heart failure) ([address]), Chronic respiratory failure ([address]), COPD (chronic obstructive pulmonary disease) ([address]), Depression, Diabetes mellitus ([address]), Emphysema of lung ([address]), GI bleed, Glaucoma, Hypertension, Hypoxemia, Morbid obesity ([address]), Oxygen dependent, Pneumonia of left upper lobe due to Pseudomonas species ([address]), Rash, SBO (small bowel obstruction) ([address]), Shingles, Sleep apnea, and Spontaneous tension pneumothorax. ECHOCARDIOGRAM Reviewed: Yes   Patient's Ejection Fraction (EF) is greater than 40%    Weights Reviewed:  Yes   Admission weight: 228 lb 1.6 oz (103.5 kg)   Wt Readings from Last 3 Encounters:   08/12/23 227 lb 11.8 oz (103.3 kg)   06/18/23 225 lb 6.4 oz (102.2 kg)   05/03/23 232 lb (105.2 kg)     Intake & Output Reviewed: Yes     Intake/Output Summary (Last 24 hours) at 8/12/2023 0911  Last data filed at 8/11/2023 1750  Gross per 24 hour   Intake 402 ml   Output 800 ml   Net -398 ml     Medications Reviewed: Yes   SCHEDULED HOSPITAL MEDICATIONS:   azithromycin  500 mg IntraVENous Q24H    ipratropium 0.5 mg-albuterol 2.5 mg  1 Dose Inhalation BID RT    enoxaparin  30 mg SubCUTAneous BID    primidone  100 mg Oral Nightly    allopurinol  100 mg Oral Daily    amLODIPine  5 mg Oral Daily    ARIPiprazole  5 mg Oral Daily    DULoxetine  60 mg Oral Daily    ferrous
Problem: Discharge Planning  Goal: Discharge to home or other facility with appropriate resources  Outcome: Progressing     Problem: Safety - Adult  Goal: Free from fall injury  Outcome: Progressing     Problem: Pain  Goal: Verbalizes/displays adequate comfort level or baseline comfort level  Outcome: Progressing     Problem: Chronic Conditions and Co-morbidities  Goal: Patient's chronic conditions and co-morbidity symptoms are monitored and maintained or improved  Outcome: Progressing  Note:   HEART FAILURE CARE PLAN:    Comorbidities Reviewed: Yes   Patient has a past medical history of Arthritis, Asthma, Bronchitis chronic, CHF (congestive heart failure) ([address]), Chronic respiratory failure ([address]), COPD (chronic obstructive pulmonary disease) ([address]), Depression, Diabetes mellitus ([address]), Emphysema of lung ([address]), GI bleed, Glaucoma, Hypertension, Hypoxemia, Morbid obesity ([address]), Oxygen dependent, Pneumonia of left upper lobe due to Pseudomonas species ([address]), Rash, SBO (small bowel obstruction) ([address]), Shingles, Sleep apnea, and Spontaneous tension pneumothorax. ECHOCARDIOGRAM Reviewed: Yes   Patient's Ejection Fraction (EF) is greater than 40%    Weights Reviewed:  Yes   Admission weight: 228 lb 1.6 oz (103.5 kg)   Wt Readings from Last 3 Encounters:   08/13/23 223 lb 9.6 oz (101.4 kg)   06/18/23 225 lb 6.4 oz (102.2 kg)   05/03/23 232 lb (105.2 kg)     Intake & Output Reviewed: Yes     Intake/Output Summary (Last 24 hours) at 8/13/2023 0571  Last data filed at 8/13/2023 0248  Gross per 24 hour   Intake 600 ml   Output 3300 ml   Net -2700 ml     Medications Reviewed: Yes   SCHEDULED HOSPITAL MEDICATIONS:   azithromycin  500 mg IntraVENous Q24H    ipratropium 0.5 mg-albuterol 2.5 mg  1 Dose Inhalation BID RT    enoxaparin  30 mg SubCUTAneous BID    primidone  100 mg Oral Nightly    allopurinol  100 mg Oral Daily    amLODIPine  5 mg Oral Daily    ARIPiprazole  5 mg Oral Daily    DULoxetine  60 mg Oral Daily
Problem: Discharge Planning  Goal: Discharge to home or other facility with appropriate resources  Outcome: Progressing     Problem: Safety - Adult  Goal: Free from fall injury  Outcome: Progressing     Problem: Pain  Goal: Verbalizes/displays adequate comfort level or baseline comfort level  Outcome: Progressing     Problem: Chronic Conditions and Co-morbidities  Goal: Patient's chronic conditions and co-morbidity symptoms are monitored and maintained or improved  Outcome: Progressing  Note:   HEART FAILURE CARE PLAN:    Comorbidities Reviewed: Yes   Patient has a past medical history of Arthritis, Asthma, Bronchitis chronic, CHF (congestive heart failure) ([address]), Chronic respiratory failure ([address]), COPD (chronic obstructive pulmonary disease) ([address]), Depression, Diabetes mellitus ([address]), Emphysema of lung ([address]), GI bleed, Glaucoma, Hypertension, Hypoxemia, Morbid obesity ([address]), Oxygen dependent, Pneumonia of left upper lobe due to Pseudomonas species ([address]), Rash, SBO (small bowel obstruction) ([address]), Shingles, Sleep apnea, and Spontaneous tension pneumothorax. ECHOCARDIOGRAM Reviewed: Yes   Patient's Ejection Fraction (EF) is greater than 40%    Weights Reviewed:  Yes   Admission weight: 228 lb 1.6 oz (103.5 kg)   Wt Readings from Last 3 Encounters:   08/14/23 224 lb 11.2 oz (101.9 kg)   06/18/23 225 lb 6.4 oz (102.2 kg)   05/03/23 232 lb (105.2 kg)     Intake & Output Reviewed: Yes     Intake/Output Summary (Last 24 hours) at 8/14/2023 0531  Last data filed at 8/14/2023 0500  Gross per 24 hour   Intake 420 ml   Output 1900 ml   Net -1480 ml     Medications Reviewed: Yes   SCHEDULED HOSPITAL MEDICATIONS:   bumetanide  1 mg Oral BID    ipratropium 0.5 mg-albuterol 2.5 mg  1 Dose Inhalation BID RT    enoxaparin  30 mg SubCUTAneous BID    primidone  100 mg Oral Nightly    allopurinol  100 mg Oral Daily    amLODIPine  5 mg Oral Daily    ARIPiprazole  5 mg Oral Daily    DULoxetine  60 mg Oral Daily    ferrous sulfate
and Spontaneous tension pneumothorax. >>For CHF and Comorbidity documentation on Education Time and Topics, please see Education Tab    Progressive Mobility Assessment:  What is this patient's Current Level of Mobility?: Ambulatory- with Assistance  How was this patient Mobilized today?: Edge of Bed, Up to Chair, Bedside Commode, and  Up to Toilet/Shower, ambulated 10 ft                 With Whom? Nurse, PCA, PT, and OT                 Level of Difficulty/Assistance: 1x Assist     Pt resting in bed at this time on  4 L O2. Pt denies shortness of breath. Pt with nonpitting lower extremity edema. Patient and/or Family's stated Goal of Care this Admission: reduce shortness of breath, increase activity tolerance, better understand heart failure and disease management, be more comfortable, and reduce lower extremity edema prior to discharge    Patient continues to be reluctant to education. Discussed management at home. Discussed monitoring salt intake, fluids, and elevating lower extremities.  Spent 20mins      :

## 2023-08-14 NOTE — CARE COORDINATION
INTERDISCIPLINARY PLAN OF CARE CONFERENCE    Date/Time: 8/14/2023 3:24 PM  Completed by: Alo Pardo, Case Management      Patient Name:  Roxie Nixon  YOB: 1945  Admitting Diagnosis: Elevated troponin [R77.8]  Bilateral lower extremity edema [R60.0]  Acute on chronic diastolic (congestive) heart failure ([address]) [I50.33]     Admit Date/Time:  8/9/2023  3:35 PM    Chart reviewed. Interdisciplinary team contacted or reviewed plan related to patient progress and discharge plans. Disciplines included Case Management, Nursing, and Dietitian. Current Status:stable  PT/OT recommendation for discharge plan of care: Discharge Recommendations: SNF  DME needs for discharge: Needs Met      Confirmed plan with patient and/or family Yes confirmed with: (name) Reggie Del Angel and Jakub Truong  Met with: pt and . PT recs SNF. Referral to [facility] per pt request.  wants to transport at DC, states he has a van. Will continue to monitor. 1745 - [facility] can accept at DC, precert not started. Discharge Plan Comments: Reviewed chart, met with pt at bedside. Home O2 in place on admit: Yes  Pt informed of need to bring portable home O2 tank on day of discharge for nursing to connect prior to leaving:  Yes  Verbalized agreement/Understanding:   Yes

## 2023-08-15 VITALS
SYSTOLIC BLOOD PRESSURE: 119 MMHG | DIASTOLIC BLOOD PRESSURE: 48 MMHG | HEIGHT: 63 IN | BODY MASS INDEX: 38.59 KG/M2 | OXYGEN SATURATION: 92 % | RESPIRATION RATE: 16 BRPM | TEMPERATURE: 97.3 F | WEIGHT: 217.8 LBS | HEART RATE: 76 BPM

## 2023-08-15 LAB
ANION GAP SERPL CALCULATED.3IONS-SCNC: 7 MMOL/L (ref 3–16)
BUN SERPL-MCNC: 34 MG/DL (ref 7–20)
CALCIUM SERPL-MCNC: 9 MG/DL (ref 8.3–10.6)
CHLORIDE SERPL-SCNC: 96 MMOL/L (ref 99–110)
CO2 SERPL-SCNC: 31 MMOL/L (ref 21–32)
CREAT SERPL-MCNC: 1.1 MG/DL (ref 0.6–1.2)
GFR SERPLBLD CREATININE-BSD FMLA CKD-EPI: 51 ML/MIN/{1.73_M2}
GLUCOSE SERPL-MCNC: 92 MG/DL (ref 70–99)
NT-PROBNP SERPL-MCNC: 361 PG/ML (ref 0–449)
POTASSIUM SERPL-SCNC: 3.9 MMOL/L (ref 3.5–5.1)
SODIUM SERPL-SCNC: 134 MMOL/L (ref 136–145)

## 2023-08-15 PROCEDURE — 6360000002 HC RX W HCPCS: Performed by: INTERNAL MEDICINE

## 2023-08-15 PROCEDURE — 80048 BASIC METABOLIC PNL TOTAL CA: CPT

## 2023-08-15 PROCEDURE — 36415 COLL VENOUS BLD VENIPUNCTURE: CPT

## 2023-08-15 PROCEDURE — 83880 ASSAY OF NATRIURETIC PEPTIDE: CPT

## 2023-08-15 PROCEDURE — 2580000003 HC RX 258: Performed by: STUDENT IN AN ORGANIZED HEALTH CARE EDUCATION/TRAINING PROGRAM

## 2023-08-15 PROCEDURE — 94761 N-INVAS EAR/PLS OXIMETRY MLT: CPT

## 2023-08-15 PROCEDURE — 6370000000 HC RX 637 (ALT 250 FOR IP): Performed by: INTERNAL MEDICINE

## 2023-08-15 PROCEDURE — 2700000000 HC OXYGEN THERAPY PER DAY

## 2023-08-15 PROCEDURE — 6370000000 HC RX 637 (ALT 250 FOR IP): Performed by: STUDENT IN AN ORGANIZED HEALTH CARE EDUCATION/TRAINING PROGRAM

## 2023-08-15 PROCEDURE — 99239 HOSP IP/OBS DSCHRG MGMT >30: CPT | Performed by: INTERNAL MEDICINE

## 2023-08-15 RX ORDER — BUMETANIDE 2 MG/1
2 TABLET ORAL 2 TIMES DAILY
Qty: 60 TABLET | Refills: 3
Start: 2023-08-15

## 2023-08-15 RX ORDER — LACTULOSE 10 G/15ML
20 SOLUTION ORAL ONCE
Status: COMPLETED | OUTPATIENT
Start: 2023-08-15 | End: 2023-08-15

## 2023-08-15 RX ADMIN — GUAIFENESIN 600 MG: 600 TABLET, EXTENDED RELEASE ORAL at 08:41

## 2023-08-15 RX ADMIN — ALLOPURINOL 100 MG: 100 TABLET ORAL at 08:36

## 2023-08-15 RX ADMIN — SODIUM CHLORIDE, PRESERVATIVE FREE 10 ML: 5 INJECTION INTRAVENOUS at 08:51

## 2023-08-15 RX ADMIN — PANTOPRAZOLE SODIUM 40 MG: 40 TABLET, DELAYED RELEASE ORAL at 05:05

## 2023-08-15 RX ADMIN — ACETAMINOPHEN 650 MG: 325 TABLET ORAL at 08:47

## 2023-08-15 RX ADMIN — ARIPIPRAZOLE 5 MG: 10 TABLET ORAL at 08:37

## 2023-08-15 RX ADMIN — LOSARTAN POTASSIUM 50 MG: 25 TABLET, FILM COATED ORAL at 08:36

## 2023-08-15 RX ADMIN — FOLIC ACID 1 MG: 1 TABLET ORAL at 08:37

## 2023-08-15 RX ADMIN — ENOXAPARIN SODIUM 30 MG: 100 INJECTION SUBCUTANEOUS at 08:48

## 2023-08-15 RX ADMIN — LACTULOSE 20 G: 10 SOLUTION ORAL at 12:30

## 2023-08-15 RX ADMIN — CYANOCOBALAMIN TAB 500 MCG 500 MCG: 500 TAB at 08:37

## 2023-08-15 RX ADMIN — FERROUS SULFATE TAB 325 MG (65 MG ELEMENTAL FE) 325 MG: 325 (65 FE) TAB at 08:36

## 2023-08-15 RX ADMIN — BUMETANIDE 1 MG: 1 TABLET ORAL at 08:37

## 2023-08-15 RX ADMIN — AMLODIPINE BESYLATE 5 MG: 5 TABLET ORAL at 08:47

## 2023-08-15 RX ADMIN — OXYCODONE HYDROCHLORIDE AND ACETAMINOPHEN 1 TABLET: 5; 325 TABLET ORAL at 05:05

## 2023-08-15 RX ADMIN — DULOXETINE HYDROCHLORIDE 60 MG: 60 CAPSULE, DELAYED RELEASE ORAL at 08:37

## 2023-08-15 ASSESSMENT — PAIN DESCRIPTION - LOCATION
LOCATION: LEG

## 2023-08-15 ASSESSMENT — PAIN DESCRIPTION - DESCRIPTORS
DESCRIPTORS: THROBBING
DESCRIPTORS: THROBBING

## 2023-08-15 ASSESSMENT — PAIN SCALES - GENERAL
PAINLEVEL_OUTOF10: 6

## 2023-08-15 ASSESSMENT — PAIN DESCRIPTION - ORIENTATION
ORIENTATION: LEFT
ORIENTATION: LEFT

## 2023-08-15 ASSESSMENT — PAIN - FUNCTIONAL ASSESSMENT: PAIN_FUNCTIONAL_ASSESSMENT: PREVENTS OR INTERFERES SOME ACTIVE ACTIVITIES AND ADLS

## 2023-08-15 ASSESSMENT — PAIN SCALES - WONG BAKER: WONGBAKER_NUMERICALRESPONSE: 0

## 2023-08-15 NOTE — DISCHARGE INSTR - DIET
Good nutrition is important when healing from an illness, injury, or surgery. Follow any nutrition recommendations given to you during your hospital stay. If you were given an oral nutrition supplement while in the hospital, continue to take this supplement at home. You can take it with meals, in-between meals, and/or before bedtime. These supplements can be purchased at most local grocery stores, pharmacies, and chain [employer]-stores. If you have any questions about your diet or nutrition, call the hospital and ask for the dietitian. Regular diet, low sodium.   2000 mL fluid restriction

## 2023-08-15 NOTE — FLOWSHEET NOTE
08/15/23 7567   Pain Assessment   Pain Assessment 0-10   Pain Level 6   Patient's Stated Pain Goal 0 - No pain   Pain Location Leg   Pain Orientation Left   Pain Descriptors Throbbing   Functional Pain Assessment Prevents or interferes some active activities and ADLs   Non-Pharmaceutical Pain Intervention(s) Rest   Opioid-Induced Sedation   POSS Score 1     Patient has c/o LLE pain with non pharmaceutical interventions ineffective, prn Percocet given per order. Pt denies further needs at this time. Call light is within reach and bed alarm on.

## 2023-08-15 NOTE — FLOWSHEET NOTE
08/14/23 2135   Vital Signs   Level of Consciousness 0   Pain Assessment   Pain Assessment 0-10   Pain Level 6   Patient's Stated Pain Goal 0 - No pain   Pain Location Leg   Pain Orientation Left   Pain Descriptors Throbbing   Functional Pain Assessment Prevents or interferes some active activities and ADLs   Non-Pharmaceutical Pain Intervention(s) Repositioned   Opioid-Induced Sedation   POSS Score 1     Shift assessment is complete, see flow sheet. Pt has c/o pain to LLE at a \"6\" with non pharmaceutical interventions ineffective, prn Percocet given. Pt has c/o constipation, BS active X4. PRN glycolax given per order. Patient denies further needs at this time. Call light is within reach and bed alarm on.

## 2023-08-15 NOTE — DISCHARGE INSTR - COC
dentures, ring, glasses, and clothes. RN SIGNATURE:  Electronically signed by Staci Turpin RN on 8/15/23 at 12:12 PM EDT    CASE MANAGEMENT/SOCIAL WORK SECTION    Inpatient Status Date: 8/9/23    Readmission Risk Assessment Score:  Readmission Risk              Risk of Unplanned Readmission:  26           Discharging to Facility/ Agency   Name: [facility]  Address:  Phone: [phone]  Fax:        / signature: Electronically signed by Rosana Persaud RN on 8/15/23 at 11:15 AM EDT    PHYSICIAN SECTION    Prognosis: Good    Condition at Discharge: Stable    Rehab Potential (if transferring to Rehab): Good    Recommended Labs or Other Treatments After Discharge:  low salt diet, daily weights  Can adjust bumex to once daily if needed  BMP in 1 week     Physician Certification: I certify the above information and transfer of Doroteo Payment  is necessary for the continuing treatment of the diagnosis listed and that she requires [address] for less 30 days.      Update Admission H&P: No change in H&P    PHYSICIAN SIGNATURE:  Electronically signed by Moses Meek MD on 8/15/23 at 10:09 AM EDT

## 2023-08-15 NOTE — DISCHARGE SUMMARY
Name:  Howard Richards  Room:  /2265-44  MRN:    [id number]    IM Discharge Summary    Discharging Physician:  Sharon Yarbrough MD    Admit: 8/9/2023    Discharge:      PCP      Cindy White MD    Diagnoses and hospital course  this Admission        #Acute on chronic diastolic CHF     - presented with  dyspnea on exertion, BLE edema 2+,   Recent changes in bumex to once daily by PCP for rising creatinine noted  Pt tried to increase to bid due to worsening edema but unable to diurese at home- compliant with other home meds  - CXR clear on admission but clinical evidence of CHF noted   - dopplers negative DVT   - lasix IV given initially and had good diuresis,  switched back to Bumex PO BID and tolerating well. Remains on RA   - last echo 6/16/23 EF 55%, bi-atrial enlargement, mild MR, TR, mild LVH  - cardiology consulted  - net negative  7.2 L since admission  - elevated cr yesterday, resolved now   - discharge planning to rehab with bid bumex     #COPD  #Chronic hypoxic respiratory failure  - suspect mild ae with productive cough and decreased breath sounds  - on 4 L NC baseline, stable   - inhaled bronchodilators   - completed prednisone burst   - completed Zithromax x 3 days      #  CKD 3, stable      #Elevated trop  - EKG without acute ischemic changes  - likely demand ischemic 2/2 acute CHF  - no chest pain  - monitor on tele      #HTN  - stable  - on losartan and amlodipine  - monitor      #PAR[employer] on CPAP  - continue as at home      #GERD w hx of ulcer  - on PPI BID         HPI 78 y.o. female with PMH of diastolic CHF, COPD (on 4L NC at home), HTN, GERD  who presents with SOB and worsening leg edema past 1 week. Pt went to her PCP who increased her bumex 1mg to BID with no improvement. Pt reports SOB even at rest. She also endorses cough with occasional green sputum. Pt also complaints of worsening bilateral LE edema which is affected her ambulation causing her to fall.    Denies lightheadedness, dizziness,

## 2023-09-05 ENCOUNTER — HOSPITAL ENCOUNTER (OUTPATIENT)
Dept: CT IMAGING | Age: 78
Discharge: HOME | End: 2023-09-05
Payer: MEDICARE

## 2023-09-05 ENCOUNTER — OFFICE VISIT (OUTPATIENT)
Dept: PULMONOLOGY | Age: 78
End: 2023-09-05
Payer: MEDICARE

## 2023-09-05 ENCOUNTER — TELEPHONE (OUTPATIENT)
Dept: PULMONOLOGY | Age: 78
End: 2023-09-05

## 2023-09-05 VITALS
WEIGHT: 217 LBS | HEIGHT: 63 IN | OXYGEN SATURATION: 89 % | SYSTOLIC BLOOD PRESSURE: 128 MMHG | HEART RATE: 58 BPM | RESPIRATION RATE: 18 BRPM | DIASTOLIC BLOOD PRESSURE: 54 MMHG | BODY MASS INDEX: 38.45 KG/M2

## 2023-09-05 DIAGNOSIS — R93.89 ABNORMAL CT OF THE CHEST: ICD-10-CM

## 2023-09-05 DIAGNOSIS — J44.9 CHRONIC OBSTRUCTIVE PULMONARY DISEASE, UNSPECIFIED COPD TYPE (HCC): ICD-10-CM

## 2023-09-05 DIAGNOSIS — R91.1 PULMONARY NODULE: Primary | ICD-10-CM

## 2023-09-05 PROCEDURE — 3074F SYST BP LT 130 MM HG: CPT | Performed by: INTERNAL MEDICINE

## 2023-09-05 PROCEDURE — 3078F DIAST BP <80 MM HG: CPT | Performed by: INTERNAL MEDICINE

## 2023-09-05 PROCEDURE — 1123F ACP DISCUSS/DSCN MKR DOCD: CPT | Performed by: INTERNAL MEDICINE

## 2023-09-05 PROCEDURE — 99214 OFFICE O/P EST MOD 30 MIN: CPT | Performed by: INTERNAL MEDICINE

## 2023-09-05 PROCEDURE — 71250 CT THORAX DX C-: CPT

## 2023-09-05 NOTE — PROGRESS NOTES
underlying advanced chronic lung disease. No associated  pleural effusion. Masslike area pulmonary consolidation left upper lobe measuring 2.7 x 1.5 x  3.2 cm unchanged compared to 06/07/2021 suggesting stable neoplastic disease  or post treatment changes. CT CHEST 9/5/23  pending, I did review    ASSESSMENT:   CARIDAD Pulmonary Nodule: clinical dx [facility] s/p SBRT Feb 2020 with Dr. 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 Patient aware to call us next week if she doesn't hear from us.

## 2023-09-05 NOTE — TELEPHONE ENCOUNTER
See me in Feb 2024, will set up f/u CT at that visit depending upon final radiology read       LOV: 9/5/23    ASSESSMENT:   CARIDAD Pulmonary Nodule: clinical dx [facility] s/p SBRT Feb 2020 with Dr. 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

## 2023-09-08 PROBLEM — R77.8 ELEVATED TROPONIN: Status: RESOLVED | Noted: 2023-08-09 | Resolved: 2023-09-08

## 2023-09-08 PROBLEM — R79.89 ELEVATED TROPONIN: Status: RESOLVED | Noted: 2023-08-09 | Resolved: 2023-09-08

## 2023-09-21 LAB
EKG ATRIAL RATE: 94 BPM
EKG DIAGNOSIS: NORMAL
EKG P AXIS: 69 DEGREES
EKG P-R INTERVAL: 198 MS
EKG Q-T INTERVAL: 342 MS
EKG QRS DURATION: 94 MS
EKG QTC CALCULATION (BAZETT): 427 MS
EKG R AXIS: -35 DEGREES
EKG T AXIS: 55 DEGREES
EKG VENTRICULAR RATE: 94 BPM

## 2024-01-01 ENCOUNTER — HOSPITAL ENCOUNTER (EMERGENCY)
Age: 79
End: 2024-08-24
Attending: EMERGENCY MEDICINE
Payer: MEDICARE

## 2024-01-01 VITALS — OXYGEN SATURATION: 61 %

## 2024-01-01 DIAGNOSIS — I46.9 ASYSTOLE (HCC): Primary | ICD-10-CM

## 2024-01-01 PROCEDURE — 94761 N-INVAS EAR/PLS OXIMETRY MLT: CPT

## 2024-01-01 PROCEDURE — 31500 INSERT EMERGENCY AIRWAY: CPT

## 2024-01-01 PROCEDURE — 99285 EMERGENCY DEPT VISIT HI MDM: CPT

## 2024-01-01 PROCEDURE — 92950 HEART/LUNG RESUSCITATION CPR: CPT

## 2024-02-08 ENCOUNTER — OFFICE VISIT (OUTPATIENT)
Dept: PULMONOLOGY | Age: 79
End: 2024-02-08
Payer: MEDICARE

## 2024-02-08 VITALS
HEART RATE: 92 BPM | SYSTOLIC BLOOD PRESSURE: 118 MMHG | RESPIRATION RATE: 24 BRPM | OXYGEN SATURATION: 93 % | HEIGHT: 63 IN | DIASTOLIC BLOOD PRESSURE: 54 MMHG | BODY MASS INDEX: 35.61 KG/M2 | WEIGHT: 201 LBS

## 2024-02-08 DIAGNOSIS — J44.9 COPD, SEVERE (HCC): ICD-10-CM

## 2024-02-08 DIAGNOSIS — J96.11 CHRONIC HYPOXEMIC RESPIRATORY FAILURE (HCC): ICD-10-CM

## 2024-02-08 PROCEDURE — 99214 OFFICE O/P EST MOD 30 MIN: CPT | Performed by: INTERNAL MEDICINE

## 2024-02-08 PROCEDURE — 3078F DIAST BP <80 MM HG: CPT | Performed by: INTERNAL MEDICINE

## 2024-02-08 PROCEDURE — 3074F SYST BP LT 130 MM HG: CPT | Performed by: INTERNAL MEDICINE

## 2024-02-08 PROCEDURE — 1123F ACP DISCUSS/DSCN MKR DOCD: CPT | Performed by: INTERNAL MEDICINE

## 2024-02-08 RX ORDER — FLUTICASONE FUROATE 200 UG/1
POWDER RESPIRATORY (INHALATION)
Qty: 30 EACH | Refills: 5 | Status: SHIPPED | OUTPATIENT
Start: 2024-02-08

## 2024-02-08 RX ORDER — TIOTROPIUM BROMIDE 18 UG/1
18 CAPSULE ORAL; RESPIRATORY (INHALATION) DAILY
Qty: 30 CAPSULE | Refills: 5 | Status: SHIPPED | OUTPATIENT
Start: 2024-02-08

## 2024-02-08 RX ORDER — ALBUTEROL SULFATE 90 UG/1
2 AEROSOL, METERED RESPIRATORY (INHALATION) EVERY 6 HOURS PRN
Qty: 8.5 G | Refills: 5 | Status: SHIPPED | OUTPATIENT
Start: 2024-02-08 | End: 2025-02-07

## 2024-02-08 NOTE — PROGRESS NOTES
Chronic lung disease unchanged from prior examination.  3. Prominent main pulmonary artery measuring 3.3 cm likely representing  pulmonary artery hypertension secondary to chronic lung disease.      ASSESSMENT:   CARIDAD Pulmonary Nodule: clinical dx [facility] s/p SBRT Feb 2020 with Dr. Agee  Severe COPD FEV1 1  L - very poor exercise capacity but has gotten back to exercise again  Chronic Respiratory Failure with hypoxemia on continuous home oxygen (4.5 L pulse)   Abnormal CT CHEST with RLL airway foreign body/food and mucus plugging   Morbid Obesity with hypoventilation syndrome    75 pack year tobacco in remission since 2005     PLAN:   Tessalon & mucinex DM PRN for cough  Spiriva q day; arnuity 200 (no LABA 2/2 leg cramps)   Continue Albuterol prn & add prior to exercise   Remain off lisinopril   4 L supplemental oxygen, 5 L with exertion   Continue diet and exercise, has bike  6 months f/u (Sept 2024) with repeat CT CHEST, dx h/o lung cancer

## 2024-02-28 DIAGNOSIS — J44.9 COPD, SEVERE (HCC): ICD-10-CM

## 2024-02-28 DIAGNOSIS — J96.11 CHRONIC HYPOXEMIC RESPIRATORY FAILURE (HCC): ICD-10-CM

## 2024-02-29 RX ORDER — TIOTROPIUM BROMIDE 18 UG/1
18 CAPSULE ORAL; RESPIRATORY (INHALATION) DAILY
Qty: 30 CAPSULE | Refills: 5 | Status: SHIPPED | OUTPATIENT
Start: 2024-02-29

## 2024-04-29 ENCOUNTER — HOSPITAL ENCOUNTER (EMERGENCY)
Age: 79
Discharge: HOME | End: 2024-04-30
Attending: STUDENT IN AN ORGANIZED HEALTH CARE EDUCATION/TRAINING PROGRAM
Payer: MEDICARE

## 2024-04-29 DIAGNOSIS — J44.1 COPD EXACERBATION (HCC): ICD-10-CM

## 2024-04-29 DIAGNOSIS — K22.89 ESOPHAGEAL DILATATION: Primary | ICD-10-CM

## 2024-04-29 DIAGNOSIS — R11.2 NAUSEA AND VOMITING, UNSPECIFIED VOMITING TYPE: ICD-10-CM

## 2024-04-29 PROCEDURE — 96372 THER/PROPH/DIAG INJ SC/IM: CPT

## 2024-04-29 PROCEDURE — 93005 ELECTROCARDIOGRAM TRACING: CPT | Performed by: STUDENT IN AN ORGANIZED HEALTH CARE EDUCATION/TRAINING PROGRAM

## 2024-04-29 PROCEDURE — 99285 EMERGENCY DEPT VISIT HI MDM: CPT

## 2024-04-29 PROCEDURE — 82803 BLOOD GASES ANY COMBINATION: CPT

## 2024-04-29 PROCEDURE — 36415 COLL VENOUS BLD VENIPUNCTURE: CPT

## 2024-04-29 PROCEDURE — 96375 TX/PRO/DX INJ NEW DRUG ADDON: CPT

## 2024-04-29 PROCEDURE — 96374 THER/PROPH/DIAG INJ IV PUSH: CPT

## 2024-04-29 ASSESSMENT — PAIN DESCRIPTION - DESCRIPTORS: DESCRIPTORS: ACHING

## 2024-04-29 ASSESSMENT — PAIN SCALES - GENERAL: PAINLEVEL_OUTOF10: 7

## 2024-04-29 ASSESSMENT — PAIN DESCRIPTION - PAIN TYPE: TYPE: ACUTE PAIN

## 2024-04-29 ASSESSMENT — PAIN DESCRIPTION - LOCATION: LOCATION: BACK

## 2024-04-29 ASSESSMENT — PAIN - FUNCTIONAL ASSESSMENT: PAIN_FUNCTIONAL_ASSESSMENT: 0-10

## 2024-04-30 ENCOUNTER — HOSPITAL ENCOUNTER (INPATIENT)
Age: 79
LOS: 6 days | Discharge: HOME | DRG: 393 | End: 2024-05-06
Attending: EMERGENCY MEDICINE | Admitting: STUDENT IN AN ORGANIZED HEALTH CARE EDUCATION/TRAINING PROGRAM
Payer: MEDICARE

## 2024-04-30 ENCOUNTER — APPOINTMENT (OUTPATIENT)
Dept: GENERAL RADIOLOGY | Age: 79
End: 2024-04-30
Payer: MEDICARE

## 2024-04-30 ENCOUNTER — APPOINTMENT (OUTPATIENT)
Dept: CT IMAGING | Age: 79
End: 2024-04-30
Payer: MEDICARE

## 2024-04-30 ENCOUNTER — APPOINTMENT (OUTPATIENT)
Dept: GENERAL RADIOLOGY | Age: 79
DRG: 393 | End: 2024-04-30
Payer: MEDICARE

## 2024-04-30 ENCOUNTER — APPOINTMENT (OUTPATIENT)
Dept: CT IMAGING | Age: 79
DRG: 393 | End: 2024-04-30
Payer: MEDICARE

## 2024-04-30 VITALS
SYSTOLIC BLOOD PRESSURE: 160 MMHG | TEMPERATURE: 98.5 F | BODY MASS INDEX: 33.66 KG/M2 | HEART RATE: 94 BPM | HEIGHT: 63 IN | OXYGEN SATURATION: 92 % | RESPIRATION RATE: 20 BRPM | WEIGHT: 190 LBS | DIASTOLIC BLOOD PRESSURE: 59 MMHG

## 2024-04-30 DIAGNOSIS — J18.9 PNEUMONIA OF LOWER LOBE DUE TO INFECTIOUS ORGANISM, UNSPECIFIED LATERALITY: ICD-10-CM

## 2024-04-30 DIAGNOSIS — R91.8 LUNG MASS: ICD-10-CM

## 2024-04-30 DIAGNOSIS — K31.1 GASTRIC OUTLET OBSTRUCTION: Primary | ICD-10-CM

## 2024-04-30 PROBLEM — K95.09 GASTRIC BAND SLIPPAGE: Status: ACTIVE | Noted: 2024-04-30

## 2024-04-30 PROBLEM — J96.02 ACUTE HYPERCAPNIC RESPIRATORY FAILURE (HCC): Status: ACTIVE | Noted: 2024-04-30

## 2024-04-30 LAB
ALBUMIN SERPL-MCNC: 3.8 G/DL (ref 3.4–5)
ALBUMIN/GLOB SERPL: 1.2 {RATIO} (ref 1.1–2.2)
ALP SERPL-CCNC: 175 U/L (ref 40–129)
ALT SERPL-CCNC: <5 U/L (ref 10–40)
ANION GAP SERPL CALCULATED.3IONS-SCNC: 11 MMOL/L (ref 3–16)
ANION GAP SERPL CALCULATED.3IONS-SCNC: 7 MMOL/L (ref 3–16)
AST SERPL-CCNC: 14 U/L (ref 15–37)
BACTERIA URNS QL MICRO: ABNORMAL /HPF
BASE EXCESS BLDV CALC-SCNC: 1.7 MMOL/L (ref -3–3)
BASE EXCESS BLDV CALC-SCNC: 5 MMOL/L (ref -2–3)
BASOPHILS # BLD: 0.1 K/UL (ref 0–0.2)
BASOPHILS # BLD: 0.1 K/UL (ref 0–0.2)
BASOPHILS NFR BLD: 0.8 %
BASOPHILS NFR BLD: 1.1 %
BILIRUB SERPL-MCNC: <0.2 MG/DL (ref 0–1)
BILIRUB UR QL STRIP.AUTO: NEGATIVE
BILIRUB UR QL STRIP.AUTO: NEGATIVE
BUN SERPL-MCNC: 18 MG/DL (ref 7–20)
BUN SERPL-MCNC: 19 MG/DL (ref 7–20)
CALCIUM SERPL-MCNC: 10.4 MG/DL (ref 8.3–10.6)
CALCIUM SERPL-MCNC: 9.9 MG/DL (ref 8.3–10.6)
CASTS #/AREA URNS LPF: ABNORMAL /LPF
CHLORIDE SERPL-SCNC: 103 MMOL/L (ref 99–110)
CHLORIDE SERPL-SCNC: 98 MMOL/L (ref 99–110)
CLARITY UR: CLEAR
CLARITY UR: CLEAR
CO2 BLDV-SCNC: 31 MMOL/L
CO2 BLDV-SCNC: 36 MMOL/L
CO2 SERPL-SCNC: 27 MMOL/L (ref 21–32)
CO2 SERPL-SCNC: 32 MMOL/L (ref 21–32)
COHGB MFR BLDV: 0.9 % (ref 0–1.5)
COHGB MFR BLDV: 1.1 % (ref 0–1.5)
COLOR UR: YELLOW
COLOR UR: YELLOW
CREAT SERPL-MCNC: 1.1 MG/DL (ref 0.6–1.2)
CREAT SERPL-MCNC: 1.1 MG/DL (ref 0.6–1.2)
DEPRECATED RDW RBC AUTO: 14.7 % (ref 12.4–15.4)
DEPRECATED RDW RBC AUTO: 14.9 % (ref 12.4–15.4)
DO-HGB MFR BLDV: 70.4 %
EKG ATRIAL RATE: 83 BPM
EKG ATRIAL RATE: 86 BPM
EKG DIAGNOSIS: NORMAL
EKG DIAGNOSIS: NORMAL
EKG P AXIS: 71 DEGREES
EKG P AXIS: 78 DEGREES
EKG P-R INTERVAL: 212 MS
EKG P-R INTERVAL: 216 MS
EKG Q-T INTERVAL: 362 MS
EKG Q-T INTERVAL: 364 MS
EKG QRS DURATION: 92 MS
EKG QRS DURATION: 94 MS
EKG QTC CALCULATION (BAZETT): 427 MS
EKG QTC CALCULATION (BAZETT): 433 MS
EKG R AXIS: -38 DEGREES
EKG R AXIS: -52 DEGREES
EKG T AXIS: 69 DEGREES
EKG T AXIS: 74 DEGREES
EKG VENTRICULAR RATE: 83 BPM
EKG VENTRICULAR RATE: 86 BPM
EOSINOPHIL # BLD: 0 K/UL (ref 0–0.6)
EOSINOPHIL # BLD: 0 K/UL (ref 0–0.6)
EOSINOPHIL NFR BLD: 0 %
EOSINOPHIL NFR BLD: 0.3 %
EPI CELLS #/AREA URNS HPF: ABNORMAL /HPF (ref 0–5)
EPI CELLS #/AREA URNS HPF: NORMAL /HPF (ref 0–5)
FLUAV RNA RESP QL NAA+PROBE: NOT DETECTED
FLUBV RNA RESP QL NAA+PROBE: NOT DETECTED
GFR SERPLBLD CREATININE-BSD FMLA CKD-EPI: 50 ML/MIN/{1.73_M2}
GFR SERPLBLD CREATININE-BSD FMLA CKD-EPI: 50 ML/MIN/{1.73_M2}
GLUCOSE SERPL-MCNC: 122 MG/DL (ref 70–99)
GLUCOSE SERPL-MCNC: 153 MG/DL (ref 70–99)
GLUCOSE UR STRIP.AUTO-MCNC: NEGATIVE MG/DL
GLUCOSE UR STRIP.AUTO-MCNC: NEGATIVE MG/DL
HCO3 BLDV-SCNC: 29.1 MMOL/L (ref 23–29)
HCO3 BLDV-SCNC: 34.1 MMOL/L (ref 24–28)
HCT VFR BLD AUTO: 33.2 % (ref 36–48)
HCT VFR BLD AUTO: 35.2 % (ref 36–48)
HGB BLD-MCNC: 10.8 G/DL (ref 12–16)
HGB BLD-MCNC: 11.2 G/DL (ref 12–16)
HGB UR QL STRIP.AUTO: NEGATIVE
HGB UR QL STRIP.AUTO: NEGATIVE
KETONES UR STRIP.AUTO-MCNC: ABNORMAL MG/DL
KETONES UR STRIP.AUTO-MCNC: NEGATIVE MG/DL
LACTATE BLDV-SCNC: 1 MMOL/L (ref 0.4–2)
LEUKOCYTE ESTERASE UR QL STRIP.AUTO: NEGATIVE
LEUKOCYTE ESTERASE UR QL STRIP.AUTO: NEGATIVE
LIPASE SERPL-CCNC: 13 U/L (ref 13–60)
LYMPHOCYTES # BLD: 0.9 K/UL (ref 1–5.1)
LYMPHOCYTES # BLD: 1 K/UL (ref 1–5.1)
LYMPHOCYTES NFR BLD: 15.2 %
LYMPHOCYTES NFR BLD: 8.2 %
MCH RBC QN AUTO: 31.7 PG (ref 26–34)
MCH RBC QN AUTO: 31.7 PG (ref 26–34)
MCHC RBC AUTO-ENTMCNC: 31.9 G/DL (ref 31–36)
MCHC RBC AUTO-ENTMCNC: 32.6 G/DL (ref 31–36)
MCV RBC AUTO: 97.3 FL (ref 80–100)
MCV RBC AUTO: 99.4 FL (ref 80–100)
METHGB MFR BLDV: 0.3 %
METHGB MFR BLDV: 0.6 % (ref 0–1.5)
MONOCYTES # BLD: 0.2 K/UL (ref 0–1.3)
MONOCYTES # BLD: 0.3 K/UL (ref 0–1.3)
MONOCYTES NFR BLD: 2.7 %
MONOCYTES NFR BLD: 3.4 %
NEUTROPHILS # BLD: 5.3 K/UL (ref 1.7–7.7)
NEUTROPHILS # BLD: 9.2 K/UL (ref 1.7–7.7)
NEUTROPHILS NFR BLD: 80.3 %
NEUTROPHILS NFR BLD: 88 %
NITRITE UR QL STRIP.AUTO: NEGATIVE
NITRITE UR QL STRIP.AUTO: NEGATIVE
NT-PROBNP SERPL-MCNC: 1346 PG/ML (ref 0–449)
NT-PROBNP SERPL-MCNC: 3515 PG/ML (ref 0–449)
O2 THERAPY: ABNORMAL
PCO2 BLDV: 59.2 MMHG (ref 40–50)
PCO2 BLDV: 72.1 MMHG (ref 41–51)
PH BLDV: 7.28 [PH] (ref 7.35–7.45)
PH BLDV: 7.31 [PH] (ref 7.35–7.45)
PH UR STRIP.AUTO: 6 [PH] (ref 5–8)
PH UR STRIP.AUTO: 7.5 [PH] (ref 5–8)
PLATELET # BLD AUTO: 248 K/UL (ref 135–450)
PLATELET # BLD AUTO: 252 K/UL (ref 135–450)
PMV BLD AUTO: 8.9 FL (ref 5–10.5)
PMV BLD AUTO: 9.2 FL (ref 5–10.5)
PO2 BLDV: 64 MMHG (ref 25–40)
PO2 BLDV: <30 MMHG (ref 25–40)
POTASSIUM SERPL-SCNC: 4.3 MMOL/L (ref 3.5–5.1)
POTASSIUM SERPL-SCNC: 4.7 MMOL/L (ref 3.5–5.1)
PROT SERPL-MCNC: 7.1 G/DL (ref 6.4–8.2)
PROT UR STRIP.AUTO-MCNC: ABNORMAL MG/DL
PROT UR STRIP.AUTO-MCNC: ABNORMAL MG/DL
RBC # BLD AUTO: 3.41 M/UL (ref 4–5.2)
RBC # BLD AUTO: 3.54 M/UL (ref 4–5.2)
RBC #/AREA URNS HPF: ABNORMAL /HPF (ref 0–4)
RBC #/AREA URNS HPF: NORMAL /HPF (ref 0–4)
SAO2 % BLDV: 29 %
SAO2 % BLDV: 90 %
SARS-COV-2 RDRP RESP QL NAA+PROBE: NOT DETECTED
SARS-COV-2 RNA RESP QL NAA+PROBE: NOT DETECTED
SODIUM SERPL-SCNC: 136 MMOL/L (ref 136–145)
SODIUM SERPL-SCNC: 142 MMOL/L (ref 136–145)
SP GR UR STRIP.AUTO: 1.02 (ref 1–1.03)
SP GR UR STRIP.AUTO: 1.02 (ref 1–1.03)
TROPONIN, HIGH SENSITIVITY: 15 NG/L (ref 0–14)
TROPONIN, HIGH SENSITIVITY: 16 NG/L (ref 0–14)
UA COMPLETE W REFLEX CULTURE PNL UR: ABNORMAL
UA COMPLETE W REFLEX CULTURE PNL UR: ABNORMAL
UA DIPSTICK W REFLEX MICRO PNL UR: YES
UA DIPSTICK W REFLEX MICRO PNL UR: YES
URN SPEC COLLECT METH UR: ABNORMAL
URN SPEC COLLECT METH UR: ABNORMAL
UROBILINOGEN UR STRIP-ACNC: 0.2 E.U./DL
UROBILINOGEN UR STRIP-ACNC: 0.2 E.U./DL
WBC # BLD AUTO: 10.5 K/UL (ref 4–11)
WBC # BLD AUTO: 6.6 K/UL (ref 4–11)
WBC #/AREA URNS HPF: ABNORMAL /HPF (ref 0–5)
WBC #/AREA URNS HPF: NORMAL /HPF (ref 0–5)

## 2024-04-30 PROCEDURE — 71260 CT THORAX DX C+: CPT

## 2024-04-30 PROCEDURE — 6360000002 HC RX W HCPCS: Performed by: NURSE PRACTITIONER

## 2024-04-30 PROCEDURE — 99285 EMERGENCY DEPT VISIT HI MDM: CPT

## 2024-04-30 PROCEDURE — 6360000002 HC RX W HCPCS: Performed by: EMERGENCY MEDICINE

## 2024-04-30 PROCEDURE — 96372 THER/PROPH/DIAG INJ SC/IM: CPT

## 2024-04-30 PROCEDURE — 93010 ELECTROCARDIOGRAM REPORT: CPT | Performed by: INTERNAL MEDICINE

## 2024-04-30 PROCEDURE — 82803 BLOOD GASES ANY COMBINATION: CPT

## 2024-04-30 PROCEDURE — 81001 URINALYSIS AUTO W/SCOPE: CPT

## 2024-04-30 PROCEDURE — 80048 BASIC METABOLIC PNL TOTAL CA: CPT

## 2024-04-30 PROCEDURE — 87636 SARSCOV2 & INF A&B AMP PRB: CPT

## 2024-04-30 PROCEDURE — 6360000002 HC RX W HCPCS: Performed by: STUDENT IN AN ORGANIZED HEALTH CARE EDUCATION/TRAINING PROGRAM

## 2024-04-30 PROCEDURE — 96374 THER/PROPH/DIAG INJ IV PUSH: CPT

## 2024-04-30 PROCEDURE — 96375 TX/PRO/DX INJ NEW DRUG ADDON: CPT

## 2024-04-30 PROCEDURE — 83880 ASSAY OF NATRIURETIC PEPTIDE: CPT

## 2024-04-30 PROCEDURE — 85025 COMPLETE CBC W/AUTO DIFF WBC: CPT

## 2024-04-30 PROCEDURE — 84484 ASSAY OF TROPONIN QUANT: CPT

## 2024-04-30 PROCEDURE — 71250 CT THORAX DX C-: CPT

## 2024-04-30 PROCEDURE — 83605 ASSAY OF LACTIC ACID: CPT

## 2024-04-30 PROCEDURE — 36415 COLL VENOUS BLD VENIPUNCTURE: CPT

## 2024-04-30 PROCEDURE — 71045 X-RAY EXAM CHEST 1 VIEW: CPT

## 2024-04-30 PROCEDURE — 87449 NOS EACH ORGANISM AG IA: CPT

## 2024-04-30 PROCEDURE — 80053 COMPREHEN METABOLIC PANEL: CPT

## 2024-04-30 PROCEDURE — 2700000000 HC OXYGEN THERAPY PER DAY

## 2024-04-30 PROCEDURE — 87635 SARS-COV-2 COVID-19 AMP PRB: CPT

## 2024-04-30 PROCEDURE — 2580000003 HC RX 258: Performed by: STUDENT IN AN ORGANIZED HEALTH CARE EDUCATION/TRAINING PROGRAM

## 2024-04-30 PROCEDURE — 94640 AIRWAY INHALATION TREATMENT: CPT

## 2024-04-30 PROCEDURE — 6370000000 HC RX 637 (ALT 250 FOR IP): Performed by: STUDENT IN AN ORGANIZED HEALTH CARE EDUCATION/TRAINING PROGRAM

## 2024-04-30 PROCEDURE — 87040 BLOOD CULTURE FOR BACTERIA: CPT

## 2024-04-30 PROCEDURE — 2060000000 HC ICU INTERMEDIATE R&B

## 2024-04-30 PROCEDURE — 2580000003 HC RX 258: Performed by: EMERGENCY MEDICINE

## 2024-04-30 PROCEDURE — 6360000004 HC RX CONTRAST MEDICATION: Performed by: STUDENT IN AN ORGANIZED HEALTH CARE EDUCATION/TRAINING PROGRAM

## 2024-04-30 PROCEDURE — 74177 CT ABD & PELVIS W/CONTRAST: CPT

## 2024-04-30 PROCEDURE — 6360000004 HC RX CONTRAST MEDICATION: Performed by: EMERGENCY MEDICINE

## 2024-04-30 PROCEDURE — 83690 ASSAY OF LIPASE: CPT

## 2024-04-30 PROCEDURE — 84145 PROCALCITONIN (PCT): CPT

## 2024-04-30 PROCEDURE — 93005 ELECTROCARDIOGRAM TRACING: CPT | Performed by: EMERGENCY MEDICINE

## 2024-04-30 RX ORDER — PREDNISONE 20 MG/1
40 TABLET ORAL DAILY
Qty: 10 TABLET | Refills: 0 | Status: ON HOLD | OUTPATIENT
Start: 2024-04-30 | End: 2024-05-01

## 2024-04-30 RX ORDER — POLYETHYLENE GLYCOL 3350 17 G/17G
17 POWDER, FOR SOLUTION ORAL DAILY PRN
Status: DISCONTINUED | OUTPATIENT
Start: 2024-04-30 | End: 2024-05-06 | Stop reason: HOSPADM

## 2024-04-30 RX ORDER — GUAIFENESIN 600 MG/1
600 TABLET, EXTENDED RELEASE ORAL 2 TIMES DAILY
Status: DISCONTINUED | OUTPATIENT
Start: 2024-04-30 | End: 2024-05-06 | Stop reason: HOSPADM

## 2024-04-30 RX ORDER — ONDANSETRON 2 MG/ML
4 INJECTION INTRAMUSCULAR; INTRAVENOUS EVERY 6 HOURS PRN
Status: DISCONTINUED | OUTPATIENT
Start: 2024-04-30 | End: 2024-05-06 | Stop reason: HOSPADM

## 2024-04-30 RX ORDER — ACETAMINOPHEN 650 MG/1
650 SUPPOSITORY RECTAL EVERY 6 HOURS PRN
Status: DISCONTINUED | OUTPATIENT
Start: 2024-04-30 | End: 2024-05-06 | Stop reason: HOSPADM

## 2024-04-30 RX ORDER — ACETAMINOPHEN 500 MG
1000 TABLET ORAL NIGHTLY
Status: ON HOLD | COMMUNITY
End: 2024-05-01

## 2024-04-30 RX ORDER — PANTOPRAZOLE SODIUM 40 MG/1
40 TABLET, DELAYED RELEASE ORAL
Status: DISCONTINUED | OUTPATIENT
Start: 2024-04-30 | End: 2024-05-06 | Stop reason: HOSPADM

## 2024-04-30 RX ORDER — ARIPIPRAZOLE 5 MG/1
5 TABLET ORAL DAILY
Status: DISCONTINUED | OUTPATIENT
Start: 2024-05-01 | End: 2024-05-01

## 2024-04-30 RX ORDER — ALLOPURINOL 100 MG/1
100 TABLET ORAL DAILY
Status: DISCONTINUED | OUTPATIENT
Start: 2024-05-01 | End: 2024-05-01

## 2024-04-30 RX ORDER — DICYCLOMINE HYDROCHLORIDE 10 MG/ML
20 INJECTION INTRAMUSCULAR ONCE
Status: COMPLETED | OUTPATIENT
Start: 2024-04-30 | End: 2024-04-30

## 2024-04-30 RX ORDER — BUMETANIDE 2 MG/1
2 TABLET ORAL 2 TIMES DAILY
Status: DISCONTINUED | OUTPATIENT
Start: 2024-04-30 | End: 2024-05-01

## 2024-04-30 RX ORDER — MORPHINE SULFATE 4 MG/ML
4 INJECTION, SOLUTION INTRAMUSCULAR; INTRAVENOUS ONCE
Status: COMPLETED | OUTPATIENT
Start: 2024-04-30 | End: 2024-04-30

## 2024-04-30 RX ORDER — MORPHINE SULFATE 2 MG/ML
2 INJECTION, SOLUTION INTRAMUSCULAR; INTRAVENOUS ONCE
Status: COMPLETED | OUTPATIENT
Start: 2024-04-30 | End: 2024-04-30

## 2024-04-30 RX ORDER — MAGNESIUM SULFATE IN WATER 40 MG/ML
2000 INJECTION, SOLUTION INTRAVENOUS PRN
Status: DISCONTINUED | OUTPATIENT
Start: 2024-04-30 | End: 2024-05-06 | Stop reason: HOSPADM

## 2024-04-30 RX ORDER — FOLIC ACID 1 MG/1
1 TABLET ORAL DAILY
Status: DISCONTINUED | OUTPATIENT
Start: 2024-05-01 | End: 2024-05-06 | Stop reason: HOSPADM

## 2024-04-30 RX ORDER — LOSARTAN POTASSIUM 50 MG/1
50 TABLET ORAL DAILY
Status: DISCONTINUED | OUTPATIENT
Start: 2024-04-30 | End: 2024-05-06 | Stop reason: HOSPADM

## 2024-04-30 RX ORDER — SODIUM CHLORIDE 0.9 % (FLUSH) 0.9 %
5-40 SYRINGE (ML) INJECTION PRN
Status: DISCONTINUED | OUTPATIENT
Start: 2024-04-30 | End: 2024-05-06 | Stop reason: HOSPADM

## 2024-04-30 RX ORDER — AMLODIPINE BESYLATE 5 MG/1
5 TABLET ORAL DAILY
Status: DISCONTINUED | OUTPATIENT
Start: 2024-05-01 | End: 2024-05-06 | Stop reason: HOSPADM

## 2024-04-30 RX ORDER — DULOXETIN HYDROCHLORIDE 30 MG/1
60 CAPSULE, DELAYED RELEASE ORAL DAILY
Status: DISCONTINUED | OUTPATIENT
Start: 2024-05-01 | End: 2024-05-06 | Stop reason: HOSPADM

## 2024-04-30 RX ORDER — SODIUM CHLORIDE 0.9 % (FLUSH) 0.9 %
5-40 SYRINGE (ML) INJECTION EVERY 12 HOURS SCHEDULED
Status: DISCONTINUED | OUTPATIENT
Start: 2024-04-30 | End: 2024-05-06 | Stop reason: HOSPADM

## 2024-04-30 RX ORDER — BUDESONIDE 0.5 MG/2ML
0.5 INHALANT ORAL
Status: DISCONTINUED | OUTPATIENT
Start: 2024-04-30 | End: 2024-05-06 | Stop reason: HOSPADM

## 2024-04-30 RX ORDER — IPRATROPIUM BROMIDE AND ALBUTEROL SULFATE 2.5; .5 MG/3ML; MG/3ML
1 SOLUTION RESPIRATORY (INHALATION)
Status: DISCONTINUED | OUTPATIENT
Start: 2024-04-30 | End: 2024-04-30 | Stop reason: HOSPADM

## 2024-04-30 RX ORDER — ONDANSETRON 2 MG/ML
4 INJECTION INTRAMUSCULAR; INTRAVENOUS EVERY 6 HOURS PRN
Status: DISCONTINUED | OUTPATIENT
Start: 2024-04-30 | End: 2024-04-30 | Stop reason: HOSPADM

## 2024-04-30 RX ORDER — LEVETIRACETAM 250 MG/1
250 TABLET ORAL 3 TIMES DAILY
Status: ON HOLD | COMMUNITY
End: 2024-05-06 | Stop reason: HOSPADM

## 2024-04-30 RX ORDER — GUAIFENESIN/DEXTROMETHORPHAN 100-10MG/5
5 SYRUP ORAL EVERY 4 HOURS PRN
Status: DISCONTINUED | OUTPATIENT
Start: 2024-04-30 | End: 2024-05-06 | Stop reason: HOSPADM

## 2024-04-30 RX ORDER — ENOXAPARIN SODIUM 100 MG/ML
40 INJECTION SUBCUTANEOUS DAILY
Status: DISCONTINUED | OUTPATIENT
Start: 2024-05-01 | End: 2024-05-06 | Stop reason: HOSPADM

## 2024-04-30 RX ORDER — POTASSIUM CHLORIDE 7.45 MG/ML
10 INJECTION INTRAVENOUS PRN
Status: DISCONTINUED | OUTPATIENT
Start: 2024-04-30 | End: 2024-05-05

## 2024-04-30 RX ORDER — POTASSIUM CHLORIDE 20 MEQ/1
40 TABLET, EXTENDED RELEASE ORAL PRN
Status: DISCONTINUED | OUTPATIENT
Start: 2024-04-30 | End: 2024-05-05

## 2024-04-30 RX ORDER — IPRATROPIUM BROMIDE AND ALBUTEROL SULFATE 2.5; .5 MG/3ML; MG/3ML
1 SOLUTION RESPIRATORY (INHALATION)
Status: DISCONTINUED | OUTPATIENT
Start: 2024-04-30 | End: 2024-04-30 | Stop reason: SDUPTHER

## 2024-04-30 RX ORDER — ACETAMINOPHEN 325 MG/1
650 TABLET ORAL EVERY 6 HOURS PRN
Status: DISCONTINUED | OUTPATIENT
Start: 2024-04-30 | End: 2024-05-06 | Stop reason: HOSPADM

## 2024-04-30 RX ORDER — PRIMIDONE 50 MG/1
50 TABLET ORAL NIGHTLY
Status: DISCONTINUED | OUTPATIENT
Start: 2024-04-30 | End: 2024-05-01

## 2024-04-30 RX ORDER — SODIUM CHLORIDE 9 MG/ML
INJECTION, SOLUTION INTRAVENOUS PRN
Status: DISCONTINUED | OUTPATIENT
Start: 2024-04-30 | End: 2024-05-06 | Stop reason: HOSPADM

## 2024-04-30 RX ORDER — AZITHROMYCIN 250 MG/1
TABLET, FILM COATED ORAL
Qty: 6 TABLET | Refills: 0 | Status: ON HOLD | OUTPATIENT
Start: 2024-04-30 | End: 2024-04-30

## 2024-04-30 RX ORDER — IPRATROPIUM BROMIDE AND ALBUTEROL SULFATE 2.5; .5 MG/3ML; MG/3ML
1 SOLUTION RESPIRATORY (INHALATION)
Status: DISCONTINUED | OUTPATIENT
Start: 2024-04-30 | End: 2024-05-06

## 2024-04-30 RX ORDER — IPRATROPIUM BROMIDE AND ALBUTEROL SULFATE 2.5; .5 MG/3ML; MG/3ML
1 SOLUTION RESPIRATORY (INHALATION) EVERY 4 HOURS PRN
Qty: 360 ML | Refills: 0 | Status: SHIPPED | OUTPATIENT
Start: 2024-04-30

## 2024-04-30 RX ORDER — ALBUTEROL SULFATE 2.5 MG/3ML
2.5 SOLUTION RESPIRATORY (INHALATION) EVERY 6 HOURS PRN
Status: DISCONTINUED | OUTPATIENT
Start: 2024-04-30 | End: 2024-04-30 | Stop reason: HOSPADM

## 2024-04-30 RX ORDER — ONDANSETRON 4 MG/1
4 TABLET, ORALLY DISINTEGRATING ORAL EVERY 8 HOURS PRN
Status: DISCONTINUED | OUTPATIENT
Start: 2024-04-30 | End: 2024-05-06 | Stop reason: HOSPADM

## 2024-04-30 RX ADMIN — MORPHINE SULFATE 4 MG: 4 INJECTION, SOLUTION INTRAMUSCULAR; INTRAVENOUS at 02:38

## 2024-04-30 RX ADMIN — DIATRIZOATE MEGLUMINE AND DIATRIZOATE SODIUM 30 ML: 660; 100 LIQUID ORAL; RECTAL at 13:36

## 2024-04-30 RX ADMIN — WATER 125 MG: 1 INJECTION INTRAMUSCULAR; INTRAVENOUS; SUBCUTANEOUS at 07:55

## 2024-04-30 RX ADMIN — ONDANSETRON 4 MG: 2 INJECTION INTRAMUSCULAR; INTRAVENOUS at 00:46

## 2024-04-30 RX ADMIN — BUDESONIDE INHALATION 500 MCG: 0.5 SUSPENSION RESPIRATORY (INHALATION) at 20:01

## 2024-04-30 RX ADMIN — GUAIFENESIN 600 MG: 600 TABLET ORAL at 22:54

## 2024-04-30 RX ADMIN — AZITHROMYCIN MONOHYDRATE 500 MG: 500 INJECTION, POWDER, LYOPHILIZED, FOR SOLUTION INTRAVENOUS at 17:06

## 2024-04-30 RX ADMIN — IPRATROPIUM BROMIDE AND ALBUTEROL SULFATE 1 DOSE: 2.5; .5 SOLUTION RESPIRATORY (INHALATION) at 20:01

## 2024-04-30 RX ADMIN — WATER 1000 MG: 1 INJECTION INTRAMUSCULAR; INTRAVENOUS; SUBCUTANEOUS at 16:54

## 2024-04-30 RX ADMIN — IPRATROPIUM BROMIDE AND ALBUTEROL SULFATE 1 DOSE: 2.5; .5 SOLUTION RESPIRATORY (INHALATION) at 07:56

## 2024-04-30 RX ADMIN — MORPHINE SULFATE 2 MG: 2 INJECTION, SOLUTION INTRAMUSCULAR; INTRAVENOUS at 12:21

## 2024-04-30 RX ADMIN — DICYCLOMINE HYDROCHLORIDE 20 MG: 10 INJECTION, SOLUTION INTRAMUSCULAR at 00:46

## 2024-04-30 RX ADMIN — WATER 125 MG: 1 INJECTION INTRAMUSCULAR; INTRAVENOUS; SUBCUTANEOUS at 16:52

## 2024-04-30 RX ADMIN — IOPAMIDOL 85 ML: 755 INJECTION, SOLUTION INTRAVENOUS at 04:18

## 2024-04-30 ASSESSMENT — PAIN SCALES - GENERAL
PAINLEVEL_OUTOF10: 4
PAINLEVEL_OUTOF10: 8

## 2024-04-30 ASSESSMENT — PAIN - FUNCTIONAL ASSESSMENT: PAIN_FUNCTIONAL_ASSESSMENT: 0-10

## 2024-04-30 NOTE — DISCHARGE INSTRUCTIONS
Follow up with Dr. Mckeon for management of lap band 2 weeks after discharge from hospital.  Call for an appointment.  [phone]    Please follow-up with your primary care physician in 2 weeks.    Please continue to take rest of your medications as prescribed.    Please contact your primary care physician immediately if you have any symptoms of abdominal pain, fever, or any other new symptoms that needs attention.        Extra Heart Failure sites:     [email or web address]   --- this is American Heart Association interactive Healthier Living with Heart Failure guidebook.  Please click hyperlink or copy / paste link into search bar. Use your mouse to scroll through the pages.  Lots of information about weight monitoring, diet tips, activity, meds, etc     HF Bogata terri  -- this is a free smart phone terri available for iPhone and Android download.  Use your phone to track sodium / fluid intake, zone tool symptom tracking, weights, medications, etc. Click on this hyperlink  HF [place] Terri   for QR code for easy download.      DASH (Dietary Approach to Stop Hypertension) diet --  https://www.nhlbi.nih.gov/education/dash-eating-plan -- this diet is a flexible eating plan that promotes heart healthy eating style.  Click on hyperlink or copy / paste link into search bar.  Lots of low sodium recipes and tips.    [email or web address]  -- more free recipes

## 2024-04-30 NOTE — CONSULTS
Department of General Surgery - Adult  Surgical Service Bariatrics  Consult Note      Reason for Consult:  dilated esophagus with gastric band      CHIEF COMPLAINT:  N/V    History Obtained From:  patient, electronic medical record    HISTORY OF PRESENT ILLNESS:                The patient is a 79 y.o. female who presents with nausea and back pain.   She has had nausea for a awhile and now is having difficulty swallowing her saliva.  She is not able to eat.  She states she had forgotten that she had a lap band.  According to EMR lap band was placed in 2010 by Dr. Roberts.  She has not had any maintenance with the lap band.  Is not able to report ow much fluid is in her band.    She has intentionally lost 75 pounds this year for \"heart problems\"  by cutting out salt.  She does not know how how much weight she lost with the and.    She denies fever but states the nurse told her she had a slight one while here.  No chest pain.  She wears 4 liters of oxygen at home and gets occasionally SOB.  This is \"nothing new\".      Past Medical History:        Diagnosis Date    Arthritis     Asthma     Bronchitis chronic     CHF (congestive heart failure) ([facility])     Chronic respiratory failure ([facility])     COPD (chronic obstructive pulmonary disease) ([facility])     Depression     Diabetes mellitus ([facility])     BORDERLINE NO MEDS    Emphysema of lung ([facility])     spontaneous pneumo 5/07/2020    GI bleed 12/15/2021    Glaucoma     Hypertension     Hypoxemia 04/15/2011    Morbid obesity ([facility])     Oxygen dependent     Uses O2 NC at 3L/min continuously    Pneumonia of left upper lobe due to Pseudomonas species ([facility]) 12/01/2021    Rash     due to nasal canula    SBO (small bowel obstruction) ([facility]) 02/10/2012    Shingles     recently no problems now    Sleep apnea     C pap    Spontaneous tension pneumothorax 05/01/2020     Past Surgical History:        Procedure Laterality Date    BRONCHOSCOPY N/A 11/30/2021    BRONCHOSCOPY ALVEOLAR LAVAGE performed by

## 2024-04-30 NOTE — ED PROVIDER NOTES
THE [facility]  EMERGENCY DEPARTMENT ENCOUNTER          ATTENDING PHYSICIAN NOTE       Date of evaluation: 4/30/2024    Chief Complaint     Transfer from [facility] (Pt. Sent over from [facility] for possible obstruction of gastroplasty. )      History of Present Illness     Tova Joseph is a 79 y.o. female who presents with concern for gastric outlet obstruction. Transferred from [facility] for evaluation by Bariatric Surgery. Presented there for N/V and found to have gastric outlet obstruction on CT thought to be due to her lap band. Did initially complain of some dyspnea but does not have any here.     ASSESSMENT / PLAN  (MEDICAL DECISION MAKING)     INITIAL VITALS: BP: (!) 158/72, Temp: (!) 100.9 °F (38.3 °C), Pulse: 96, Respirations: 18, SpO2: 91 %      Tova Joseph is a 79 y.o. female who presents for gastric obstruction. Seen by Bariatrics. Lap band fluid removed. Resolution of obstruction symptoms with CT scan confirming contrast throughout the bowel. Did note some infiltrates concerning for PNA, possibly aspiration related. I did note she had a mild hypercarbic acidosis on VBG from last night at OSH. Will get repeat to ensure this is improving/not worsening. Will get CBC and Renal panel. If these are reassuring, patient to be discharged with antibiotics for PNA. Dr. Rodrigues to follow up on her labs.     Is this patient to be included in the SEP-1 core measure? No Exclusion criteria - the patient is NOT to be included for SEP-1 Core Measure due to: 2+ SIRS criteria are not met    Medical Decision Making  Problems Addressed:  Gastric outlet obstruction: complicated acute illness or injury  Pneumonia of lower lobe due to infectious organism, unspecified laterality: acute illness or injury    Amount and/or Complexity of Data Reviewed  Labs: ordered.  Radiology: ordered.  ECG/medicine tests: ordered.    Risk  Prescription drug management.          Clinical Impression     1. Gastric outlet  2 RN skin assessment done; skin WDL.

## 2024-04-30 NOTE — H&P
5/5/24  Catrina Wise MD   ipratropium 0.5 mg-albuterol 2.5 mg (DUONEB) 0.5-2.5 (3) MG/3ML SOLN nebulizer solution Take 3 mLs by nebulization every 4 hours as needed for Shortness of Breath 4/30/24   Catrina Wise MD   predniSONE (DELTASONE) 20 MG tablet Take 2 tablets by mouth daily for 5 days 4/30/24 5/5/24  Catrina Wise MD   SPIRIVA HANDIHALER 18 MCG inhalation capsule INHALE 1 CAPSULE INTO THE LUNGS DAILY 2/29/24   Nadiya Aguirre PA-C   carbidopa-levodopa (SINEMET)  MG per tablet TAKE ONE TABLET BY MOUTH THREE TIMES DAILY FOR 90 DAYS **ON HOLD,EXPIRES 6-4-24** NEXT FILL 12/15/23    Fransico Thakkar MD   fluticasone (ARNUITY ELLIPTA) 200 MCG/ACT AEPB INHALE 1 INHALATION INTO THE LUNGS DAILY RINSING MOUTH AFTER USE. 2/8/24   Radames Hernandez MD   albuterol sulfate HFA (PROVENTIL;VENTOLIN;PROAIR) 108 (90 Base) MCG/ACT inhaler Inhale 2 puffs into the lungs every 6 hours as needed for Wheezing or Shortness of Breath 2/8/24 2/7/25  Radames Hernandez MD   MELOXICAM PO Take 15 mg by mouth    Fransico Thakkar MD   bumetanide (BUMEX) 2 MG tablet Take 1 tablet by mouth 2 times daily 8/15/23   Jose Luis Holt MD   ARIPiprazole (ABILIFY) 5 MG tablet Take 1 tablet by mouth daily 6/18/23   Babs Zafar DO   losartan (COZAAR) 50 MG tablet Take 1 tablet by mouth daily Hold for now due to normal blood pressure 6/18/23   Babs Zafar DO   amLODIPine (NORVASC) 5 MG tablet Take 1 tablet by mouth daily    Fransico Thakkar MD   allopurinol (ZYLOPRIM) 100 MG tablet Take 1 tablet by mouth daily    Fransico Thakkar MD   potassium chloride (KLOR-CON M) 20 MEQ extended release tablet Take 1 tablet by mouth daily    Fransico Thakkar MD   primidone (MYSOLINE) 50 MG tablet Take 1 tablet by mouth at bedtime Take 1 tablet at night for 7 days. Then increase to 2 tablets every night after seven days.    Provider, MD Fransico   guaiFENesin (MUCINEX) 600 MG extended release tablet Take 1 tablet by mouth

## 2024-04-30 NOTE — ED PROVIDER NOTES
[facility] ED  EMERGENCY DEPARTMENT ENCOUNTER        Patient Name: Tova Joseph  MRN: [id number]  Birthdate 1945  Date of evaluation: 4/29/2024  Provider: Catrina Wise MD  PCP: Jimmy Plascencia MD  Note Started: 12:25 AM EDT 4/30/24      CHIEF COMPLAINT  Nausea and Back Pain          HISTORY & PHYSICAL     HISTORY OF PRESENT ILLNESS  History from : Patient    Limitations to history : None    Tova Joseph is a 79 y.o. female  has a past medical history of Arthritis, Asthma, Bronchitis chronic, CHF (congestive heart failure) ([facility]), Chronic respiratory failure ([facility]), COPD (chronic obstructive pulmonary disease) ([facility]), Depression, Diabetes mellitus ([facility]), Emphysema of lung ([facility]), GI bleed (12/15/2021), Glaucoma, Hypertension, Hypoxemia (04/15/2011), Morbid obesity ([facility]), Oxygen dependent, Pneumonia of left upper lobe due to Pseudomonas species ([facility]) (12/01/2021), Rash, SBO (small bowel obstruction) ([facility]) (02/10/2012), Shingles, Sleep apnea, and Spontaneous tension pneumothorax (05/01/2020)., who presents to the ED complaining of vomiting.     Onset of pain: today  Location: umbilical  Radiation: denies  Quality: cramping  Severity: mild  Timing: constant  Palliative Factors: denies  Provocative Factors: denies    Nausea/Vomiting: NBNB emesis  Cough: mild  Diarrhea/Constipation: denies; Last BM: couple days, not passing gas  Vaginal Discharge/Bleeding: denies  Dysuria/urinary frequency: denies  Fever: denies  Previous abdominal surgeries: hernia, colon, hysterectomy    Also report lower back pain, burning pain, both sides    Denies bladder incontinence, bladder urgency, bowel incontinence, bowel urgency, numbness, tingling, and weakness.      Back Pain Red Flags:   IV drug abuse? No   Fever without source?  No   Systemic illness?  No   Immunosuppressed?  No   Recent surgery/procedure?  No   Incontinence or retention?  No   Indwelling barnes?  No   Alcohol abuse? No      Diabetes? Yes +1   Night

## 2024-04-30 NOTE — ED PROVIDER NOTES
Date of evaluation: 4/30/2024    ADDENDUM:      Care of this patient was assumed from Dr. gallegos.  The patient was seen for Transfer from [facility] (Pt. Sent over from [facility] for possible obstruction of gastroplasty. )  .  The patient's initial evaluation and plan have been discussed with the prior provider who initially evaluated the patient.  Nursing Notes, Past Medical Hx, Past Surgical Hx, Social Hx, Allergies, and Family Hx were all reviewed.  Patient had repeat VBG which showed a pH of 727 pCO2 was 72.  She did have airspace disease on her CT and has been coughing.  She has not hypercarbic clinically DuoNeb Solu-Medrol Rocephin and Zithromax was administered.  I did discuss that there was a lesion that was increasing in size that she will need follow-up but she understands as well as her relative.  At this time with her increasing hypercarbia she will need to be admitted for COPD exacerbation and pneumonia.  Surgery has cleared her from her gastric outlet obstruction standpoint after they deflated the Lap-Band    Diagnostic Results     EKG       RADIOLOGY:  CT CHEST ABDOMEN PELVIS WO CONTRAST Additional Contrast? Oral   Final Result      CHEST:      1. New patchy branching airspace opacities and component areas of consolidation   of the left lung base suspicious for early pneumonia. Aspiration is not   excluded.   2. Linear nodular opacity in the anterior right upper lobe unchanged from   previous CT imaging from 2023 suggesting an area of scarring.   3. Nodular opacity in the anterior left upper lobe corresponding to previous   PET/CT findings and previous chest CT findings. There is been slight enlargement   in comparison to 9/5/2023. Recurrent neoplasm is not excluded. This could be   evaluated further with follow-up chest CT in 3 to 6 months or follow-up PET/CT.   4. Severe emphysema with main pulmonary artery enlargement compatible with   pulmonary hypertension.   5. Gastroesophageal

## 2024-05-01 LAB
ANION GAP SERPL CALCULATED.3IONS-SCNC: 7 MMOL/L (ref 3–16)
BASE EXCESS BLDV CALC-SCNC: 5.6 MMOL/L (ref -2–3)
BASOPHILS # BLD: 0.1 K/UL (ref 0–0.2)
BASOPHILS NFR BLD: 0.5 %
BUN SERPL-MCNC: 22 MG/DL (ref 7–20)
CALCIUM SERPL-MCNC: 9.3 MG/DL (ref 8.3–10.6)
CHLORIDE SERPL-SCNC: 102 MMOL/L (ref 99–110)
CO2 BLDV-SCNC: 35 MMOL/L
CO2 SERPL-SCNC: 30 MMOL/L (ref 21–32)
COHGB MFR BLDV: 1 % (ref 0–1.5)
CREAT SERPL-MCNC: 1 MG/DL (ref 0.6–1.2)
DEPRECATED RDW RBC AUTO: 14.6 % (ref 12.4–15.4)
DO-HGB MFR BLDV: 41.5 %
EOSINOPHIL # BLD: 0 K/UL (ref 0–0.6)
EOSINOPHIL NFR BLD: 0 %
GFR SERPLBLD CREATININE-BSD FMLA CKD-EPI: 57 ML/MIN/{1.73_M2}
GLUCOSE SERPL-MCNC: 114 MG/DL (ref 70–99)
HCO3 BLDV-SCNC: 32.7 MMOL/L (ref 24–28)
HCT VFR BLD AUTO: 28 % (ref 36–48)
HGB BLD-MCNC: 9.1 G/DL (ref 12–16)
LEGIONELLA AG UR QL: NORMAL
LYMPHOCYTES # BLD: 1.6 K/UL (ref 1–5.1)
LYMPHOCYTES NFR BLD: 12.6 %
MCH RBC QN AUTO: 32.1 PG (ref 26–34)
MCHC RBC AUTO-ENTMCNC: 32.6 G/DL (ref 31–36)
MCV RBC AUTO: 98.3 FL (ref 80–100)
METHGB MFR BLDV: 0.4 % (ref 0–1.5)
MONOCYTES # BLD: 0.6 K/UL (ref 0–1.3)
MONOCYTES NFR BLD: 5.1 %
NEUTROPHILS # BLD: 10.2 K/UL (ref 1.7–7.7)
NEUTROPHILS NFR BLD: 81.8 %
PCO2 BLDV: 60.7 MMHG (ref 41–51)
PH BLDV: 7.34 [PH] (ref 7.35–7.45)
PLATELET # BLD AUTO: 206 K/UL (ref 135–450)
PMV BLD AUTO: 8.7 FL (ref 5–10.5)
PO2 BLDV: 32.7 MMHG (ref 25–40)
POTASSIUM SERPL-SCNC: 4.4 MMOL/L (ref 3.5–5.1)
PROCALCITONIN SERPL IA-MCNC: 0.05 NG/ML (ref 0–0.15)
RBC # BLD AUTO: 2.84 M/UL (ref 4–5.2)
S PNEUM AG UR QL: ABNORMAL
SAO2 % BLDV: 58 %
SODIUM SERPL-SCNC: 139 MMOL/L (ref 136–145)
WBC # BLD AUTO: 12.5 K/UL (ref 4–11)

## 2024-05-01 PROCEDURE — 87040 BLOOD CULTURE FOR BACTERIA: CPT

## 2024-05-01 PROCEDURE — 6360000002 HC RX W HCPCS: Performed by: STUDENT IN AN ORGANIZED HEALTH CARE EDUCATION/TRAINING PROGRAM

## 2024-05-01 PROCEDURE — 2700000000 HC OXYGEN THERAPY PER DAY

## 2024-05-01 PROCEDURE — 94660 CPAP INITIATION&MGMT: CPT

## 2024-05-01 PROCEDURE — 2060000000 HC ICU INTERMEDIATE R&B

## 2024-05-01 PROCEDURE — 6370000000 HC RX 637 (ALT 250 FOR IP): Performed by: INTERNAL MEDICINE

## 2024-05-01 PROCEDURE — 94640 AIRWAY INHALATION TREATMENT: CPT

## 2024-05-01 PROCEDURE — 85025 COMPLETE CBC W/AUTO DIFF WBC: CPT

## 2024-05-01 PROCEDURE — 82803 BLOOD GASES ANY COMBINATION: CPT

## 2024-05-01 PROCEDURE — 2580000003 HC RX 258: Performed by: STUDENT IN AN ORGANIZED HEALTH CARE EDUCATION/TRAINING PROGRAM

## 2024-05-01 PROCEDURE — 80048 BASIC METABOLIC PNL TOTAL CA: CPT

## 2024-05-01 PROCEDURE — 36415 COLL VENOUS BLD VENIPUNCTURE: CPT

## 2024-05-01 PROCEDURE — 6370000000 HC RX 637 (ALT 250 FOR IP): Performed by: STUDENT IN AN ORGANIZED HEALTH CARE EDUCATION/TRAINING PROGRAM

## 2024-05-01 PROCEDURE — 94761 N-INVAS EAR/PLS OXIMETRY MLT: CPT

## 2024-05-01 RX ORDER — ALLOPURINOL 100 MG/1
200 TABLET ORAL DAILY
Status: DISCONTINUED | OUTPATIENT
Start: 2024-05-01 | End: 2024-05-06 | Stop reason: HOSPADM

## 2024-05-01 RX ORDER — LOSARTAN POTASSIUM 100 MG/1
100 TABLET ORAL DAILY
Status: ON HOLD | COMMUNITY
Start: 2024-04-02 | End: 2024-05-06 | Stop reason: HOSPADM

## 2024-05-01 RX ORDER — BUMETANIDE 1 MG/1
1 TABLET ORAL DAILY
Status: DISCONTINUED | OUTPATIENT
Start: 2024-05-01 | End: 2024-05-06 | Stop reason: HOSPADM

## 2024-05-01 RX ORDER — BUMETANIDE 1 MG/1
1 TABLET ORAL DAILY
Status: ON HOLD | COMMUNITY
Start: 2024-04-01 | End: 2024-05-06 | Stop reason: HOSPADM

## 2024-05-01 RX ORDER — LEVETIRACETAM 250 MG/1
250 TABLET ORAL 3 TIMES DAILY
Status: DISCONTINUED | OUTPATIENT
Start: 2024-05-01 | End: 2024-05-04

## 2024-05-01 RX ORDER — PRIMIDONE 50 MG/1
100 TABLET ORAL NIGHTLY
Status: DISCONTINUED | OUTPATIENT
Start: 2024-05-01 | End: 2024-05-06 | Stop reason: HOSPADM

## 2024-05-01 RX ADMIN — SODIUM CHLORIDE, PRESERVATIVE FREE 10 ML: 5 INJECTION INTRAVENOUS at 02:37

## 2024-05-01 RX ADMIN — IPRATROPIUM BROMIDE AND ALBUTEROL SULFATE 1 DOSE: 2.5; .5 SOLUTION RESPIRATORY (INHALATION) at 20:30

## 2024-05-01 RX ADMIN — WATER 60 MG: 1 INJECTION INTRAMUSCULAR; INTRAVENOUS; SUBCUTANEOUS at 06:24

## 2024-05-01 RX ADMIN — PRIMIDONE 100 MG: 50 TABLET ORAL at 20:03

## 2024-05-01 RX ADMIN — GUAIFENESIN 600 MG: 600 TABLET ORAL at 11:49

## 2024-05-01 RX ADMIN — SODIUM CHLORIDE, PRESERVATIVE FREE 10 ML: 5 INJECTION INTRAVENOUS at 16:58

## 2024-05-01 RX ADMIN — PANTOPRAZOLE SODIUM 40 MG: 40 TABLET, DELAYED RELEASE ORAL at 15:43

## 2024-05-01 RX ADMIN — CARBIDOPA AND LEVODOPA 1 TABLET: 25; 100 TABLET ORAL at 11:48

## 2024-05-01 RX ADMIN — WATER 60 MG: 1 INJECTION INTRAMUSCULAR; INTRAVENOUS; SUBCUTANEOUS at 19:48

## 2024-05-01 RX ADMIN — IPRATROPIUM BROMIDE AND ALBUTEROL SULFATE 1 DOSE: 2.5; .5 SOLUTION RESPIRATORY (INHALATION) at 15:34

## 2024-05-01 RX ADMIN — CARBIDOPA AND LEVODOPA 1 TABLET: 25; 100 TABLET ORAL at 22:12

## 2024-05-01 RX ADMIN — WATER 1000 MG: 1 INJECTION INTRAMUSCULAR; INTRAVENOUS; SUBCUTANEOUS at 15:43

## 2024-05-01 RX ADMIN — SODIUM CHLORIDE, PRESERVATIVE FREE 10 ML: 5 INJECTION INTRAVENOUS at 11:56

## 2024-05-01 RX ADMIN — SODIUM CHLORIDE, PRESERVATIVE FREE 10 ML: 5 INJECTION INTRAVENOUS at 19:50

## 2024-05-01 RX ADMIN — LEVETIRACETAM 250 MG: 250 TABLET, FILM COATED ORAL at 22:12

## 2024-05-01 RX ADMIN — CARBIDOPA AND LEVODOPA 1 TABLET: 25; 100 TABLET ORAL at 02:38

## 2024-05-01 RX ADMIN — BUDESONIDE INHALATION 500 MCG: 0.5 SUSPENSION RESPIRATORY (INHALATION) at 20:30

## 2024-05-01 RX ADMIN — PANTOPRAZOLE SODIUM 40 MG: 40 TABLET, DELAYED RELEASE ORAL at 06:23

## 2024-05-01 RX ADMIN — IPRATROPIUM BROMIDE AND ALBUTEROL SULFATE 1 DOSE: 2.5; .5 SOLUTION RESPIRATORY (INHALATION) at 08:01

## 2024-05-01 RX ADMIN — BUDESONIDE INHALATION 500 MCG: 0.5 SUSPENSION RESPIRATORY (INHALATION) at 08:01

## 2024-05-01 RX ADMIN — PRIMIDONE 50 MG: 50 TABLET ORAL at 02:38

## 2024-05-01 RX ADMIN — DULOXETINE HYDROCHLORIDE 60 MG: 30 CAPSULE, DELAYED RELEASE ORAL at 11:49

## 2024-05-01 RX ADMIN — ENOXAPARIN SODIUM 40 MG: 100 INJECTION SUBCUTANEOUS at 11:48

## 2024-05-01 RX ADMIN — AZITHROMYCIN MONOHYDRATE 500 MG: 500 INJECTION, POWDER, LYOPHILIZED, FOR SOLUTION INTRAVENOUS at 15:53

## 2024-05-01 RX ADMIN — ALLOPURINOL 200 MG: 100 TABLET ORAL at 11:57

## 2024-05-01 RX ADMIN — GUAIFENESIN 600 MG: 600 TABLET ORAL at 20:03

## 2024-05-01 RX ADMIN — LEVETIRACETAM 250 MG: 250 TABLET, FILM COATED ORAL at 11:49

## 2024-05-01 RX ADMIN — BUMETANIDE 1 MG: 2 TABLET ORAL at 11:50

## 2024-05-01 RX ADMIN — LEVETIRACETAM 250 MG: 250 TABLET, FILM COATED ORAL at 15:43

## 2024-05-01 RX ADMIN — IPRATROPIUM BROMIDE AND ALBUTEROL SULFATE 1 DOSE: 2.5; .5 SOLUTION RESPIRATORY (INHALATION) at 12:40

## 2024-05-01 RX ADMIN — ACETAMINOPHEN 650 MG: 325 TABLET ORAL at 17:10

## 2024-05-01 RX ADMIN — FOLIC ACID 1 MG: 1 TABLET ORAL at 11:49

## 2024-05-01 RX ADMIN — CARBIDOPA AND LEVODOPA 1 TABLET: 25; 100 TABLET ORAL at 15:43

## 2024-05-01 RX ADMIN — SODIUM CHLORIDE, PRESERVATIVE FREE 10 ML: 5 INJECTION INTRAVENOUS at 15:57

## 2024-05-01 ASSESSMENT — PAIN SCALES - GENERAL
PAINLEVEL_OUTOF10: 0

## 2024-05-01 NOTE — ED NOTES
Unable to draw blood from IV site, Lab called     Omar Ford RN  04/30/24 2012    
obstruction.   2. No acute abdominal or pelvic abnormality identified.               XR CHEST PORTABLE   Final Result   1. No acute disease.        Abnormal labs:   Abnormal Labs Reviewed   URINALYSIS WITH REFLEX TO CULTURE - Abnormal; Notable for the following components:       Result Value    Protein, UA TRACE (*)     All other components within normal limits   BLOOD GAS, VENOUS - Abnormal; Notable for the following components:    pH, [place] 7.283 (*)     pCO2, [place] 72.1 (*)     HCO3, Venous 34.1 (*)     Base Excess, [place] 5.0 (*)     All other components within normal limits   CBC WITH AUTO DIFFERENTIAL - Abnormal; Notable for the following components:    RBC 3.54 (*)     Hemoglobin 11.2 (*)     Hematocrit 35.2 (*)     Neutrophils Absolute 9.2 (*)     Lymphocytes Absolute 0.9 (*)     All other components within normal limits   BASIC METABOLIC PANEL W/ REFLEX TO MG FOR LOW K - Abnormal; Notable for the following components:    Chloride 98 (*)     Glucose 122 (*)     Est, Glom Filt Rate 50 (*)     All other components within normal limits     Critical values: no     Abnormal Assessment Findings: Pneumonia of lower lobe    Background  History:   Past Medical History:   Diagnosis Date    Arthritis     Asthma     Bronchitis chronic     CHF (congestive heart failure) ([facility])     Chronic respiratory failure ([facility])     COPD (chronic obstructive pulmonary disease) ([facility])     Depression     Diabetes mellitus ([facility])     BORDERLINE NO MEDS    Emphysema of lung ([facility])     spontaneous pneumo 5/07/2020    GI bleed 12/15/2021    Glaucoma     Hypertension     Hypoxemia 04/15/2011    Morbid obesity ([facility])     Oxygen dependent     Uses O2 NC at 3L/min continuously    Pneumonia of left upper lobe due to Pseudomonas species ([facility]) 12/01/2021    Rash     due to nasal canula    SBO (small bowel obstruction) ([facility]) 02/10/2012    Shingles     recently no problems now    Sleep apnea     C pap    Spontaneous tension pneumothorax 05/01/2020

## 2024-05-01 NOTE — CONSULTS
Clinical Pharmacy Consult Note  Medication History     Admit Date: 4/30/2024    Pharmacy consulted to verify home medication list by Dr. Cruz.    List of of current medications patient is taking is complete. Home Medication list in EPIC updated to reflect changes noted below.    Source of information: Rx dispense history; medication list from patient (medications listed w/o doses or frequencies)    Patient's home pharmacy: [facility]     Changes made to medication list:   Medications removed: (include reason, ex: therapy completed, patient no longer taking, etc.)  Acetaminophen   Medication doses adjusted:   Allopurinol-->200mg po daily  Bumetanide-->1mg po daily  Losartan-->100mg po daily  Other notes:   Vitamin B12 - unclear of dose/frequency  Unclear about iron - she reported to RN on admission that MD told her to stop taking?     Current Outpatient Medications   Medication Instructions    albuterol sulfate HFA (PROVENTIL;VENTOLIN;PROAIR) 108 (90 Base) MCG/ACT inhaler 2 puffs, Inhalation, EVERY 6 HOURS PRN    allopurinol (ZYLOPRIM) 200 mg, Oral, DAILY    bumetanide (BUMEX) 1 mg, Oral, DAILY    carbidopa-levodopa (SINEMET)  MG per tablet TAKE ONE TABLET BY MOUTH THREE TIMES DAILY FOR 90 DAYS **ON HOLD,EXPIRES 6-4-24** NEXT FILL 12/15/23    Cyanocobalamin (VITAMIN B 12 PO) Oral    DULoxetine (CYMBALTA) 60 mg, Oral, DAILY    ferrous sulfate (IRON 325) 325 mg, DAILY WITH BREAKFAST    fluticasone (ARNUITY ELLIPTA) 200 MCG/ACT AEPB INHALE 1 INHALATION INTO THE LUNGS DAILY RINSING MOUTH AFTER USE.    folic acid (FOLVITE) 1 MG tablet TAKE 1 TABLET (1 MG TOTAL) BY MOUTH DAILY.    ipratropium 0.5 mg-albuterol 2.5 mg (DUONEB) 0.5-2.5 (3) MG/3ML SOLN nebulizer solution 3 mLs, Nebulization, EVERY 4 HOURS PRN    levETIRAcetam (KEPPRA) 250 mg, Oral, 3 TIMES DAILY    losartan (COZAAR) 100 mg, Oral, DAILY    pantoprazole (PROTONIX) 40 mg, Oral, 2 TIMES DAILY BEFORE MEALS    potassium chloride (KLOR-CON M) 20 MEQ

## 2024-05-02 LAB
ALBUMIN SERPL-MCNC: 3.2 G/DL (ref 3.4–5)
ANION GAP SERPL CALCULATED.3IONS-SCNC: 7 MMOL/L (ref 3–16)
BASOPHILS # BLD: 0.1 K/UL (ref 0–0.2)
BASOPHILS NFR BLD: 0.9 %
BUN SERPL-MCNC: 23 MG/DL (ref 7–20)
CALCIUM SERPL-MCNC: 9.2 MG/DL (ref 8.3–10.6)
CHLORIDE SERPL-SCNC: 99 MMOL/L (ref 99–110)
CO2 SERPL-SCNC: 30 MMOL/L (ref 21–32)
CREAT SERPL-MCNC: 0.9 MG/DL (ref 0.6–1.2)
DEPRECATED RDW RBC AUTO: 14.7 % (ref 12.4–15.4)
EOSINOPHIL # BLD: 0 K/UL (ref 0–0.6)
EOSINOPHIL NFR BLD: 0 %
GFR SERPLBLD CREATININE-BSD FMLA CKD-EPI: 64 ML/MIN/{1.73_M2}
GLUCOSE SERPL-MCNC: 127 MG/DL (ref 70–99)
HCT VFR BLD AUTO: 26.4 % (ref 36–48)
HGB BLD-MCNC: 8.8 G/DL (ref 12–16)
LYMPHOCYTES # BLD: 0.7 K/UL (ref 1–5.1)
LYMPHOCYTES NFR BLD: 9 %
MAGNESIUM SERPL-MCNC: 1.9 MG/DL (ref 1.8–2.4)
MCH RBC QN AUTO: 32.7 PG (ref 26–34)
MCHC RBC AUTO-ENTMCNC: 33.2 G/DL (ref 31–36)
MCV RBC AUTO: 98.5 FL (ref 80–100)
MONOCYTES # BLD: 0.2 K/UL (ref 0–1.3)
MONOCYTES NFR BLD: 2.8 %
NEUTROPHILS # BLD: 7.3 K/UL (ref 1.7–7.7)
NEUTROPHILS NFR BLD: 87.3 %
PHOSPHATE SERPL-MCNC: 3.3 MG/DL (ref 2.5–4.9)
PLATELET # BLD AUTO: 186 K/UL (ref 135–450)
PMV BLD AUTO: 9.6 FL (ref 5–10.5)
POTASSIUM SERPL-SCNC: 4.3 MMOL/L (ref 3.5–5.1)
RBC # BLD AUTO: 2.68 M/UL (ref 4–5.2)
SODIUM SERPL-SCNC: 136 MMOL/L (ref 136–145)
WBC # BLD AUTO: 8.3 K/UL (ref 4–11)

## 2024-05-02 PROCEDURE — 2060000000 HC ICU INTERMEDIATE R&B

## 2024-05-02 PROCEDURE — 6370000000 HC RX 637 (ALT 250 FOR IP): Performed by: STUDENT IN AN ORGANIZED HEALTH CARE EDUCATION/TRAINING PROGRAM

## 2024-05-02 PROCEDURE — 6360000002 HC RX W HCPCS: Performed by: STUDENT IN AN ORGANIZED HEALTH CARE EDUCATION/TRAINING PROGRAM

## 2024-05-02 PROCEDURE — 94640 AIRWAY INHALATION TREATMENT: CPT

## 2024-05-02 PROCEDURE — 92610 EVALUATE SWALLOWING FUNCTION: CPT

## 2024-05-02 PROCEDURE — 83735 ASSAY OF MAGNESIUM: CPT

## 2024-05-02 PROCEDURE — 94761 N-INVAS EAR/PLS OXIMETRY MLT: CPT

## 2024-05-02 PROCEDURE — 2700000000 HC OXYGEN THERAPY PER DAY

## 2024-05-02 PROCEDURE — 2580000003 HC RX 258: Performed by: STUDENT IN AN ORGANIZED HEALTH CARE EDUCATION/TRAINING PROGRAM

## 2024-05-02 PROCEDURE — 6370000000 HC RX 637 (ALT 250 FOR IP): Performed by: INTERNAL MEDICINE

## 2024-05-02 PROCEDURE — 92526 ORAL FUNCTION THERAPY: CPT

## 2024-05-02 PROCEDURE — 36415 COLL VENOUS BLD VENIPUNCTURE: CPT

## 2024-05-02 PROCEDURE — 80069 RENAL FUNCTION PANEL: CPT

## 2024-05-02 PROCEDURE — 94660 CPAP INITIATION&MGMT: CPT

## 2024-05-02 PROCEDURE — 85025 COMPLETE CBC W/AUTO DIFF WBC: CPT

## 2024-05-02 RX ADMIN — SODIUM CHLORIDE, PRESERVATIVE FREE 10 ML: 5 INJECTION INTRAVENOUS at 06:48

## 2024-05-02 RX ADMIN — ACETAMINOPHEN 650 MG: 325 TABLET ORAL at 09:53

## 2024-05-02 RX ADMIN — IPRATROPIUM BROMIDE AND ALBUTEROL SULFATE 1 DOSE: 2.5; .5 SOLUTION RESPIRATORY (INHALATION) at 22:16

## 2024-05-02 RX ADMIN — CARBIDOPA AND LEVODOPA 1 TABLET: 25; 100 TABLET ORAL at 09:44

## 2024-05-02 RX ADMIN — SODIUM CHLORIDE, PRESERVATIVE FREE 10 ML: 5 INJECTION INTRAVENOUS at 17:02

## 2024-05-02 RX ADMIN — CARBIDOPA AND LEVODOPA 1 TABLET: 25; 100 TABLET ORAL at 13:48

## 2024-05-02 RX ADMIN — LEVETIRACETAM 250 MG: 250 TABLET, FILM COATED ORAL at 09:43

## 2024-05-02 RX ADMIN — LEVETIRACETAM 250 MG: 250 TABLET, FILM COATED ORAL at 13:48

## 2024-05-02 RX ADMIN — CARBIDOPA AND LEVODOPA 1 TABLET: 25; 100 TABLET ORAL at 19:46

## 2024-05-02 RX ADMIN — SODIUM CHLORIDE, PRESERVATIVE FREE 10 ML: 5 INJECTION INTRAVENOUS at 19:49

## 2024-05-02 RX ADMIN — SODIUM CHLORIDE, PRESERVATIVE FREE 10 ML: 5 INJECTION INTRAVENOUS at 09:42

## 2024-05-02 RX ADMIN — ACETAMINOPHEN 650 MG: 325 TABLET ORAL at 00:03

## 2024-05-02 RX ADMIN — PANTOPRAZOLE SODIUM 40 MG: 40 TABLET, DELAYED RELEASE ORAL at 06:40

## 2024-05-02 RX ADMIN — ENOXAPARIN SODIUM 40 MG: 100 INJECTION SUBCUTANEOUS at 09:42

## 2024-05-02 RX ADMIN — ALLOPURINOL 200 MG: 100 TABLET ORAL at 09:44

## 2024-05-02 RX ADMIN — BUDESONIDE INHALATION 500 MCG: 0.5 SUSPENSION RESPIRATORY (INHALATION) at 07:55

## 2024-05-02 RX ADMIN — WATER 1000 MG: 1 INJECTION INTRAMUSCULAR; INTRAVENOUS; SUBCUTANEOUS at 16:50

## 2024-05-02 RX ADMIN — GUAIFENESIN 600 MG: 600 TABLET ORAL at 19:46

## 2024-05-02 RX ADMIN — BUDESONIDE INHALATION 500 MCG: 0.5 SUSPENSION RESPIRATORY (INHALATION) at 22:16

## 2024-05-02 RX ADMIN — PRIMIDONE 100 MG: 50 TABLET ORAL at 19:46

## 2024-05-02 RX ADMIN — ACETAMINOPHEN 650 MG: 325 TABLET ORAL at 21:46

## 2024-05-02 RX ADMIN — WATER 60 MG: 1 INJECTION INTRAMUSCULAR; INTRAVENOUS; SUBCUTANEOUS at 06:44

## 2024-05-02 RX ADMIN — AZITHROMYCIN MONOHYDRATE 500 MG: 500 INJECTION, POWDER, LYOPHILIZED, FOR SOLUTION INTRAVENOUS at 16:56

## 2024-05-02 RX ADMIN — GUAIFENESIN 600 MG: 600 TABLET ORAL at 09:44

## 2024-05-02 RX ADMIN — WATER 60 MG: 1 INJECTION INTRAMUSCULAR; INTRAVENOUS; SUBCUTANEOUS at 19:47

## 2024-05-02 RX ADMIN — LEVETIRACETAM 250 MG: 250 TABLET, FILM COATED ORAL at 19:46

## 2024-05-02 RX ADMIN — IPRATROPIUM BROMIDE AND ALBUTEROL SULFATE 1 DOSE: 2.5; .5 SOLUTION RESPIRATORY (INHALATION) at 07:55

## 2024-05-02 RX ADMIN — BUMETANIDE 1 MG: 2 TABLET ORAL at 09:49

## 2024-05-02 RX ADMIN — FOLIC ACID 1 MG: 1 TABLET ORAL at 09:43

## 2024-05-02 RX ADMIN — PANTOPRAZOLE SODIUM 40 MG: 40 TABLET, DELAYED RELEASE ORAL at 16:50

## 2024-05-02 RX ADMIN — IPRATROPIUM BROMIDE AND ALBUTEROL SULFATE 1 DOSE: 2.5; .5 SOLUTION RESPIRATORY (INHALATION) at 11:47

## 2024-05-02 RX ADMIN — DULOXETINE HYDROCHLORIDE 60 MG: 30 CAPSULE, DELAYED RELEASE ORAL at 09:43

## 2024-05-02 ASSESSMENT — PAIN DESCRIPTION - PAIN TYPE: TYPE: ACUTE PAIN

## 2024-05-02 ASSESSMENT — PAIN DESCRIPTION - DESCRIPTORS
DESCRIPTORS: ACHING;SORE
DESCRIPTORS: ACHING

## 2024-05-02 ASSESSMENT — PAIN DESCRIPTION - LOCATION
LOCATION: LEG
LOCATION: ARM

## 2024-05-02 ASSESSMENT — PAIN SCALES - GENERAL
PAINLEVEL_OUTOF10: 3
PAINLEVEL_OUTOF10: 0
PAINLEVEL_OUTOF10: 0
PAINLEVEL_OUTOF10: 3
PAINLEVEL_OUTOF10: 0
PAINLEVEL_OUTOF10: 3
PAINLEVEL_OUTOF10: 0

## 2024-05-02 ASSESSMENT — PAIN - FUNCTIONAL ASSESSMENT
PAIN_FUNCTIONAL_ASSESSMENT: ACTIVITIES ARE NOT PREVENTED
PAIN_FUNCTIONAL_ASSESSMENT: ACTIVITIES ARE NOT PREVENTED

## 2024-05-02 ASSESSMENT — PAIN DESCRIPTION - ORIENTATION
ORIENTATION: LEFT
ORIENTATION: RIGHT;LEFT

## 2024-05-02 ASSESSMENT — PAIN DESCRIPTION - FREQUENCY: FREQUENCY: CONTINUOUS

## 2024-05-02 ASSESSMENT — PAIN DESCRIPTION - ONSET: ONSET: ON-GOING

## 2024-05-02 ASSESSMENT — PAIN SCALES - WONG BAKER: WONGBAKER_NUMERICALRESPONSE: NO HURT

## 2024-05-03 LAB
ALBUMIN SERPL-MCNC: 3.4 G/DL (ref 3.4–5)
ANION GAP SERPL CALCULATED.3IONS-SCNC: 7 MMOL/L (ref 3–16)
BASOPHILS # BLD: 0 K/UL (ref 0–0.2)
BASOPHILS NFR BLD: 0.1 %
BUN SERPL-MCNC: 23 MG/DL (ref 7–20)
CALCIUM SERPL-MCNC: 10.1 MG/DL (ref 8.3–10.6)
CHLORIDE SERPL-SCNC: 97 MMOL/L (ref 99–110)
CO2 SERPL-SCNC: 32 MMOL/L (ref 21–32)
CREAT SERPL-MCNC: 1 MG/DL (ref 0.6–1.2)
DEPRECATED RDW RBC AUTO: 14.7 % (ref 12.4–15.4)
EOSINOPHIL # BLD: 0 K/UL (ref 0–0.6)
EOSINOPHIL NFR BLD: 0 %
GFR SERPLBLD CREATININE-BSD FMLA CKD-EPI: 57 ML/MIN/{1.73_M2}
GLUCOSE SERPL-MCNC: 117 MG/DL (ref 70–99)
HCT VFR BLD AUTO: 29 % (ref 36–48)
HGB BLD-MCNC: 9.5 G/DL (ref 12–16)
LYMPHOCYTES # BLD: 0.4 K/UL (ref 1–5.1)
LYMPHOCYTES NFR BLD: 6.4 %
MAGNESIUM SERPL-MCNC: 2 MG/DL (ref 1.8–2.4)
MCH RBC QN AUTO: 32.3 PG (ref 26–34)
MCHC RBC AUTO-ENTMCNC: 32.9 G/DL (ref 31–36)
MCV RBC AUTO: 98.1 FL (ref 80–100)
MONOCYTES # BLD: 0.3 K/UL (ref 0–1.3)
MONOCYTES NFR BLD: 4.2 %
NEUTROPHILS # BLD: 6 K/UL (ref 1.7–7.7)
NEUTROPHILS NFR BLD: 89.3 %
PHOSPHATE SERPL-MCNC: 3 MG/DL (ref 2.5–4.9)
PLATELET # BLD AUTO: 202 K/UL (ref 135–450)
PMV BLD AUTO: 9.2 FL (ref 5–10.5)
POTASSIUM SERPL-SCNC: 3.8 MMOL/L (ref 3.5–5.1)
RBC # BLD AUTO: 2.96 M/UL (ref 4–5.2)
SODIUM SERPL-SCNC: 136 MMOL/L (ref 136–145)
WBC # BLD AUTO: 6.7 K/UL (ref 4–11)

## 2024-05-03 PROCEDURE — 94761 N-INVAS EAR/PLS OXIMETRY MLT: CPT

## 2024-05-03 PROCEDURE — 94660 CPAP INITIATION&MGMT: CPT

## 2024-05-03 PROCEDURE — 85025 COMPLETE CBC W/AUTO DIFF WBC: CPT

## 2024-05-03 PROCEDURE — 6360000002 HC RX W HCPCS: Performed by: STUDENT IN AN ORGANIZED HEALTH CARE EDUCATION/TRAINING PROGRAM

## 2024-05-03 PROCEDURE — 36415 COLL VENOUS BLD VENIPUNCTURE: CPT

## 2024-05-03 PROCEDURE — 94640 AIRWAY INHALATION TREATMENT: CPT

## 2024-05-03 PROCEDURE — 6370000000 HC RX 637 (ALT 250 FOR IP): Performed by: STUDENT IN AN ORGANIZED HEALTH CARE EDUCATION/TRAINING PROGRAM

## 2024-05-03 PROCEDURE — 2060000000 HC ICU INTERMEDIATE R&B

## 2024-05-03 PROCEDURE — 6370000000 HC RX 637 (ALT 250 FOR IP): Performed by: INTERNAL MEDICINE

## 2024-05-03 PROCEDURE — 80069 RENAL FUNCTION PANEL: CPT

## 2024-05-03 PROCEDURE — 2700000000 HC OXYGEN THERAPY PER DAY

## 2024-05-03 PROCEDURE — 2580000003 HC RX 258: Performed by: STUDENT IN AN ORGANIZED HEALTH CARE EDUCATION/TRAINING PROGRAM

## 2024-05-03 PROCEDURE — 83735 ASSAY OF MAGNESIUM: CPT

## 2024-05-03 PROCEDURE — 92526 ORAL FUNCTION THERAPY: CPT

## 2024-05-03 PROCEDURE — 6370000000 HC RX 637 (ALT 250 FOR IP)

## 2024-05-03 RX ORDER — MECOBALAMIN 5000 MCG
5 TABLET,DISINTEGRATING ORAL NIGHTLY
Status: DISCONTINUED | OUTPATIENT
Start: 2024-05-03 | End: 2024-05-06 | Stop reason: HOSPADM

## 2024-05-03 RX ADMIN — GUAIFENESIN 600 MG: 600 TABLET ORAL at 20:20

## 2024-05-03 RX ADMIN — SODIUM CHLORIDE, PRESERVATIVE FREE 10 ML: 5 INJECTION INTRAVENOUS at 08:57

## 2024-05-03 RX ADMIN — GUAIFENESIN 600 MG: 600 TABLET ORAL at 08:57

## 2024-05-03 RX ADMIN — IPRATROPIUM BROMIDE AND ALBUTEROL SULFATE 1 DOSE: 2.5; .5 SOLUTION RESPIRATORY (INHALATION) at 08:07

## 2024-05-03 RX ADMIN — Medication 5 MG: at 01:23

## 2024-05-03 RX ADMIN — WATER 1000 MG: 1 INJECTION INTRAMUSCULAR; INTRAVENOUS; SUBCUTANEOUS at 15:37

## 2024-05-03 RX ADMIN — AZITHROMYCIN MONOHYDRATE 500 MG: 500 INJECTION, POWDER, LYOPHILIZED, FOR SOLUTION INTRAVENOUS at 15:54

## 2024-05-03 RX ADMIN — ALLOPURINOL 200 MG: 100 TABLET ORAL at 08:57

## 2024-05-03 RX ADMIN — DULOXETINE HYDROCHLORIDE 60 MG: 30 CAPSULE, DELAYED RELEASE ORAL at 08:57

## 2024-05-03 RX ADMIN — IPRATROPIUM BROMIDE AND ALBUTEROL SULFATE 1 DOSE: 2.5; .5 SOLUTION RESPIRATORY (INHALATION) at 12:14

## 2024-05-03 RX ADMIN — PANTOPRAZOLE SODIUM 40 MG: 40 TABLET, DELAYED RELEASE ORAL at 15:37

## 2024-05-03 RX ADMIN — PRIMIDONE 100 MG: 50 TABLET ORAL at 20:20

## 2024-05-03 RX ADMIN — SODIUM CHLORIDE, PRESERVATIVE FREE 10 ML: 5 INJECTION INTRAVENOUS at 20:19

## 2024-05-03 RX ADMIN — IPRATROPIUM BROMIDE AND ALBUTEROL SULFATE 1 DOSE: 2.5; .5 SOLUTION RESPIRATORY (INHALATION) at 16:05

## 2024-05-03 RX ADMIN — BUDESONIDE INHALATION 500 MCG: 0.5 SUSPENSION RESPIRATORY (INHALATION) at 08:07

## 2024-05-03 RX ADMIN — Medication 5 MG: at 21:34

## 2024-05-03 RX ADMIN — LEVETIRACETAM 250 MG: 250 TABLET, FILM COATED ORAL at 20:20

## 2024-05-03 RX ADMIN — FOLIC ACID 1 MG: 1 TABLET ORAL at 08:57

## 2024-05-03 RX ADMIN — BUDESONIDE INHALATION 500 MCG: 0.5 SUSPENSION RESPIRATORY (INHALATION) at 20:39

## 2024-05-03 RX ADMIN — CARBIDOPA AND LEVODOPA 1 TABLET: 25; 100 TABLET ORAL at 08:57

## 2024-05-03 RX ADMIN — LEVETIRACETAM 250 MG: 250 TABLET, FILM COATED ORAL at 15:54

## 2024-05-03 RX ADMIN — BUMETANIDE 1 MG: 2 TABLET ORAL at 08:56

## 2024-05-03 RX ADMIN — WATER 60 MG: 1 INJECTION INTRAMUSCULAR; INTRAVENOUS; SUBCUTANEOUS at 06:18

## 2024-05-03 RX ADMIN — PANTOPRAZOLE SODIUM 40 MG: 40 TABLET, DELAYED RELEASE ORAL at 06:18

## 2024-05-03 RX ADMIN — CARBIDOPA AND LEVODOPA 1 TABLET: 25; 100 TABLET ORAL at 20:20

## 2024-05-03 RX ADMIN — WATER 60 MG: 1 INJECTION INTRAMUSCULAR; INTRAVENOUS; SUBCUTANEOUS at 18:42

## 2024-05-03 RX ADMIN — ENOXAPARIN SODIUM 40 MG: 100 INJECTION SUBCUTANEOUS at 08:56

## 2024-05-03 RX ADMIN — IPRATROPIUM BROMIDE AND ALBUTEROL SULFATE 1 DOSE: 2.5; .5 SOLUTION RESPIRATORY (INHALATION) at 20:39

## 2024-05-03 RX ADMIN — LEVETIRACETAM 250 MG: 250 TABLET, FILM COATED ORAL at 08:57

## 2024-05-03 RX ADMIN — CARBIDOPA AND LEVODOPA 1 TABLET: 25; 100 TABLET ORAL at 15:37

## 2024-05-03 ASSESSMENT — PAIN SCALES - GENERAL
PAINLEVEL_OUTOF10: 0

## 2024-05-04 LAB
ALBUMIN SERPL-MCNC: 3.1 G/DL (ref 3.4–5)
ANION GAP SERPL CALCULATED.3IONS-SCNC: 6 MMOL/L (ref 3–16)
BACTERIA BLD CULT: NORMAL
BASOPHILS # BLD: 0 K/UL (ref 0–0.2)
BASOPHILS NFR BLD: 0.7 %
BUN SERPL-MCNC: 21 MG/DL (ref 7–20)
CALCIUM SERPL-MCNC: 9.7 MG/DL (ref 8.3–10.6)
CHLORIDE SERPL-SCNC: 97 MMOL/L (ref 99–110)
CO2 SERPL-SCNC: 34 MMOL/L (ref 21–32)
CREAT SERPL-MCNC: 0.9 MG/DL (ref 0.6–1.2)
DEPRECATED RDW RBC AUTO: 14.3 % (ref 12.4–15.4)
EOSINOPHIL # BLD: 0 K/UL (ref 0–0.6)
EOSINOPHIL NFR BLD: 0.1 %
GFR SERPLBLD CREATININE-BSD FMLA CKD-EPI: 64 ML/MIN/{1.73_M2}
GLUCOSE SERPL-MCNC: 105 MG/DL (ref 70–99)
HCT VFR BLD AUTO: 28.5 % (ref 36–48)
HGB BLD-MCNC: 9.2 G/DL (ref 12–16)
LYMPHOCYTES # BLD: 0.7 K/UL (ref 1–5.1)
LYMPHOCYTES NFR BLD: 14.2 %
MAGNESIUM SERPL-MCNC: 1.9 MG/DL (ref 1.8–2.4)
MCH RBC QN AUTO: 31.6 PG (ref 26–34)
MCHC RBC AUTO-ENTMCNC: 32.3 G/DL (ref 31–36)
MCV RBC AUTO: 98 FL (ref 80–100)
MONOCYTES # BLD: 0.3 K/UL (ref 0–1.3)
MONOCYTES NFR BLD: 6.1 %
NEUTROPHILS # BLD: 3.9 K/UL (ref 1.7–7.7)
NEUTROPHILS NFR BLD: 78.9 %
PHOSPHATE SERPL-MCNC: 3.5 MG/DL (ref 2.5–4.9)
PLATELET # BLD AUTO: 193 K/UL (ref 135–450)
PMV BLD AUTO: 9.5 FL (ref 5–10.5)
POTASSIUM SERPL-SCNC: 4.2 MMOL/L (ref 3.5–5.1)
RBC # BLD AUTO: 2.91 M/UL (ref 4–5.2)
SODIUM SERPL-SCNC: 137 MMOL/L (ref 136–145)
WBC # BLD AUTO: 5 K/UL (ref 4–11)

## 2024-05-04 PROCEDURE — 6370000000 HC RX 637 (ALT 250 FOR IP)

## 2024-05-04 PROCEDURE — 6360000002 HC RX W HCPCS: Performed by: STUDENT IN AN ORGANIZED HEALTH CARE EDUCATION/TRAINING PROGRAM

## 2024-05-04 PROCEDURE — 94660 CPAP INITIATION&MGMT: CPT

## 2024-05-04 PROCEDURE — 83735 ASSAY OF MAGNESIUM: CPT

## 2024-05-04 PROCEDURE — 94640 AIRWAY INHALATION TREATMENT: CPT

## 2024-05-04 PROCEDURE — 6370000000 HC RX 637 (ALT 250 FOR IP): Performed by: INTERNAL MEDICINE

## 2024-05-04 PROCEDURE — 94761 N-INVAS EAR/PLS OXIMETRY MLT: CPT

## 2024-05-04 PROCEDURE — 2700000000 HC OXYGEN THERAPY PER DAY

## 2024-05-04 PROCEDURE — 36415 COLL VENOUS BLD VENIPUNCTURE: CPT

## 2024-05-04 PROCEDURE — 6370000000 HC RX 637 (ALT 250 FOR IP): Performed by: STUDENT IN AN ORGANIZED HEALTH CARE EDUCATION/TRAINING PROGRAM

## 2024-05-04 PROCEDURE — 2580000003 HC RX 258: Performed by: STUDENT IN AN ORGANIZED HEALTH CARE EDUCATION/TRAINING PROGRAM

## 2024-05-04 PROCEDURE — 85025 COMPLETE CBC W/AUTO DIFF WBC: CPT

## 2024-05-04 PROCEDURE — 2060000000 HC ICU INTERMEDIATE R&B

## 2024-05-04 PROCEDURE — 80069 RENAL FUNCTION PANEL: CPT

## 2024-05-04 RX ORDER — LEVETIRACETAM 500 MG/1
500 TABLET ORAL NIGHTLY
Status: DISCONTINUED | OUTPATIENT
Start: 2024-05-04 | End: 2024-05-06 | Stop reason: HOSPADM

## 2024-05-04 RX ORDER — PREDNISONE 20 MG/1
40 TABLET ORAL DAILY
Status: DISCONTINUED | OUTPATIENT
Start: 2024-05-05 | End: 2024-05-05

## 2024-05-04 RX ORDER — LEVETIRACETAM 250 MG/1
250 TABLET ORAL DAILY
Status: DISCONTINUED | OUTPATIENT
Start: 2024-05-05 | End: 2024-05-06 | Stop reason: HOSPADM

## 2024-05-04 RX ADMIN — WATER 60 MG: 1 INJECTION INTRAMUSCULAR; INTRAVENOUS; SUBCUTANEOUS at 05:30

## 2024-05-04 RX ADMIN — DULOXETINE HYDROCHLORIDE 60 MG: 30 CAPSULE, DELAYED RELEASE ORAL at 10:07

## 2024-05-04 RX ADMIN — SODIUM CHLORIDE, PRESERVATIVE FREE 10 ML: 5 INJECTION INTRAVENOUS at 10:08

## 2024-05-04 RX ADMIN — SODIUM CHLORIDE, PRESERVATIVE FREE 10 ML: 5 INJECTION INTRAVENOUS at 20:41

## 2024-05-04 RX ADMIN — ENOXAPARIN SODIUM 40 MG: 100 INJECTION SUBCUTANEOUS at 10:07

## 2024-05-04 RX ADMIN — WATER 1000 MG: 1 INJECTION INTRAMUSCULAR; INTRAVENOUS; SUBCUTANEOUS at 16:47

## 2024-05-04 RX ADMIN — CARBIDOPA AND LEVODOPA 1 TABLET: 25; 100 TABLET ORAL at 20:41

## 2024-05-04 RX ADMIN — GUAIFENESIN 600 MG: 600 TABLET ORAL at 10:07

## 2024-05-04 RX ADMIN — ALLOPURINOL 200 MG: 100 TABLET ORAL at 10:07

## 2024-05-04 RX ADMIN — FOLIC ACID 1 MG: 1 TABLET ORAL at 10:07

## 2024-05-04 RX ADMIN — PANTOPRAZOLE SODIUM 40 MG: 40 TABLET, DELAYED RELEASE ORAL at 16:46

## 2024-05-04 RX ADMIN — BUMETANIDE 1 MG: 2 TABLET ORAL at 10:07

## 2024-05-04 RX ADMIN — GUAIFENESIN 600 MG: 600 TABLET ORAL at 20:41

## 2024-05-04 RX ADMIN — IPRATROPIUM BROMIDE AND ALBUTEROL SULFATE 1 DOSE: 2.5; .5 SOLUTION RESPIRATORY (INHALATION) at 20:54

## 2024-05-04 RX ADMIN — CARBIDOPA AND LEVODOPA 1 TABLET: 25; 100 TABLET ORAL at 16:46

## 2024-05-04 RX ADMIN — CARBIDOPA AND LEVODOPA 1 TABLET: 25; 100 TABLET ORAL at 10:07

## 2024-05-04 RX ADMIN — PRIMIDONE 100 MG: 50 TABLET ORAL at 20:41

## 2024-05-04 RX ADMIN — IPRATROPIUM BROMIDE AND ALBUTEROL SULFATE 1 DOSE: 2.5; .5 SOLUTION RESPIRATORY (INHALATION) at 12:02

## 2024-05-04 RX ADMIN — IPRATROPIUM BROMIDE AND ALBUTEROL SULFATE 1 DOSE: 2.5; .5 SOLUTION RESPIRATORY (INHALATION) at 17:14

## 2024-05-04 RX ADMIN — BUDESONIDE INHALATION 500 MCG: 0.5 SUSPENSION RESPIRATORY (INHALATION) at 08:26

## 2024-05-04 RX ADMIN — BUDESONIDE INHALATION 500 MCG: 0.5 SUSPENSION RESPIRATORY (INHALATION) at 20:54

## 2024-05-04 RX ADMIN — PANTOPRAZOLE SODIUM 40 MG: 40 TABLET, DELAYED RELEASE ORAL at 05:30

## 2024-05-04 RX ADMIN — IPRATROPIUM BROMIDE AND ALBUTEROL SULFATE 1 DOSE: 2.5; .5 SOLUTION RESPIRATORY (INHALATION) at 08:26

## 2024-05-04 RX ADMIN — LEVETIRACETAM 250 MG: 250 TABLET, FILM COATED ORAL at 10:07

## 2024-05-04 RX ADMIN — ACETAMINOPHEN 650 MG: 325 TABLET ORAL at 05:35

## 2024-05-04 RX ADMIN — LEVETIRACETAM 500 MG: 500 TABLET, FILM COATED ORAL at 20:40

## 2024-05-04 RX ADMIN — Medication 5 MG: at 21:48

## 2024-05-04 ASSESSMENT — PAIN SCALES - GENERAL
PAINLEVEL_OUTOF10: 0
PAINLEVEL_OUTOF10: 3
PAINLEVEL_OUTOF10: 0

## 2024-05-05 LAB
ALBUMIN SERPL-MCNC: 3.1 G/DL (ref 3.4–5)
ANION GAP SERPL CALCULATED.3IONS-SCNC: 8 MMOL/L (ref 3–16)
BACTERIA BLD CULT ORG #2: NORMAL
BASOPHILS # BLD: 0 K/UL (ref 0–0.2)
BASOPHILS NFR BLD: 0.8 %
BUN SERPL-MCNC: 23 MG/DL (ref 7–20)
CALCIUM SERPL-MCNC: 9.4 MG/DL (ref 8.3–10.6)
CHLORIDE SERPL-SCNC: 97 MMOL/L (ref 99–110)
CO2 SERPL-SCNC: 34 MMOL/L (ref 21–32)
CREAT SERPL-MCNC: 1 MG/DL (ref 0.6–1.2)
DEPRECATED RDW RBC AUTO: 14.3 % (ref 12.4–15.4)
EKG ATRIAL RATE: 79 BPM
EKG DIAGNOSIS: NORMAL
EKG P AXIS: 76 DEGREES
EKG P-R INTERVAL: 184 MS
EKG Q-T INTERVAL: 366 MS
EKG QRS DURATION: 92 MS
EKG QTC CALCULATION (BAZETT): 419 MS
EKG R AXIS: -24 DEGREES
EKG T AXIS: 77 DEGREES
EKG VENTRICULAR RATE: 79 BPM
EOSINOPHIL # BLD: 0.1 K/UL (ref 0–0.6)
EOSINOPHIL NFR BLD: 1.6 %
GFR SERPLBLD CREATININE-BSD FMLA CKD-EPI: 57 ML/MIN/{1.73_M2}
GLUCOSE SERPL-MCNC: 87 MG/DL (ref 70–99)
HCT VFR BLD AUTO: 29.4 % (ref 36–48)
HGB BLD-MCNC: 9.5 G/DL (ref 12–16)
LYMPHOCYTES # BLD: 1.9 K/UL (ref 1–5.1)
LYMPHOCYTES NFR BLD: 37.2 %
MAGNESIUM SERPL-MCNC: 1.8 MG/DL (ref 1.8–2.4)
MCH RBC QN AUTO: 31.8 PG (ref 26–34)
MCHC RBC AUTO-ENTMCNC: 32.3 G/DL (ref 31–36)
MCV RBC AUTO: 98.3 FL (ref 80–100)
MONOCYTES # BLD: 0.5 K/UL (ref 0–1.3)
MONOCYTES NFR BLD: 9.9 %
NEUTROPHILS # BLD: 2.6 K/UL (ref 1.7–7.7)
NEUTROPHILS NFR BLD: 50.5 %
PHOSPHATE SERPL-MCNC: 3.4 MG/DL (ref 2.5–4.9)
PLATELET # BLD AUTO: 192 K/UL (ref 135–450)
PMV BLD AUTO: 9.2 FL (ref 5–10.5)
POTASSIUM SERPL-SCNC: 3.4 MMOL/L (ref 3.5–5.1)
RBC # BLD AUTO: 2.99 M/UL (ref 4–5.2)
SODIUM SERPL-SCNC: 139 MMOL/L (ref 136–145)
WBC # BLD AUTO: 5.2 K/UL (ref 4–11)

## 2024-05-05 PROCEDURE — 6370000000 HC RX 637 (ALT 250 FOR IP): Performed by: STUDENT IN AN ORGANIZED HEALTH CARE EDUCATION/TRAINING PROGRAM

## 2024-05-05 PROCEDURE — 93005 ELECTROCARDIOGRAM TRACING: CPT

## 2024-05-05 PROCEDURE — 94761 N-INVAS EAR/PLS OXIMETRY MLT: CPT

## 2024-05-05 PROCEDURE — 36415 COLL VENOUS BLD VENIPUNCTURE: CPT

## 2024-05-05 PROCEDURE — 94660 CPAP INITIATION&MGMT: CPT

## 2024-05-05 PROCEDURE — 85025 COMPLETE CBC W/AUTO DIFF WBC: CPT

## 2024-05-05 PROCEDURE — 80069 RENAL FUNCTION PANEL: CPT

## 2024-05-05 PROCEDURE — 2700000000 HC OXYGEN THERAPY PER DAY

## 2024-05-05 PROCEDURE — 93010 ELECTROCARDIOGRAM REPORT: CPT | Performed by: INTERNAL MEDICINE

## 2024-05-05 PROCEDURE — 83735 ASSAY OF MAGNESIUM: CPT

## 2024-05-05 PROCEDURE — 6370000000 HC RX 637 (ALT 250 FOR IP): Performed by: INTERNAL MEDICINE

## 2024-05-05 PROCEDURE — 94640 AIRWAY INHALATION TREATMENT: CPT

## 2024-05-05 PROCEDURE — 6360000002 HC RX W HCPCS: Performed by: STUDENT IN AN ORGANIZED HEALTH CARE EDUCATION/TRAINING PROGRAM

## 2024-05-05 PROCEDURE — 6370000000 HC RX 637 (ALT 250 FOR IP)

## 2024-05-05 PROCEDURE — 2060000000 HC ICU INTERMEDIATE R&B

## 2024-05-05 PROCEDURE — 2580000003 HC RX 258: Performed by: STUDENT IN AN ORGANIZED HEALTH CARE EDUCATION/TRAINING PROGRAM

## 2024-05-05 RX ORDER — PREDNISONE 10 MG/1
10 TABLET ORAL DAILY
Status: DISCONTINUED | OUTPATIENT
Start: 2024-05-08 | End: 2024-05-06 | Stop reason: HOSPADM

## 2024-05-05 RX ORDER — PREDNISONE 20 MG/1
20 TABLET ORAL DAILY
Status: DISCONTINUED | OUTPATIENT
Start: 2024-05-07 | End: 2024-05-06 | Stop reason: HOSPADM

## 2024-05-05 RX ORDER — PREDNISONE 20 MG/1
40 TABLET ORAL DAILY
Status: COMPLETED | OUTPATIENT
Start: 2024-05-05 | End: 2024-05-05

## 2024-05-05 RX ORDER — POTASSIUM CHLORIDE 20 MEQ/1
20 TABLET, EXTENDED RELEASE ORAL ONCE
Status: COMPLETED | OUTPATIENT
Start: 2024-05-05 | End: 2024-05-05

## 2024-05-05 RX ADMIN — PRIMIDONE 100 MG: 50 TABLET ORAL at 20:48

## 2024-05-05 RX ADMIN — BUMETANIDE 1 MG: 2 TABLET ORAL at 10:33

## 2024-05-05 RX ADMIN — POLYETHYLENE GLYCOL 3350 17 G: 17 POWDER, FOR SOLUTION ORAL at 06:40

## 2024-05-05 RX ADMIN — BUDESONIDE INHALATION 500 MCG: 0.5 SUSPENSION RESPIRATORY (INHALATION) at 22:35

## 2024-05-05 RX ADMIN — WATER 1000 MG: 1 INJECTION INTRAMUSCULAR; INTRAVENOUS; SUBCUTANEOUS at 15:34

## 2024-05-05 RX ADMIN — LEVETIRACETAM 250 MG: 250 TABLET, FILM COATED ORAL at 10:31

## 2024-05-05 RX ADMIN — DULOXETINE HYDROCHLORIDE 60 MG: 30 CAPSULE, DELAYED RELEASE ORAL at 10:32

## 2024-05-05 RX ADMIN — FOLIC ACID 1 MG: 1 TABLET ORAL at 10:32

## 2024-05-05 RX ADMIN — Medication 5 MG: at 21:47

## 2024-05-05 RX ADMIN — PREDNISONE 40 MG: 20 TABLET ORAL at 10:32

## 2024-05-05 RX ADMIN — IPRATROPIUM BROMIDE AND ALBUTEROL SULFATE 1 DOSE: 2.5; .5 SOLUTION RESPIRATORY (INHALATION) at 16:17

## 2024-05-05 RX ADMIN — BUDESONIDE INHALATION 500 MCG: 0.5 SUSPENSION RESPIRATORY (INHALATION) at 08:59

## 2024-05-05 RX ADMIN — GUAIFENESIN 600 MG: 600 TABLET ORAL at 10:32

## 2024-05-05 RX ADMIN — ALLOPURINOL 200 MG: 100 TABLET ORAL at 10:32

## 2024-05-05 RX ADMIN — GUAIFENESIN 600 MG: 600 TABLET ORAL at 20:48

## 2024-05-05 RX ADMIN — PANTOPRAZOLE SODIUM 40 MG: 40 TABLET, DELAYED RELEASE ORAL at 06:39

## 2024-05-05 RX ADMIN — CARBIDOPA AND LEVODOPA 1 TABLET: 25; 100 TABLET ORAL at 15:34

## 2024-05-05 RX ADMIN — CARBIDOPA AND LEVODOPA 1 TABLET: 25; 100 TABLET ORAL at 21:47

## 2024-05-05 RX ADMIN — SODIUM CHLORIDE, PRESERVATIVE FREE 10 ML: 5 INJECTION INTRAVENOUS at 10:33

## 2024-05-05 RX ADMIN — LEVETIRACETAM 500 MG: 500 TABLET, FILM COATED ORAL at 20:11

## 2024-05-05 RX ADMIN — POTASSIUM CHLORIDE 20 MEQ: 1500 TABLET, EXTENDED RELEASE ORAL at 10:31

## 2024-05-05 RX ADMIN — ENOXAPARIN SODIUM 40 MG: 100 INJECTION SUBCUTANEOUS at 10:33

## 2024-05-05 RX ADMIN — IPRATROPIUM BROMIDE AND ALBUTEROL SULFATE 1 DOSE: 2.5; .5 SOLUTION RESPIRATORY (INHALATION) at 08:59

## 2024-05-05 RX ADMIN — PANTOPRAZOLE SODIUM 40 MG: 40 TABLET, DELAYED RELEASE ORAL at 15:34

## 2024-05-05 RX ADMIN — CARBIDOPA AND LEVODOPA 1 TABLET: 25; 100 TABLET ORAL at 10:33

## 2024-05-05 RX ADMIN — IPRATROPIUM BROMIDE AND ALBUTEROL SULFATE 1 DOSE: 2.5; .5 SOLUTION RESPIRATORY (INHALATION) at 22:35

## 2024-05-05 RX ADMIN — IPRATROPIUM BROMIDE AND ALBUTEROL SULFATE 1 DOSE: 2.5; .5 SOLUTION RESPIRATORY (INHALATION) at 13:00

## 2024-05-05 RX ADMIN — SODIUM CHLORIDE, PRESERVATIVE FREE 10 ML: 5 INJECTION INTRAVENOUS at 20:11

## 2024-05-05 ASSESSMENT — PAIN SCALES - GENERAL
PAINLEVEL_OUTOF10: 0
PAINLEVEL_OUTOF10: 0

## 2024-05-06 ENCOUNTER — APPOINTMENT (OUTPATIENT)
Dept: GENERAL RADIOLOGY | Age: 79
DRG: 393 | End: 2024-05-06
Payer: MEDICARE

## 2024-05-06 VITALS
BODY MASS INDEX: 34.18 KG/M2 | OXYGEN SATURATION: 95 % | RESPIRATION RATE: 18 BRPM | WEIGHT: 192.9 LBS | DIASTOLIC BLOOD PRESSURE: 68 MMHG | TEMPERATURE: 98 F | HEIGHT: 63 IN | SYSTOLIC BLOOD PRESSURE: 136 MMHG | HEART RATE: 74 BPM

## 2024-05-06 LAB
ALBUMIN SERPL-MCNC: 3.1 G/DL (ref 3.4–5)
ANION GAP SERPL CALCULATED.3IONS-SCNC: 9 MMOL/L (ref 3–16)
BASOPHILS # BLD: 0 K/UL (ref 0–0.2)
BASOPHILS NFR BLD: 0.4 %
BUN SERPL-MCNC: 22 MG/DL (ref 7–20)
CALCIUM SERPL-MCNC: 9.3 MG/DL (ref 8.3–10.6)
CHLORIDE SERPL-SCNC: 96 MMOL/L (ref 99–110)
CO2 SERPL-SCNC: 31 MMOL/L (ref 21–32)
CREAT SERPL-MCNC: 0.9 MG/DL (ref 0.6–1.2)
DEPRECATED RDW RBC AUTO: 14.5 % (ref 12.4–15.4)
EOSINOPHIL # BLD: 0.1 K/UL (ref 0–0.6)
EOSINOPHIL NFR BLD: 2.6 %
GFR SERPLBLD CREATININE-BSD FMLA CKD-EPI: 64 ML/MIN/{1.73_M2}
GLUCOSE SERPL-MCNC: 96 MG/DL (ref 70–99)
HCT VFR BLD AUTO: 29.6 % (ref 36–48)
HGB BLD-MCNC: 9.8 G/DL (ref 12–16)
LYMPHOCYTES # BLD: 2.4 K/UL (ref 1–5.1)
LYMPHOCYTES NFR BLD: 44.3 %
MAGNESIUM SERPL-MCNC: 1.8 MG/DL (ref 1.8–2.4)
MCH RBC QN AUTO: 32.4 PG (ref 26–34)
MCHC RBC AUTO-ENTMCNC: 33.2 G/DL (ref 31–36)
MCV RBC AUTO: 97.7 FL (ref 80–100)
MONOCYTES # BLD: 0.5 K/UL (ref 0–1.3)
MONOCYTES NFR BLD: 9 %
NEUTROPHILS # BLD: 2.4 K/UL (ref 1.7–7.7)
NEUTROPHILS NFR BLD: 43.7 %
PHOSPHATE SERPL-MCNC: 3.6 MG/DL (ref 2.5–4.9)
PLATELET # BLD AUTO: 222 K/UL (ref 135–450)
PMV BLD AUTO: 9 FL (ref 5–10.5)
POTASSIUM SERPL-SCNC: 3.6 MMOL/L (ref 3.5–5.1)
RBC # BLD AUTO: 3.03 M/UL (ref 4–5.2)
SODIUM SERPL-SCNC: 136 MMOL/L (ref 136–145)
WBC # BLD AUTO: 5.4 K/UL (ref 4–11)

## 2024-05-06 PROCEDURE — 2580000003 HC RX 258: Performed by: STUDENT IN AN ORGANIZED HEALTH CARE EDUCATION/TRAINING PROGRAM

## 2024-05-06 PROCEDURE — 92526 ORAL FUNCTION THERAPY: CPT

## 2024-05-06 PROCEDURE — 92611 MOTION FLUOROSCOPY/SWALLOW: CPT

## 2024-05-06 PROCEDURE — 97530 THERAPEUTIC ACTIVITIES: CPT

## 2024-05-06 PROCEDURE — 74230 X-RAY XM SWLNG FUNCJ C+: CPT

## 2024-05-06 PROCEDURE — 94761 N-INVAS EAR/PLS OXIMETRY MLT: CPT

## 2024-05-06 PROCEDURE — 2700000000 HC OXYGEN THERAPY PER DAY

## 2024-05-06 PROCEDURE — 94640 AIRWAY INHALATION TREATMENT: CPT

## 2024-05-06 PROCEDURE — 97535 SELF CARE MNGMENT TRAINING: CPT

## 2024-05-06 PROCEDURE — 6360000002 HC RX W HCPCS: Performed by: STUDENT IN AN ORGANIZED HEALTH CARE EDUCATION/TRAINING PROGRAM

## 2024-05-06 PROCEDURE — 97162 PT EVAL MOD COMPLEX 30 MIN: CPT

## 2024-05-06 PROCEDURE — 97166 OT EVAL MOD COMPLEX 45 MIN: CPT

## 2024-05-06 PROCEDURE — 85025 COMPLETE CBC W/AUTO DIFF WBC: CPT

## 2024-05-06 PROCEDURE — 6370000000 HC RX 637 (ALT 250 FOR IP): Performed by: STUDENT IN AN ORGANIZED HEALTH CARE EDUCATION/TRAINING PROGRAM

## 2024-05-06 PROCEDURE — 6370000000 HC RX 637 (ALT 250 FOR IP)

## 2024-05-06 PROCEDURE — 6370000000 HC RX 637 (ALT 250 FOR IP): Performed by: INTERNAL MEDICINE

## 2024-05-06 PROCEDURE — 83735 ASSAY OF MAGNESIUM: CPT

## 2024-05-06 PROCEDURE — 97116 GAIT TRAINING THERAPY: CPT

## 2024-05-06 PROCEDURE — 80069 RENAL FUNCTION PANEL: CPT

## 2024-05-06 PROCEDURE — 36415 COLL VENOUS BLD VENIPUNCTURE: CPT

## 2024-05-06 RX ORDER — PREDNISONE 10 MG/1
10 TABLET ORAL DAILY
Qty: 1 TABLET | Refills: 0 | Status: SHIPPED | OUTPATIENT
Start: 2024-05-08 | End: 2024-05-09

## 2024-05-06 RX ORDER — PREDNISONE 10 MG/1
30 TABLET ORAL DAILY
Qty: 3 TABLET | Refills: 0 | Status: CANCELLED | OUTPATIENT
Start: 2024-05-06 | End: 2024-05-07

## 2024-05-06 RX ORDER — IPRATROPIUM BROMIDE AND ALBUTEROL SULFATE 2.5; .5 MG/3ML; MG/3ML
1 SOLUTION RESPIRATORY (INHALATION) 3 TIMES DAILY
Status: DISCONTINUED | OUTPATIENT
Start: 2024-05-06 | End: 2024-05-06 | Stop reason: HOSPADM

## 2024-05-06 RX ORDER — LEVETIRACETAM 500 MG/1
500 TABLET ORAL NIGHTLY
Qty: 60 TABLET | Refills: 3 | Status: SHIPPED | OUTPATIENT
Start: 2024-05-06

## 2024-05-06 RX ORDER — IPRATROPIUM BROMIDE AND ALBUTEROL SULFATE 2.5; .5 MG/3ML; MG/3ML
1 SOLUTION RESPIRATORY (INHALATION) 3 TIMES DAILY
Status: DISCONTINUED | OUTPATIENT
Start: 2024-05-06 | End: 2024-05-06

## 2024-05-06 RX ORDER — GUAIFENESIN/DEXTROMETHORPHAN 100-10MG/5
5 SYRUP ORAL EVERY 4 HOURS PRN
Qty: 120 ML | Refills: 0 | Status: SHIPPED | OUTPATIENT
Start: 2024-05-06 | End: 2024-05-16

## 2024-05-06 RX ORDER — AMLODIPINE BESYLATE 5 MG/1
5 TABLET ORAL DAILY
Qty: 30 TABLET | Refills: 3 | Status: SHIPPED | OUTPATIENT
Start: 2024-05-06

## 2024-05-06 RX ORDER — LOSARTAN POTASSIUM 50 MG/1
50 TABLET ORAL DAILY
Qty: 30 TABLET | Refills: 3 | Status: SHIPPED | OUTPATIENT
Start: 2024-05-06

## 2024-05-06 RX ORDER — SENNA AND DOCUSATE SODIUM 50; 8.6 MG/1; MG/1
1 TABLET, FILM COATED ORAL DAILY
Qty: 10 TABLET | Refills: 0 | Status: SHIPPED | OUTPATIENT
Start: 2024-05-06

## 2024-05-06 RX ORDER — LEVETIRACETAM 250 MG/1
250 TABLET ORAL DAILY
Qty: 60 TABLET | Refills: 3 | Status: SHIPPED | OUTPATIENT
Start: 2024-05-06

## 2024-05-06 RX ORDER — SENNA AND DOCUSATE SODIUM 50; 8.6 MG/1; MG/1
2 TABLET, FILM COATED ORAL DAILY PRN
Status: DISCONTINUED | OUTPATIENT
Start: 2024-05-06 | End: 2024-05-06

## 2024-05-06 RX ORDER — BUMETANIDE 1 MG/1
1 TABLET ORAL DAILY
Qty: 30 TABLET | Refills: 3 | Status: SHIPPED | OUTPATIENT
Start: 2024-05-06

## 2024-05-06 RX ORDER — SENNA AND DOCUSATE SODIUM 50; 8.6 MG/1; MG/1
2 TABLET, FILM COATED ORAL ONCE
Status: COMPLETED | OUTPATIENT
Start: 2024-05-06 | End: 2024-05-06

## 2024-05-06 RX ORDER — PREDNISONE 20 MG/1
20 TABLET ORAL DAILY
Qty: 1 TABLET | Refills: 0 | Status: SHIPPED | OUTPATIENT
Start: 2024-05-07 | End: 2024-05-08

## 2024-05-06 RX ADMIN — LEVETIRACETAM 250 MG: 250 TABLET, FILM COATED ORAL at 09:56

## 2024-05-06 RX ADMIN — PANTOPRAZOLE SODIUM 40 MG: 40 TABLET, DELAYED RELEASE ORAL at 05:09

## 2024-05-06 RX ADMIN — WATER 1000 MG: 1 INJECTION INTRAMUSCULAR; INTRAVENOUS; SUBCUTANEOUS at 16:45

## 2024-05-06 RX ADMIN — ALLOPURINOL 200 MG: 100 TABLET ORAL at 09:56

## 2024-05-06 RX ADMIN — PANTOPRAZOLE SODIUM 40 MG: 40 TABLET, DELAYED RELEASE ORAL at 15:38

## 2024-05-06 RX ADMIN — IPRATROPIUM BROMIDE AND ALBUTEROL SULFATE 1 DOSE: 2.5; .5 SOLUTION RESPIRATORY (INHALATION) at 15:24

## 2024-05-06 RX ADMIN — IPRATROPIUM BROMIDE AND ALBUTEROL SULFATE 1 DOSE: 2.5; .5 SOLUTION RESPIRATORY (INHALATION) at 09:30

## 2024-05-06 RX ADMIN — ENOXAPARIN SODIUM 40 MG: 100 INJECTION SUBCUTANEOUS at 09:57

## 2024-05-06 RX ADMIN — BUMETANIDE 1 MG: 2 TABLET ORAL at 09:57

## 2024-05-06 RX ADMIN — PREDNISONE 30 MG: 20 TABLET ORAL at 09:56

## 2024-05-06 RX ADMIN — BUDESONIDE INHALATION 500 MCG: 0.5 SUSPENSION RESPIRATORY (INHALATION) at 09:30

## 2024-05-06 RX ADMIN — CARBIDOPA AND LEVODOPA 1 TABLET: 25; 100 TABLET ORAL at 15:38

## 2024-05-06 RX ADMIN — DULOXETINE HYDROCHLORIDE 60 MG: 30 CAPSULE, DELAYED RELEASE ORAL at 09:56

## 2024-05-06 RX ADMIN — SODIUM CHLORIDE, PRESERVATIVE FREE 10 ML: 5 INJECTION INTRAVENOUS at 09:57

## 2024-05-06 RX ADMIN — GUAIFENESIN 600 MG: 600 TABLET ORAL at 09:56

## 2024-05-06 RX ADMIN — DOCUSATE SODIUM 50 MG AND SENNOSIDES 8.6 MG 2 TABLET: 8.6; 5 TABLET, FILM COATED ORAL at 10:49

## 2024-05-06 RX ADMIN — FOLIC ACID 1 MG: 1 TABLET ORAL at 09:57

## 2024-05-06 RX ADMIN — POLYETHYLENE GLYCOL 3350 17 G: 17 POWDER, FOR SOLUTION ORAL at 05:09

## 2024-05-06 RX ADMIN — CARBIDOPA AND LEVODOPA 1 TABLET: 25; 100 TABLET ORAL at 09:57

## 2024-05-06 NOTE — PROCEDURES
INSTRUMENTAL SWALLOW REPORT  MODIFIED BARIUM SWALLOW / Discharge     NAME: Tova Joseph     : 1945  MRN: [id number]       Date of Eval: 2024     Ordering Physician: Dr. Moss  Referring Diagnosis: Dysphagia    Past Medical History:  has a past medical history of Arthritis, Asthma, Bronchitis chronic, CHF (congestive heart failure) ([facility]), Chronic respiratory failure ([facility]), COPD (chronic obstructive pulmonary disease) ([facility]), Depression, Diabetes mellitus ([facility]), Emphysema of lung ([facility]), GI bleed, Glaucoma, Hypertension, Hypoxemia, Morbid obesity ([facility]), Oxygen dependent, Pneumonia of left upper lobe due to Pseudomonas species ([facility]), Rash, SBO (small bowel obstruction) ([facility]), Shingles, Sleep apnea, and Spontaneous tension pneumothorax.  Past Surgical History:  has a past surgical history that includes hernia repair; Cholecystectomy; Hysterectomy; Colon surgery; Lap Band (10/5/10); Cataract removal with implant (3/9/2011); Cataract removal with implant (3/23/2011); Colonoscopy (13); bronchoscopy (N/A, 2021); bronchoscopy (2021); Upper gastrointestinal endoscopy (N/A, 2021); and IR MIDLINE CATH (4/10/2023).    CXR: 24  IMPRESSION:     CHEST:     1. New patchy branching airspace opacities and component areas of consolidation  of the left lung base suspicious for early pneumonia. Aspiration is not  excluded.  2. Linear nodular opacity in the anterior right upper lobe unchanged from  previous CT imaging from  suggesting an area of scarring.  3. Nodular opacity in the anterior left upper lobe corresponding to previous  PET/CT findings and previous chest CT findings. There is been slight enlargement  in comparison to 2023. Recurrent neoplasm is not excluded. This could be  evaluated further with follow-up chest CT in 3 to 6 months or follow-up PET/CT.  4. Severe emphysema with main pulmonary artery enlargement compatible with  pulmonary hypertension.  5. Gastroesophageal

## 2024-05-06 NOTE — PLAN OF CARE
Problem: Discharge Planning  Goal: Discharge to home or other facility with appropriate resources  5/3/2024 0017 by Dacia Fernandez RN  Outcome: Progressing  Flowsheets (Taken 5/1/2024 0223 by Kamar Meza RN)  Discharge to home or other facility with appropriate resources: Identify barriers to discharge with patient and caregiver  5/2/2024 1139 by Radha Mckeon RN  Outcome: Progressing     Problem: Pain  Goal: Verbalizes/displays adequate comfort level or baseline comfort level  5/3/2024 0017 by Dacia Fernandez RN  Outcome: Progressing  Flowsheets (Taken 5/3/2024 0017)  Verbalizes/displays adequate comfort level or baseline comfort level:   Encourage patient to monitor pain and request assistance   Assess pain using appropriate pain scale   Administer analgesics based on type and severity of pain and evaluate response   Implement non-pharmacological measures as appropriate and evaluate response  5/2/2024 1139 by Radha Mckeon RN  Outcome: Progressing     Problem: Safety - Adult  Goal: Free from fall injury  5/3/2024 0017 by Dacia Fernandez RN  Outcome: Progressing  Flowsheets (Taken 5/3/2024 0017)  Free From Fall Injury: Instruct family/caregiver on patient safety  5/2/2024 1139 by Radha Mckeon RN  Outcome: Progressing     Problem: ABCDS Injury Assessment  Goal: Absence of physical injury  5/3/2024 0017 by Dacia Fernandez RN  Outcome: Progressing  Flowsheets (Taken 5/3/2024 0017)  Absence of Physical Injury: Implement safety measures based on patient assessment  5/2/2024 1139 by Radha Mckeon RN  Outcome: Progressing     Problem: Respiratory - Adult  Goal: Achieves optimal ventilation and oxygenation  5/3/2024 0017 by Dacia Fernandez RN  Outcome: Progressing  Flowsheets (Taken 5/3/2024 0017)  Achieves optimal ventilation and oxygenation:   Assess for changes in respiratory status   Assess for changes in mentation and behavior  5/2/2024 1139 by Radha Mckeon RN  Outcome: 
  Problem: Discharge Planning  Goal: Discharge to home or other facility with appropriate resources  5/3/2024 0934 by Nitza Bolanos RN  Flowsheets (Taken 5/3/2024 0934)  Discharge to home or other facility with appropriate resources:   Identify barriers to discharge with patient and caregiver   Identify discharge learning needs (meds, wound care, etc)   Arrange for needed discharge resources and transportation as appropriate   Refer to discharge planning if patient needs post-hospital services based on physician order or complex needs related to functional status, cognitive ability or social support system     Problem: Pain  Goal: Verbalizes/displays adequate comfort level or baseline comfort level  5/3/2024 0934 by Nitza Bolanos RN  Outcome: Progressing  Flowsheets (Taken 5/3/2024 0934)  Verbalizes/displays adequate comfort level or baseline comfort level:   Encourage patient to monitor pain and request assistance   Administer analgesics based on type and severity of pain and evaluate response   Consider cultural and social influences on pain and pain management   Assess pain using appropriate pain scale   Implement non-pharmacological measures as appropriate and evaluate response   Notify Licensed Independent Practitioner if interventions unsuccessful or patient reports new pain     Problem: Safety - Adult  Goal: Free from fall injury  5/3/2024 0934 by Nitza Bolanos RN  Outcome: Progressing  Flowsheets (Taken 5/3/2024 0934)  Free From Fall Injury: Instruct family/caregiver on patient safety     Problem: ABCDS Injury Assessment  Goal: Absence of physical injury  5/3/2024 0934 by Nitza Bolanos, RN  Outcome: Progressing  Flowsheets (Taken 5/3/2024 0934)  Absence of Physical Injury: Implement safety measures based on patient assessment     Problem: Respiratory - Adult  Goal: Achieves optimal ventilation and oxygenation  5/3/2024 0934 by Nitza Bolanos, RN  Outcome: 
  Problem: Discharge Planning  Goal: Discharge to home or other facility with appropriate resources  5/3/2024 2258 by Ajay Ornelas RN  Outcome: Progressing  Flowsheets (Taken 5/3/2024 2020)  Discharge to home or other facility with appropriate resources: Identify barriers to discharge with patient and caregiver     Problem: Pain  Goal: Verbalizes/displays adequate comfort level or baseline comfort level  5/3/2024 2258 by Ajay Ornelas RN  Outcome: Progressing     Problem: Safety - Adult  Goal: Free from fall injury  5/3/2024 2258 by Ajay Ornelas RN  Outcome: Progressing     Problem: ABCDS Injury Assessment  Goal: Absence of physical injury  5/3/2024 2258 by Ajay Ornelas RN  Outcome: Progressing     Problem: Respiratory - Adult  Goal: Achieves optimal ventilation and oxygenation  5/3/2024 2258 by Ajay Ornelas RN  Outcome: Progressing  Flowsheets (Taken 5/3/2024 2020)  Achieves optimal ventilation and oxygenation: Assess for changes in respiratory status     Problem: Chronic Conditions and Co-morbidities  Goal: Patient's chronic conditions and co-morbidity symptoms are monitored and maintained or improved  Recent Flowsheet Documentation  Taken 5/3/2024 2020 by Ajay Ornelas RN  Care Plan - Patient's Chronic Conditions and Co-Morbidity Symptoms are Monitored and Maintained or Improved: Monitor and assess patient's chronic conditions and comorbid symptoms for stability, deterioration, or improvement  
  Problem: Discharge Planning  Goal: Discharge to home or other facility with appropriate resources  5/5/2024 0037 by Ajay Ornelas RN  Outcome: Progressing     Problem: Pain  Goal: Verbalizes/displays adequate comfort level or baseline comfort level  5/5/2024 0037 by Ajay Ornelas RN  Outcome: Progressing     Problem: Safety - Adult  Goal: Free from fall injury  5/5/2024 0037 by Ajay Ornelas RN  Outcome: Progressing     Problem: ABCDS Injury Assessment  Goal: Absence of physical injury  5/5/2024 0037 by Ajay Ornelas RN  Outcome: Progressing     Problem: Respiratory - Adult  Goal: Achieves optimal ventilation and oxygenation  5/5/2024 0037 by Ajay Ornelas RN  Outcome: Progressing     Problem: Chronic Conditions and Co-morbidities  Goal: Patient's chronic conditions and co-morbidity symptoms are monitored and maintained or improved  5/5/2024 0037 by Ajay Ornelas RN  Outcome: Progressing  Taken 5/4/2024 1934 by Ajay Ornelas RN  Care Plan - Patient's Chronic Conditions and Co-Morbidity Symptoms are Monitored and Maintained or Improved: Monitor and assess patient's chronic conditions and comorbid symptoms for stability, deterioration, or improvement     
  Problem: Discharge Planning  Goal: Discharge to home or other facility with appropriate resources  5/6/2024 0149 by Ajay Ornelas RN  Outcome: Progressing  Flowsheets (Taken 5/5/2024 1930)  Discharge to home or other facility with appropriate resources: Identify barriers to discharge with patient and caregiver     Problem: Pain  Goal: Verbalizes/displays adequate comfort level or baseline comfort level  5/6/2024 0149 by Ajay Ornelas RN  Outcome: Progressing     Problem: Safety - Adult  Goal: Free from fall injury  5/6/2024 0149 by Ajay Ornelas RN  Outcome: Progressing     Problem: ABCDS Injury Assessment  Goal: Absence of physical injury  Outcome: Progressing     Problem: Respiratory - Adult  Goal: Achieves optimal ventilation and oxygenation  5/6/2024 0149 by Ajay Ornelas RN  Outcome: Progressing  Flowsheets (Taken 5/5/2024 1930)  Achieves optimal ventilation and oxygenation: Assess for changes in respiratory status     Problem: Chronic Conditions and Co-morbidities  Goal: Patient's chronic conditions and co-morbidity symptoms are monitored and maintained or improved  5/6/2024 0149 by Ajay Ornelas RN  Outcome: Progressing  Flowsheets (Taken 5/5/2024 1930)  Care Plan - Patient's Chronic Conditions and Co-Morbidity Symptoms are Monitored and Maintained or Improved: Monitor and assess patient's chronic conditions and comorbid symptoms for stability, deterioration, or improvement     
  Problem: Discharge Planning  Goal: Discharge to home or other facility with appropriate resources  Outcome: Adequate for Discharge  Flowsheets (Taken 5/6/2024 0805)  Discharge to home or other facility with appropriate resources: Identify barriers to discharge with patient and caregiver     Problem: Pain  Goal: Verbalizes/displays adequate comfort level or baseline comfort level  Outcome: Adequate for Discharge     Problem: Safety - Adult  Goal: Free from fall injury  Outcome: Adequate for Discharge  Flowsheets (Taken 5/6/2024 0805)  Free From Fall Injury: Instruct family/caregiver on patient safety     Problem: ABCDS Injury Assessment  Goal: Absence of physical injury  Outcome: Adequate for Discharge  Flowsheets (Taken 5/6/2024 0805)  Absence of Physical Injury: Implement safety measures based on patient assessment     Problem: Respiratory - Adult  Goal: Achieves optimal ventilation and oxygenation  Outcome: Adequate for Discharge  Flowsheets (Taken 5/6/2024 0805)  Achieves optimal ventilation and oxygenation: Assess for changes in respiratory status     Problem: Chronic Conditions and Co-morbidities  Goal: Patient's chronic conditions and co-morbidity symptoms are monitored and maintained or improved  Outcome: Adequate for Discharge  Flowsheets (Taken 5/6/2024 0805)  Care Plan - Patient's Chronic Conditions and Co-Morbidity Symptoms are Monitored and Maintained or Improved: Monitor and assess patient's chronic conditions and comorbid symptoms for stability, deterioration, or improvement     Problem: Cardiovascular - Adult  Goal: Maintains optimal cardiac output and hemodynamic stability  Outcome: Adequate for Discharge  Goal: Absence of cardiac dysrhythmias or at baseline  Outcome: Adequate for Discharge     Problem: Metabolic/Fluid and Electrolytes - Adult  Goal: Electrolytes maintained within normal limits  Outcome: Adequate for Discharge  Goal: Hemodynamic stability and optimal renal function 
  Problem: Discharge Planning  Goal: Discharge to home or other facility with appropriate resources  Outcome: Progressing  Flowsheets (Taken 5/1/2024 0223)  Discharge to home or other facility with appropriate resources: Identify barriers to discharge with patient and caregiver  Note: From home with .  No D/C needs identified at this time.      Problem: Pain  Goal: Verbalizes/displays adequate comfort level or baseline comfort level  Outcome: Progressing  Flowsheets (Taken 5/1/2024 0223)  Verbalizes/displays adequate comfort level or baseline comfort level: Assess pain using appropriate pain scale  Note: No complaints of pain this shift.       Problem: Safety - Adult  Goal: Free from fall injury  Note: Identified as a medium fall risk.  Fall precautions in place and bed alarm in use.  Bed in locked and low position.  SR up x3.       Problem: Respiratory - Adult  Goal: Achieves optimal ventilation and oxygenation  Outcome: Progressing  Flowsheets (Taken 5/1/2024 0225)  Achieves optimal ventilation and oxygenation:   Assess for changes in respiratory status   Oxygen supplementation based on oxygen saturation or arterial blood gases  Note: On Vapotherm at 20L/70% FiO2.  Lungs diminished.  Denies SOB. Encourage cough and deep breathing.  HOB elevated.       
  Problem: Discharge Planning  Goal: Discharge to home or other facility with appropriate resources  Outcome: Progressing  Flowsheets (Taken 5/5/2024 1928)  Discharge to home or other facility with appropriate resources:   Identify barriers to discharge with patient and caregiver   Arrange for needed discharge resources and transportation as appropriate   Identify discharge learning needs (meds, wound care, etc)  Note: Dc Monday or Tuesday depending on o2 requirement when working with pt/ot     Problem: Pain  Goal: Verbalizes/displays adequate comfort level or baseline comfort level  Outcome: Progressing  Flowsheets (Taken 5/5/2024 1928)  Verbalizes/displays adequate comfort level or baseline comfort level:   Encourage patient to monitor pain and request assistance   Assess pain using appropriate pain scale     Problem: Safety - Adult  Goal: Free from fall injury  Outcome: Progressing  Flowsheets (Taken 5/5/2024 1928)  Free From Fall Injury: Instruct family/caregiver on patient safety     Problem: Respiratory - Adult  Goal: Achieves optimal ventilation and oxygenation  Outcome: Progressing  Flowsheets (Taken 5/5/2024 1928)  Achieves optimal ventilation and oxygenation:   Assess for changes in respiratory status   Assess for changes in mentation and behavior   Position to facilitate oxygenation and minimize respiratory effort  Note: High flow 6L     Problem: Chronic Conditions and Co-morbidities  Goal: Patient's chronic conditions and co-morbidity symptoms are monitored and maintained or improved  Outcome: Progressing  Flowsheets (Taken 5/5/2024 1928)  Care Plan - Patient's Chronic Conditions and Co-Morbidity Symptoms are Monitored and Maintained or Improved:   Monitor and assess patient's chronic conditions and comorbid symptoms for stability, deterioration, or improvement   Collaborate with multidisciplinary team to address chronic and comorbid conditions and prevent exacerbation or deterioration   Update acute 
General Internal Medicine Attending     Chart and data reviewed.  Patient seen and examined, case discussed with medical resident.   Physical exam repeated.  Labs and imaging studies reviewed.      Agree with documentation, assessment and plan as outlined            Sepsis sec to bacterial pneumonia likely aspiration etiology .   -P/w fever T 100.9, tachycardia 90's.  Reports SOB, productive cough and URI symptoms past few days. CT chest showed consolidation left lung.   - Was started on azithro+rocephin  - blood cxX2; NGTD  - Monitor vitals and labs     Pneumonia likely aspiration etiology sec to complication of lap band: POA  Also has Parkinson's disease, hence at risk for dysphagia  - Reports SOB, productive cough and URI symptoms past few days. CT chest showed consolidation left lung.   - CT showed dilated esophagus with significant retained fluid. Fluid removed from Lap band using a straight mcintosh needle-approx 6 cc obtained of clear fluid.   - Was started on azithro+rocephin  - appears strep pneumo. Complete ceftriaxone txt  - Gen surgery follow up     Esophagus dilation with dysphagia sec to esophageal obstruction ,  fluid retention,  complication of band gastroplasty.   - improved with aspiration of fluid by general surgery  - gen surg following: Follow-up with Dr. Mckeon 2 weeks after discharge   - diet per surgery     Acute hypercapnic hypoxic resp failure: POA  Chronic respiratory failure sec to COPD: POA   On home 3-4L NC. Noted to have vbg pH 7.283 , pCO2 72.   2/2 COPD exacerbation and bacterial pneumonia  Pt could not tolerate BIPAP  - On vapotherm wean as able  - Steroids, breathing tx  - wean O2 as tolerated : baseline 4-5 L  - mucinex, symptomatic treatment     Severe COPD: POA  - Reviewed last PFTs and O/P Pulm notes.   - On Home O2 4-5 L at baseline  - Follows with Radames Causey, who saw her in 2/2024; also follows her lung nodule  - O/p F/u with Pulmonary at discharge     Chronic diastolic CHF.  
Intervention: Speech Evaluation/treatment  SLP completed evaluation. Please refer to notes in EMR.    
Vapotherm at 15L, 40%FIO2, attempt to wean to pt home baseline 4L NC. Pt speech evaluation shows - Recommended Diet: regular with thin liquids-Make NPO if s/s aspiration emerge and alert SLP.    Problem: Discharge Planning  Goal: Discharge to home or other facility with appropriate resources  Outcome: Progressing     Problem: Pain  Goal: Verbalizes/displays adequate comfort level or baseline comfort level  Outcome: Progressing     Problem: Safety - Adult  Goal: Free from fall injury  Outcome: Progressing     Problem: ABCDS Injury Assessment  Goal: Absence of physical injury  Outcome: Progressing     Problem: Respiratory - Adult  Goal: Achieves optimal ventilation and oxygenation  Outcome: Progressing     Problem: Chronic Conditions and Co-morbidities  Goal: Patient's chronic conditions and co-morbidity symptoms are monitored and maintained or improved  Outcome: Progressing     
Improved:   Collaborate with multidisciplinary team to address chronic and comorbid conditions and prevent exacerbation or deterioration   Update acute care plan with appropriate goals if chronic or comorbid symptoms are exacerbated and prevent overall improvement and discharge   Monitor and assess patient's chronic conditions and comorbid symptoms for stability, deterioration, or improvement

## 2024-05-06 NOTE — DISCHARGE SUMMARY
INTERNAL MEDICINE DEPARTMENT AT THE [facility]  DISCHARGE SUMMARY    Patient ID: Tova Joseph                                             Discharge Date: 5/6/24   Patient's PCP: Jimmy Plascencia MD                                          Discharge Physician: Dilan Moss MD  Admit Date: 4/30/2024   Admitting Physician: Elmer Cruz MD    PROBLEMS DURING HOSPITALIZATION:  Present on Admission:   Gastric band slippage   Acute hypercapnic respiratory failure (HCC)      HPI:  Tova Joseph is a 79 y.o. female with PMH of gastric banding (2010), Parkinson's disease, COPD, PE (has IVC filter due to GI bleed), CHF who presents with cough and fever. Pt presented initially to [place] ED where she was having nausea , dysphagia, and vomiting. Pt has history of lap band.   She was found to have fluid around the Lap-Band. CT abd showed esophagus dilation with significant retained fluid. Pt was transferred to [facility] ED. Surgery was consulted and fluid was removed with resolution of her symptoms. Pt had repeat CT chest/abd which showed resolution of dilation. Pt was noted to have fever with productive cough. CT chest showed suspected aspiration pneumonia. Pt blood gas showed hypercapnia which was worse on repeat. Discussed with ED, will admit pt to med surg  for acute hypercapnia resp failure  workup and management.     The following issues were addressed during hospitalization:    She met criteria for sepsis. Sepsis etiology was most likely due to bacterial pneumonia due to symptomatology, CT findings, and positive strep pneum urine antigen test. Patient treated with azithromycin & ceftriaxone. A component of COPD was suspected to be contributing to her overall picture. Therefore, steroids were also given.  During hospitalization, she had high oxygen requirements. By discharge, she was back to her home O2 use of 4 L. The pneumonia itself might have been due to aspiration in context of Parkinson's. Speech pathology

## 2024-05-06 NOTE — RT PROTOCOL NOTE
RT Nebulizer Bronchodilator Protocol Note    There is a bronchodilator order in the chart from a provider indicating to follow the RT Bronchodilator Protocol and there is an “Initiate RT Bronchodilator Protocol” order as well (see protocol at bottom of note).    CXR Findings:  No results found.    The findings from the last RT Protocol Assessment were as follows:  Smoking: Chronic pulmonary disease  Respiratory Pattern: Regular pattern and RR 12-20 bpm  Breath Sounds: Slightly diminished and/or crackles  Cough: Strong, spontaneous, non-productive  Indication for Bronchodilator Therapy: Decreased or absent breath sounds, On home bronchodilators  Bronchodilator Assessment Score: 4    Aerosolized bronchodilator medication orders have been revised according to the RT Nebulizer Bronchodilator Protocol below.    Respiratory Therapist to perform RT Therapy Protocol Assessment initially then follow the protocol.  Repeat RT Therapy Protocol Assessment PRN for score 0-3 or on second treatment, BID, and PRN for scores above 3.    No Indications - adjust the frequency to every 6 hours PRN wheezing or bronchospasm, if no treatments needed after 48 hours then discontinue using Per Protocol order mode.     If indication present, adjust the RT bronchodilator orders based on the Bronchodilator Assessment Score as indicated below.  If a patient is on this medication at home then do not decrease Frequency below that used at home.    0-3 - enter or revise RT bronchodilator order(s) to equivalent RT Bronchodilator order with Frequency of every 4 hours PRN for wheezing or increased work of breathing using Per Protocol order mode.       4-6 - enter or revise RT Bronchodilator order(s) to two equivalent RT bronchodilator orders with one order with BID Frequency and one order with Frequency of every 4 hours PRN wheezing or increased work of breathing using Per Protocol order mode.         7-10 - enter or revise RT Bronchodilator order(s) to  Face Mask

## 2024-05-06 NOTE — CARE COORDINATION
Case Management Assessment  Initial Evaluation    Date/Time of Evaluation: 5/1/2024 10:10 AM  Assessment Completed by: Eric Yarbrough RN    If patient is discharged prior to next notation, then this note serves as note for discharge by case management.    Patient Name: Toav Joseph                   YOB: 1945  Diagnosis: Lung mass [R91.8]  Gastric outlet obstruction [K31.1]  Pneumonia of lower lobe due to infectious organism, unspecified laterality [J18.9]  Acute hypercapnic respiratory failure (HCC) [J96.02]                   Date / Time: 4/30/2024 11:04 AM    Patient Admission Status: Inpatient   Readmission Risk (Low < 19, Mod (19-27), High > 27): Readmission Risk Score: 18.8    Current PCP: Jimmy Plascencia MD  PCP verified by CM? Yes    Chart Reviewed: Yes      History Provided by: Patient, Medical Record  Patient Orientation: Alert and Oriented    Patient Cognition: Alert    Hospitalization in the last 30 days (Readmission):  No    If yes, Readmission Assessment in  Navigator will be completed and shown below.          Advance Directives:      Code Status: Full Code   Patient's Primary Decision Maker is: Legal Next of Kin    Primary Decision Maker: Bran Joseph W - Spouse - [phone]    Secondary Decision Maker: Nimo Hernandez - Child - [phone]    Discharge Planning:    Patient lives with: Spouse/Significant Other Type of Home: House  Primary Care Giver: Self  Patient Support Systems include: Spouse/Significant Other   Current Financial resources: Medicare  Current community resources:    Current services prior to admission: Home Care, Oxygen Therapy            Current DME:              Type of Home Care services:  None    ADLS  Prior functional level: Toileting  Current functional level: Independent in ADLs/IADLs    PT AM-PAC:   /24  OT AM-PAC:   /24    Family can provide assistance at DC: Yes  Would you like Case Management to discuss the discharge plan with any other family 
home medications/ co-pay costs: Yes    ADLS:  Current PT AM-PAC Score: 17 /24  Current OT AM-PAC Score: 18 /24    DISCHARGE Disposition: Home with Home Health Care: [facility]     LOC at discharge: Not Applicable  SHAHID Completed: Not Indicated    Notification completed in HENS/PAS?:  Not Applicable    Transportation PLAN for discharge: family   Mode of Transport: Private Car  Reason for medical transport: Not Applicable      Home Care:  Home Care ordered at discharge: Yes  [facility]  Orders faxed: Yes agency to pull from Epic     Home Oxygen and Respiratory Equipment:  Oxygen needed at discharge?: Yes  Home Oxygen Company: [employer] Phone: [phone]   Portable tank available for discharge?: Yes      Additional CM Notes: Pt to discharge home with spouse. HHC orders called into [facility]. Radha will pull from [facility]. Pt has portable O2 for transport. Family to transport.     COVID Result:    Lab Results   Component Value Date/Time    COVID19 Not Detected 04/30/2024 01:27 PM    COVID19 NOT DETECTED 04/30/2024 12:57 AM       The Plan for Transition of Care is related to the following treatment goals of Lung mass [R91.8]  Gastric outlet obstruction [K31.1]  Pneumonia of lower lobe due to infectious organism, unspecified laterality [J18.9]  Acute hypercapnic respiratory failure (HCC) [J96.02]    The Patient and/or patient representative Tova and her family were provided with a choice of provider and agrees with the discharge plan Yes    Freedom of choice list was provided with basic dialogue that supports the patient's individualized plan of care/goals and shares the quality data associated with the providers. Yes    Care Transitions patient: No    Teresa Fernandez RN  [facility]  Case Management Department  Ph: [phone]  Fax: [phone]

## 2024-05-06 NOTE — PROGRESS NOTES
Speech Language Pathology  Facility/Department:[address]  Dysphagia Therapy Note    Name: Tova Joseph  : 1945  MRN: [id number]                                                       Patient Diagnosis(es):   Patient Active Problem List    Diagnosis Date Noted    Gastric band slippage 2024    Acute hypercapnic respiratory failure ([facility]) 2024    Acute on chronic heart failure with preserved ejection fraction ([facility]) 2023    Decompensated heart failure ([facility]) 08/10/2023    Bilateral lower extremity edema 2023    Acute on chronic diastolic (congestive) heart failure ([facility]) 2023    Chest pain 2023    PVC's (premature ventricular contractions) 2023    COPD exacerbation ([facility]) 2023    Shortness of breath 2023    Community acquired bacterial pneumonia 04/10/2023    Acute on chronic respiratory failure with hypoxia ([facility]) 04/10/2023    Anemia of chronic renal failure 04/10/2023    Edema 04/10/2023    Pneumonia due to infectious organism 04/10/2023    Tremor of left hand 04/10/2023    Hx of cancer of lung 04/10/2023    Stage 3b chronic kidney disease ([facility]) 04/10/2023    Presence of IVC filter 2022    History of pulmonary embolism     Hyperuricemia 2021    Ineffective airway clearance 2021    Stress incontinence 2018    Chronic kidney disease 2017    Knee osteoarthritis 2015    Periodic limb movement disorder (PLMD) 2015    Prediabetes 2013    Chronic respiratory failure with hypoxia, on home O2 therapy ([facility])     Acute exacerbation of chronic obstructive pulmonary disease (COPD) ([facility]) 04/15/2011    PARK (obstructive sleep apnea) 04/15/2011    Status post gastric banding 2011    Benign essential HTN 2010    Pulmonary emphysema ([facility]) 2010    Arthritis 2010    Morbid obesity with BMI of 40.0-44.9, adult ([facility]) 2010       Past Medical History:   Diagnosis Date    Arthritis     Asthma     
     Internal Medicine Progress Note    Date: 5/3/2024   Patient: Tova Joseph   [facility] Day: 3    CC: Transfer from [facility] (Pt. Sent over from [facility] for possible obstruction of gastroplasty. )    Interval Hx   VSS. Still on HFNC 40%, 15L. She states she initially did not sleep well but was given a \"sleeping medicine\" and was subsequently able to sleep. She notes she feels \"hazy\" but does not specify further. She does complain of her chronic ongoing tremors for which she takes medications at home. No chills, abdominal pain, nausea, vomiting, SOB, chest pain. She has been tolerating her meals but requests molasses for her waffles.     HPI:   Tova Joseph is a 79 y.o. female with PMH of gastric banding (2010), Parkinson's disease, COPD, PE (has IVC filter due to GI bleed), CHF who presents with cough and fever. Pt presented initially to [place] ED where she was having nausea , dysphagia, and vomiting. Pt has history of lap band.   She was found to have fluid around the Lap-Band. CT abd showed esophagus dilation with significant retained fluid. Pt was transferred to [facility] ED. Surgery was consulted and fluid was removed with resolution of her symptoms. Pt had repeat CT chest/abd which showed resolution of dilation. Pt was noted to have fever with productive cough. CT chest showed suspected aspiration pneumonia. Pt blood gas showed hypercapnia which was worse on repeat. Pt was admitted for acute hypercapnia resp failure workup and management.     Objective     Vital Signs:  Patient Vitals for the past 8 hrs:   BP Temp Temp src Pulse Resp SpO2 Weight   05/03/24 0849 136/66 98 °F (36.7 °C) Oral 93 20 93 % --   05/03/24 0815 -- -- -- 87 20 92 % --   05/03/24 0620 -- -- -- -- -- -- 87.3 kg (192 lb 7.4 oz)   05/03/24 0401 -- -- -- -- 18 96 % --   05/03/24 0354 106/62 98 °F (36.7 °C) Oral 95 18 95 % --       Physical Exam  Constitutional:       Appearance: She is obese.      Comments: Adult female pt is 
     Internal Medicine Progress Note    Date: 5/4/2024   Patient: Tova Joseph   [facility] Day: 4    CC: Transfer from [facility] (Pt. Sent over from [facility] for possible obstruction of gastroplasty. )    Interval Hx   VSS. Still on HFNC 40%, 15L.  Patient feels like her breathing is better.  She does not feel short of breath she feels good.  She denies any nausea vomiting abdominal pain.  Sitting up on the side of the bed eating breakfast     HPI:   Tova Joseph is a 79 y.o. female with PMH of gastric banding (2010), Parkinson's disease, COPD, PE (has IVC filter due to GI bleed), CHF who presents with cough and fever. Pt presented initially to [place] ED where she was having nausea , dysphagia, and vomiting. Pt has history of lap band.   She was found to have fluid around the Lap-Band. CT abd showed esophagus dilation with significant retained fluid. Pt was transferred to [facility] ED. Surgery was consulted and fluid was removed with resolution of her symptoms. Pt had repeat CT chest/abd which showed resolution of dilation. Pt was noted to have fever with productive cough. CT chest showed suspected aspiration pneumonia. Pt blood gas showed hypercapnia which was worse on repeat. Pt was admitted for acute hypercapnia resp failure workup and management.     Objective     Vital Signs:  Patient Vitals for the past 8 hrs:   BP Temp Temp src Pulse Resp SpO2   05/04/24 1216 -- -- -- 86 20 91 %   05/04/24 0859 (!) 129/58 98 °F (36.7 °C) Oral 100 23 91 %   05/04/24 0853 -- -- -- 89 20 (!) 89 %   05/04/24 0829 -- -- -- 87 20 91 %       Physical Exam  Constitutional:       Appearance: She is obese.      Comments: Adult female pt is sitting up in bed, eating breakfast. She is in NAD   HENT:      Nose:      Comments: Vapotherm in place  Cardiovascular:      Rate and Rhythm: Normal rate and regular rhythm.   Pulmonary:      Effort: Pulmonary effort is normal.      Comments: Distant breath sounds in the bases. Poor 
     Internal Medicine Progress Note    Date: 5/5/2024   Patient: Tova Joseph   [facility] Day: 5    CC: Transfer from [facility] (Pt. Sent over from [facility] for possible obstruction of gastroplasty. )    Interval Hx   VSS.   Patient feels like her breathing feels the same, char SOB.  She does not feel short of breath she feels good.  She denies any nausea vomiting abdominal pain.  Sitting up on the side of the bed eating breakfast. Was on vapotherm 35% 12L asked RT to switch to HFNC. Pt currently tolerating on 6L    HPI:   Tova Joseph is a 79 y.o. female with PMH of gastric banding (2010), Parkinson's disease, COPD, PE (has IVC filter due to GI bleed), CHF who presents with cough and fever. Pt presented initially to [place] ED where she was having nausea , dysphagia, and vomiting. Pt has history of lap band.   She was found to have fluid around the Lap-Band. CT abd showed esophagus dilation with significant retained fluid. Pt was transferred to [facility] ED. Surgery was consulted and fluid was removed with resolution of her symptoms. Pt had repeat CT chest/abd which showed resolution of dilation. Pt was noted to have fever with productive cough. CT chest showed suspected aspiration pneumonia. Pt blood gas showed hypercapnia which was worse on repeat. Pt was admitted for acute hypercapnia resp failure workup and management.     Objective     Vital Signs:  Patient Vitals for the past 8 hrs:   BP Temp Temp src Pulse Resp SpO2 Weight   05/05/24 0926 -- -- -- 71 22 97 % --   05/05/24 0925 -- -- -- 78 22 91 % --   05/05/24 0840 (!) 154/68 98.4 °F (36.9 °C) Oral 85 21 90 % --   05/05/24 0530 -- -- -- -- -- 92 % --   05/05/24 0444 -- -- -- -- -- -- 87.4 kg (192 lb 10.9 oz)   05/05/24 0437 111/68 98 °F (36.7 °C) Oral 87 20 93 % --   05/05/24 0329 -- -- -- -- -- 93 % --   05/05/24 0257 -- -- -- -- -- 93 % --       Physical Exam  Constitutional:       Appearance: She is obese.      Comments: Adult female pt is 
    Speech Language Pathology  Facility/Department:[facility]  Bedside Swallowing Evaluation  Dysphagia treatment   Name: Tova Joseph  : 1945  MRN: [id number]                                                       Patient Diagnosis(es):   Patient Active Problem List    Diagnosis Date Noted    Gastric band slippage 2024    Acute hypercapnic respiratory failure ([facility]) 2024    Acute on chronic heart failure with preserved ejection fraction ([facility]) 2023    Decompensated heart failure ([facility]) 08/10/2023    Bilateral lower extremity edema 2023    Acute on chronic diastolic (congestive) heart failure ([facility]) 2023    Chest pain 2023    PVC's (premature ventricular contractions) 2023    COPD exacerbation ([facility]) 2023    Shortness of breath 2023    Community acquired bacterial pneumonia 04/10/2023    Acute on chronic respiratory failure with hypoxia ([facility]) 04/10/2023    Anemia of chronic renal failure 04/10/2023    Edema 04/10/2023    Pneumonia due to infectious organism 04/10/2023    Tremor of left hand 04/10/2023    Hx of cancer of lung 04/10/2023    Stage 3b chronic kidney disease ([facility]) 04/10/2023    Presence of IVC filter 2022    History of pulmonary embolism     Hyperuricemia 2021    Ineffective airway clearance 2021    Stress incontinence 2018    Chronic kidney disease 2017    Knee osteoarthritis 2015    Periodic limb movement disorder (PLMD) 2015    Prediabetes 2013    Chronic respiratory failure with hypoxia, on home O2 therapy ([facility])     Acute exacerbation of chronic obstructive pulmonary disease (COPD) ([facility]) 04/15/2011    PARK (obstructive sleep apnea) 04/15/2011    Status post gastric banding 2011    Benign essential HTN 2010    Pulmonary emphysema ([facility]) 2010    Arthritis 2010    Morbid obesity with BMI of 40.0-44.9, adult ([facility]) 2010       Past Medical History:   Diagnosis Date    
   Progress Note  Admit Date: 4/30/2024           CC: F/U for cough and fever.     79 y.o. female with PMH of gastric banding (2010), Parkinson's disease, COPD, PE (has IVC filter due to GI bleed), and CHF who presented with cough and fever.     Pt presented initially to [place] ED where she was having nausea, dysphagia, and vomiting.     Pt has history of lap band.   She was found to have fluid around the Lap-Band. CT abd showed esophagus dilation with significant retained fluid.     Pt was transferred to [facility] ED. Surgery was consulted and fluid was removed with resolution of her symptoms. Pt had repeat CT chest/abd which showed resolution of dilation.     Pt was noted to have fever with productive cough. CT chest showed suspected aspiration pneumonia. Pt blood gas showed hypercapnia which was worse on repeat.       Interval History: Sitting out in bed, family at bedside    Still on a lot of O2    Less dyspneic    No fever      Review of Systems - Negative except as in HPI.     Scheduled Medications:    levETIRAcetam  250 mg Oral TID    allopurinol  200 mg Oral Daily    primidone  100 mg Oral Nightly    bumetanide  1 mg Oral Daily    azithromycin  500 mg IntraVENous Q24H    cefTRIAXone (ROCEPHIN) IV  1,000 mg IntraVENous Q24H    sodium chloride flush  5-40 mL IntraVENous 2 times per day    enoxaparin  40 mg SubCUTAneous Daily    ipratropium 0.5 mg-albuterol 2.5 mg  1 Dose Inhalation Q4H WA RT    [Held by provider] amLODIPine  5 mg Oral Daily    carbidopa-levodopa  1 tablet Oral TID    DULoxetine  60 mg Oral Daily    budesonide  0.5 mg Nebulization BID RT    folic acid  1 mg Oral Daily    [Held by provider] losartan  50 mg Oral Daily    pantoprazole  40 mg Oral BID AC    methylPREDNISolone  60 mg IntraVENous Q12H    guaiFENesin  600 mg Oral BID      PRN Medications: sodium chloride flush, sodium chloride, potassium chloride **OR** potassium alternative oral replacement **OR** potassium chloride, magnesium sulfate, 
4 Eyes Skin Assessment     NAME:  Tova Joseph  YOB: 1945  MEDICAL RECORD NUMBER:  [id number]    The patient is being assessed for  Admission    I agree that at least one RN has performed a thorough Head to Toe Skin Assessment on the patient. ALL assessment sites listed below have been assessed.      Areas assessed by both nurses:    Head, Face, Ears, Shoulders, Back, Chest, Arms, Elbows, Hands, Sacrum. Buttock, Coccyx, Ischium, Legs. Feet and Heels, and Under Medical Devices         Does the Patient have a Wound? No noted wound(s)    Redness under left breast.    Blanchable redness to bilateral heels and inner buttocks  Redness bilateral cheeks       Anoop Prevention initiated by RN: Yes  Wound Care Orders initiated by RN: No    Pressure Injury (Stage 3,4, Unstageable, DTI, NWPT, and Complex wounds) if present, place Wound referral order by RN under : No    New Ostomies, if present place, Ostomy referral order under : No     Nurse 1 eSignature: Electronically signed by Kamar Meza RN on 5/1/24 at 1:05 AM EDT    **SHARE this note so that the co-signing nurse can place an eSignature**    Nurse 2 eSignature: Electronically signed by Vidal Muller RN on 5/1/24 at 12:25 AM EDT    
Attempted to place pt on BIPAP per Dr. Cruz's orders. Pt refused, says she does not want anything on her face and is nauseous. MD notified.   
Department of General Surgery - Adult  Surgical Service Bariatrics  Progress Notes      CC:  dilated esophagus with gastric band    Interval History:    Tolerating clears, pain is well controlled, denies no nausea, no vomiting      PHYSICAL EXAM:    VITALS:  BP (!) 152/69   Pulse 94   Temp 98.6 °F (37 °C) (Oral)   Resp 16   Ht 1.6 m (5' 2.99\")   Wt 87.3 kg (192 lb 7.4 oz)   SpO2 95%   BMI 34.10 kg/m²   24HR INTAKE/OUTPUT:    Intake/Output Summary (Last 24 hours) at 5/1/2024 1035  Last data filed at 5/1/2024 1005  Gross per 24 hour   Intake 370 ml   Output 175 ml   Net 195 ml     CONSTITUTIONAL:  awake, alert, pleasant  LUNGS:  symmetrical chest rise  CARDIOVASCULAR:  perfused  ABDOMEN:  soft, obese, non-distended, non-tender,  abd scar noted at port site and midline  NEUROLOGIC:  oriented to name, place, date  SKIN:  warm and dry  EXT:  trace edema    DATA:    CBC:   Lab Results   Component Value Date/Time    WBC 12.5 05/01/2024 04:42 AM    RBC 2.84 05/01/2024 04:42 AM    HGB 9.1 05/01/2024 04:42 AM    HCT 28.0 05/01/2024 04:42 AM    MCV 98.3 05/01/2024 04:42 AM    MCH 32.1 05/01/2024 04:42 AM    MCHC 32.6 05/01/2024 04:42 AM    RDW 14.6 05/01/2024 04:42 AM     05/01/2024 04:42 AM    MPV 8.7 05/01/2024 04:42 AM     CMP:    Lab Results   Component Value Date/Time     05/01/2024 04:42 AM    K 4.4 05/01/2024 04:42 AM     05/01/2024 04:42 AM    CO2 30 05/01/2024 04:42 AM    BUN 22 05/01/2024 04:42 AM    CREATININE 1.0 05/01/2024 04:42 AM    GFRAA 41 12/27/2021 03:31 PM    GFRAA >60 02/28/2013 04:40 AM    AGRATIO 1.2 03/29/2024 11:35 PM    LABGLOM 34 12/27/2021 03:31 PM    GLUCOSE 114 05/01/2024 04:42 AM    PROT 6.9 02/25/2013 02:31 AM    CALCIUM 9.3 05/01/2024 04:42 AM    BILITOT <0.2 03/29/2024 11:35 PM    ALKPHOS 175 03/29/2024 11:35 PM    AST 14 03/29/2024 11:35 PM    ALT <5 03/29/2024 11:35 PM     BMP:    Lab Results   Component Value Date/Time     05/01/2024 04:42 AM    K 4.4 
Department of General Surgery - Adult  Surgical Service Bariatrics  Progress Notes      CC:  dilated esophagus with gastric band    Interval History:    Tolerating soft diet. Denies nausea or vomiting. No abdominal pain.      PHYSICAL EXAM:    VITALS:  /67   Pulse 79   Temp 98.1 °F (36.7 °C) (Oral)   Resp 18   Ht 1.6 m (5' 2.99\")   Wt 87.3 kg (192 lb 7.4 oz)   SpO2 98%   BMI 34.10 kg/m²   24HR INTAKE/OUTPUT:    Intake/Output Summary (Last 24 hours) at 5/2/2024 0706  Last data filed at 5/2/2024 0650  Gross per 24 hour   Intake 1484.56 ml   Output 1225 ml   Net 259.56 ml       CONSTITUTIONAL:  awake, alert, pleasant  LUNGS:  symmetrical chest rise, on HFNC 15L, minimal increased work of breathing  CARDIOVASCULAR:  perfused  ABDOMEN:  soft, obese, non-distended, non-tender,  abd scar noted at port site and midline  NEUROLOGIC:  oriented to name, place, date  SKIN:  warm and dry  EXT:  trace edema    DATA:    CBC:   Lab Results   Component Value Date/Time    WBC 8.3 05/02/2024 04:58 AM    RBC 2.68 05/02/2024 04:58 AM    HGB 8.8 05/02/2024 04:58 AM    HCT 26.4 05/02/2024 04:58 AM    MCV 98.5 05/02/2024 04:58 AM    MCH 32.7 05/02/2024 04:58 AM    MCHC 33.2 05/02/2024 04:58 AM    RDW 14.7 05/02/2024 04:58 AM     05/02/2024 04:58 AM    MPV 9.6 05/02/2024 04:58 AM     CMP:    Lab Results   Component Value Date/Time     05/02/2024 04:58 AM    K 4.3 05/02/2024 04:58 AM    K 4.4 05/01/2024 04:42 AM    CL 99 05/02/2024 04:58 AM    CO2 30 05/02/2024 04:58 AM    BUN 23 05/02/2024 04:58 AM    CREATININE 0.9 05/02/2024 04:58 AM    GFRAA 41 12/27/2021 03:31 PM    GFRAA >60 02/28/2013 04:40 AM    AGRATIO 1.2 03/29/2024 11:35 PM    LABGLOM 34 12/27/2021 03:31 PM    GLUCOSE 127 05/02/2024 04:58 AM    PROT 6.9 02/25/2013 02:31 AM    CALCIUM 9.2 05/02/2024 04:58 AM    BILITOT <0.2 03/29/2024 11:35 PM    ALKPHOS 175 03/29/2024 11:35 PM    AST 14 03/29/2024 11:35 PM    ALT <5 03/29/2024 11:35 PM     BMP:    Lab 
Now drinking contrast without difficulty.  States she feels much better after fluid removal and is not having the struggles with drinking  
Occupational Therapy  Facility/Department: [address]  Occupational Therapy Initial Assessment/Tx Note    Name: Tova Joseph  : 1945  MRN: [id number]  Date of Service: 2024    Assessment: Pt demonstrates a decline in endurance, balance, and ADL independence. She is off on her Parkinson's meds and has been out of bed very little since admit on . On eval, she demonstrates ability to ambulate short household distances SBA to CGA with her rollator. She may need a bit of assist initially with lower body ADLs; she reports her  can provide this as needed. Pt verbalizes good awareness of her abilities and limitations. Recommend initial 24 hr A and home OT/PT at d/c.    Discharge Recommendations:  24 hour supervision or assist, Home with Home health OT    OT Equipment Recommendations  Equipment Needed: No     Treatment Diagnosis: Decreased activity tolerance, impaired ADLs and mobility      Assessment   Performance deficits / Impairments: Decreased functional mobility ;Decreased ADL status;Decreased endurance;Decreased balance  Treatment Diagnosis: Decreased activity tolerance, impaired ADLs and mobility  Decision Making: Medium Complexity  REQUIRES OT FOLLOW-UP: Yes  Activity Tolerance  Activity Tolerance: Patient Tolerated treatment well  Activity Tolerance Comments: Pt on 3L O2 (baseline 4L). spO2 93% at rest. Delayed drop to 79-84% within about 30 seconds of short walks/activity recovering to 88% within about 1 min. Educated on appropriate activity levels at home, gradually increasing as tolerated - verb understanding        Plan    Occupational Therapy Plan  Times Per Week: 2-5x  Times Per Day: Once a day  Current Treatment Recommendations: Balance training, Strengthening, Functional mobility training, Endurance training, Safety education & training, Gait training, Patient/Caregiver education & training, Equipment evaluation, education, & procurement, Self-Care / ADL     Restrictions  Position 
Patient admitted to room 4306 via wheelchair from 5S at 2330.  Patient alert and oriented x3. VSS.  On Vapotherm 20L/70% FiO2.  SpO2 100%.  Denies SOB.  Lungs diminished.  Congested non-productive cough.  Abdomen soft and non-tender.  Denies pain or nausea.  Oriented to room and reviewed plan of care.  Admission completed except med rec.  Patient has a list of meds but no dosages.  Family to bring in dosages tomorrow.  Pharmacy also reviewing.  Noted order for VBG from earlier.  Oncall NP made aware, stated to get now.  pH 7.33, Co2 60.7.  Patient resting comfortably.  Fall precautions in place and bed alarm in use.  Bed in locked and low position.  SR up x3.    
jaundiced.      Findings: No bruising.   Neurological:      Mental Status: She is alert. Mental status is at baseline.       Labs:  CBC:   Recent Labs     05/03/24  0833 05/04/24  0724 05/05/24  0819   WBC 6.7 5.0 5.2   HGB 9.5* 9.2* 9.5*   HCT 29.0* 28.5* 29.4*    193 192       BMP:   Recent Labs     05/03/24  0833 05/04/24  0724 05/05/24  0819    137 139   K 3.8 4.2 3.4*   CL 97* 97* 97*   CO2 32 34* 34*   BUN 23* 21* 23*   CREATININE 1.0 0.9 1.0   GLUCOSE 117* 105* 87   PHOS 3.0 3.5 3.4     Magnesium:   Recent Labs     05/03/24  0833 05/04/24 0724 05/05/24 0819   MG 2.00 1.90 1.80     Radiology:  CT CHEST ABDOMEN PELVIS WO CONTRAST Additional Contrast? Oral   Final Result      CHEST:      1. New patchy branching airspace opacities and component areas of consolidation   of the left lung base suspicious for early pneumonia. Aspiration is not   excluded.   2. Linear nodular opacity in the anterior right upper lobe unchanged from   previous CT imaging from 2023 suggesting an area of scarring.   3. Nodular opacity in the anterior left upper lobe corresponding to previous   PET/CT findings and previous chest CT findings. There is been slight enlargement   in comparison to 9/5/2023. Recurrent neoplasm is not excluded. This could be   evaluated further with follow-up chest CT in 3 to 6 months or follow-up PET/CT.   4. Severe emphysema with main pulmonary artery enlargement compatible with   pulmonary hypertension.   5. Gastroesophageal reflux with decreased esophageal distention in comparison to   prior exam.         ABDOMEN/PELVIS:      1. Oral contrast extends through the stomach and into the distal small bowel. No   evidence of bowel obstruction.   2. No acute abdominal or pelvic abnormality identified.               XR CHEST PORTABLE   Final Result   1. No acute disease.        Assessment & Plan     Ms. Joseph is an 79-year-old female who presented on 4/30 for evaluation of nausea and dysphagia at 
SOB, productive cough and URI symptoms past few days. CT chest showed consolidation left lung.   - Was started on azithro+rocephin  - strep, legionella, resp cx ordered  - blood cxX2  - Monitor vitals and labs    Pneumonia likely aspiration etiology sec to complication of lap band: POA  Also has Parkinson's disease, hence at risk for dysphagia  - Reports SOB, productive cough and URI symptoms past few days. CT chest showed consolidation left lung.   - CT showed dilated esophagus with significant retained fluid. Fluid removed from Lap band using a straight mcintosh needle-approx 6 cc obtained of clear fluid.   - Was started on azithro+rocephin  - strep, legionella, resp cx ordered  - Gen surgery following    Esophagus dilation with dysphagia sec to esophageal obstruction ,  fluid retention,  complication of band gastroplasty.   - improved with aspiration of fluid by general surgery  - gen surg following  - may need lap removal if pt continues to have obstruction  - diet per surgery    Acute hypercapnic hypoxic resp failure: POA  Chronic respiratory failure sec to COPD: POA   On home 3-4L NC. Noted to have vbg pH 7.283 , pCO2 72.   2/2 COPD exacerbation and bacterial pneumonia  Pt could not tolerate BIPAP  - Continue vapotherm  - Continue steroids, breathing tx  - wean O2 as tolerated  - mucinex, symptomatic treatment  - Pulm eval if worsens     Chronic diastolic CHF.  Has chronic edema w tenderness chronic per pt. No sign of exacerbation    last echo 6/16/23 EF 55%, bi-atrial enlargement, mild MR, TR, mild LVH  - resumed home bumex    GERD w hx of ulcer  - on PPI BID     HTN  Stable. on losartan and amlodipine  - resume once sepsis picture stabalizes     Nodular opacity in the anterior left upper lobe  slightly enlarged compared to 9/2023 scan.   - needs follow up outpatient        The patient and / or the family were informed of the results of any tests, a time was given to answer questions, a plan was proposed and 
Recovered back to 90% after 1-2 mintues rest and pursed lip breathing.  RN informed                 OutComes Score                                                  AM-PAC - Mobility    AM-PAC Basic Mobility - Inpatient   How much help is needed turning from your back to your side while in a flat bed without using bedrails?: A Little  How much help is needed moving from lying on your back to sitting on the side of a flat bed without using bedrails?: A Little  How much help is needed moving to and from a bed to a chair?: A Little  How much help is needed standing up from a chair using your arms?: A Little  How much help is needed walking in hospital room?: A Little  How much help is needed climbing 3-5 steps with a railing?: A Lot  AM-PAC Inpatient Mobility Raw Score : 17  AM-PAC Inpatient T-Scale Score : 42.13  Mobility Inpatient CMS 0-100% Score: 50.57  Mobility Inpatient CMS G-Code Modifier : CK         Tinneti Score       Goals  Short Term Goals  Time Frame for Short Term Goals: By discharge  Short Term Goal 1: Sup to sit modified independent  Short Term Goal 2: Pt will transfer sit to stand supervision  Short Term Goal 3: Pt will amb >80' with rollator supervision       Education  Patient Education  Education Given To: Patient  Education Provided: Role of Therapy;Plan of Care  Education Provided Comments: need for assist, importance of OOB/ambulation, balancing activity with rest  Education Method: Verbal  Education Outcome: Verbalized understanding;Continued education needed      Therapy Time   Individual Concurrent Group Co-treatment   Time In 1129         Time Out 1207         Minutes 38             Timed Code Treatment Minutes: 23      Total Treatment Minutes:  38      Tova Oliver, PT         
water    Possible MBS tomorrow, 5/3/24, if off vapotherm     Strategies:   Upright for all po intake during and 30 min after all meals  Small sips/bites   Discharge Recommendation: TBD closer to discharge   Discussed with RNNitza  Needs met prior to leaving room, call light within reach    Monika Boone, SLP, [id number]  Ph. # [phone]    This document will serve as a dc summary if pt dc prior to next visit

## 2024-05-22 ENCOUNTER — OFFICE VISIT (OUTPATIENT)
Dept: BARIATRICS/WEIGHT MGMT | Age: 79
End: 2024-05-22
Payer: MEDICARE

## 2024-05-22 VITALS
RESPIRATION RATE: 18 BRPM | DIASTOLIC BLOOD PRESSURE: 60 MMHG | SYSTOLIC BLOOD PRESSURE: 125 MMHG | HEART RATE: 78 BPM | HEIGHT: 63 IN | OXYGEN SATURATION: 91 % | WEIGHT: 197 LBS | BODY MASS INDEX: 34.91 KG/M2

## 2024-05-22 DIAGNOSIS — K31.1 GASTRIC OUTLET OBSTRUCTION: ICD-10-CM

## 2024-05-22 DIAGNOSIS — K95.09 GASTRIC BAND SLIPPAGE: Primary | ICD-10-CM

## 2024-05-22 PROCEDURE — 3078F DIAST BP <80 MM HG: CPT | Performed by: SURGERY

## 2024-05-22 PROCEDURE — 3074F SYST BP LT 130 MM HG: CPT | Performed by: SURGERY

## 2024-05-22 PROCEDURE — 1123F ACP DISCUSS/DSCN MKR DOCD: CPT | Performed by: SURGERY

## 2024-05-22 PROCEDURE — 99214 OFFICE O/P EST MOD 30 MIN: CPT | Performed by: SURGERY

## 2024-05-23 NOTE — PROGRESS NOTES
[facility]   General & Laparoscopic Surgery  [employer]       HPI:    Tova Joseph is very pleasant 79 y.o. female who is referred from the [facility] emergency department.     Patient following up from hospital after being seen for Gastric outlet obstruction on 4/30/2024 resolved with fluid removed from band.     Patient comes to the clinic to report she is doing well, having bowel movements, and tolerating diet after the fluid was removed from the lap band. denies any nausea, vomiting, fevers, chills, shortness of breath, chest pain, cough, constipation or difficulty urinating.    Past Medical History:   Diagnosis Date    Arthritis     Asthma     Bronchitis chronic     CHF (congestive heart failure) ([facility])     Chronic respiratory failure ([facility])     COPD (chronic obstructive pulmonary disease) ([facility])     Depression     Diabetes mellitus ([facility])     BORDERLINE NO MEDS    Emphysema of lung ([facility])     spontaneous pneumo 5/07/2020    GI bleed 12/15/2021    Glaucoma     Hypertension     Hypoxemia 04/15/2011    Morbid obesity ([facility])     Oxygen dependent     Uses O2 NC at 3L/min continuously    Pneumonia of left upper lobe due to Pseudomonas species ([facility]) 12/01/2021    Rash     due to nasal canula    SBO (small bowel obstruction) ([facility]) 02/10/2012    Shingles     recently no problems now    Sleep apnea     C pap    Spontaneous tension pneumothorax 05/01/2020     Past Surgical History:   Procedure Laterality Date    BRONCHOSCOPY N/A 11/30/2021    BRONCHOSCOPY ALVEOLAR LAVAGE performed by Esau Trinidad MD at [facility] ENDOSCOPY    BRONCHOSCOPY  11/30/2021    BRONCHOSCOPY THERAPUTIC ASPIRATION INITIAL performed by Esau Trinidad MD at [facility] ENDOSCOPY    CATARACT REMOVAL WITH IMPLANT  3/9/2011    CATARACT REMOVAL WITH IMPLANT  3/23/2011    CHOLECYSTECTOMY      COLON SURGERY      11\" removed    COLONOSCOPY  11/18/13    Diverticulosis    HERNIA REPAIR      HYSTERECTOMY (CERVIX STATUS UNKNOWN)

## 2024-05-24 ENCOUNTER — OFFICE VISIT (OUTPATIENT)
Dept: PULMONOLOGY | Age: 79
End: 2024-05-24

## 2024-05-24 VITALS
WEIGHT: 197 LBS | SYSTOLIC BLOOD PRESSURE: 136 MMHG | BODY MASS INDEX: 34.91 KG/M2 | RESPIRATION RATE: 14 BRPM | OXYGEN SATURATION: 94 % | HEART RATE: 77 BPM | HEIGHT: 63 IN | DIASTOLIC BLOOD PRESSURE: 62 MMHG

## 2024-05-24 DIAGNOSIS — J96.11 CHRONIC HYPOXEMIC RESPIRATORY FAILURE (HCC): ICD-10-CM

## 2024-05-24 DIAGNOSIS — Z85.118 HISTORY OF LUNG CANCER: Primary | ICD-10-CM

## 2024-05-24 DIAGNOSIS — J44.9 COPD, SEVERE (HCC): ICD-10-CM

## 2024-05-24 RX ORDER — FLUTICASONE FUROATE 200 UG/1
POWDER RESPIRATORY (INHALATION)
Qty: 30 EACH | Refills: 5 | Status: SHIPPED | OUTPATIENT
Start: 2024-05-24

## 2024-05-24 RX ORDER — TIOTROPIUM BROMIDE 18 UG/1
18 CAPSULE ORAL; RESPIRATORY (INHALATION) DAILY
Qty: 30 CAPSULE | Refills: 5 | Status: SHIPPED | OUTPATIENT
Start: 2024-05-24

## 2024-05-24 NOTE — PROGRESS NOTES
PULMONARY, CRITICAL CARE AND SLEEP MEDICINE    Outpatient Pulmonary Progress Note   CC: COPD    Interval History 5/24/24:   Looks good, weight is down again, breathing pretty good overall.  Was hospitalized in April with aspiration 2/2  lap band     Interval History 12/12/22:  COPD on 4 to 4.5 L continuous.  Had CT CHEST and here to discuss results.     reports that she quit smoking about 19 years ago. Her smoking use included cigarettes. She started smoking about 29 years ago. She has a 15.0 pack-year smoking history. She has been exposed to tobacco smoke. She has never used smokeless tobacco.     PHYSICAL EXAM:   Blood pressure 136/62, pulse 77, resp. rate 14, height 1.6 m (5' 3\"), weight 89.4 kg (197 lb), SpO2 94 %. 'on NC 4 L   241# May 2013; 258# May 2014; 260# Feb 2015, 246# Sep 2015; 239# Nov 2015, 255# Mar 2016, 243# Sep 2016, 257# Mar 2017, 249# Jan 2018, 249# Jul 2018, 246# Sep 2018, 256# Dec 2018; 252# Sep 2019; 260# Dec 2019; 263# Sep 2020, 255# Dec 2020; 241# Aug 2021; 212# Jul 2022; 229# Nov 2022; 217# 9/5/23; 201# 2/8/24; 197# 5/24/24  Constitutional:  No acute distress.   HENT:  Oropharynx is clear and moist.   Neck: No tracheal deviation present.   Cardiovascular: Normal heart sounds.  + lower extremity edema.  Pulmonary/Chest: No wheezes. No rhonchi. No rales. + decreased breath sounds.  No accessory muscle usage or stridor.   Musculoskeletal: No cyanosis. No clubbing.  Skin: Skin is warm and dry.   Psychiatric: Normal mood and affect.  Neurologic: speech fluent, alert and oriented, strength symmetric  Tremor     DATA:   PFT Nov 2009 FEV1 1.07 L  54%  DLCO 7.16  PFT Feb 2013 FEV1 1.0 L  46% DLCO 10.44  PFT 4-21-15 FEV1 1.1 L 51% DLCO 9.72 45% %    PSG at [facility] with re-titration 2010 post surgery  PSG @ [facility] 6-11-14 AHI 0.6    CT CHEST 4/30/24  Lungs and Pleura: Severe emphysematous changes with panlobular changes in the  lung apices and in the superior segment of lower lobes. Linear opacity in

## 2024-05-28 PROBLEM — K31.1 GASTRIC OUTLET OBSTRUCTION: Status: ACTIVE | Noted: 2024-05-28

## 2024-06-25 DIAGNOSIS — J44.9 COPD, SEVERE (HCC): ICD-10-CM

## 2024-06-25 DIAGNOSIS — J96.11 CHRONIC HYPOXEMIC RESPIRATORY FAILURE (HCC): ICD-10-CM

## 2024-06-25 RX ORDER — FLUTICASONE FUROATE 200 UG/1
POWDER RESPIRATORY (INHALATION)
Qty: 30 EACH | Refills: 5 | Status: SHIPPED | OUTPATIENT
Start: 2024-06-25

## 2024-08-24 NOTE — ED NOTES
Per squad, Pt fell out of bed and became unresponsive. Family started CPR prior to squad arrival. X4 epi given and narcan in route. Pt unresponsive, unable to get ROSC. Airway

## 2024-08-24 NOTE — ED PROVIDER NOTES
bronchoscopy (N/A, 11/30/2021); bronchoscopy (11/30/2021); Upper gastrointestinal endoscopy (N/A, 12/16/2021); and IR MIDLINE CATH (4/10/2023).    Med list:   No current facility-administered medications on file prior to encounter.     Current Outpatient Medications on File Prior to Encounter   Medication Sig Dispense Refill    fluticasone (ARNUITY ELLIPTA) 200 MCG/ACT AEPB INHALE 1 INHALATION INTO THE LUNGS DAILY RINSING MOUTH AFTER USE. 30 each 5    tiotropium (SPIRIVA HANDIHALER) 18 MCG inhalation capsule Inhale 1 capsule into the lungs daily 30 capsule 5    vitamin D3 (CHOLECALCIFEROL) 125 MCG (5000 UT) TABS tablet Take 125 mcg by mouth daily      levETIRAcetam (KEPPRA) 250 MG tablet Take 1 tablet by mouth daily 60 tablet 3    levETIRAcetam (KEPPRA) 500 MG tablet Take 1 tablet by mouth nightly 60 tablet 3    losartan (COZAAR) 50 MG tablet Take 1 tablet by mouth daily 30 tablet 3    amLODIPine (NORVASC) 5 MG tablet Take 1 tablet by mouth daily 30 tablet 3    bumetanide (BUMEX) 1 MG tablet Take 1 tablet by mouth daily 30 tablet 3    sennosides-docusate sodium (SENOKOT-S) 8.6-50 MG tablet Take 1 tablet by mouth daily 10 tablet 0    ipratropium 0.5 mg-albuterol 2.5 mg (DUONEB) 0.5-2.5 (3) MG/3ML SOLN nebulizer solution Take 3 mLs by nebulization every 4 hours as needed for Shortness of Breath 360 mL 0    Cyanocobalamin (VITAMIN B 12 PO) Take by mouth      carbidopa-levodopa (SINEMET)  MG per tablet TAKE ONE TABLET BY MOUTH THREE TIMES DAILY FOR 90 DAYS **ON HOLD,EXPIRES 6-4-24** NEXT FILL 12/15/23      albuterol sulfate HFA (PROVENTIL;VENTOLIN;PROAIR) 108 (90 Base) MCG/ACT inhaler Inhale 2 puffs into the lungs every 6 hours as needed for Wheezing or Shortness of Breath 8.5 g 5    allopurinol (ZYLOPRIM) 100 MG tablet Take 2 tablets by mouth daily      potassium chloride (KLOR-CON M) 20 MEQ extended release tablet Take 1 tablet by mouth daily      primidone (MYSOLINE) 50 MG tablet Take 2 tablets by mouth at  bedtime      pantoprazole (PROTONIX) 40 MG tablet Take 1 tablet by mouth 2 times daily (before meals) 30 tablet 3    folic acid (FOLVITE) 1 MG tablet TAKE 1 TABLET (1 MG TOTAL) BY MOUTH DAILY.      DULoxetine (CYMBALTA) 60 MG extended release capsule Take 1 capsule by mouth daily         PHYSICAL EXAM:  ED Triage Vitals   BP Systolic BP Percentile Diastolic BP Percentile Temp Temp src Pulse Resp SpO2   -- -- -- -- -- 24 0037 -- 24 0039        (!) 0  (!) 61 %      Height Weight         -- --                        Physical Exam   Patient unresponsive.  Endotracheal tube in place with bloody sputum in the tube and spewing out of it.  Dada device providing chest compressions.  During pulse checks no pulse.  No heart sounds.  No breath sounds.  Patient obese with 1+ peripheral edema.  Abdomen soft and nondistended.        EMERGENCY DEPARTMENT COURSE and DIFFERENTIAL DIAGNOSIS/MDM:          Vitals:    Vitals:    247 24   Pulse: (!) 0    SpO2:  (!) 61%        Patient was given the following medications:   Medications - No data to display               CC/HPI Summary, DDx, ED Course, and Reassessment: EMS arrived with ACLS care in progress.  We continued the Dada device compressions and provided bag delivered respirations.  Endotracheal tube was suctioned.  At the first pulse check the patient was in asystole with no palpable pulses.  On the second pulse check this was the same.  At this point the patient had been in cardiac arrest for nearly an hour with no return of spontaneous circulation and is now asystole on back-to-back pulse checks.  Patient declared  at 12:41 AM.        I am the primary physician of Record.     FINAL IMPRESSION    1. Asystole (HCC)         DISPOSITION/PLAN   DISPOSITION  2024 12:46:00 AM  Condition at Disposition: Data Unavailable       PATIENT REFERRED TO:   No follow-up provider specified.   DISCHARGE MEDICATIONS:   New Prescriptions    No

## 2024-08-24 NOTE — PROGRESS NOTES
08/24/24 0253   Encounter Summary   Encounter Overview/Reason Grief, Loss, and Adjustments   Service Provided For Family   Referral/Consult From Family   Support System Spouse;Children;Family members   Last Encounter    (8/24 sppt and prayer w/ family at bedside after Pt's passing)   Complexity of Encounter High   Begin Time 0220   End Time  0254   Total Time Calculated 34 min

## 2024-08-26 LAB
GLUCOSE BLD-MCNC: 156 MG/DL (ref 70–99)
PERFORMED ON: ABNORMAL

## 2024-09-03 NOTE — PROGRESS NOTES
September 3, 2024    Home health orders was received via fax for Dr. Lund.  Patient label was attached to paperwork and placed in provider's inbox to be signed.    Urszula Garner   daily.     Presumed PE.  - VQ scan with high probability of PE.  - On Apixaban.    ____________________________________  Mckenzie Perez MD  [facility]  www."[email or web address]  Office: [phone]

## (undated) DEVICE — SINGLE USE BIOPSY VALVE MAJ-210: Brand: SINGLE USE BIOPSY VALVE (STERILE)

## (undated) DEVICE — YANKAUER,BULB TIP,W/O VENT,RIGID,STERILE: Brand: MEDLINE

## (undated) DEVICE — TUBING, SUCTION, 3/16" X 12', STRAIGHT: Brand: MEDLINE

## (undated) DEVICE — ELECTRODE,ECG,STRESS,FOAM,3PK: Brand: MEDLINE

## (undated) DEVICE — SYRINGE MED 10ML SLIP TIP BLNT FILL AND LUERLOCK DISP

## (undated) DEVICE — SYRINGE MED 50ML LUERSLIP TIP

## (undated) DEVICE — ELECTRODE,RADIOTRANSLUCENT,FOAM,3PK: Brand: MEDLINE

## (undated) DEVICE — CANNULA NSL AD TBNG L7FT PVC STR NONFLARED PRNG O2 DEL W STD

## (undated) DEVICE — ENDOSCOPIC KIT 2 12 FT OP4 DE2 GWN SYR

## (undated) DEVICE — SYRINGE MED 10ML LUERLOCK TIP W/O SFTY DISP

## (undated) DEVICE — SYRINGE LL 10CC

## (undated) DEVICE — CONMED SCOPE SAVER BITE BLOCK, 20X27 MM: Brand: SCOPE SAVER

## (undated) DEVICE — TRAP,MUCUS SPECIMEN, 80CC: Brand: MEDLINE

## (undated) DEVICE — ADAPTER,CATHETER/SYRINGE/LUER,STERILE: Brand: MEDLINE

## (undated) DEVICE — TUBING, SUCTION, 3/16" X 6', STRAIGHT: Brand: MEDLINE

## (undated) DEVICE — BOWL MED M 16OZ PLAS CAP GRAD

## (undated) DEVICE — FORCEPS BX L115CM ALGTR JAW STP DISP

## (undated) DEVICE — SINGLE USE SUCTION VALVE MAJ-209: Brand: SINGLE USE SUCTION VALVE (STERILE)